# Patient Record
Sex: FEMALE | Race: WHITE | Employment: FULL TIME | URBAN - METROPOLITAN AREA
[De-identification: names, ages, dates, MRNs, and addresses within clinical notes are randomized per-mention and may not be internally consistent; named-entity substitution may affect disease eponyms.]

---

## 2017-04-28 DIAGNOSIS — I61.9 NONTRAUMATIC INTRACEREBRAL HEMORRHAGE OF BASAL GANGLIA (HCC): ICD-10-CM

## 2017-04-28 RX ORDER — HYDROCHLOROTHIAZIDE 25 MG/1
25 TABLET ORAL DAILY
Qty: 14 TAB | Refills: 0 | Status: SHIPPED | OUTPATIENT
Start: 2017-04-28 | End: 2018-01-29 | Stop reason: SDUPTHER

## 2017-05-01 ENCOUNTER — OFFICE VISIT (OUTPATIENT)
Dept: CARDIOLOGY CLINIC | Age: 47
End: 2017-05-01

## 2017-05-01 VITALS
BODY MASS INDEX: 26.92 KG/M2 | DIASTOLIC BLOOD PRESSURE: 72 MMHG | RESPIRATION RATE: 16 BRPM | WEIGHT: 161.6 LBS | SYSTOLIC BLOOD PRESSURE: 124 MMHG | HEART RATE: 74 BPM | HEIGHT: 65 IN | OXYGEN SATURATION: 99 %

## 2017-05-01 DIAGNOSIS — E78.5 DYSLIPIDEMIA: ICD-10-CM

## 2017-05-01 DIAGNOSIS — E10.8 TYPE 1 DIABETES MELLITUS WITH COMPLICATION (HCC): ICD-10-CM

## 2017-05-01 DIAGNOSIS — I25.10 CORONARY ARTERY DISEASE INVOLVING NATIVE CORONARY ARTERY OF NATIVE HEART WITHOUT ANGINA PECTORIS: Primary | ICD-10-CM

## 2017-05-01 RX ORDER — ACETAMINOPHEN, DIPHENHYDRAMINE HCL, PHENYLEPHRINE HCL 325; 25; 5 MG/1; MG/1; MG/1
TABLET ORAL DAILY
COMMUNITY

## 2017-05-01 RX ORDER — INSULIN LISPRO 100 [IU]/ML
INJECTION, SOLUTION INTRAVENOUS; SUBCUTANEOUS
COMMUNITY
End: 2020-02-05 | Stop reason: SDUPTHER

## 2017-05-01 RX ORDER — LISINOPRIL 40 MG/1
40 TABLET ORAL
COMMUNITY
End: 2020-05-15

## 2017-05-01 NOTE — PATIENT INSTRUCTIONS
[facility] Activation    Thank you for requesting access to [facility]. Please follow the instructions below to securely access and download your online medical record. [facility] allows you to send messages to your doctor, view your test results, renew your prescriptions, schedule appointments, and more. How Do I Sign Up? 1. In your internet browser, go to [email or web address]  2. Click on the First Time User? Click Here link in the Sign In box. You will be redirect to the New Member Sign Up page. 3. Enter your [facility] Access Code exactly as it appears below. You will not need to use this code after youve completed the sign-up process. If you do not sign up before the expiration date, you must request a new code. [facility] Access Code: [email or web address]  Expires: 2017  4:17 PM (This is the date your [facility] access code will )    4. Enter the last four digits of your Social Security Number (xxxx) and Date of Birth (mm/dd/yyyy) as indicated and click Submit. You will be taken to the next sign-up page. 5. Create a [facility] ID. This will be your [facility] login ID and cannot be changed, so think of one that is secure and easy to remember. 6. Create a [facility] password. You can change your password at any time. 7. Enter your Password Reset Question and Answer. This can be used at a later time if you forget your password. 8. Enter your e-mail address. You will receive e-mail notification when new information is available in [address]. 9. Click Sign Up. You can now view and download portions of your medical record. 10. Click the Download Summary menu link to download a portable copy of your medical information. Additional Information    If you have questions, please visit the Frequently Asked Questions section of the [facility] website at [email or web address] Remember, [facility] is NOT to be used for urgent needs. For medical emergencies, dial 911.            Learning About Coronary Artery Disease (CAD)  What is coronary artery disease? Coronary artery disease (CAD) occurs when plaque builds up in the arteries that bring oxygen-rich blood to your heart. Plaque is a fatty substance made of cholesterol, calcium, and other substances in the blood. This process is called hardening of the arteries, or atherosclerosis. What happens when you have coronary artery disease? · Plaque may narrow the coronary arteries. Narrowed arteries cause poor blood flow. This can lead to angina symptoms such as chest pain or discomfort. If blood flow is completely blocked, you could have a heart attack. · You can slow CAD and reduce the risk of future problems by making changes in your lifestyle. These include quitting smoking and eating heart-healthy foods. · Treatments for CAD, along with changes in your lifestyle, can help you live a longer and healthier life. How can you prevent coronary artery disease? · Do not smoke. It may be the best thing you can do to prevent heart disease. If you need help quitting, talk to your doctor about stop-smoking programs and medicines. These can increase your chances of quitting for good. · Be active. Get at least 30 minutes of exercise on most days of the week. Walking is a good choice. You also may want to do other activities, such as running, swimming, cycling, or playing tennis or team sports. · Eat heart-healthy foods. Eat more fruits and vegetables and less foods that contain saturated and trans fats. Limit alcohol, sodium, and sweets. · Stay at a healthy weight. Lose weight if you need to. · Manage other health problems such as diabetes, high blood pressure, and high cholesterol. · Manage stress. Stress can hurt your heart. To keep stress low, talk about your problems and feelings. Don't keep your feelings hidden. · If you have talked about it with your doctor, take a low-dose aspirin every day.  Aspirin can help certain people lower their risk of a heart attack or stroke. But taking aspirin isn't right for everyone, because it can cause serious bleeding. Do not start taking daily aspirin unless your doctor knows about it. How is coronary artery disease treated? · Your doctor will suggest that you make lifestyle changes. For example, your doctor may ask you to eat healthy foods, quit smoking, lose extra weight, and be more active. · You will have to take medicines. · Your doctor may suggest a procedure to open narrowed or blocked arteries. This is called angioplasty. Or your doctor may suggest using healthy blood vessels to create detours around narrowed or blocked arteries. This is called bypass surgery. Follow-up care is a key part of your treatment and safety. Be sure to make and go to all appointments, and call your doctor if you are having problems. It's also a good idea to know your test results and keep a list of the medicines you take. Where can you learn more? Go to http://kelly-dionte.info/. Enter [phone] in the search box to learn more about \"Learning About Coronary Artery Disease (CAD). \"  Current as of: January 27, 2016  Content Version: 11.2  © 3336-9048 [employer]. Care instructions adapted under license by [employer] (which disclaims liability or warranty for this information). If you have questions about a medical condition or this instruction, always ask your healthcare professional. [address] any warranty or liability for your use of this information.

## 2017-05-01 NOTE — MR AVS SNAPSHOT
Visit Information Date & Time Provider Department Dept. Phone Encounter #  
 5/1/2017  4:00 PM Aniket Orosco MD [facility] Elissa Smith [phone] [id number] Upcoming Health Maintenance Date Due HEMOGLOBIN A1C Q6M 1972 LIPID PANEL Q1 1972 FOOT EXAM Q1 2/5/1982 MICROALBUMIN Q1 2/5/1982 EYE EXAM RETINAL OR DILATED Q1 2/5/1982 Pneumococcal 19-64 Medium Risk (1 of 1 - PPSV23) 2/5/1991 DTaP/Tdap/Td series (1 - Tdap) 2/5/1993 PAP AKA CERVICAL CYTOLOGY 2/5/1993 INFLUENZA AGE 9 TO ADULT 8/1/2017 Allergies as of 5/1/2017  Review Complete On: 5/1/2017 By: Aniket Orosco MD  
  
 Severity Noted Reaction Type Reactions Erythromycin  08/26/2011    Hives Current Immunizations  Never Reviewed Name Date Influenza Vaccine 9/13/2016 Not reviewed this visit You Were Diagnosed With   
  
 Codes Comments Coronary artery disease involving native coronary artery of native heart without angina pectoris    -  Primary ICD-10-CM: I25.10 ICD-9-CM: 414.01 Vitals BP Pulse Resp Height(growth percentile) Weight(growth percentile) SpO2  
 124/72 (BP 1 Location: Left arm) 74 16 5' 5\" (1.651 m) 161 lb 9.6 oz (73.3 kg) 99% BMI OB Status Smoking Status 26.89 kg/m2 IUD Never Smoker Vitals History BMI and BSA Data Body Mass Index Body Surface Area  
 26.89 kg/m 2 1.83 m 2 Preferred Pharmacy Pharmacy Name Phone [id number] [address] [phone] Your Updated Medication List  
  
   
This list is accurate as of: 5/1/17  4:31 PM.  Always use your most recent med list.  
  
  
  
  
 COREG CR 10 mg CR capsule Generic drug:  carvedilol Take  by mouth daily (with breakfast). CRESTOR 20 mg tablet Generic drug:  rosuvastatin Take 20 mg by mouth daily. HumaLOG 100 unit/mL injection Generic drug:  insulin lispro Via insulin pump  
  
 hydroCHLOROthiazide 25 mg tablet Commonly known as:  HYDRODIURIL Take 1 Tab by mouth daily. Intrauterine Device (IUD) Iud  
by IntraUTERine route. Placed in Feb 2013 LEXAPRO 10 mg tablet Generic drug:  escitalopram oxalate Take 10 mg by mouth daily. lisinopril 40 mg tablet Commonly known as:  Landon Lofty Take 40 mg by mouth daily. spironolactone 25 mg tablet Commonly known as:  ALDACTONE Take 1 Tab by mouth three (3) days a week. VITAMIN B-12 5,000 mcg Subl Generic drug:  cyanocobalamin (vitamin B-12) by SubLINGual route daily. We Performed the Following AMB POC EKG ROUTINE W/ 12 LEADS, INTER & REP [23097 CPT(R)] Introducing [facility] SERVICES! Liana Yusuf introduces [employer] patient portal. Now you can access parts of your medical record, email your doctor's office, and request medication refills online. 1. In your internet browser, go to [email or web address] 2. Click on the First Time User? Click Here link in the Sign In box. You will see the New Member Sign Up page. 3. Enter your [employer] Access Code exactly as it appears below. You will not need to use this code after youve completed the sign-up process. If you do not sign up before the expiration date, you must request a new code. · [employer] Access Code: [email or web address] Expires: 7/30/2017  4:17 PM 
 
4. Enter the last four digits of your Social Security Number (xxxx) and Date of Birth (mm/dd/yyyy) as indicated and click Submit. You will be taken to the next sign-up page. 5. Create a [employer] ID. This will be your [employer] login ID and cannot be changed, so think of one that is secure and easy to remember. 6. Create a [employer] password. You can change your password at any time. 7. Enter your Password Reset Question and Answer. This can be used at a later time if you forget your password. 8. Enter your e-mail address. You will receive e-mail notification when new information is available in [address] 9. Click Sign Up. You can now view and download portions of your medical record. 10. Click the Download Summary menu link to download a portable copy of your medical information. If you have questions, please visit the Frequently Asked Questions section of the [employer] website. Remember, [employer] is NOT to be used for urgent needs. For medical emergencies, dial 911. Now available from your iPhone and Android! Please provide this summary of care documentation to your next provider. Your primary care clinician is listed as [address] [place] [address] Street. If you have any questions after today's visit, please call [phone].

## 2017-05-01 NOTE — PROGRESS NOTES
Subjective:     Problem List  Date Reviewed: 5/1/2017          Codes Class Noted    Nontraumatic acute hemorrhage of basal ganglia ([facility] [address] ICD-10-CM: I61.9  ICD-9-CM: 465  11/7/2016        Stroke (cerebrum) [facility]) ICD-10-CM: I63.9  ICD-9-CM: 434.91  9/23/2016        Coronary artery disease ICD-10-CM: I25.10  ICD-9-CM: 414.00  8/26/2011    Overview Addendum 11/5/2013  4:45 PM by Cary Lopez MD     a. Echo (11/21/6): EF 60%. PASP 20.  b. Exercise Cardiolite (11/17/6): Reversible anteroseptal defect extending to apex. EF 69%. c. Cath (11/28/6): LAD p80, m50; D1 50. OM1 40. RCA p40. EF 65%. No AVG/MR.  d. PCI pLAD (11/29/6): 2.5x13 Cypher. e. Exercise Myoview (10/4/7): Normal perfusion. EF 74%.  f. Exercise Echo (5/11/10): EF 60%. No ischemia. G.  Holter (6/27/13): Unremarkable. H. Exercise Cardiolite (6/27/13): Normal perfusion. EF 74%. Dyslipidemia ICD-10-CM: E78.5  ICD-9-CM: 272.4  8/26/2011    Overview Signed 8/26/2011  4:04 PM by Cary Lopez MD     a. FLP (10/26/6): Tot 168, , HDL 60, LDL 82 (Vytorin 10/80mg qd). b. Ana Spine (11/29/6): Tot 131, TG 73, HDL 44, LDL 72 (Vytorin 10/80mg qd). c. FLP (8/3/7): Tot 163, , HDL 53, LDL 86 (Vytorin 10/80mg qd). Diabetes mellitus type 1 (HCC) ICD-10-CM: E10.9  ICD-9-CM: 250.01  8/26/2011        History of peptic ulcer disease ICD-10-CM: Z87.11  ICD-9-CM: V12.71  8/26/2011              Ms. Matilde Santa is a 41 y.o. woman with the above past medical history, who presents for follow up of her coronary artery disease. She is doing well from a cardiac standpoint. We reviewed her most recent hospitalization when she had a cerebral bleed felt to be due to elevated blood pressure. Her symptoms completely resolved. She denies any chest pain, chest discomfort, shortness of breath, dyspnea on exertion, orthopnea, paroxysmal nocturnal dyspnea, lower extremity swelling, palpitations, syncope or near syncope.           History   Smoking Status  Never Smoker   Smokeless Tobacco    Never Used       Current Outpatient Prescriptions   Medication Sig Dispense Refill    lisinopril (PRINIVIL, ZESTRIL) 40 mg tablet Take 40 mg by mouth daily.  insulin lispro (HUMALOG) 100 unit/mL injection Via insulin pump      cyanocobalamin, vitamin B-12, (VITAMIN B-12) 5,000 mcg subl by SubLINGual route daily.  hydroCHLOROthiazide (HYDRODIURIL) 25 mg tablet Take 1 Tab by mouth daily. 14 Tab 0    spironolactone (ALDACTONE) 25 mg tablet Take 1 Tab by mouth three (3) days a week. 15 Tab 0    escitalopram (LEXAPRO) 10 mg tablet Take 10 mg by mouth daily.  Intrauterine Device, IUD, IUD by IntraUTERine route. Placed in Feb 2013      rosuvastatin (CRESTOR) 20 mg tablet Take 20 mg by mouth daily.  carvedilol (COREG CR) 10 mg CR capsule Take  by mouth daily (with breakfast). Objective:     Visit Vitals    /72 (BP 1 Location: Left arm)    Pulse 74    Resp 16    Ht 5' 5\" (1.651 m)    Wt 161 lb 9.6 oz (73.3 kg)    SpO2 99%    BMI 26.89 kg/m2       HEENT Exam:     Normocephalic, atraumatic. EOMI. Oropharynx negative. Neck supple. No lymphadenopathy. Lung Exam:     The patient is not dyspneic. There is no cough. The lungs are clear to percussion. Breath sounds are heard equally in all lung fields. There are no wheezes, rales, rhonchi, or rubs heard on auscultation. Heart Exam:     The rhythm is regular. The PMI is in the 5th intercostal space of the MCL. Apical impulse is normal. S1 is regular. S2 is physiologic. There is no S3, S4 gallop, murmur, click, or rub. Abdomen Exam:     Bowel sounds are normoactive. Abdomen benign. Extremities Exam:     The extremities are atraumatic appearing. There is no clubbing, cyanosis, edema, ulcers, varicose veins, rash, swelling, erythemia noted in the extremities. The neurovascular status is grossly intact with normal distal sensation and pulses.           Vascular Exam: The radial, brachial, dorsalis pedis, posterior tibial, are equal and strong bilaterally The carotids are equal bilaterally without bruits. EKG: NSR @ 74, no ischy. Assessment/Plan:     Ms. Efe Sarmiento appears stable from a cardiac standpoint. We are going to stay the course with her current medication regimen, and she is going to follow up with me in one year's time. We discussed diet and exercise. Plan:  1. Continue outpatient medication regimen. 2. Follow up with me in one year's time. 3. Diet and exercise. 4. Call my office, call her primary care physician, or return to the hospital should any concerning symptomatology arise. Ms. Efe Sarmiento indicated that she understood this plan and wished to proceed ahead.              Patient Care Team:  Nhan Bajwa MD as PCP - General (Family Practice)  Toro Sharp MD (Nephrology)  Gene Burroughs MD (Obstetrics & Gynecology)  Jordin Hou MD as Physician (Cardiology)  Leif Gaona MD (Neurology)  Eugenie Tran MD (Orthopedic Surgery)

## 2018-01-29 DIAGNOSIS — I61.9 NONTRAUMATIC INTRACEREBRAL HEMORRHAGE OF BASAL GANGLIA (HCC): ICD-10-CM

## 2018-01-29 RX ORDER — HYDROCHLOROTHIAZIDE 25 MG/1
25 TABLET ORAL DAILY
Qty: 14 TAB | Refills: 0 | Status: SHIPPED | OUTPATIENT
Start: 2018-01-29

## 2018-01-29 RX ORDER — HYDROCHLOROTHIAZIDE 25 MG/1
TABLET ORAL
Qty: 90 TAB | Refills: 2 | Status: SHIPPED | OUTPATIENT
Start: 2018-01-29 | End: 2019-12-11 | Stop reason: SDUPTHER

## 2018-01-29 RX ORDER — HYDROCHLOROTHIAZIDE 25 MG/1
25 TABLET ORAL DAILY
Qty: 30 TAB | Refills: 6 | Status: SHIPPED | OUTPATIENT
Start: 2018-01-29 | End: 2018-01-29 | Stop reason: ALTCHOICE

## 2018-01-29 NOTE — TELEPHONE ENCOUNTER
Patient stated that her new pharmacy is [facility]. She only has 1 pill left. Patient would like to  a few samples if possible Thanks!    Fax# [phone]  Phone [phone]  Phone [phone] sk

## 2018-03-08 ENCOUNTER — HOSPITAL ENCOUNTER (OUTPATIENT)
Dept: DIABETES SERVICES | Age: 48
Discharge: HOME | End: 2018-03-08

## 2018-05-31 ENCOUNTER — HOSPITAL ENCOUNTER (OUTPATIENT)
Dept: DIABETES SERVICES | Age: 48
Discharge: HOME | End: 2018-05-31
Attending: INTERNAL MEDICINE

## 2018-06-25 ENCOUNTER — HOSPITAL ENCOUNTER (OUTPATIENT)
Dept: DIABETES SERVICES | Age: 48
Discharge: HOME | End: 2018-06-25
Attending: INTERNAL MEDICINE

## 2018-07-13 DIAGNOSIS — I61.9 NONTRAUMATIC INTRACEREBRAL HEMORRHAGE OF BASAL GANGLIA (HCC): ICD-10-CM

## 2018-07-24 RX ORDER — HYDROCHLOROTHIAZIDE 25 MG/1
TABLET ORAL
Qty: 90 TAB | OUTPATIENT
Start: 2018-07-24

## 2019-01-04 ENCOUNTER — APPOINTMENT (OUTPATIENT)
Dept: ULTRASOUND IMAGING | Age: 49
End: 2019-01-04
Attending: EMERGENCY MEDICINE
Payer: COMMERCIAL

## 2019-01-04 ENCOUNTER — APPOINTMENT (OUTPATIENT)
Dept: GENERAL RADIOLOGY | Age: 49
End: 2019-01-04
Attending: EMERGENCY MEDICINE
Payer: COMMERCIAL

## 2019-01-04 ENCOUNTER — APPOINTMENT (OUTPATIENT)
Dept: CT IMAGING | Age: 49
End: 2019-01-04
Attending: EMERGENCY MEDICINE
Payer: COMMERCIAL

## 2019-01-04 ENCOUNTER — HOSPITAL ENCOUNTER (EMERGENCY)
Age: 49
Discharge: HOME | End: 2019-01-04
Attending: EMERGENCY MEDICINE
Payer: COMMERCIAL

## 2019-01-04 VITALS
BODY MASS INDEX: 24.99 KG/M2 | SYSTOLIC BLOOD PRESSURE: 107 MMHG | DIASTOLIC BLOOD PRESSURE: 61 MMHG | WEIGHT: 150 LBS | HEART RATE: 67 BPM | HEIGHT: 65 IN | TEMPERATURE: 98.2 F | RESPIRATION RATE: 13 BRPM | OXYGEN SATURATION: 99 %

## 2019-01-04 DIAGNOSIS — R10.13 ABDOMINAL PAIN, EPIGASTRIC: Primary | ICD-10-CM

## 2019-01-04 DIAGNOSIS — R11.2 NON-INTRACTABLE VOMITING WITH NAUSEA, UNSPECIFIED VOMITING TYPE: ICD-10-CM

## 2019-01-04 DIAGNOSIS — D72.829 LEUKOCYTOSIS, UNSPECIFIED TYPE: ICD-10-CM

## 2019-01-04 LAB
ALBUMIN SERPL-MCNC: 3.7 G/DL (ref 3.5–5)
ALBUMIN/GLOB SERPL: 1.1 {RATIO} (ref 1.1–2.2)
ALP SERPL-CCNC: 62 U/L (ref 45–117)
ALT SERPL-CCNC: 25 U/L (ref 12–78)
ANION GAP SERPL CALC-SCNC: 6 MMOL/L (ref 5–15)
APPEARANCE UR: CLEAR
AST SERPL-CCNC: 15 U/L (ref 15–37)
BACTERIA URNS QL MICRO: ABNORMAL /HPF
BASOPHILS # BLD: 0.1 K/UL (ref 0–0.1)
BASOPHILS NFR BLD: 0 % (ref 0–1)
BILIRUB SERPL-MCNC: 1.1 MG/DL (ref 0.2–1)
BILIRUB UR QL: NEGATIVE
BUN SERPL-MCNC: 23 MG/DL (ref 6–20)
BUN/CREAT SERPL: 19 (ref 12–20)
CALCIUM SERPL-MCNC: 9.3 MG/DL (ref 8.5–10.1)
CHLORIDE SERPL-SCNC: 104 MMOL/L (ref 97–108)
CK SERPL-CCNC: 72 U/L (ref 26–192)
CO2 SERPL-SCNC: 29 MMOL/L (ref 21–32)
COLOR UR: ABNORMAL
CREAT SERPL-MCNC: 1.23 MG/DL (ref 0.55–1.02)
DIFFERENTIAL METHOD BLD: ABNORMAL
EOSINOPHIL # BLD: 0.4 K/UL (ref 0–0.4)
EOSINOPHIL NFR BLD: 2 % (ref 0–7)
EPITH CASTS URNS QL MICRO: ABNORMAL /LPF
ERYTHROCYTE [DISTWIDTH] IN BLOOD BY AUTOMATED COUNT: 12.1 % (ref 11.5–14.5)
GLOBULIN SER CALC-MCNC: 3.3 G/DL (ref 2–4)
GLUCOSE SERPL-MCNC: 144 MG/DL (ref 65–100)
GLUCOSE UR STRIP.AUTO-MCNC: NEGATIVE MG/DL
HCT VFR BLD AUTO: 37.2 % (ref 35–47)
HGB BLD-MCNC: 12.8 G/DL (ref 11.5–16)
HGB UR QL STRIP: NEGATIVE
HYALINE CASTS URNS QL MICRO: ABNORMAL /LPF (ref 0–5)
IMM GRANULOCYTES # BLD: 0.1 K/UL (ref 0–0.04)
IMM GRANULOCYTES NFR BLD AUTO: 1 % (ref 0–0.5)
KETONES UR QL STRIP.AUTO: NEGATIVE MG/DL
LEUKOCYTE ESTERASE UR QL STRIP.AUTO: NEGATIVE
LYMPHOCYTES # BLD: 1.5 K/UL (ref 0.8–3.5)
LYMPHOCYTES NFR BLD: 9 % (ref 12–49)
MCH RBC QN AUTO: 33 PG (ref 26–34)
MCHC RBC AUTO-ENTMCNC: 34.4 G/DL (ref 30–36.5)
MCV RBC AUTO: 95.9 FL (ref 80–99)
MONOCYTES # BLD: 1.4 K/UL (ref 0–1)
MONOCYTES NFR BLD: 8 % (ref 5–13)
NEUTS SEG # BLD: 13 K/UL (ref 1.8–8)
NEUTS SEG NFR BLD: 80 % (ref 32–75)
NITRITE UR QL STRIP.AUTO: NEGATIVE
NRBC # BLD: 0 K/UL (ref 0–0.01)
NRBC BLD-RTO: 0 PER 100 WBC
PH UR STRIP: 5.5 [PH] (ref 5–8)
PLATELET # BLD AUTO: 279 K/UL (ref 150–400)
PMV BLD AUTO: 9 FL (ref 8.9–12.9)
POTASSIUM SERPL-SCNC: 4.2 MMOL/L (ref 3.5–5.1)
PROT SERPL-MCNC: 7 G/DL (ref 6.4–8.2)
PROT UR STRIP-MCNC: 30 MG/DL
RBC # BLD AUTO: 3.88 M/UL (ref 3.8–5.2)
RBC #/AREA URNS HPF: ABNORMAL /HPF (ref 0–5)
SODIUM SERPL-SCNC: 139 MMOL/L (ref 136–145)
SP GR UR REFRACTOMETRY: 1.02 (ref 1–1.03)
TROPONIN I SERPL-MCNC: <0.05 NG/ML
TROPONIN I SERPL-MCNC: <0.05 NG/ML
UROBILINOGEN UR QL STRIP.AUTO: 0.2 EU/DL (ref 0.2–1)
WBC # BLD AUTO: 16.4 K/UL (ref 3.6–11)
WBC URNS QL MICRO: ABNORMAL /HPF (ref 0–4)

## 2019-01-04 PROCEDURE — 76705 ECHO EXAM OF ABDOMEN: CPT

## 2019-01-04 PROCEDURE — 80053 COMPREHEN METABOLIC PANEL: CPT

## 2019-01-04 PROCEDURE — 93005 ELECTROCARDIOGRAM TRACING: CPT

## 2019-01-04 PROCEDURE — 74011250636 HC RX REV CODE- 250/636: Performed by: PHYSICIAN ASSISTANT

## 2019-01-04 PROCEDURE — 71046 X-RAY EXAM CHEST 2 VIEWS: CPT

## 2019-01-04 PROCEDURE — 74011250637 HC RX REV CODE- 250/637: Performed by: PHYSICIAN ASSISTANT

## 2019-01-04 PROCEDURE — 74177 CT ABD & PELVIS W/CONTRAST: CPT

## 2019-01-04 PROCEDURE — 96374 THER/PROPH/DIAG INJ IV PUSH: CPT

## 2019-01-04 PROCEDURE — 81001 URINALYSIS AUTO W/SCOPE: CPT

## 2019-01-04 PROCEDURE — 74011636320 HC RX REV CODE- 636/320: Performed by: EMERGENCY MEDICINE

## 2019-01-04 PROCEDURE — 74011250636 HC RX REV CODE- 250/636: Performed by: EMERGENCY MEDICINE

## 2019-01-04 PROCEDURE — 82550 ASSAY OF CK (CPK): CPT

## 2019-01-04 PROCEDURE — 85025 COMPLETE CBC W/AUTO DIFF WBC: CPT

## 2019-01-04 PROCEDURE — 36415 COLL VENOUS BLD VENIPUNCTURE: CPT

## 2019-01-04 PROCEDURE — 99285 EMERGENCY DEPT VISIT HI MDM: CPT

## 2019-01-04 PROCEDURE — 84484 ASSAY OF TROPONIN QUANT: CPT

## 2019-01-04 RX ORDER — SODIUM CHLORIDE 9 MG/ML
50 INJECTION, SOLUTION INTRAVENOUS
Status: COMPLETED | OUTPATIENT
Start: 2019-01-04 | End: 2019-01-04

## 2019-01-04 RX ORDER — SODIUM CHLORIDE 0.9 % (FLUSH) 0.9 %
10 SYRINGE (ML) INJECTION
Status: COMPLETED | OUTPATIENT
Start: 2019-01-04 | End: 2019-01-04

## 2019-01-04 RX ORDER — ONDANSETRON 2 MG/ML
4 INJECTION INTRAMUSCULAR; INTRAVENOUS
Status: COMPLETED | OUTPATIENT
Start: 2019-01-04 | End: 2019-01-04

## 2019-01-04 RX ORDER — BUTALBITAL, ACETAMINOPHEN AND CAFFEINE 50; 325; 40 MG/1; MG/1; MG/1
1 TABLET ORAL
Status: COMPLETED | OUTPATIENT
Start: 2019-01-04 | End: 2019-01-04

## 2019-01-04 RX ORDER — SODIUM CHLORIDE 0.9 % (FLUSH) 0.9 %
5-10 SYRINGE (ML) INJECTION AS NEEDED
Status: DISCONTINUED | OUTPATIENT
Start: 2019-01-04 | End: 2019-01-05 | Stop reason: HOSPADM

## 2019-01-04 RX ORDER — ONDANSETRON 4 MG/1
4 TABLET, ORALLY DISINTEGRATING ORAL
Qty: 10 TAB | Refills: 0 | Status: SHIPPED | OUTPATIENT
Start: 2019-01-04

## 2019-01-04 RX ORDER — SODIUM CHLORIDE 0.9 % (FLUSH) 0.9 %
5-10 SYRINGE (ML) INJECTION EVERY 8 HOURS
Status: DISCONTINUED | OUTPATIENT
Start: 2019-01-04 | End: 2019-01-05 | Stop reason: HOSPADM

## 2019-01-04 RX ADMIN — BUTALBITAL, ACETAMINOPHEN AND CAFFEINE 1 TABLET: 50; 325; 40 TABLET ORAL at 16:38

## 2019-01-04 RX ADMIN — Medication 10 ML: at 18:15

## 2019-01-04 RX ADMIN — IOPAMIDOL 100 ML: 755 INJECTION, SOLUTION INTRAVENOUS at 18:15

## 2019-01-04 RX ADMIN — ONDANSETRON 4 MG: 2 INJECTION INTRAMUSCULAR; INTRAVENOUS at 16:38

## 2019-01-04 RX ADMIN — SODIUM CHLORIDE 50 ML/HR: 900 INJECTION, SOLUTION INTRAVENOUS at 18:15

## 2019-01-04 NOTE — LETTER
[address] 
[facility] EMERGENCY DEPT 
[address] [place] [address] [address] [id number] [phone] Work/School Note Date: 1/4/2019 To Whom It May concern: 
 
Clement Cadet was seen and treated today in the emergency room by the following provider(s): 
Attending Provider: Brady Hardy MD. Clement Cadet No work till 1/16/19 Sincerely, Sade Hi MD

## 2019-01-04 NOTE — ED NOTES
PT. Presents to ED today for complaints of nausea/vomiting/diarrhea for the past week. Patient went back to work today and had an episode of nausea/vomiting and chest pain. Pt. Alert and oriented x4. Pt. Placed in position of comfort with call bell in reach.

## 2019-01-04 NOTE — ED PROVIDER NOTES
EMERGENCY DEPARTMENT HISTORY AND PHYSICAL EXAM      Date: 1/4/2019  Patient Name: John Pineda CARE NOTE:  3:54 PM  I have seen and evaluated this patient in the Express Care portion of triage for abdominal pain, nausea/vomiting around the Christmas holiday which returned today. Pt states she was at work when she had abrupt onset abdominal pain, vomiting, and an uncomfortable sensation between her shoulder blades. Pt states she has had a silent MI in the past with stent placement and became concerned at the abrupt nature of her symptoms. The patients care will begin now and orders have been placed. This patient will be seen and provided further care in the Emergency Room. DARIO Robert  History of Presenting Illness     Chief Complaint   Patient presents with    Nausea     The patient presents to the ED with complaints of nausea, dizziness and abdominal pain since 12/25/18    Dizziness    Abdominal Pain       History Provided By: Patient    HPI: Tobias Aguilar, 57 y.o. female with PMHx significant for CAD, DM, CKD and HTN, presents via EMS to the ED with cc of sudden onset, recurrent N/V/D x 1 week with associated lightheadedness, mild epigastric abd pain, a mild-moderate diffuse, pounding HA and tingling between the shoulder blades. Abd pain is worsened with palpation of the area. Pt states her symptoms initially began 12/26 and improved until today. She notes that she was at work today when she experienced a sudden need to vomit and move stool. Her other symptoms manifested briefly afterwards which prompted the trip to the ED for evaluation. Pt specifically denies a fever or chills. There are no other complaints, changes, or physical findings at this time. PCP: Rodrigo Crow MD    No current facility-administered medications on file prior to encounter.       Current Outpatient Medications on File Prior to Encounter   Medication Sig Dispense Refill    hydroCHLOROthiazide (HYDRODIURIL) 25 mg tablet Take 1 Tab by mouth daily. 14 Tab 0    lisinopril (PRINIVIL, ZESTRIL) 40 mg tablet Take 40 mg by mouth daily.  insulin lispro (HUMALOG) 100 unit/mL injection Via insulin pump      cyanocobalamin, vitamin B-12, (VITAMIN B-12) 5,000 mcg subl by SubLINGual route daily.  spironolactone (ALDACTONE) 25 mg tablet Take 1 Tab by mouth three (3) days a week. 15 Tab 0    escitalopram (LEXAPRO) 10 mg tablet Take 10 mg by mouth daily.  Intrauterine Device, IUD, IUD by IntraUTERine route. Placed in Feb 2013      rosuvastatin (CRESTOR) 20 mg tablet Take 20 mg by mouth daily.  carvedilol (COREG CR) 10 mg CR capsule Take  by mouth daily (with breakfast).       hydroCHLOROthiazide (HYDRODIURIL) 25 mg tablet TAKE 1 TABLET DAILY 90 Tab 2       Past History     Past Medical History:  Past Medical History:   Diagnosis Date    CKD (chronic kidney disease) stage 3, GFR 30-59 ml/min ([address]     Coronary artery disease 8/26/2011    with silent MI in 2006 and s/p one stent    Diabetes mellitus type 1 ([address] 8/26/2011    Age 7    Diabetic retinopathy ([address]     Dyslipidemia 8/26/2011    History of peptic ulcer disease 8/26/2011    from plavix    Hypertension        Past Surgical History:  Past Surgical History:   Procedure Laterality Date    CARDIAC SURG PROCEDURE UNLIST  stent placed with heart catherization    HX HEENT      laser surgery for retinopathy on multiple occasions and vitrectomy bilateral       Family History:  Family History   Problem Relation Age of Onset    Heart Disease Father         CABG    Dementia Father     Heart Disease Paternal Grandfather     Diabetes Maternal Grandmother         Type 2    Dementia Mother     Heart Disease Mother     Stroke Maternal Grandfather        Social History:  Social History     Tobacco Use    Smoking status: Never Smoker    Smokeless tobacco: Never Used   Substance Use Topics    Alcohol use: Yes     Comment: occ glass of wine  Drug use: No       Allergies: Allergies   Allergen Reactions    Erythromycin Hives         Review of Systems   Review of Systems   Constitutional: Negative. Negative for chills and fever. HENT: Negative. Negative for congestion and rhinorrhea. Respiratory: Negative. Negative for cough, chest tightness and wheezing. Cardiovascular: Negative. Negative for chest pain and palpitations. Gastrointestinal: Positive for abdominal pain, diarrhea, nausea and vomiting. Negative for constipation. Endocrine: Negative. Genitourinary: Negative. Negative for decreased urine volume, flank pain, hematuria and pelvic pain. Musculoskeletal: Negative. Negative for back pain and neck pain. Skin: Negative. Negative for color change, pallor and rash. Neurological: Positive for light-headedness and headaches. Negative for dizziness, seizures, weakness and numbness. +tingling   Hematological: Negative. Negative for adenopathy. Psychiatric/Behavioral: Negative. All other systems reviewed and are negative. Physical Exam   Physical Exam   Constitutional: She is oriented to person, place, and time. She appears well-developed and well-nourished. No distress. HENT:   Head: Normocephalic and atraumatic. Mouth/Throat: No oropharyngeal exudate. Eyes: Conjunctivae are normal. Pupils are equal, round, and reactive to light. Right eye exhibits no discharge. Left eye exhibits no discharge. No scleral icterus. Neck: Normal range of motion. Neck supple. No JVD present. Cardiovascular: Normal rate, regular rhythm, normal heart sounds and intact distal pulses. Exam reveals no gallop and no friction rub. No murmur heard. Pulmonary/Chest: Effort normal and breath sounds normal. No stridor. No respiratory distress. She has no wheezes. She has no rales. She exhibits no tenderness. Abdominal: Soft. Bowel sounds are normal. She exhibits no distension and no mass.  There is tenderness in the epigastric area. There is no rebound and no guarding. Neurological: She is alert and oriented to person, place, and time. She displays normal reflexes. No cranial nerve deficit. She exhibits normal muscle tone. Coordination normal.   Skin: Skin is warm. No rash noted. She is not diaphoretic. No pallor. Vitals reviewed. Diagnostic Study Results     Labs -     Recent Results (from the past 12 hour(s))   CBC WITH AUTOMATED DIFF    Collection Time: 01/04/19  2:19 PM   Result Value Ref Range    WBC 16.4 (H) 3.6 - 11.0 K/uL    RBC 3.88 3.80 - 5.20 M/uL    HGB 12.8 11.5 - 16.0 g/dL    HCT 37.2 35.0 - 47.0 %    MCV 95.9 80.0 - 99.0 FL    MCH 33.0 26.0 - 34.0 PG    MCHC 34.4 30.0 - 36.5 g/dL    RDW 12.1 11.5 - 14.5 %    PLATELET 784 542 - 213 K/uL    MPV 9.0 8.9 - 12.9 FL    NRBC 0.0 0  WBC    ABSOLUTE NRBC 0.00 0.00 - 0.01 K/uL    NEUTROPHILS 80 (H) 32 - 75 %    LYMPHOCYTES 9 (L) 12 - 49 %    MONOCYTES 8 5 - 13 %    EOSINOPHILS 2 0 - 7 %    BASOPHILS 0 0 - 1 %    IMMATURE GRANULOCYTES 1 (H) 0.0 - 0.5 %    ABS. NEUTROPHILS 13.0 (H) 1.8 - 8.0 K/UL    ABS. LYMPHOCYTES 1.5 0.8 - 3.5 K/UL    ABS. MONOCYTES 1.4 (H) 0.0 - 1.0 K/UL    ABS. EOSINOPHILS 0.4 0.0 - 0.4 K/UL    ABS. BASOPHILS 0.1 0.0 - 0.1 K/UL    ABS. IMM. GRANS. 0.1 (H) 0.00 - 0.04 K/UL    DF AUTOMATED     METABOLIC PANEL, COMPREHENSIVE    Collection Time: 01/04/19  2:19 PM   Result Value Ref Range    Sodium 139 136 - 145 mmol/L    Potassium 4.2 3.5 - 5.1 mmol/L    Chloride 104 97 - 108 mmol/L    CO2 29 21 - 32 mmol/L    Anion gap 6 5 - 15 mmol/L    Glucose 144 (H) 65 - 100 mg/dL    BUN 23 (H) 6 - 20 MG/DL    Creatinine 1.23 (H) 0.55 - 1.02 MG/DL    BUN/Creatinine ratio 19 12 - 20      GFR est AA 57 (L) >60 ml/min/1.73m2    GFR est non-AA 47 (L) >60 ml/min/1.73m2    Calcium 9.3 8.5 - 10.1 MG/DL    Bilirubin, total 1.1 (H) 0.2 - 1.0 MG/DL    ALT (SGPT) 25 12 - 78 U/L    AST (SGOT) 15 15 - 37 U/L    Alk.  phosphatase 62 45 - 117 U/L    Protein, total 7.0 6.4 - 8.2 g/dL    Albumin 3.7 3.5 - 5.0 g/dL    Globulin 3.3 2.0 - 4.0 g/dL    A-G Ratio 1.1 1.1 - 2.2     TROPONIN I    Collection Time: 01/04/19  2:19 PM   Result Value Ref Range    Troponin-I, Qt. <0.05 <0.05 ng/mL   CK W/ REFLX CKMB    Collection Time: 01/04/19  2:19 PM   Result Value Ref Range    CK 72 26 - 192 U/L   EKG, 12 LEAD, INITIAL    Collection Time: 01/04/19  3:48 PM   Result Value Ref Range    Ventricular Rate 81 BPM    Atrial Rate 81 BPM    P-R Interval 140 ms    QRS Duration 84 ms    Q-T Interval 372 ms    QTC Calculation (Bezet) 432 ms    Calculated P Axis 35 degrees    Calculated R Axis 21 degrees    Calculated T Axis 34 degrees    Diagnosis       Normal sinus rhythm  Septal infarct , age undetermined  When compared with ECG of 11-JAN-2014 07:25,  Septal infarct is now present     URINALYSIS W/ RFLX MICROSCOPIC    Collection Time: 01/04/19  4:03 PM   Result Value Ref Range    Color YELLOW/STRAW      Appearance CLEAR CLEAR      Specific gravity 1.017 1.003 - 1.030      pH (UA) 5.5 5.0 - 8.0      Protein 30 (A) NEG mg/dL    Glucose NEGATIVE  NEG mg/dL    Ketone NEGATIVE  NEG mg/dL    Bilirubin NEGATIVE  NEG      Blood NEGATIVE  NEG      Urobilinogen 0.2 0.2 - 1.0 EU/dL    Nitrites NEGATIVE  NEG      Leukocyte Esterase NEGATIVE  NEG      WBC 0-4 0 - 4 /hpf    RBC 0-5 0 - 5 /hpf    Epithelial cells FEW FEW /lpf    Bacteria 1+ (A) NEG /hpf    Hyaline cast 2-5 0 - 5 /lpf   TROPONIN I    Collection Time: 01/04/19  7:09 PM   Result Value Ref Range    Troponin-I, Qt. <0.05 <0.05 ng/mL       Radiologic Studies -   XR CHEST PA LAT   Final Result   Impression:   1. No acute cardiopulmonary disease         US ABD LTD   Final Result   IMPRESSION:   1.  No acute abnormality         CT ABD PELV W CONT   Final Result   IMPRESSION:   No acute abnormality        CT Results  (Last 48 hours)               01/04/19 1815  CT ABD PELV W CONT Final result    Impression:  IMPRESSION:   No acute abnormality       Narrative: EXAM: CT ABD PELV W CONT       INDICATION: Abdominal pain       COMPARISON: Ultrasound same day        CONTRAST: 100 mL of Isovue-370. TECHNIQUE:    Following the uneventful intravenous administration of contrast, thin axial   images were obtained through the abdomen and pelvis. Coronal and sagittal   reconstructions were generated. Oral contrast was not administered. CT dose   reduction was achieved through use of a standardized protocol tailored for this   examination and automatic exposure control for dose modulation. FINDINGS:    LUNG BASES: Clear. INCIDENTALLY IMAGED HEART AND MEDIASTINUM: Unremarkable. LIVER: No mass or biliary dilatation. GALLBLADDER: Unremarkable. SPLEEN: No mass. PANCREAS: No mass or ductal dilatation. ADRENALS: Unremarkable. KIDNEYS: No mass, calculus, or hydronephrosis. STOMACH: Unremarkable. SMALL BOWEL: No dilatation or wall thickening. COLON: No dilatation or wall thickening. APPENDIX: Unremarkable. PERITONEUM: No ascites or pneumoperitoneum. RETROPERITONEUM: No lymphadenopathy or aortic aneurysm. REPRODUCTIVE ORGANS: IUD within the uterus which is not enlarged   URINARY BLADDER: No mass or calculus. BONES: No destructive bone lesion. ADDITIONAL COMMENTS: N/A               CXR Results  (Last 48 hours)               01/04/19 1945  XR CHEST PA LAT Final result    Impression:  Impression:   1. No acute cardiopulmonary disease           Narrative:  INDICATION:  abdominal pain        Exam: Chest 2 views. Comparison: 1/11/2014. Findings: Cardiomediastinal silhouette is normal. Pulmonary vasculature is not   engorged. No focal parenchymal opacities, effusions, or pneumothorax. Bony   thorax is intact. Medical Decision Making   I am the first provider for this patient. I reviewed the vital signs, available nursing notes, past medical history, past surgical history, family history and social history.     Vital Signs-Reviewed the patient's vital signs. Patient Vitals for the past 12 hrs:   Temp Pulse Resp BP SpO2   01/04/19 1800 -- 70 15 98/57 96 %   01/04/19 1751 -- 72 12 111/64 --   01/04/19 1556 -- 79 -- 126/75 --   01/04/19 1401 98.3 °F (36.8 °C) 86 18 118/58 100 %       Pulse Oximetry Analysis - 100% on RA    Cardiac Monitor:   Rate: 86 bpm  Rhythm: Normal Sinus Rhythm     Records Reviewed: Nursing Notes and Old Medical Records    Provider Notes (Medical Decision Making):   DDx: coronary syndrome, hepatitis, pancreatitis, perforated viscus, aortic aneurysm, appendicitis    Impression: Hx of coronary disease to the ER with initial N/V and dizziness followed by upper abd pain. Had similar event shortly after Jose. Has no classic CP Sx in the ER. Will obtain serial enzymes, CT and make final disposition pending those results. ED Course:   Initial assessment performed. The patients presenting problems have been discussed, and they are in agreement with the care plan formulated and outlined with them. I have encouraged them to ask questions as they arise throughout their visit. Critical Care Time:   0    Disposition:  DISCHARGE NOTE:  8:29 PM  The patient is ready for discharge. The patients signs, symptoms, diagnosis, and instructions for discharge have been discussed and the pt has conveyed their understanding. The patient is to follow up as recommended or return to the ER should their symptoms worsen. Plan has been discussed and patient has conveyed their agreement. PLAN:  1. Current Discharge Medication List      START taking these medications    Details   ondansetron (ZOFRAN ODT) 4 mg disintegrating tablet Take 1 Tab by mouth every eight (8) hours as needed for Nausea. Qty: 10 Tab, Refills: 0           2.    Follow-up Information     Follow up With Specialties Details Why Contact Info    Iris Wilson MD [facility] Schedule an appointment as soon as possible for a visit in 2 days Rai [phone]      \[facility] [place] [facility]\"" EMERGENCY DEPT Emergency Medicine  If symptoms worsen [address] [place] [address]  [phone]        Return to ED if worse     Diagnosis     Clinical Impression:   1. Abdominal pain, epigastric    2. Leukocytosis, unspecified type    3. Non-intractable vomiting with nausea, unspecified vomiting type        Attestations: This note is prepared by Juan A Farr, acting as Scribe for Jarad Vaughn MD.    Jarad Vaughn MD: The scribe's documentation has been prepared under my direction and personally reviewed by me in its entirety. I confirm that the note above accurately reflects all work, treatment, procedures, and medical decision making performed by me.

## 2019-01-05 LAB
ATRIAL RATE: 68 BPM
ATRIAL RATE: 81 BPM
CALCULATED P AXIS, ECG09: 21 DEGREES
CALCULATED P AXIS, ECG09: 35 DEGREES
CALCULATED R AXIS, ECG10: 12 DEGREES
CALCULATED R AXIS, ECG10: 21 DEGREES
CALCULATED T AXIS, ECG11: 34 DEGREES
CALCULATED T AXIS, ECG11: 9 DEGREES
DIAGNOSIS, 93000: NORMAL
DIAGNOSIS, 93000: NORMAL
P-R INTERVAL, ECG05: 140 MS
P-R INTERVAL, ECG05: 158 MS
Q-T INTERVAL, ECG07: 372 MS
Q-T INTERVAL, ECG07: 394 MS
QRS DURATION, ECG06: 82 MS
QRS DURATION, ECG06: 84 MS
QTC CALCULATION (BEZET), ECG08: 418 MS
QTC CALCULATION (BEZET), ECG08: 432 MS
VENTRICULAR RATE, ECG03: 68 BPM
VENTRICULAR RATE, ECG03: 81 BPM

## 2019-01-05 NOTE — ED NOTES
Bedside and Verbal shift change report given to Duran Regalado RN and Alejandra Kay RN (oncoming nurse) by Ulysses Laundry, RN (offgoing nurse). Report included the following information SBAR, Kardex, ED Summary, [facility] and Recent Results.

## 2019-01-05 NOTE — DISCHARGE INSTRUCTIONS
Patient Education        Abdominal Pain: Care Instructions  Your Care Instructions    Abdominal pain has many possible causes. Some aren't serious and get better on their own in a few days. Others need more testing and treatment. If your pain continues or gets worse, you need to be rechecked and may need more tests to find out what is wrong. You may need surgery to correct the problem. Don't ignore new symptoms, such as fever, nausea and vomiting, urination problems, pain that gets worse, and dizziness. These may be signs of a more serious problem. Your doctor may have recommended a follow-up visit in the next 8 to 12 hours. If you are not getting better, you may need more tests or treatment. The doctor has checked you carefully, but problems can develop later. If you notice any problems or new symptoms, get medical treatment right away. Follow-up care is a key part of your treatment and safety. Be sure to make and go to all appointments, and call your doctor if you are having problems. It's also a good idea to know your test results and keep a list of the medicines you take. How can you care for yourself at home? · Rest until you feel better. · To prevent dehydration, drink plenty of fluids, enough so that your urine is light yellow or clear like water. Choose water and other caffeine-free clear liquids until you feel better. If you have kidney, heart, or liver disease and have to limit fluids, talk with your doctor before you increase the amount of fluids you drink. · If your stomach is upset, eat mild foods, such as rice, dry toast or crackers, bananas, and applesauce. Try eating several small meals instead of two or three large ones. · Wait until 48 hours after all symptoms have gone away before you have spicy foods, alcohol, and drinks that contain caffeine. · Do not eat foods that are high in fat. · Avoid anti-inflammatory medicines such as aspirin, ibuprofen (Advil, Motrin), and naproxen (Aleve). These can cause stomach upset. Talk to your doctor if you take daily aspirin for another health problem. When should you call for help? Call 911 anytime you think you may need emergency care. For example, call if:    · You passed out (lost consciousness).     · You pass maroon or very bloody stools.     · You vomit blood or what looks like coffee grounds.     · You have new, severe belly pain.    Call your doctor now or seek immediate medical care if:    · Your pain gets worse, especially if it becomes focused in one area of your belly.     · You have a new or higher fever.     · Your stools are black and look like tar, or they have streaks of blood.     · You have unexpected vaginal bleeding.     · You have symptoms of a urinary tract infection. These may include:  ? Pain when you urinate. ? Urinating more often than usual.  ? Blood in your urine.     · You are dizzy or lightheaded, or you feel like you may faint.    Watch closely for changes in your health, and be sure to contact your doctor if:    · You are not getting better after 1 day (24 hours). Where can you learn more? Go to http://kellyCentrobit Agoradionte.info/. Enter M906 in the search box to learn more about \"Abdominal Pain: Care Instructions. \"  Current as of: November 20, 2017  Content Version: 11.8  © 2811-3180 [employer]. Care instructions adapted under license by [employer] (which disclaims liability or warranty for this information). If you have questions about a medical condition or this instruction, always ask your healthcare professional. [place] [address] any warranty or liability for your use of this information. Patient Education        Nausea and Vomiting: Care Instructions  Your Care Instructions    When you are nauseated, you may feel weak and sweaty and notice a lot of saliva in your mouth. Nausea often leads to vomiting.  Most of the time you do not need to worry about nausea and vomiting, but they can be signs of other illnesses. Two common causes of nausea and vomiting are stomach flu and food poisoning. Nausea and vomiting from viral stomach flu will usually start to improve within 24 hours. Nausea and vomiting from food poisoning may last from 12 to 48 hours. The doctor has checked you carefully, but problems can develop later. If you notice any problems or new symptoms, get medical treatment right away. Follow-up care is a key part of your treatment and safety. Be sure to make and go to all appointments, and call your doctor if you are having problems. It's also a good idea to know your test results and keep a list of the medicines you take. How can you care for yourself at home? · To prevent dehydration, drink plenty of fluids, enough so that your urine is light yellow or clear like water. Choose water and other caffeine-free clear liquids until you feel better. If you have kidney, heart, or liver disease and have to limit fluids, talk with your doctor before you increase the amount of fluids you drink. · Rest in bed until you feel better. · When you are able to eat, try clear soups, mild foods, and liquids until all symptoms are gone for 12 to 48 hours. Other good choices include dry toast, crackers, cooked cereal, and gelatin dessert, such as Jell-O. When should you call for help? Call 911 anytime you think you may need emergency care. For example, call if:    · You passed out (lost consciousness).    Call your doctor now or seek immediate medical care if:    · You have symptoms of dehydration, such as:  ? Dry eyes and a dry mouth. ? Passing only a little dark urine. ?  Feeling thirstier than usual.     · You have new or worsening belly pain.     · You have a new or higher fever.     · You vomit blood or what looks like coffee grounds.    Watch closely for changes in your health, and be sure to contact your doctor if:    · You have ongoing nausea and vomiting.     · Your vomiting is getting worse.     · Your vomiting lasts longer than 2 days.     · You are not getting better as expected. Where can you learn more? Go to http://kelly-dionte.info/. Enter 25 [id number] in the search box to learn more about \"Nausea and Vomiting: Care Instructions. \"  Current as of: 2017  Content Version: 11.8  © 7137-6322 [employer]. Care instructions adapted under license by [employer] (which disclaims liability or warranty for this information). If you have questions about a medical condition or this instruction, always ask your healthcare professional. [place] [address] any warranty or liability for your use of this information. [employer] Activation    Thank you for requesting access to [employer]. Please follow the instructions below to securely access and download your online medical record. [employer] allows you to send messages to your doctor, view your test results, renew your prescriptions, schedule appointments, and more. How Do I Sign Up? 1. In your internet browser, go to [email or web address]  2. Click on the First Time User? Click Here link in the Sign In box. You will be redirect to the New Member Sign Up page. 3. Enter your [employer] Access Code exactly as it appears below. You will not need to use this code after youve completed the sign-up process. If you do not sign up before the expiration date, you must request a new code. [employer] Access Code: [email or web address]  Expires: 2019  2:00 PM (This is the date your [employer] access code will )    4. Enter the last four digits of your Social Security Number (xxxx) and Date of Birth (mm/dd/yyyy) as indicated and click Submit. You will be taken to the next sign-up page. 5. Create a [employer] ID. This will be your [employer] login ID and cannot be changed, so think of one that is secure and easy to remember. 6. Create a [employer] password.  You can change your password at any time. 7. Enter your Password Reset Question and Answer. This can be used at a later time if you forget your password. 8. Enter your e-mail address. You will receive e-mail notification when new information is available in [address] 9. Click Sign Up. You can now view and download portions of your medical record. 10. Click the Download Summary menu link to download a portable copy of your medical information. Additional Information    If you have questions, please visit the Frequently Asked Questions section of the [employer] website at [email or web address] Remember, [employer] is NOT to be used for urgent needs. For medical emergencies, dial 911.

## 2019-04-16 ENCOUNTER — OFFICE VISIT (OUTPATIENT)
Dept: NEUROLOGY | Age: 49
End: 2019-04-16

## 2019-04-16 VITALS
BODY MASS INDEX: 26.99 KG/M2 | SYSTOLIC BLOOD PRESSURE: 132 MMHG | TEMPERATURE: 98.8 F | WEIGHT: 162 LBS | RESPIRATION RATE: 14 BRPM | DIASTOLIC BLOOD PRESSURE: 84 MMHG | OXYGEN SATURATION: 99 % | HEART RATE: 98 BPM | HEIGHT: 65 IN

## 2019-04-16 DIAGNOSIS — G44.89 OTHER HEADACHE SYNDROME: Primary | ICD-10-CM

## 2019-04-16 NOTE — PROGRESS NOTES
Visit Vitals  /84 (BP 1 Location: Right arm, BP Patient Position: Sitting)   Pulse 98   Temp 98.8 °F (37.1 °C) (Oral)   Resp 14   Ht 5' 5\" (1.651 m)   Wt 73.5 kg (162 lb)   SpO2 99%   BMI 26.96 kg/m²     Chief Complaint   Patient presents with    Headache     dizziness/ nausea December, january, march- SOB as well    Other     hospital mention vaso vagel      Order placed for Cambia 50 mg powder pack, PO, per Verbal Order from Dr. Jovanni Mendoza on 4/16/2019 due to migraine. Instructed patient that Dr. Jovanni Mendoza does not want her taking the Cambia until we get the results form the CT of the head. If everything is fine with the CT of the head, than she may take the cambia as needed for migraine.  Patient stated understanding

## 2019-04-16 NOTE — LETTER
4/16/19 Patient: Ranjan Pacheco YOB: 1970 Date of Visit: 4/16/2019 Derik Reese MD 
[address] [id number] VIA Facsimile: [phone] Dear Derik Reese MD, Thank you for referring Ms. Dirk Fowler to [facility] for evaluation. My notes for this consultation are attached. If you have questions, please do not hesitate to call me. I look forward to following your patient along with you. Sincerely, Joan Oneil MD

## 2019-04-16 NOTE — PROGRESS NOTES
Neurology Progress Note    Patient ID:  Amanda Flores  [id number]  45 y.o.  1970      Subjective:   History:  Amanda Flores is a female who  has a past medical history of CKD (chronic kidney disease) stage 3, GFR 30-59 ml/min; Coronary artery disease (8/26/2011); Diabetes mellitus type 1 ([facility] [address] (8/26/2011); Diabetic retinopathy ([facility] Dyslipidemia (8/26/2011); History of peptic ulcer disease (8/26/2011); and Hypertension. who last Sept 23, 2016, was doing a phone interview when she noted tingling of the R hand with heaviness of the R arm and R leg associated with nausea consistent with a L internal capsule hemorrhage, probably due to uncontrolled HTN after missing her BP medication that day and stress with the interview.       On Dec 2018, patient will have periods of  Feeling sick described as having lightheadedness, nausea, with upset stomach that lasts for 3 days. (-) vomiting (+) diarrhea (+) chills     (+) headache bifrontal, 6/10, lasting for 3 days, dull aching, (+) photophobia (+) phonophobia (+) blurred vision. Last event was 1st week of March. Patient see Dr [place] cardiologist and work up was said to be okay. ROS:  Per HPI-  Otherwise the remainder of ROS was negative    Social Hx  Social History     Socioeconomic History    Marital status: SINGLE     Spouse name: Not on file    Number of children: Not on file    Years of education: Not on file    Highest education level: Not on file   Tobacco Use    Smoking status: Never Smoker    Smokeless tobacco: Never Used   Substance and Sexual Activity    Alcohol use: Yes     Comment: occ glass of wine    Drug use: No   Social History Narrative    Lives in [facility] alone. No kids. 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 Likes to work out and go to sporting events and plays with animals.          Meds:  Current Outpatient Medications on File Prior to Visit   Medication Sig Dispense Refill    ondansetron [facility] ODT) 4 mg disintegrating tablet Take 1 Tab by mouth every eight (8) hours as needed for Nausea. 10 Tab 0    hydroCHLOROthiazide (HYDRODIURIL) 25 mg tablet TAKE 1 TABLET DAILY 90 Tab 2    lisinopril (PRINIVIL, ZESTRIL) 40 mg tablet Take 40 mg by mouth daily.  insulin lispro (HUMALOG) 100 unit/mL injection Via insulin pump      cyanocobalamin, vitamin B-12, (VITAMIN B-12) 5,000 mcg subl by SubLINGual route daily.  spironolactone (ALDACTONE) 25 mg tablet Take 1 Tab by mouth three (3) days a week. 15 Tab 0    escitalopram (LEXAPRO) 10 mg tablet Take 10 mg by mouth daily.  Intrauterine Device, IUD, IUD by IntraUTERine route. Placed in Feb 2013      rosuvastatin (CRESTOR) 20 mg tablet Take 20 mg by mouth daily.  carvedilol (COREG CR) 10 mg CR capsule Take  by mouth daily (with breakfast).  hydroCHLOROthiazide (HYDRODIURIL) 25 mg tablet Take 1 Tab by mouth daily. 14 Tab 0     No current facility-administered medications on file prior to visit. Imaging:    CT Results (recent):  Results from Hospital Encounter encounter on 01/04/19   CT ABD PELV W CONT    Narrative EXAM: CT ABD PELV W CONT    INDICATION: Abdominal pain    COMPARISON: Ultrasound same day     CONTRAST: 100 mL of Isovue-370. TECHNIQUE:   Following the uneventful intravenous administration of contrast, thin axial  images were obtained through the abdomen and pelvis. Coronal and sagittal  reconstructions were generated. Oral contrast was not administered. CT dose  reduction was achieved through use of a standardized protocol tailored for this  examination and automatic exposure control for dose modulation. FINDINGS:   LUNG BASES: Clear. INCIDENTALLY IMAGED HEART AND MEDIASTINUM: Unremarkable. LIVER: No mass or biliary dilatation. GALLBLADDER: Unremarkable. SPLEEN: No mass. PANCREAS: No mass or ductal dilatation. ADRENALS: Unremarkable. KIDNEYS: No mass, calculus, or hydronephrosis. STOMACH: Unremarkable.   SMALL BOWEL: No dilatation or wall thickening. COLON: No dilatation or wall thickening. APPENDIX: Unremarkable. PERITONEUM: No ascites or pneumoperitoneum. RETROPERITONEUM: No lymphadenopathy or aortic aneurysm. REPRODUCTIVE ORGANS: IUD within the uterus which is not enlarged  URINARY BLADDER: No mass or calculus. BONES: No destructive bone lesion. ADDITIONAL COMMENTS: N/A      Impression IMPRESSION:  No acute abnormality       MRI Results (recent):  No results found for this or any previous visit. IR Results (recent):  No results found for this or any previous visit. VAS/US Results (recent):  No results found for this or any previous visit. Reviewed records in [facility] and [employer] tab today    Lab Review     No visits with results within 3 Month(s) from this visit. Latest known visit with results is:   Admission on 01/04/2019, Discharged on 01/04/2019   Component Date Value Ref Range Status    WBC 01/04/2019 16.4* 3.6 - 11.0 K/uL Final    Comment: Due to mathematical rounding between the [address], and the new [employer] analyzers, the reported automated differential may vary by up to +/- 0.5% per cell line. This finding may produce a result that is 100% +/- 3%, which is clinically insignificant.       RBC 01/04/2019 3.88  3.80 - 5.20 M/uL Final    HGB 01/04/2019 12.8  11.5 - 16.0 g/dL Final    HCT 01/04/2019 37.2  35.0 - 47.0 % Final    MCV 01/04/2019 95.9  80.0 - 99.0 FL Final    MCH 01/04/2019 33.0  26.0 - 34.0 PG Final    MCHC 01/04/2019 34.4  30.0 - 36.5 g/dL Final    RDW 01/04/2019 12.1  11.5 - 14.5 % Final    PLATELET 53/27/8668 707  150 - 400 K/uL Final    MPV 01/04/2019 9.0  8.9 - 12.9 FL Final    NRBC 01/04/2019 0.0  0  WBC Final    ABSOLUTE NRBC 01/04/2019 0.00  0.00 - 0.01 K/uL Final    NEUTROPHILS 01/04/2019 80* 32 - 75 % Final    LYMPHOCYTES 01/04/2019 9* 12 - 49 % Final    MONOCYTES 01/04/2019 8  5 - 13 % Final    EOSINOPHILS 01/04/2019 2  0 - 7 % Final    BASOPHILS 01/04/2019 0  0 - 1 % Final    IMMATURE GRANULOCYTES 01/04/2019 1* 0.0 - 0.5 % Final    ABS. NEUTROPHILS 01/04/2019 13.0* 1.8 - 8.0 K/UL Final    ABS. LYMPHOCYTES 01/04/2019 1.5  0.8 - 3.5 K/UL Final    ABS. MONOCYTES 01/04/2019 1.4* 0.0 - 1.0 K/UL Final    ABS. EOSINOPHILS 01/04/2019 0.4  0.0 - 0.4 K/UL Final    ABS. BASOPHILS 01/04/2019 0.1  0.0 - 0.1 K/UL Final    ABS. IMM. GRANS. 01/04/2019 0.1* 0.00 - 0.04 K/UL Final    DF 01/04/2019 AUTOMATED    Final    Sodium 01/04/2019 139  136 - 145 mmol/L Final    Potassium 01/04/2019 4.2  3.5 - 5.1 mmol/L Final    Chloride 01/04/2019 104  97 - 108 mmol/L Final    CO2 01/04/2019 29  21 - 32 mmol/L Final    Anion gap 01/04/2019 6  5 - 15 mmol/L Final    Glucose 01/04/2019 144* 65 - 100 mg/dL Final    BUN 01/04/2019 23* 6 - 20 MG/DL Final    Creatinine 01/04/2019 1.23* 0.55 - 1.02 MG/DL Final    BUN/Creatinine ratio 01/04/2019 19  12 - 20   Final    GFR est AA 01/04/2019 57* >60 ml/min/1.73m2 Final    GFR est non-AA 01/04/2019 47* >60 ml/min/1.73m2 Final    Comment: Estimated GFR is calculated using the IDMS-traceable Modification of Diet in Renal Disease (MDRD) Study equation, reported for both  Americans (GFRAA) and non- Americans (GFRNA), and normalized to 1.73m2 body surface area. The physician must decide which value applies to the patient. The MDRD study equation should only be used in individuals age 25 or older. It has not been validated for the following: pregnant women, patients with serious comorbid conditions, or on certain medications, or persons with extremes of body size, muscle mass, or nutritional status.  Calcium 01/04/2019 9.3  8.5 - 10.1 MG/DL Final    Bilirubin, total 01/04/2019 1.1* 0.2 - 1.0 MG/DL Final    ALT (SGPT) 01/04/2019 25  12 - 78 U/L Final    AST (SGOT) 01/04/2019 15  15 - 37 U/L Final    Alk.  phosphatase 01/04/2019 62  45 - 117 U/L Final    Protein, total 01/04/2019 7.0  6.4 - 8.2 g/dL Final    Albumin 01/04/2019 3.7  3.5 - 5.0 g/dL Final    Globulin 01/04/2019 3.3  2.0 - 4.0 g/dL Final    A-G Ratio 01/04/2019 1.1  1.1 - 2.2   Final    Color 01/04/2019 YELLOW/STRAW    Final    Color Reference Range: Straw, Yellow or Dark Yellow    Appearance 01/04/2019 CLEAR  CLEAR   Final    Specific gravity 01/04/2019 1.017  1.003 - 1.030   Final    pH (UA) 01/04/2019 5.5  5.0 - 8.0   Final    Protein 01/04/2019 30* NEG mg/dL Final    Glucose 01/04/2019 NEGATIVE   NEG mg/dL Final    Ketone 01/04/2019 NEGATIVE   NEG mg/dL Final    Bilirubin 01/04/2019 NEGATIVE   NEG   Final    Blood 01/04/2019 NEGATIVE   NEG   Final    Urobilinogen 01/04/2019 0.2  0.2 - 1.0 EU/dL Final    Nitrites 01/04/2019 NEGATIVE   NEG   Final    Leukocyte Esterase 01/04/2019 NEGATIVE   NEG   Final    WBC 01/04/2019 0-4  0 - 4 /hpf Final    RBC 01/04/2019 0-5  0 - 5 /hpf Final    Epithelial cells 01/04/2019 FEW  FEW /lpf Final    Epithelial cell category consists of squamous cells and /or transitional urothelial cells. Renal tubular cells, if present, are separately identified as such.  Bacteria 01/04/2019 1+* NEG /hpf Final    Hyaline cast 01/04/2019 2-5  0 - 5 /lpf Final    Troponin-I, Qt. 01/04/2019 <0.05  <0.05 ng/mL Final    Comment: The presence of detectable troponin above the reference range indicates myocardial injury which may be due to ischemia, myocarditis, trauma, etc.  Clinical correlation is necessary to establish the significance of this finding. Sequential testing is recommended to determine if the typical rise and fall of cTnI is demonstrated. Note:  Cardiac troponin I has a relatively long half life and may be present well after the CK MB has returned to baseline. The reference range is based on the 99th percentile of the referent population.       Ventricular Rate 01/04/2019 81  BPM Final    Atrial Rate 01/04/2019 81  BPM Final    P-R Interval 01/04/2019 140  ms Final    QRS Duration 01/04/2019 84  ms Final    Q-T Interval 01/04/2019 372  ms Final    QTC Calculation (Bezet) 01/04/2019 432  ms Final    Calculated P Axis 01/04/2019 35  degrees Final    Calculated R Axis 01/04/2019 21  degrees Final    Calculated T Axis 01/04/2019 34  degrees Final    Diagnosis 01/04/2019    Final                    Value:Normal sinus rhythm  Poor Anterior Forces  When compared with ECG of 11-JAN-2014 07:25,  No significant change was found  Confirmed by Simeon Jurado ([id number]) on 1/5/2019 2:37:30 PM      CK 01/04/2019 72  26 - 192 U/L Final    NOT ELEVATED,CKMB-QUANT NOT PERFORMED    Troponin-I, Qt. 01/04/2019 <0.05  <0.05 ng/mL Final    Ventricular Rate 01/04/2019 68  BPM Final    Atrial Rate 01/04/2019 68  BPM Final    P-R Interval 01/04/2019 158  ms Final    QRS Duration 01/04/2019 82  ms Final    Q-T Interval 01/04/2019 394  ms Final    QTC Calculation (Bezet) 01/04/2019 418  ms Final    Calculated P Axis 01/04/2019 21  degrees Final    Calculated R Axis 01/04/2019 12  degrees Final    Calculated T Axis 01/04/2019 9  degrees Final    Diagnosis 01/04/2019    Final                    Value:Normal sinus rhythm  Poor Anterior Forces  Confirmed by Simeon Wilkins ([id number]) on 1/5/2019 2:37:46 PM           Objective:       Exam:  Visit Vitals  /84 (BP 1 Location: Right arm, BP Patient Position: Sitting)   Pulse 98   Temp 98.8 °F (37.1 °C) (Oral)   Resp 14   Ht 5' 5\" (1.651 m)   Wt 73.5 kg (162 lb)   SpO2 99%   BMI 26.96 kg/m²     Gen: Awake, alert, follows commands  Appropriate appearance, normal speech. Oriented to all spheres. No visual field defect on confrontation exam.  Full eyes movement, with no nystagmus, no diplopia, no ptosis. Normal gag and swallow.   All remaining cranial nerves were normal  Motor function: 5/5 in all extremities  Sensory: intact to LT, PP and JPS  DTRs ++ in all extremities, (-) Babinski  Good FTN and HTS   Gait: Normal    Assessment: ICD-10-CM ICD-9-CM    1. Other headache syndrome G44.89 339.89 CT HEAD WO CONT     Headache with nausea, vomiting, upset stomach, photophobia and phonophobia; consider migraine. Evaluate for intracranial cause of symptoms (history of L basal ganglia bleed) - also slipped on ice and hit head on Jan 2019      Plan:   1. Will do CT head to look for new bleeds to explain symptoms. Patient to call for results  2. Given Cambia to try to abort headache if CT head is negative for bleed  3. Monitor for triggers (I.e. Hormonal changes)  4. Depending on above, will consider trial of migraine preventative (I.e. Topamax)      Follow-up and Dispositions    · Return in about 3 months (around 7/16/2019).                Joan Oneil MD  Diplomate, American Board of Psychiatry and Neurology  Diplomate, Neuromuscular Medicine  Diplomate, American Board of Electrodiagnostic Medicine

## 2019-04-16 NOTE — PATIENT INSTRUCTIONS
[facility]   Statement to Patients  April 1, 2014      In an effort to ensure the large volume of patient prescription refills is processed in the most efficient and expeditious manner, we are asking our patients to assist us by calling your Pharmacy for all prescription refills, this will include also your  Mail Order Pharmacy. The pharmacy will contact our office electronically to continue the refill process. Please do not wait until the last minute to call your pharmacy. We need at least 48 hours (2days) to fill prescriptions. We also encourage you to call your pharmacy before going to  your prescription to make sure it is ready. With regard to controlled substance prescription refill requests (narcotic refills) that need to be picked up at our office, we ask your cooperation by providing us with at least 72 hours (3days) notice that you will need a refill. We will not refill narcotic prescription refill requests after 4:00pm on any weekday, Monday through Thursday, or after 2:00pm on Fridays, or on the weekends. We encourage everyone to explore another way of getting your prescription refill request processed using V[employer], our patient web portal through our electronic medical record system. Vidit is an efficient and effective way to communicate your medication request directly to the office and  downloadable as an maryann on your smart phone . Vidit also features a review functionality that allows you to view your medication list as well as leave messages for your physician. Are you ready to get connected? If so please review the attatched instructions or speak to any of our staff to get you set up right away! Thank you so much for your cooperation. Should you have any questions please contact our Practice Administrator.     The Physicians and Staff,  [facility]   Patient Instruction Plan/ Result Policy    If we have ordered testing for you, know that; \"NO NEWS IS GOOD NEWS! \" It is our policy that we know longer call patients with results, nor do we  give test results over the phone. We schedule follow up appointments so that your results can be discussed in person. This allows you to address any questions you have regarding the results. If something of concern is revealed on your test, we will contact you to discuss the matter and if needed schedule a sooner follow up appointment. Additionally, results may be found by using the My Chart feature and one of our patient service representatives at the  can give you instructions on how to access this feature to utilize our electronic medical record system. Thank you for your understanding. Migraine Headache: Care Instructions  Your Care Instructions  Migraines are painful, throbbing headaches that often start on one side of the head. They may cause nausea and vomiting and make you sensitive to light, sound, or smell. Without treatment, migraines can last from 4 hours to a few days. Medicines can help prevent migraines or stop them after they have started. Your doctor can help you find which ones work best for you. Follow-up care is a key part of your treatment and safety. Be sure to make and go to all appointments, and call your doctor if you are having problems. It's also a good idea to know your test results and keep a list of the medicines you take. How can you care for yourself at home? · Do not drive if you have taken a prescription pain medicine. · Rest in a quiet, dark room until your headache is gone. Close your eyes, and try to relax or go to sleep. Don't watch TV or read. · Put a cold, moist cloth or cold pack on the painful area for 10 to 20 minutes at a time. Put a thin cloth between the cold pack and your skin. · Use a warm, moist towel or a heating pad set on low to relax tight shoulder and neck muscles.   · Have someone gently massage your neck and shoulders. · Take your medicines exactly as prescribed. Call your doctor if you think you are having a problem with your medicine. You will get more details on the specific medicines your doctor prescribes. · Be careful not to take pain medicine more often than the instructions allow. You could get worse or more frequent headaches when the medicine wears off. To prevent migraines  · Keep a headache diary so you can figure out what triggers your headaches. Avoiding triggers may help you prevent headaches. Record when each headache began, how long it lasted, and what the pain was like. (Was it throbbing, aching, stabbing, or dull?) Write down any other symptoms you had with the headache, such as nausea, flashing lights or dark spots, or sensitivity to bright light or loud noise. Note if the headache occurred near your period. List anything that might have triggered the headache. Triggers may include certain foods (chocolate, cheese, wine) or odors, smoke, bright light, stress, or lack of sleep. · If your doctor has prescribed medicine for your migraines, take it as directed. You may have medicine that you take only when you get a migraine and medicine that you take all the time to help prevent migraines. ? If your doctor has prescribed medicine for when you get a headache, take it at the first sign of a migraine, unless your doctor has given you other instructions. ? If your doctor has prescribed medicine to prevent migraines, take it exactly as prescribed. Call your doctor if you think you are having a problem with your medicine. · Find healthy ways to deal with stress. Migraines are most common during or right after stressful times. Take time to relax before and after you do something that has caused a migraine in the past.  · Try to keep your muscles relaxed by keeping good posture. Check your jaw, face, neck, and shoulder muscles for tension. Try to relax them.  When you sit at a desk, change positions often. And make sure to stretch for 30 seconds each hour. · Get plenty of sleep and exercise. · Eat meals on a regular schedule. Avoid foods and drinks that often trigger migraines. These include chocolate, alcohol (especially red wine and port), aspartame, monosodium glutamate (MSG), and some additives found in foods (such as hot dogs, fuentes, cold cuts, aged cheeses, and pickled foods). · Limit caffeine. Don't drink too much coffee, tea, or soda. But don't quit caffeine suddenly. That can also give you migraines. · Do not smoke or allow others to smoke around you. If you need help quitting, talk to your doctor about stop-smoking programs and medicines. These can increase your chances of quitting for good. · If you are taking birth control pills or hormone therapy, talk to your doctor about whether they are triggering your migraines. When should you call for help? Call 911 anytime you think you may need emergency care. For example, call if:    · You have signs of a stroke. These may include:  ? Sudden numbness, paralysis, or weakness in your face, arm, or leg, especially on only one side of your body. ? Sudden vision changes. ? Sudden trouble speaking. ? Sudden confusion or trouble understanding simple statements. ? Sudden problems with walking or balance. ? A sudden, severe headache that is different from past headaches.    Call your doctor now or seek immediate medical care if:    · You have new or worse nausea and vomiting.     · You have a new or higher fever.     · Your headache gets much worse.    Watch closely for changes in your health, and be sure to contact your doctor if:    · You are not getting better after 2 days (48 hours). Where can you learn more? Go to http://kelly-dionte.info/. Enter I367 in the search box to learn more about \"Migraine Headache: Care Instructions. \"  Current as of: Iza 3, 2018  Content Version: 11.9  © 7280-6380 [employer]. Care instructions adapted under license by [employer] (which disclaims liability or warranty for this information). If you have questions about a medical condition or this instruction, always ask your healthcare professional. [address] any warranty or liability for your use of this information.

## 2019-05-03 ENCOUNTER — HOSPITAL ENCOUNTER (OUTPATIENT)
Dept: CT IMAGING | Age: 49
Discharge: HOME | End: 2019-05-03
Attending: PSYCHIATRY & NEUROLOGY
Payer: COMMERCIAL

## 2019-05-03 DIAGNOSIS — G44.89 OTHER HEADACHE SYNDROME: ICD-10-CM

## 2019-05-03 PROCEDURE — 70450 CT HEAD/BRAIN W/O DYE: CPT

## 2019-05-06 ENCOUNTER — TELEPHONE (OUTPATIENT)
Dept: NEUROLOGY | Age: 49
End: 2019-05-06

## 2019-05-06 NOTE — TELEPHONE ENCOUNTER
----- Message from Zoey Hastings sent at 5/6/2019  1:40 PM EDT -----  Regarding: Dr. Hector Walton: [phone]  Pt returning missed call.

## 2019-09-11 ENCOUNTER — OFFICE VISIT (OUTPATIENT)
Dept: ENDOCRINOLOGY | Age: 49
End: 2019-09-11

## 2019-09-11 VITALS
SYSTOLIC BLOOD PRESSURE: 137 MMHG | HEART RATE: 88 BPM | WEIGHT: 158.6 LBS | DIASTOLIC BLOOD PRESSURE: 78 MMHG | BODY MASS INDEX: 26.42 KG/M2 | HEIGHT: 65 IN

## 2019-09-11 DIAGNOSIS — E10.42 TYPE 1 DIABETES MELLITUS WITH DIABETIC POLYNEUROPATHY (HCC): Primary | ICD-10-CM

## 2019-09-11 NOTE — PROGRESS NOTES
Chief Complaint   Patient presents with    Diabetes     pcp and pharmacy verified. Has a mini medtronic pump. Eye exam appt soon   Records reviewed  History of Present Illness: Nevin Maher is a 54 y.o. female who I was asked to see in consult by Dr. Thor Babb, for evaluation of DM. Will request records from Dr. Rohit Fragoso. \"I wanted a fresh prospective\". She was diagnosed with DM at the age of 13. She was previously followed by Octavio Macias, [address] [place] [address] and Ramon Mcgarry. Will request records from Drs. [address] [place] [address] and Ramon Mcgarry. Pt is currently using a Medtronic pump. She has been on an insulin pump since 1998. She notes that she does use a CGM and \"I was happy with it, but since I am wearing a skirt to work and they don't have pockets, I am not able to access my pump. I also started to have issues with the CGM so I stopped using it because it was easier to just check my BGs. \"    She did see the [facility]  98. system \"but my insurance would not cover it because I was with [employer] so I never picked it up\". Pt notes she commutes from [place] to [place] every day and she notes all the travel \"makes me crazy and makes it difficult to control my DM. \"    Her last A1C was in November 2018, \"it was in the 8's. \"    She checks her BGs 4 times per day her FBGs are running in the 100-160's, her pre-lunch BGs run in the 150-200, her pre-dinner BGs run in the 150 and her HS BGs tend to run in the 200-300's. I tend to have a large meal at night and am not bolusing enough. She has been working She has been working with Shaye Kimble and she notes that she has been working with her to overcome \"guilt issues with DM\". Current Pump Settings  Basal  MN-9AM 1.85  9AM-6PM 1.80  6PM-930PM 1.9  930PM-MN 1.85  Carb Ratio  MN-9AM 7.5  9AM-6PM8.0  6PM-MN 7.0  Correction  1: 23    Pt reports that she will use the bolus wizard to correct for hyperglycemia, but then estimate and dose her carbs manually.     She changes her infusion site every 3 days. A typical day is as follows:  Pt wakes at 6AM  - breakfast: She has breakfast at work around 830AM, this AM she had nabs and a diet soda. - She denies snacking during the mid-morning  - lunch: She has lunch around Noon-2PM, this afternoon she had an apple and PB (this is her typical lunch)  - She denies mid-afternoon snacking.  - dinner: She has dinner around 7-9PM, last night she had chicken breast with cheese and ham, rice, broccoli, mushrooms and wine  - She will have 3 mini oreos in the evening, on occasions    Exercise consists of she has been walking at night. \"I had a silent heart attack at the age of 41 (2006) and had a stent placed. She follows with Dr. Shay Sanford of Cardiology. She had a TIA in September 2016. She follows a Neurologist (Dr. Phillip Snyder). Pt has hx of Neuropathy. She has had proteinuria since the age of 18. She is followed by Nephrology. Last eye exam was 2018. Pt has hx of Retinopathy (since [facility]) and cataracts. Will request these records. Past Medical History:   Diagnosis Date    CKD (chronic kidney disease) stage 3, GFR 30-59 ml/min ([facility])     Coronary artery disease 8/26/2011    with silent MI in 2006 and s/p one stent    Diabetes mellitus type 1 ([facility] 8/26/2011    Age 7    Diabetic retinopathy ([facility]     Dyslipidemia 8/26/2011    History of peptic ulcer disease 8/26/2011    from plavix    Hypertension      Past Surgical History:   Procedure Laterality Date    CARDIAC SURG PROCEDURE UNLIST  stent placed with heart catherization    HX HEENT      laser surgery for retinopathy on multiple occasions and vitrectomy bilateral     Current Outpatient Medications   Medication Sig    ondansetron (ZOFRAN ODT) 4 mg disintegrating tablet Take 1 Tab by mouth every eight (8) hours as needed for Nausea.     hydroCHLOROthiazide (HYDRODIURIL) 25 mg tablet TAKE 1 TABLET DAILY    hydroCHLOROthiazide (HYDRODIURIL) 25 mg tablet Take 1 Tab by mouth daily.  lisinopril (PRINIVIL, ZESTRIL) 40 mg tablet Take 40 mg by mouth daily.  insulin lispro (HUMALOG) 100 unit/mL injection Via insulin pump    cyanocobalamin, vitamin B-12, (VITAMIN B-12) 5,000 mcg subl by SubLINGual route daily.  spironolactone (ALDACTONE) 25 mg tablet Take 1 Tab by mouth three (3) days a week.  escitalopram (LEXAPRO) 10 mg tablet Take 10 mg by mouth daily.  Intrauterine Device, IUD, IUD by IntraUTERine route. Placed in Feb 2013    rosuvastatin (CRESTOR) 20 mg tablet Take 20 mg by mouth daily.  carvedilol (COREG CR) 10 mg CR capsule Take  by mouth daily (with breakfast). No current facility-administered medications for this visit.       Allergies   Allergen Reactions    Erythromycin Hives     Family History   Problem Relation Age of Onset    Heart Disease Father         CABG    Diabetes Father         Type II    Heart Disease Paternal Grandfather     Diabetes Maternal Grandmother         Type 2    Heart Disease Mother     Diabetes Mother         Type II    Stroke Mother     Arthritis-osteo Mother     Stroke Maternal Grandfather      Social History     Socioeconomic History    Marital status: SINGLE     Spouse name: Not on file    Number of children: Not on file    Years of education: Not on file    Highest education level: Not on file   Occupational History    Not on file   Social Needs    Financial resource strain: Not on file    Food insecurity:     Worry: Not on file     Inability: Not on file    Transportation needs:     Medical: Not on file     Non-medical: Not on file   Tobacco Use    Smoking status: Never Smoker    Smokeless tobacco: Never Used   Substance and Sexual Activity    Alcohol use: Yes     Comment: occ glass of wine    Drug use: No    Sexual activity: Not on file   Lifestyle    Physical activity:     Days per week: Not on file     Minutes per session: Not on file    Stress: Not on file   Relationships    Social connections:     Talks on phone: Not on file     Gets together: Not on file     Attends [employer] service: Not on file     Active member of club or organization: Not on file     Attends meetings of clubs or organizations: Not on file     Relationship status: Not on file    Intimate partner violence:     Fear of current or ex partner: Not on file     Emotionally abused: Not on file     Physically abused: Not on file     Forced sexual activity: Not on file   Other Topics Concern    Not on file   Social History Narrative    Lives in [place] alone. No kids. 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 Likes to work out and go to sporting events and plays with animals. Review of Systems:  - Constitutional Symptoms: no fevers, chills, weight loss  - Eyes: no blurry vision or double vision  - Cardiovascular: no chest pain or palpitations  - Respiratory: no cough or shortness of breath  - Gastrointestinal: no dysphagia or abdominal pain  - Musculoskeletal: no joint pains or weakness  - Integumentary: no rashes  - Neurological: no numbness, tingling, or headaches  - Psychiatric: no depression or anxiety  - Endocrine: no heat or cold intolerance, no polyuria or polydipsia    Physical Examination:  Blood pressure 137/78, pulse 88, height 5' 5\" (1.651 m), weight 158 lb 9.6 oz (71.9 kg).   - General: pleasant, no distress, good eye contact  - HEENT: no exopthalmos, no periorbital edema, no scleral/conjunctival injection, EOMI, no lid lag or stare  - Neck: supple, no thyromegaly, masses, lymph nodes, or carotid bruits, no supraclavicular or dorsocervical fat pads  - Cardiovascular: regular, normal rate, normal S1 and S2, no murmurs/rubs/gallops, 2+ dorsalis pedis pulses bilaterally  - Respiratory: clear to auscultation bilaterally  - Gastrointestinal: soft, nontender, nondistended, no masses, no hepatosplenomegaly  - Musculoskeletal: no proximal muscle weakness in upper or lower extremities  - Integumentary: no acanthosis nigricans, no rashes, no edema, no foot ulcers  - Neurological: Psychiatric: normal mood and affect    Diabetic foot exam:     Left Foot:   Visual Exam: callous - present   Pulse DP: 2+ (normal)   Filament test: normal sensation    Vibratory sensation: normal      Right Foot:   Visual Exam: callous - present   Pulse DP: 2+ (normal)   Filament test: normal sensation    Vibratory sensation: normal        Data Reviewed:   Component      Latest Ref Rng & Units 1/4/2019           2:19 PM   WBC      3.6 - 11.0 K/uL 16.4 (H)   RBC      3.80 - 5.20 M/uL 3.88   HGB      11.5 - 16.0 g/dL 12.8   HCT      35.0 - 47.0 % 37.2   MCV      80.0 - 99.0 FL 95.9   MCH      26.0 - 34.0 PG 33.0   MCHC      30.0 - 36.5 g/dL 34.4   RDW      11.5 - 14.5 % 12.1   PLATELET      431 - 639 K/uL 279   MPV      8.9 - 12.9 FL 9.0   NRBC      0  WBC 0.0   ABSOLUTE NRBC      0.00 - 0.01 K/uL 0.00   NEUTROPHILS      32 - 75 % 80 (H)   LYMPHOCYTES      12 - 49 % 9 (L)   MONOCYTES      5 - 13 % 8   EOSINOPHILS      0 - 7 % 2   BASOPHILS      0 - 1 % 0   IMMATURE GRANULOCYTES      0.0 - 0.5 % 1 (H)   ABS. NEUTROPHILS      1.8 - 8.0 K/UL 13.0 (H)   ABS. LYMPHOCYTES      0.8 - 3.5 K/UL 1.5   ABS. MONOCYTES      0.0 - 1.0 K/UL 1.4 (H)   ABS. EOSINOPHILS      0.0 - 0.4 K/UL 0.4   ABS. BASOPHILS      0.0 - 0.1 K/UL 0.1   ABS. IMM. GRANS.      0.00 - 0.04 K/UL 0.1 (H)   DF       AUTOMATED     Assessment/Plan:   1. Type 1 diabetes mellitus with diabetic polyneuropathy ([facility]    1) To help with her high BGs after breakfast and HS I will make some changes to her insulin pump settings    Basal  MN-9AM 1.85  9AM-6PM 1.80 > 1.9  6PM-930PM 1.9 > 2.0  930PM-MN 1.85  Carb Ratio  MN-9AM 7.5  9AM-6PM8.0  6PM-MN 7.0  Correction  1: 23    Will give pt a [facility] reader and two sensor kit for her to try. If she does like it we can prescribe the sensors for her to have a CGM.   Will check an A1C, lipid panel, CMP and urine MA today. There are no Patient Instructions on file for this visit.       Copy sent to:  Dr. Hector Pritchard

## 2019-09-11 NOTE — LETTER
9/11/2019 3:38 PM 
 
Patient:  Nimesh Brown YOB: 1970 Date of Visit: 9/11/2019 Dear Nica Ahmadi MD 
[address] [address] VIA Facsimile: [phone] Diego Shanks MD 
[address] [facility] [address] [facility] [address] [address] VIA Facsimile: [phone] Lucien Farr MD 
[address] [address] [facility] [address] VIA Facsimile: [phone] Elsie Oh MD 
[facility] [address] [facility] [facility] [address] [facility] [address] VIA In Basket 
 : Thank you for referring Ms. Cece Morales to me for evaluation/treatment. Below are the relevant portions of my assessment and plan of care. If you have questions, please do not hesitate to call me. I look forward to following Ms. Breanne Olsen along with you. Sincerely, Ramses Junior MD

## 2019-09-11 NOTE — Clinical Note
9/11/19 Patient: Allison Beckett YOB: 1970 Date of Visit: 9/11/2019 Myra Larsen MD 
[address] [place] [address] VIA Facsimile: [phone] Dear Myra Larsen MD, Thank you for referring Ms. Ervin Hobbs to [address] [place] [address] for evaluation. My notes for this consultation are attached. If you have questions, please do not hesitate to call me. I look forward to following your patient along with you. Sincerely, Renita Mukherjee MD

## 2019-09-12 LAB
ALBUMIN SERPL-MCNC: 4.7 G/DL (ref 3.5–5.5)
ALBUMIN/CREAT UR: 1218 MG/G CREAT (ref 0–30)
ALBUMIN/GLOB SERPL: 2 {RATIO} (ref 1.2–2.2)
ALP SERPL-CCNC: 56 IU/L (ref 39–117)
ALT SERPL-CCNC: 29 IU/L (ref 0–32)
AST SERPL-CCNC: 29 IU/L (ref 0–40)
BILIRUB SERPL-MCNC: 1 MG/DL (ref 0–1.2)
BUN SERPL-MCNC: 22 MG/DL (ref 6–24)
BUN/CREAT SERPL: 22 (ref 9–23)
CALCIUM SERPL-MCNC: 10.4 MG/DL (ref 8.7–10.2)
CHLORIDE SERPL-SCNC: 98 MMOL/L (ref 96–106)
CHOLEST SERPL-MCNC: 207 MG/DL (ref 100–199)
CO2 SERPL-SCNC: 24 MMOL/L (ref 20–29)
CREAT SERPL-MCNC: 0.98 MG/DL (ref 0.57–1)
CREAT UR-MCNC: 65.4 MG/DL
EST. AVERAGE GLUCOSE BLD GHB EST-MCNC: 203 MG/DL
GLOBULIN SER CALC-MCNC: 2.3 G/DL (ref 1.5–4.5)
GLUCOSE SERPL-MCNC: 211 MG/DL (ref 65–99)
HBA1C MFR BLD: 8.7 % (ref 4.8–5.6)
HDLC SERPL-MCNC: 56 MG/DL
INTERPRETATION, 910389: NORMAL
LDLC SERPL CALC-MCNC: 78 MG/DL (ref 0–99)
Lab: NORMAL
MICROALBUMIN UR-MCNC: 796.6 UG/ML
POTASSIUM SERPL-SCNC: 4.3 MMOL/L (ref 3.5–5.2)
PROT SERPL-MCNC: 7 G/DL (ref 6–8.5)
SODIUM SERPL-SCNC: 137 MMOL/L (ref 134–144)
TRIGL SERPL-MCNC: 365 MG/DL (ref 0–149)
VLDLC SERPL CALC-MCNC: 73 MG/DL (ref 5–40)

## 2019-09-13 NOTE — PROGRESS NOTES
1) Her A1C was 8.7%, please send us some blood sugar readings in 2-3 weeks, so we can see how the changes we made are working. 2) Her Cholesterol levels look good. 3) Her kidney and liver function tests look good.

## 2019-09-16 NOTE — TELEPHONE ENCOUNTER
----- Message from Veda Montano sent at 9/16/2019 12:17 PM EDT -----  Regarding: Dr. Kamryn Horn Message/Vendor Calls    Caller's first and last name: Chantel MONTGOMERY [facility])      Reason for call: pt doesn't remember getting a prescription for the \"free [place] censors\" so she's requesting a prescription to be sent to [facility] pharmacy that's on file    Callback required yes/no and why: yes      Best contact number(s): [phone]      Details to clarify the request:      Veda Montano

## 2019-11-13 ENCOUNTER — OFFICE VISIT (OUTPATIENT)
Dept: SURGERY | Age: 49
End: 2019-11-13

## 2019-11-13 VITALS
WEIGHT: 166.4 LBS | SYSTOLIC BLOOD PRESSURE: 172 MMHG | OXYGEN SATURATION: 100 % | RESPIRATION RATE: 20 BRPM | HEIGHT: 65 IN | TEMPERATURE: 98.8 F | DIASTOLIC BLOOD PRESSURE: 83 MMHG | HEART RATE: 82 BPM | BODY MASS INDEX: 27.72 KG/M2

## 2019-11-13 DIAGNOSIS — K42.9 UMBILICAL HERNIA WITHOUT OBSTRUCTION AND WITHOUT GANGRENE: Primary | ICD-10-CM

## 2019-11-13 NOTE — PROGRESS NOTES
The patient is a 55-year old woman referred by Dr. Madison Joshua regarding a hernia at the umbilicus. The patient reports that she noticed recently when leaning forward against a counter a sharp pain at the umbilicus. The area remained quite sensitive to touch. She had nausea transiently. The patient was seen by Dr. Sangeeta Hurtado who pushed on the area, reducing the hernia, and the area has remained sensitive but less severely so since then. The patient still notices the discomfort when coughing, laughing or lifting. She does not have any other abdominal pain. She is tolerating po. She has for many years occasionally had nausea and vomiting and this is presently unchanged. The patient has no history of previous abdominal surgery. The patient has a history of type 1 diabetes mellitus. Her blood sugar typically runs 140-150. She has a history of silent MI in 2006 and had coronary angioplasty at that time. She has had no further cardiac symptoms. She has a history of stage 3 chronic kidney disease and is followed by Dr. Darlene Bowie. There is a history of hyperlipidemia, hypertension. She reports she had peptic ulcer disease related to Plavix shortly after the coronary stent. She presently takes one aspirin per day. The patient has never smoked. She does use alcohol. There is a history of nontraumatic hemorrhage at the basal ganglia since stroke. Presently the patient is ambulatory and active and does not have shortness of breath with activity. She has no cardiac symptoms. Physical Examination:  GENERAL: On examination the patient is an alert woman in no acute distress. VITAL SIGNS: /83, P 82, R 20, T 98.8, O2 saturation 100% on room air,  pounds, HT 5'5\". HEAD/NECK: No mass, jaundice or thyromegaly. LUNGS: Clear bilaterally without rales, rhonchi or wheezes. HEART: Regular without murmur, gallop or rub. ABDOMEN: Soft, nontender. There is a small reducible hernia at the umbilicus. No masses or other hernias are noted. NEURO: Patient is alert and oriented. She moves all extremities equally. Facial movement is symmetrical. Speech is normal.    The patient does have a small but symptomatic umbilical hernia and therefore repair would be recommended. I would recommend planning for open repair of umbilical hernia with mesh under general anesthesia as an outpatient. I reviewed with the patient the typical operative and postoperative course for the surgery. Risks vs benefits of surgery were reviewed. Risks would include bleeding, infection, injury to abdominal organs, complications related to mesh, risk of hernia recurrence, risks associated with general anesthesia. The patient understands and wishes to proceed.      Final Diagnosis: Umbilical hernia.    c: Criss Patten MD

## 2019-12-05 ENCOUNTER — TELEPHONE (OUTPATIENT)
Dept: SURGERY | Age: 49
End: 2019-12-05

## 2019-12-05 NOTE — TELEPHONE ENCOUNTER
Spoke with Ms Patience Presume. Need to move surgery date from 12/20/19 to 12/18/19. Pt agreeable with with change. Pre admission testing remains on 12/11/19.

## 2019-12-11 ENCOUNTER — HOSPITAL ENCOUNTER (OUTPATIENT)
Dept: PREADMISSION TESTING | Age: 49
Discharge: HOME | End: 2019-12-11
Attending: SURGERY
Payer: COMMERCIAL

## 2019-12-11 VITALS
HEART RATE: 84 BPM | BODY MASS INDEX: 27.47 KG/M2 | HEIGHT: 65 IN | DIASTOLIC BLOOD PRESSURE: 70 MMHG | RESPIRATION RATE: 16 BRPM | OXYGEN SATURATION: 98 % | SYSTOLIC BLOOD PRESSURE: 148 MMHG | TEMPERATURE: 98.7 F | WEIGHT: 164.9 LBS

## 2019-12-11 LAB
ANION GAP SERPL CALC-SCNC: 8 MMOL/L (ref 5–15)
BUN SERPL-MCNC: 15 MG/DL (ref 6–20)
BUN/CREAT SERPL: 14 (ref 12–20)
CALCIUM SERPL-MCNC: 9.2 MG/DL (ref 8.5–10.1)
CHLORIDE SERPL-SCNC: 104 MMOL/L (ref 97–108)
CO2 SERPL-SCNC: 25 MMOL/L (ref 21–32)
CREAT SERPL-MCNC: 1.11 MG/DL (ref 0.55–1.02)
ERYTHROCYTE [DISTWIDTH] IN BLOOD BY AUTOMATED COUNT: 12.2 % (ref 11.5–14.5)
GLUCOSE SERPL-MCNC: 173 MG/DL (ref 65–100)
HCT VFR BLD AUTO: 35.9 % (ref 35–47)
HGB BLD-MCNC: 12 G/DL (ref 11.5–16)
MCH RBC QN AUTO: 32.4 PG (ref 26–34)
MCHC RBC AUTO-ENTMCNC: 33.4 G/DL (ref 30–36.5)
MCV RBC AUTO: 97 FL (ref 80–99)
NRBC # BLD: 0 K/UL (ref 0–0.01)
NRBC BLD-RTO: 0 PER 100 WBC
PLATELET # BLD AUTO: 280 K/UL (ref 150–400)
PMV BLD AUTO: 9.2 FL (ref 8.9–12.9)
POTASSIUM SERPL-SCNC: 4 MMOL/L (ref 3.5–5.1)
RBC # BLD AUTO: 3.7 M/UL (ref 3.8–5.2)
SODIUM SERPL-SCNC: 137 MMOL/L (ref 136–145)
WBC # BLD AUTO: 7.2 K/UL (ref 3.6–11)

## 2019-12-11 PROCEDURE — 80048 BASIC METABOLIC PNL TOTAL CA: CPT

## 2019-12-11 PROCEDURE — 36415 COLL VENOUS BLD VENIPUNCTURE: CPT

## 2019-12-11 PROCEDURE — 85027 COMPLETE CBC AUTOMATED: CPT

## 2019-12-11 RX ORDER — CARVEDILOL 6.25 MG/1
6.25 TABLET ORAL 2 TIMES DAILY
COMMUNITY
End: 2020-02-05 | Stop reason: SDUPTHER

## 2019-12-11 RX ORDER — SODIUM CHLORIDE, SODIUM LACTATE, POTASSIUM CHLORIDE, CALCIUM CHLORIDE 600; 310; 30; 20 MG/100ML; MG/100ML; MG/100ML; MG/100ML
25 INJECTION, SOLUTION INTRAVENOUS CONTINUOUS
Status: CANCELLED | OUTPATIENT
Start: 2019-12-18

## 2019-12-11 RX ORDER — ESTRADIOL AND NORETHINDRONE ACETATE .5; .1 MG/1; MG/1
1 TABLET ORAL
Status: ON HOLD | COMMUNITY
End: 2020-05-07

## 2019-12-11 RX ORDER — ASPIRIN 81 MG/1
81 TABLET ORAL
COMMUNITY

## 2019-12-11 NOTE — PERIOP NOTES
[facility]  Preoperative Instructions        Surgery Date 12/18/19          Time of Arrival 1030  Contact #:  [phone]    1. On the day of your surgery, please report to the Surgical Services Registration Desk and sign in at your designated time. The Surgery Center is located to the right of the Emergency Room. 2. You must have someone with you to drive you home. You should not drive a car for 24 hours following surgery. Please make arrangements for a friend or family member to stay with you for the first 24 hours after your surgery. 3. Do not have anything to eat or drink (including water, gum, mints, coffee, juice) after midnight 12/17/19 . ? This may not apply to medications prescribed by your physician. ?(Please note below the special instructions with medications to take the morning of your procedure.)    4. We recommend you do not drink any alcoholic beverages for 24 hours before and after your surgery. 5. Contact your surgeons office for instructions on the following medications: non-steroidal anti-inflammatory drugs (i.e. Advil, Aleve), vitamins, and supplements. (Some surgeons will want you to stop these medications prior to surgery and others may allow you to take them)  **If you are currently taking Plavix, Coumadin, Aspirin and/or other blood-thinning agents, contact your surgeon for instructions. ** Your surgeon will partner with the physician prescribing these medications to determine if it is safe to stop or if you need to continue taking. Please do not stop taking these medications without instructions from your surgeon    6. Wear comfortable clothes. Wear glasses instead of contacts. Do not bring any money or jewelry. Please bring picture ID, insurance card, and any prearranged co-payment or hospital payment. Do not wear make-up, particularly mascara the morning of your surgery. Do not wear nail polish, particularly if you are having foot /hand surgery. Wear your hair loose or down, no ponytails, buns, alix pins or clips. All body piercings must be removed. Please shower with antibacterial soap for three consecutive days before and on the morning of surgery, but do not apply any lotions, powders or deodorants after the shower on the day of surgery. Please use a fresh towels after each shower. Please sleep in clean clothes and change bed linens the night before surgery. Please do not shave for 48 hours prior to surgery. Shaving of the face is acceptable. 7. You should understand that if you do not follow these instructions your surgery may be cancelled. If your physical condition changes (I.e. fever, cold or flu) please contact your surgeon as soon as possible. 8. It is important that you be on time. If a situation occurs where you may be late, please call [phone] (OR Holding Area). 9. If you have any questions and or problems, please call [phone] (Pre-admission Testing). 10. Your surgery time may be subject to change. You will receive a phone call the evening prior if your time changes. 11.  If having outpatient surgery, you must have someone to drive you here, stay with you during the duration of your stay, and to drive you home at time of discharge. 12.   In an effort to improve the efficiency, privacy, and safety for all of our Pre-op patients visitors are not allowed in the Holding area. Once you arrive and are registered your family/visitors will be asked to remain in the waiting room. The Pre-op staff will get you from the Surgical Waiting Area and will explain to you and your family/visitors that the Pre-op phase is beginning. The staff will answer any questions and provide instructions for tracking of the patient, by use of the existing tracking number and color-coded status board in the waiting room.   At this time the staff will also ask for your designated spokesperson information in the event that the physician or staff need to provide an update or obtain any pertinent information. The designated spokesperson will be notified if the physician needs to speak to family during the pre-operative phase. If at any time your family/visitors has questions or concerns they may approach the volunteer desk in the waiting area for assistance. Special Instructions: Follow your doctors instructions when to stop Aspirin, Vitamins, Supplements. Please keep your Insulin Pump on and notify your nurse that you have it on day of surgery. MEDICATIONS TO TAKE THE MORNING OF SURGERY WITH A SIP OF WATER:  Lexapro, Carvedilol, Spironolactone if due day of surgery. I understand a pre-operative phone call will be made to verify my surgery time. In the event that I am not available, I give permission for a message to be left on my answering service and/or with another person?   yes         ___________________       __________   12/11/19 @  0564    (Signature of Patient)             (Witness)                (Date and Time)

## 2019-12-12 ENCOUNTER — OFFICE VISIT (OUTPATIENT)
Dept: ENDOCRINOLOGY | Age: 49
End: 2019-12-12

## 2019-12-12 VITALS
HEIGHT: 65 IN | DIASTOLIC BLOOD PRESSURE: 73 MMHG | BODY MASS INDEX: 27.72 KG/M2 | HEART RATE: 85 BPM | SYSTOLIC BLOOD PRESSURE: 116 MMHG | WEIGHT: 166.4 LBS

## 2019-12-12 DIAGNOSIS — E10.42 TYPE 1 DIABETES MELLITUS WITH DIABETIC POLYNEUROPATHY (HCC): Primary | ICD-10-CM

## 2019-12-12 LAB — HBA1C MFR BLD HPLC: 8.7 %

## 2019-12-12 RX ORDER — ESTRADIOL AND NORETHINDRONE ACETATE 1; .5 MG/1; MG/1
TABLET, FILM COATED ORAL
Refills: 11 | COMMUNITY
Start: 2019-11-21 | End: 2022-04-20

## 2019-12-12 NOTE — LETTER
12/12/19 Patient: Bassem José YOB: 1970 Date of Visit: 12/12/2019 Wagner Paniagua MD 
[address] [address] VIA Facsimile: [phone] Dear Wagner Paniagua MD, Thank you for referring Ms. Genet Hammond to [address] [place] [address] for evaluation. My notes for this consultation are attached. If you have questions, please do not hesitate to call me. I look forward to following your patient along with you. Sincerely, Tanisha Rust MD

## 2019-12-12 NOTE — PROGRESS NOTES
Chief Complaint   Patient presents with    Diabetes     pcp and pharmacy verified. Release signed for eye exam     History of Present Illness: Mehdi García is a 54 y.o. female here for follow up of Type 1 diabetes. She was diagnosed with DM at the age of 13. At our initial visit in September 2019 she was using a [employer]tr[employer] pump. She has been on an insulin pump since 1998. Her A1C was 8.7%. To help with high BGs after breakfast and HS I made some adjustments to her pump settings    Basal  MN-9AM 1.85  9AM-6PM 1.80 > 1.9  6PM-930PM 1.9 > 2.0  930PM-MN 1.85  Carb Ratio  MN-9AM 7.5  9AM-6PM8.0  6PM-MN 7.0  Correction  1: 23    Pt saw Dr. Geoff Blackman of General surgery for evaluation of an umbilical hernia. She is scheduled for surgery on 12/18/19. She was diagnosed with being in Menopause in September 2019. She notes that she was having issues of hot flashes. She is now on HRT by her Gyn Dr. Cheo Siddiqi. She feels that with the menopause and hernia she notes her BGs have been higher and she has been having to adjust her basal rates. She notes that she has had some low BGs, typically in the morning or on the way home from work in the evening.  (530-6PM)    She notes that she has had some episodes between changing her infusion sets that she has had some BGs in then 400's. Her A1C today was 8.0%    Current Pump Settings  Basal  MN-9AM 2.10  9AM-6PM 2.0  6PM-930PM 2.1  930PM-MN 2.25  Carb Ratio  MN-9AM 7.5  9AM-6PM8.0  6PM-MN 7.0  Correction  1: 23    Pt reports that she will use the bolus wizard to correct for hyperglycemia, but then estimate and dose her carbs manually. Pt is not using the freestyle farrah, which she feels has been very helpful. She brought her [address] [place] [address] with her. Her BGs from 6A-Noon tend to spike and then between Noon-6PM drop. They are tending to climb overnight, then drop around 3AM    She changes her infusion site every 3 days.     A typical day is as follows:  Pt wakes at 530AM  - breakfast: She has breakfast at work around 830AM, this AM she had an egg and fuentes croissant, potatoes and diet soda. - She denies snacking during the mid-morning  - lunch: She has lunch around 11AM-2PM, yesterday she had pretzels and peanut butter. - She denies mid-afternoon snacking.  - dinner: She has dinner around 7-8PM, last night she had avacado on toast, ham and wine. - She will have 3 mini oreos in the evening, on occasions  -She goes to bed around 9-10PM    Exercise consists of she has been walking at night. \"I had a silent heart attack at the age of 41 (2006) and had a stent placed. She follows with Dr. Souleymane Bell of Cardiology. She had a TIA in September 2016. She follows a Neurologist (Dr. Lynsey Wyatt). Pt has hx of Neuropathy. She has had proteinuria since the age of 18. She is followed by Nephrology. Last eye exam was 2018. Pt has hx of Retinopathy (since college) and cataracts. Will request these records. Current Outpatient Medications   Medication Sig    MIMVEY 1-0.5 mg per tablet TAKE 1 TABLET BY MOUTH EVERY DAY    carvedilol (COREG) 6.25 mg tablet Take 6.25 mg by mouth two (2) times a day.  aspirin delayed-release 81 mg tablet Take 81 mg by mouth nightly.  flash glucose sensor (FREESTYLE RICCI 14 DAY SENSOR) kit Use to check blood sugar    ondansetron (ZOFRAN ODT) 4 mg disintegrating tablet Take 1 Tab by mouth every eight (8) hours as needed for Nausea.  hydroCHLOROthiazide (HYDRODIURIL) 25 mg tablet Take 1 Tab by mouth daily. (Patient taking differently: Take 25 mg by mouth nightly.)    lisinopril (PRINIVIL, ZESTRIL) 40 mg tablet Take 40 mg by mouth nightly.  insulin lispro (HUMALOG) 100 unit/mL injection Via insulin pump    cyanocobalamin, vitamin B-12, (VITAMIN B-12) 5,000 mcg subl by SubLINGual route daily.  spironolactone (ALDACTONE) 25 mg tablet Take 1 Tab by mouth three (3) days a week. (Patient taking differently: Take 25 mg by mouth three (3) days a week. M-W-F)    escitalopram (LEXAPRO) 10 mg tablet Take 10 mg by mouth daily.  Intrauterine Device, IUD, IUD by IntraUTERine route. Placed in Feb 2013    rosuvastatin (CRESTOR) 20 mg tablet Take 20 mg by mouth nightly.  estradiol-norethindrone (ACTIVELLA) 0.5-0.1 mg per tablet Take 1 Tab by mouth nightly. No current facility-administered medications for this visit. Allergies   Allergen Reactions    Erythromycin Hives     Review of Systems:  - Eyes: no blurry vision or double vision  - Cardiovascular: no chest pain  - Respiratory: no shortness of breath  - Musculoskeletal: no myalgias  - Neurological: no numbness/tingling in extremities    Physical Examination:  Blood pressure 116/73, pulse 85, height 5' 5\" (1.651 m), weight 166 lb 6.4 oz (75.5 kg). - General: pleasant, no distress, good eye contact   - Neck: no carotid bruits  - Cardiovascular: regular, normal rate, nl s1 and s2, no m/r/g, 2+ DP pulses   - Respiratory: clear bilaterally  - Integumentary: no edema, no foot ulcers,  - Psychiatric: normal mood and affect    Diabetic foot exam:     Left Foot:   Visual Exam: normal    Pulse DP: 2+ (normal)   Filament test: normal sensation    Vibratory sensation: normal      Right Foot:   Visual Exam: normal    Pulse DP: 2+ (normal)   Filament test: normal sensation    Vibratory sensation: normal        Data Reviewed:   - none new for review    Assessment/Plan:   1) DM > Her A1C today was down to 8.0%, but she has been having high BGs during the day and drops in the afternoon To address her spikes and drops as noted above, I have made some adjustments to her insulin pump settings. Basal  MN-3AM 2.10  3AM-530AM1. 95  530AM-Noon 2.1  Noon--6PM: 1.9  6PM-930PM 2.1  930PM-MN 2.25  Carb Ratio  MN-9AM 7.5  9AM-6PM8.0  6PM-MN 7.0  Correction  1: 23    Pt to continue the FreeStyle Eliseo CGM    Pt to send me her BG readings in 2 weeks.       Copy sent to:  Dr. Josie Lockett

## 2019-12-18 ENCOUNTER — HOSPITAL ENCOUNTER (OUTPATIENT)
Age: 49
Setting detail: OUTPATIENT SURGERY
Discharge: HOME | End: 2019-12-18
Attending: SURGERY | Admitting: SURGERY
Payer: COMMERCIAL

## 2019-12-18 ENCOUNTER — ANESTHESIA EVENT (OUTPATIENT)
Dept: SURGERY | Age: 49
End: 2019-12-18
Payer: COMMERCIAL

## 2019-12-18 ENCOUNTER — ANESTHESIA (OUTPATIENT)
Dept: SURGERY | Age: 49
End: 2019-12-18
Payer: COMMERCIAL

## 2019-12-18 VITALS
HEART RATE: 70 BPM | DIASTOLIC BLOOD PRESSURE: 58 MMHG | BODY MASS INDEX: 26.74 KG/M2 | TEMPERATURE: 98.5 F | SYSTOLIC BLOOD PRESSURE: 120 MMHG | WEIGHT: 160.5 LBS | RESPIRATION RATE: 16 BRPM | OXYGEN SATURATION: 99 % | HEIGHT: 65 IN

## 2019-12-18 DIAGNOSIS — L76.82 PAIN AT SURGICAL INCISION: Primary | ICD-10-CM

## 2019-12-18 LAB
GLUCOSE BLD STRIP.AUTO-MCNC: 169 MG/DL (ref 65–100)
GLUCOSE BLD STRIP.AUTO-MCNC: 206 MG/DL (ref 65–100)
GLUCOSE BLD STRIP.AUTO-MCNC: 224 MG/DL (ref 65–100)
HCG UR QL: NEGATIVE
SERVICE CMNT-IMP: ABNORMAL

## 2019-12-18 PROCEDURE — 77030013079 HC BLNKT BAIR HGGR 3M -A: Performed by: ANESTHESIOLOGY

## 2019-12-18 PROCEDURE — 76210000017 HC OR PH I REC 1.5 TO 2 HR: Performed by: SURGERY

## 2019-12-18 PROCEDURE — 74011250636 HC RX REV CODE- 250/636: Performed by: ANESTHESIOLOGY

## 2019-12-18 PROCEDURE — 74011000250 HC RX REV CODE- 250: Performed by: NURSE ANESTHETIST, CERTIFIED REGISTERED

## 2019-12-18 PROCEDURE — 74011250637 HC RX REV CODE- 250/637: Performed by: SURGERY

## 2019-12-18 PROCEDURE — 82962 GLUCOSE BLOOD TEST: CPT

## 2019-12-18 PROCEDURE — 77030018673: Performed by: SURGERY

## 2019-12-18 PROCEDURE — 77030008771 HC TU NG SALEM SUMP -A: Performed by: ANESTHESIOLOGY

## 2019-12-18 PROCEDURE — 74011250636 HC RX REV CODE- 250/636: Performed by: NURSE ANESTHETIST, CERTIFIED REGISTERED

## 2019-12-18 PROCEDURE — 74011000272 HC RX REV CODE- 272: Performed by: SURGERY

## 2019-12-18 PROCEDURE — 74011250637 HC RX REV CODE- 250/637: Performed by: ANESTHESIOLOGY

## 2019-12-18 PROCEDURE — 76060000033 HC ANESTHESIA 1 TO 1.5 HR: Performed by: SURGERY

## 2019-12-18 PROCEDURE — 74011000250 HC RX REV CODE- 250: Performed by: SURGERY

## 2019-12-18 PROCEDURE — 76010000149 HC OR TIME 1 TO 1.5 HR: Performed by: SURGERY

## 2019-12-18 PROCEDURE — 77030040361 HC SLV COMPR DVT MDII -B: Performed by: SURGERY

## 2019-12-18 PROCEDURE — 77030010507 HC ADH SKN DERMBND J&J -B: Performed by: SURGERY

## 2019-12-18 PROCEDURE — 77030031139 HC SUT VCRL2 J&J -A: Performed by: SURGERY

## 2019-12-18 PROCEDURE — 77030002986 HC SUT PROL J&J -A: Performed by: SURGERY

## 2019-12-18 PROCEDURE — 77030026438 HC STYL ET INTUB CARD -A: Performed by: ANESTHESIOLOGY

## 2019-12-18 PROCEDURE — 77030008684 HC TU ET CUF COVD -B: Performed by: ANESTHESIOLOGY

## 2019-12-18 PROCEDURE — 77030011640 HC PAD GRND REM COVD -A: Performed by: SURGERY

## 2019-12-18 PROCEDURE — 74011250636 HC RX REV CODE- 250/636: Performed by: SURGERY

## 2019-12-18 PROCEDURE — 77030002888 HC SUT CHRMC J&J -A: Performed by: SURGERY

## 2019-12-18 PROCEDURE — 77030018836 HC SOL IRR NACL ICUM -A: Performed by: SURGERY

## 2019-12-18 PROCEDURE — C1781 MESH (IMPLANTABLE): HCPCS | Performed by: SURGERY

## 2019-12-18 PROCEDURE — 76210000021 HC REC RM PH II 0.5 TO 1 HR: Performed by: SURGERY

## 2019-12-18 PROCEDURE — 81025 URINE PREGNANCY TEST: CPT

## 2019-12-18 DEVICE — VENTRALEX ST HERNIA PATCH, 6.4 CM (2.5"), CIRCLE
Type: IMPLANTABLE DEVICE | Site: UMBILICAL | Status: FUNCTIONAL
Brand: VENTRALEX

## 2019-12-18 RX ORDER — SODIUM CHLORIDE, SODIUM LACTATE, POTASSIUM CHLORIDE, CALCIUM CHLORIDE 600; 310; 30; 20 MG/100ML; MG/100ML; MG/100ML; MG/100ML
25 INJECTION, SOLUTION INTRAVENOUS CONTINUOUS
Status: DISCONTINUED | OUTPATIENT
Start: 2019-12-18 | End: 2019-12-18 | Stop reason: HOSPADM

## 2019-12-18 RX ORDER — MIDAZOLAM HYDROCHLORIDE 1 MG/ML
0.5 INJECTION, SOLUTION INTRAMUSCULAR; INTRAVENOUS
Status: DISCONTINUED | OUTPATIENT
Start: 2019-12-18 | End: 2019-12-18 | Stop reason: HOSPADM

## 2019-12-18 RX ORDER — SODIUM CHLORIDE 0.9 % (FLUSH) 0.9 %
5-40 SYRINGE (ML) INJECTION EVERY 8 HOURS
Status: DISCONTINUED | OUTPATIENT
Start: 2019-12-18 | End: 2019-12-18 | Stop reason: HOSPADM

## 2019-12-18 RX ORDER — ACETAMINOPHEN 325 MG/1
650 TABLET ORAL ONCE
Status: DISCONTINUED | OUTPATIENT
Start: 2019-12-18 | End: 2019-12-18 | Stop reason: HOSPADM

## 2019-12-18 RX ORDER — ONDANSETRON 2 MG/ML
INJECTION INTRAMUSCULAR; INTRAVENOUS AS NEEDED
Status: DISCONTINUED | OUTPATIENT
Start: 2019-12-18 | End: 2019-12-18 | Stop reason: HOSPADM

## 2019-12-18 RX ORDER — MIDAZOLAM HYDROCHLORIDE 1 MG/ML
1 INJECTION, SOLUTION INTRAMUSCULAR; INTRAVENOUS AS NEEDED
Status: DISCONTINUED | OUTPATIENT
Start: 2019-12-18 | End: 2019-12-18 | Stop reason: HOSPADM

## 2019-12-18 RX ORDER — HYDROMORPHONE HYDROCHLORIDE 1 MG/ML
0.2 INJECTION, SOLUTION INTRAMUSCULAR; INTRAVENOUS; SUBCUTANEOUS
Status: DISCONTINUED | OUTPATIENT
Start: 2019-12-18 | End: 2019-12-18 | Stop reason: HOSPADM

## 2019-12-18 RX ORDER — FENTANYL CITRATE 50 UG/ML
INJECTION, SOLUTION INTRAMUSCULAR; INTRAVENOUS AS NEEDED
Status: DISCONTINUED | OUTPATIENT
Start: 2019-12-18 | End: 2019-12-18 | Stop reason: HOSPADM

## 2019-12-18 RX ORDER — ACETAMINOPHEN 325 MG/1
650 TABLET ORAL ONCE
Status: COMPLETED | OUTPATIENT
Start: 2019-12-18 | End: 2019-12-18

## 2019-12-18 RX ORDER — SUCCINYLCHOLINE CHLORIDE 20 MG/ML
INJECTION INTRAMUSCULAR; INTRAVENOUS AS NEEDED
Status: DISCONTINUED | OUTPATIENT
Start: 2019-12-18 | End: 2019-12-18 | Stop reason: HOSPADM

## 2019-12-18 RX ORDER — HYDROCODONE BITARTRATE AND ACETAMINOPHEN 5; 325 MG/1; MG/1
1 TABLET ORAL
Qty: 18 TAB | Refills: 0 | Status: SHIPPED | OUTPATIENT
Start: 2019-12-18 | End: 2019-12-21

## 2019-12-18 RX ORDER — EPHEDRINE SULFATE/0.9% NACL/PF 50 MG/5 ML
SYRINGE (ML) INTRAVENOUS AS NEEDED
Status: DISCONTINUED | OUTPATIENT
Start: 2019-12-18 | End: 2019-12-18 | Stop reason: HOSPADM

## 2019-12-18 RX ORDER — FENTANYL CITRATE 50 UG/ML
25 INJECTION, SOLUTION INTRAMUSCULAR; INTRAVENOUS
Status: DISCONTINUED | OUTPATIENT
Start: 2019-12-18 | End: 2019-12-18 | Stop reason: HOSPADM

## 2019-12-18 RX ORDER — PROPOFOL 10 MG/ML
INJECTION, EMULSION INTRAVENOUS AS NEEDED
Status: DISCONTINUED | OUTPATIENT
Start: 2019-12-18 | End: 2019-12-18 | Stop reason: HOSPADM

## 2019-12-18 RX ORDER — BUPIVACAINE HYDROCHLORIDE AND EPINEPHRINE 2.5; 5 MG/ML; UG/ML
INJECTION, SOLUTION EPIDURAL; INFILTRATION; INTRACAUDAL; PERINEURAL AS NEEDED
Status: DISCONTINUED | OUTPATIENT
Start: 2019-12-18 | End: 2019-12-18 | Stop reason: HOSPADM

## 2019-12-18 RX ORDER — ROCURONIUM BROMIDE 10 MG/ML
INJECTION, SOLUTION INTRAVENOUS AS NEEDED
Status: DISCONTINUED | OUTPATIENT
Start: 2019-12-18 | End: 2019-12-18 | Stop reason: HOSPADM

## 2019-12-18 RX ORDER — MIDAZOLAM HYDROCHLORIDE 1 MG/ML
INJECTION, SOLUTION INTRAMUSCULAR; INTRAVENOUS AS NEEDED
Status: DISCONTINUED | OUTPATIENT
Start: 2019-12-18 | End: 2019-12-18 | Stop reason: HOSPADM

## 2019-12-18 RX ORDER — LIDOCAINE HYDROCHLORIDE 10 MG/ML
0.1 INJECTION, SOLUTION EPIDURAL; INFILTRATION; INTRACAUDAL; PERINEURAL AS NEEDED
Status: DISCONTINUED | OUTPATIENT
Start: 2019-12-18 | End: 2019-12-18 | Stop reason: HOSPADM

## 2019-12-18 RX ORDER — DIPHENHYDRAMINE HYDROCHLORIDE 50 MG/ML
12.5 INJECTION, SOLUTION INTRAMUSCULAR; INTRAVENOUS AS NEEDED
Status: DISCONTINUED | OUTPATIENT
Start: 2019-12-18 | End: 2019-12-18 | Stop reason: HOSPADM

## 2019-12-18 RX ORDER — NEOSTIGMINE METHYLSULFATE 1 MG/ML
INJECTION INTRAVENOUS AS NEEDED
Status: DISCONTINUED | OUTPATIENT
Start: 2019-12-18 | End: 2019-12-18 | Stop reason: HOSPADM

## 2019-12-18 RX ORDER — LIDOCAINE HYDROCHLORIDE 20 MG/ML
INJECTION, SOLUTION EPIDURAL; INFILTRATION; INTRACAUDAL; PERINEURAL AS NEEDED
Status: DISCONTINUED | OUTPATIENT
Start: 2019-12-18 | End: 2019-12-18 | Stop reason: HOSPADM

## 2019-12-18 RX ORDER — GLYCOPYRROLATE 0.2 MG/ML
INJECTION INTRAMUSCULAR; INTRAVENOUS AS NEEDED
Status: DISCONTINUED | OUTPATIENT
Start: 2019-12-18 | End: 2019-12-18 | Stop reason: HOSPADM

## 2019-12-18 RX ORDER — SODIUM CHLORIDE 0.9 % (FLUSH) 0.9 %
5-40 SYRINGE (ML) INJECTION AS NEEDED
Status: DISCONTINUED | OUTPATIENT
Start: 2019-12-18 | End: 2019-12-18 | Stop reason: HOSPADM

## 2019-12-18 RX ORDER — SODIUM CHLORIDE 9 MG/ML
25 INJECTION, SOLUTION INTRAVENOUS CONTINUOUS
Status: DISCONTINUED | OUTPATIENT
Start: 2019-12-18 | End: 2019-12-18 | Stop reason: HOSPADM

## 2019-12-18 RX ORDER — ONDANSETRON 2 MG/ML
4 INJECTION INTRAMUSCULAR; INTRAVENOUS AS NEEDED
Status: DISCONTINUED | OUTPATIENT
Start: 2019-12-18 | End: 2019-12-18 | Stop reason: HOSPADM

## 2019-12-18 RX ORDER — FENTANYL CITRATE 50 UG/ML
50 INJECTION, SOLUTION INTRAMUSCULAR; INTRAVENOUS AS NEEDED
Status: DISCONTINUED | OUTPATIENT
Start: 2019-12-18 | End: 2019-12-18 | Stop reason: HOSPADM

## 2019-12-18 RX ORDER — MORPHINE SULFATE 10 MG/ML
2 INJECTION, SOLUTION INTRAMUSCULAR; INTRAVENOUS
Status: DISCONTINUED | OUTPATIENT
Start: 2019-12-18 | End: 2019-12-18 | Stop reason: HOSPADM

## 2019-12-18 RX ORDER — OXYCODONE HYDROCHLORIDE 5 MG/1
5 TABLET ORAL AS NEEDED
Status: DISCONTINUED | OUTPATIENT
Start: 2019-12-18 | End: 2019-12-18 | Stop reason: HOSPADM

## 2019-12-18 RX ORDER — SODIUM CHLORIDE, SODIUM LACTATE, POTASSIUM CHLORIDE, CALCIUM CHLORIDE 600; 310; 30; 20 MG/100ML; MG/100ML; MG/100ML; MG/100ML
125 INJECTION, SOLUTION INTRAVENOUS CONTINUOUS
Status: DISCONTINUED | OUTPATIENT
Start: 2019-12-18 | End: 2019-12-18 | Stop reason: HOSPADM

## 2019-12-18 RX ADMIN — Medication 15 MG: at 12:45

## 2019-12-18 RX ADMIN — SUCCINYLCHOLINE CHLORIDE 120 MG: 20 INJECTION, SOLUTION INTRAMUSCULAR; INTRAVENOUS at 12:30

## 2019-12-18 RX ADMIN — MIDAZOLAM HYDROCHLORIDE 2 MG: 1 INJECTION INTRAMUSCULAR; INTRAVENOUS at 12:25

## 2019-12-18 RX ADMIN — FENTANYL CITRATE 100 MCG: 50 INJECTION, SOLUTION INTRAMUSCULAR; INTRAVENOUS at 12:30

## 2019-12-18 RX ADMIN — ONDANSETRON HYDROCHLORIDE 4 MG: 2 INJECTION, SOLUTION INTRAMUSCULAR; INTRAVENOUS at 13:24

## 2019-12-18 RX ADMIN — ROCURONIUM BROMIDE 25 MG: 10 INJECTION INTRAVENOUS at 12:40

## 2019-12-18 RX ADMIN — SODIUM CHLORIDE, SODIUM LACTATE, POTASSIUM CHLORIDE, AND CALCIUM CHLORIDE 25 ML/HR: 600; 310; 30; 20 INJECTION, SOLUTION INTRAVENOUS at 11:59

## 2019-12-18 RX ADMIN — ACETAMINOPHEN 650 MG: 325 TABLET ORAL at 12:01

## 2019-12-18 RX ADMIN — SODIUM CHLORIDE, SODIUM LACTATE, POTASSIUM CHLORIDE, AND CALCIUM CHLORIDE: 600; 310; 30; 20 INJECTION, SOLUTION INTRAVENOUS at 13:26

## 2019-12-18 RX ADMIN — Medication 3 AMPULE: at 12:00

## 2019-12-18 RX ADMIN — PROPOFOL 200 MG: 10 INJECTION, EMULSION INTRAVENOUS at 12:30

## 2019-12-18 RX ADMIN — ROCURONIUM BROMIDE 10 MG: 10 INJECTION INTRAVENOUS at 12:48

## 2019-12-18 RX ADMIN — GLYCOPYRROLATE 0.6 MG: 0.2 INJECTION, SOLUTION INTRAMUSCULAR; INTRAVENOUS at 13:24

## 2019-12-18 RX ADMIN — ROCURONIUM BROMIDE 5 MG: 10 INJECTION INTRAVENOUS at 12:30

## 2019-12-18 RX ADMIN — WATER 2 G: 1 INJECTION INTRAMUSCULAR; INTRAVENOUS; SUBCUTANEOUS at 12:43

## 2019-12-18 RX ADMIN — LIDOCAINE HYDROCHLORIDE 100 MG: 20 INJECTION, SOLUTION INTRAVENOUS at 12:30

## 2019-12-18 RX ADMIN — NEOSTIGMINE METHYLSULFATE 3.5 MG: 1 INJECTION INTRAVENOUS at 13:24

## 2019-12-18 NOTE — PERIOP NOTES
[facility] Report from Operating Room to PACU    Report received from GETACHEW Chapman RN and Valarie Fenton CRNA regarding Bailey Olmedo. Surgeon(s):  Rach Beasley MD  And Procedure(s) (LRB):  OPEN UMBILICAL HERNIA REPAIR WITH MESH (N/A)  confirmed   with allergies, drains and dressings discussed. Anesthesia type, drugs, patient history, complications, estimated blood loss, vital signs, intake and output, and last pain medication, lines, reversal medications and temperature were reviewed.

## 2019-12-18 NOTE — PERIOP NOTES
perrl - right and left temporal pulse palp.smile and tongue pertrussion symmetrical right and left radial pulse palp. Brisk cap refill . Grasp strong and equal right and left dp and pt pulse palp.    Brisk cap refill

## 2019-12-18 NOTE — ANESTHESIA PREPROCEDURE EVALUATION
Relevant Problems   No relevant active problems       Anesthetic History   No history of anesthetic complications            Review of Systems / Medical History  Patient summary reviewed, nursing notes reviewed and pertinent labs reviewed    Pulmonary  Within defined limits                 Neuro/Psych         TIA     Cardiovascular    Hypertension          CAD and cardiac stents    Exercise tolerance: >4 METS     GI/Hepatic/Renal     GERD           Endo/Other    Diabetes: type 1, using insulin         Other Findings   Comments: Insulin pump           Physical Exam    Airway  Mallampati: II  TM Distance: > 6 cm  Neck ROM: normal range of motion   Mouth opening: Normal     Cardiovascular  Regular rate and rhythm,  S1 and S2 normal,  no murmur, click, rub, or gallop             Dental  No notable dental hx       Pulmonary  Breath sounds clear to auscultation               Abdominal  GI exam deferred       Other Findings            Anesthetic Plan    ASA: 3  Anesthesia type: general          Induction: Intravenous  Anesthetic plan and risks discussed with: Patient

## 2019-12-18 NOTE — H&P
Surgery History and Physical    Subjective:      Bassem José is a 54 y.o. female  referred by Dr. John Pendleton regarding a hernia at the umbilicus. The patient reports that she noticed recently when leaning forward against a counter a sharp pain at the umbilicus. The area remained quite sensitive to touch. She had nausea transiently. The patient was seen by Dr. Maria Luisa Murillo who pushed on the area, reducing the hernia, and the area has remained sensitive but less severely so since then. The patient still notices the discomfort when coughing, laughing or lifting. She does not have any other abdominal pain. She is tolerating po. She has for many years occasionally had nausea and vomiting and this is presently unchanged.     The patient has no history of previous abdominal surgery.     The patient has a history of type 1 diabetes mellitus. Her blood sugar typically runs 140-150. She has a history of silent MI in 2006 and had coronary angioplasty at that time. She has had no further cardiac symptoms. She has a history of stage 3 chronic kidney disease and is followed by Dr. Bernardo Cardozo. There is a history of hyperlipidemia, hypertension. She reports she had peptic ulcer disease related to Plavix shortly after the coronary stent. She presently takes one aspirin per day.      The patient has never smoked. She does use alcohol. There is a history of nontraumatic hemorrhage at the basal ganglia since stroke.      Presently the patient is ambulatory and active and does not have shortness of breath with activity. She has no cardiac symptoms.       Past Medical History:   Diagnosis Date    Adverse effect of anesthesia     difficult to wake    Chronic kidney disease 1989    Dr Bernardo Cardozo, stage 3 Proteinuria    CKD (chronic kidney disease) stage 3, GFR 30-59 ml/min ([facility])     Coronary artery disease 8/26/2011    with silent MI in 2006 and s/p one stent    [facility] 8/26/2011    Age 7, Insulin Pump    [facility]     Dyslipidemia 8/26/2011    GERD (gastroesophageal reflux disease)     History of peptic ulcer disease 8/26/2011    from plavix    Hypertension     denies states takes BP meds to reduce protein in kidneys due to kidney disease    PUD (peptic ulcer disease)     Stroke [facility] 2016    hx L basal ganglia bleed     Past Surgical History:   Procedure Laterality Date    CARDIAC SURG PROCEDURE UNLIST  2007    Dr Alejo Rodney stent with Card Cath    HX HEENT Bilateral 1990-95    laser surgery for retinopathy on multiple occasions and vitrectomy bilateral    HX HEENT Bilateral 2013-14    cataracts Dr Gilbert Chauhan      Family History   Problem Relation Age of Onset    Heart Disease Father         CABG    Diabetes Father         Type II    Hypertension Father     Stroke Father     Heart Disease Paternal Grandfather     Hypertension Paternal Grandfather     Stroke Paternal Grandfather     Diabetes Maternal Grandmother         Type 2    Heart Disease Mother     Diabetes Mother         Type II    Stroke Mother     Arthritis-osteo Mother     Hypertension Mother     Stroke Maternal Grandfather     Heart Disease Paternal Grandmother     Hypertension Paternal Grandmother     Stroke Paternal Grandmother      Social History     Tobacco Use    Smoking status: Never Smoker    Smokeless tobacco: Never Used   Substance Use Topics    Alcohol use: Yes     Alcohol/week: 1.0 standard drinks     Types: 1 Glasses of wine per week      Prior to Admission medications    Medication Sig Start Date End Date Taking? Authorizing Provider   MIMVEY 1-0.5 mg per tablet TAKE 1 TABLET BY MOUTH EVERY DAY 11/21/19  Yes Provider, Historical   carvedilol (COREG) 6.25 mg tablet Take 6.25 mg by mouth two (2) times a day.    Yes Provider, Historical   flash glucose sensor (FREESTYLE RICCI 14 DAY SENSOR) kit Use to check blood sugar 9/17/19  Yes Dary Anglin MD   hydroCHLOROthiazide (HYDRODIURIL) 25 mg tablet Take 1 Tab by mouth daily. Patient taking differently: Take 25 mg by mouth nightly. 1/29/18  Yes Amador Carlos MD   lisinopril (PRINIVIL, ZESTRIL) 40 mg tablet Take 40 mg by mouth nightly. Yes Provider, Historical   insulin lispro (HUMALOG) 100 unit/mL injection Via insulin pump   Yes Provider, Historical   escitalopram (LEXAPRO) 10 mg tablet Take 10 mg by mouth daily. Yes Other, MD Lindy   rosuvastatin (CRESTOR) 20 mg tablet Take 20 mg by mouth nightly. Yes Provider, Historical   estradiol-norethindrone (ACTIVELLA) 0.5-0.1 mg per tablet Take 1 Tab by mouth nightly. Provider, Historical   aspirin delayed-release 81 mg tablet Take 81 mg by mouth nightly. Provider, Historical   ondansetron (ZOFRAN ODT) 4 mg disintegrating tablet Take 1 Tab by mouth every eight (8) hours as needed for Nausea. 1/4/19   Hubert Bear MD   cyanocobalamin, vitamin B-12, (VITAMIN B-12) 5,000 mcg subl by SubLINGual route daily. Provider, Historical   spironolactone (ALDACTONE) 25 mg tablet Take 1 Tab by mouth three (3) days a week. Patient taking differently: Take 25 mg by mouth three (3) days a week. M-W-F 9/26/16   Lizy Gongora MD   Intrauterine Device, IUD, IUD by IntraUTERine route. Placed in Feb 2013    Provider, Historical      Allergies   Allergen Reactions    Erythromycin Hives       Review of Systems:  Pertinent items are noted in the History of Present Illness. Objective:      No data found. No data recorded. Physical Exam:  WD woman,  NAD  HEAD/NECK: No jaundice, mass or thyromegaly. LUNGS: Clear bilaterally without wheeze, rhonchi or rales. [facility] HEART: Regular without murmur, gallop or rub. Abdomen:  Soft, non tender. Small reducible umbilical hernia noted. NEURO: Patient is alert and oriented x 3 and moves all extremities equally. Facial movement is symmetrical. Speech was normal.   EXT: without edema. Assessment:     Umbilical hernia.     Plan:     Open repair of umbilical hernia with mesh.

## 2019-12-18 NOTE — DISCHARGE INSTRUCTIONS
Instructions Following Hernia Surgery        Take pain medications as prescribed when needed or use Tylenol. Do not drive while taking narcotic pain medication. Do not take escitalopram (Lexapro) on the day of surgery. You may resume this medication the day after surgery. Do not restart Aspirin until Friday 12/20/2019. Leave Dermabond (plastic coating) in place until it falls off. This usually occurs in about 2 weeks. It is OK for incision to get wet for bathing tomorrow. No lifting over 15 pounds for 4 weeks. Expect some bruise color and swelling in the region of the surgery. See Dr. Tyree Browne in the office in two weeks - [phone]. For any problem, call Dr. Tyree Browne at [phone] or [phone]. Ai Burris MD      DISCHARGE SUMMARY from Nurse    PATIENT INSTRUCTIONS:    After general anesthesia or intravenous sedation, for 24 hours or while taking prescription Narcotics:  · Limit your activities  · Do not drive and operate hazardous machinery  · Do not make important personal or business decisions  · Do  not drink alcoholic beverages  · If you have not urinated within 8 hours after discharge, please contact your surgeon on call. Report the following to your surgeon:  · Excessive pain, swelling, redness or odor of or around the surgical area  · Temperature over 100.5  · Nausea and vomiting lasting longer than 4 hours or if unable to take medications  · Any signs of decreased circulation or nerve impairment to extremity: change in color, persistent  numbness, tingling, coldness or increase pain  · Any questions    What to do at Home:  A common side effect of anesthesia following surgery is nausea and/or vomiting. In order to decrease symptoms, it is wise to avoid foods that are high in fat, greasy foods, milk products, and spicy foods for the first 24 hours.     Acceptable foods for the first 24 hours following surgery include but are not limited to:     soup   broth    toast  crackers    applesauce    bananas    mashed potatoes,   soft or scrambled eggs   oatmeal    jello    It is important to eat when taking your pain medication. This will help to prevent nausea. If possible, please try to time your meals with your medications. It is very important to stay hydrated following surgery. Sip fluids frequently while awake. Avoid acidic drinks such as citrus juices and soda for 24 hours. Carbonated beverages may cause bloating and gas. Acceptable fluids include:    - water (flavor packets may add variety)  - coffee or tea (in moderation)  - Gatorade  - Rajeev-aid  - apple juice  - cranberry juice    You are encouraged to cough and deep breathe every hour when awake. This will help to prevent respiratory complications following anesthesia. You may want to hug a pillow when coughing and sneezing to add additional support to the surgical area and to decrease discomfort if you had abdominal or chest surgery. If you are discharged home with support stockings, you may remove them after 24 hours. Support stockings are used to help prevent blood clots in the legs following surgery. Please take time to review all of your Home Care Instructions and Medication Information sheets provided in your discharge packet. If you have any questions, please contact your surgeons office. Thank you. TO PREVENT AN INFECTION      1. [address] YOUR HANDS     To prevent infection, good handwashing is the most important thing you or your caregiver can do.  Wash your hands with soap and water or use the hand  we gave you before you touch any wounds. 2. SHOWER     Use the antibacterial soap we gave you when you take a shower.  Shower with this soap until your wounds are healed.  To reach all areas of your body, you may need someone to help you.  Dont forget to clean your belly button with every shower.     3.  USE CLEAN SHEETS     Use freshly cleaned sheets on your bed after surgery.  To keep the surgery site clean, do not allow pets to sleep with you while your wound is still healing. 4. STOP SMOKING     Stop smoking, or at least cut back on smoking     Smoking slows your healing. 5.  CONTROL YOUR BLOOD SUGAR     High blood sugars slow wound healing. If you are diabetic, control your blood sugar levels before and after your surgery. How to Care for Your Wound After Its Treated With  DERMABOND* Topical Skin Adhesive  DERMABOND* Topical Skin Adhesive (2-octyl cyanoacrylate) is a sterile, liquid skin adhesive  that holds wound edges together. The film will usually remain in place for 5 to 10 days, then  naturally fall off your skin. The following will answer some of your questions and provide instructions for proper care for your  wound while it is healing:    CHECK WOUND APPEARANCE   Some swelling, redness, and pain are common with all wounds and normally will go away as the  wound heals. If swelling, redness, or pain increases or if the wound feels warm to the touch,  contact a doctor. Also contact a doctor if the wound edges reopen or separate. REPLACE BANDAGES   If your wound is bandaged, keep the bandage dry.  Replace the dressing daily until the adhesive film has fallen off or if the  bandage should become wet, unless otherwise instructed by your  physician.  When changing the dressing, do not place tape directly over the  DERMABOND adhesive film, because removing the tape later may also  remove the film. AVOID TOPICAL MEDICATIONS   Do not apply liquid or ointment medications or any other product to your wound while the  DERMABOND adhesive film is in place. These may loosen the film before your wound is healed. KEEP WOUND DRY AND PROTECTED   You may occasionally and briefly wet your wound in the shower or bath.  Do not soak or scrub  your wound, do not swim, and avoid periods of heavy perspiration until the DERMABOND  adhesive has naturally fallen off. After showering or bathing, gently blot your wound dry with a  soft towel. If a protective dressing is being used, apply a fresh, dry bandage, being sure to keep  the tape off the DERMABOND adhesive film.  Apply a clean, dry bandage over the wound if necessary to protect it.  Protect your wound from injury until the skin has had sufficient time to heal.   Do not scratch, rub, or pick at the DERMABOND adhesive film. This may loosen the film before  your wound is healed.  Protect the wound from prolonged exposure to sunlight or tanning lamps while the film is in  place. If you have any questions or concerns about this product, please consult your doctor. *Trademark ©[employer] 2002Patient Education      Narcotic-Analgesic/Acetaminophen (Percocet, [address] [facility], [address],3M, Lortab 10/325) - (By mouth)   Why this medicine is used:   Relieves pain. Contact a nurse or doctor right away if you have:  Extreme weakness, shallow breathing, slow heartbeat  Severe confusion, lightheadedness, dizziness, fainting  Yellow skin or eyes, dark urine or pale stools  Severe constipation, severe stomach pain, nausea, vomiting, loss of appetite  Sweating or cold, clammy skin     Common side effects:  Mild constipation, nausea, vomiting  Sleepiness, tiredness  Itching, rash  © 2017 [facility] Information is for End User's use only and may not be sold, redistributed or otherwise used for commercial purposes. These are general instructions for a healthy lifestyle:    No smoking/ No tobacco products/ Avoid exposure to second hand smoke  Surgeon General's Warning:  Quitting smoking now greatly reduces serious risk to your health.     Obesity, smoking, and sedentary lifestyle greatly increases your risk for illness    A healthy diet, regular physical exercise & weight monitoring are important for maintaining a healthy lifestyle    You may be retaining fluid if you have a history of heart failure or if you experience any of the following symptoms:  Weight gain of 3 pounds or more overnight or 5 pounds in a week, increased swelling in our hands or feet or shortness of breath while lying flat in bed. Please call your doctor as soon as you notice any of these symptoms; do not wait until your next office visit. The discharge information has been reviewed with the {PATIENT PARENT GUARDIAN:82227}. The {PATIENT PARENT GUARDIAN:83657} verbalized understanding. Discharge medications reviewed with the {Dishcar meds reviewed [facility]:99180} and appropriate educational materials and side effects teaching were provided.   ___________________________________________________________________________________________________________________________________

## 2019-12-18 NOTE — ANESTHESIA POSTPROCEDURE EVALUATION
Post-Anesthesia Evaluation and Assessment    Patient: Bradly Morales MRN: [id number]  SSN: [id number]    YOB: 1970  Age: 54 y.o. Sex: female       Cardiovascular Function/Vital Signs  Visit Vitals  /54 (BP 1 Location: Right arm, BP Patient Position: At rest)   Pulse 69   Temp 36.8 °C (98.3 °F)   Resp 19   Ht 5' 5\" (1.651 m)   Wt 72.8 kg (160 lb 7.9 oz)   SpO2 98%   BMI 26.71 kg/m²       Patient is status post General anesthesia for Procedure(s):  OPEN UMBILICAL HERNIA REPAIR WITH MESH. Nausea/Vomiting: None    Postoperative hydration reviewed and adequate. Pain:  Pain Scale 1: Numeric (0 - 10) (12/18/19 1500)  Pain Intensity 1: 0 (12/18/19 1500)   Managed    Neurological Status:   Neuro (WDL): Exceptions to WDL (12/18/19 1335)  Neuro  Neurologic State: Drowsy (12/18/19 1335)  Orientation Level: Oriented to person;Oriented to place;Oriented to situation (12/18/19 1335)  Cognition: Follows commands (12/18/19 1335)  Speech: Nods appropriately (12/18/19 1335)  LUE Motor Response: Purposeful (12/18/19 1335)  LLE Motor Response: Purposeful (12/18/19 1335)  RUE Motor Response: Purposeful (12/18/19 1335)  RLE Motor Response: Purposeful (12/18/19 1335)   At baseline    Mental Status and Level of Consciousness: Alert and oriented to person, place, and time    Pulmonary Status:   O2 Device: Room air (12/18/19 1500)   Adequate oxygenation and airway patent    Complications related to anesthesia: None    Post-anesthesia assessment completed. No concerns    Signed By: Deshawn Estes MD     December 18, 2019              Procedure(s):  OPEN UMBILICAL HERNIA REPAIR WITH MESH. general    <BSHSIANPOST>    Vitals Value Taken Time   /70 12/18/2019  3:15 PM   Temp 36.8 °C (98.3 °F) 12/18/2019  1:36 PM   Pulse 67 12/18/2019  3:18 PM   Resp 19 12/18/2019  3:18 PM   SpO2 98 % 12/18/2019  3:18 PM   Vitals shown include unvalidated device data.

## 2019-12-18 NOTE — PERIOP NOTES
TRANSFER - OUT REPORT:    Verbal report given to Rizwana Prasad RN (name) on Sharon Soares  being transferred to Phase II (unit) for routine progression of care       Report consisted of patients Situation, Background, Assessment and   Recommendations(SBAR). Information from the following report(s) SBAR, OR Summary, MAR and Cardiac Rhythm NSR was reviewed with the receiving nurse. Opportunity for questions and clarification was provided.       Patient transported with:   Registered Nurse

## 2019-12-18 NOTE — BRIEF OP NOTE
BRIEF OPERATIVE NOTE    Date of Procedure: 12/18/2019   Preoperative Diagnosis: UMBILICAL HERNIA  Postoperative Diagnosis: UMBILICAL HERNIA    Procedure(s):  OPEN UMBILICAL HERNIA REPAIR WITH MESH  Surgeon(s) and Role:     An Freitas MD - Primary         Surgical Assistant: staff    Surgical Staff:  Circ-1: Adriana Bullard  Scrub RN-1: Elgin Hodgkins, RN; Arash Rodriguez RN  Surg Asst-1: Shaneka Rashid RN  Event Time In Time Out   Incision Start 1246    Incision Close 1330      Anesthesia: General   Estimated Blood Loss: 5 ml  Specimens: * No specimens in log *   Findings: Umbilical hernia with 2 cm fascial defect. Coated mesh placed intraperitoneally. Complications: none  Implants:   Implant Name Type Inv.  Item Serial No.  Lot No. LRB No. Used Action   MESH VENTRALEX ST MED --  - SNA  MESH VENTRALEX ST MED --  NA BARD EVE [id number] N/A 1 Implanted

## 2019-12-19 NOTE — OP NOTES
[address]  OPERATIVE REPORT    Name:  Jaciel Puente  MR#:  [id number]  :  1970  ACCOUNT #:  [de-identified]  DATE OF SERVICE:  2019    PREOPERATIVE DIAGNOSIS:  Umbilical hernia. POSTOPERATIVE DIAGNOSIS:  Umbilical hernia. PROCEDURE PERFORMED:  Open repair of umbilical hernia with mesh. SURGEON:  Katrin Bowie MD    ASSISTANT:  staff    ANESTHESIA:  General.    COMPLICATIONS:  none. SPECIMENS REMOVED:  None. DRAIN:  None. IMPLANTS:  Ventralex ST mesh. ESTIMATED BLOOD LOSS:  5 mL. OPERATIVE SUMMARY:  The patient was taken to the operating room, placed on the operating table in supine position. The patient's abdomen was sterilely prepared and draped. An Ioban drape was utilized. A curved incision was made on the right side of the umbilicus and carried down into the subcutaneous tissue. The underlying sac was identified on its first crease circumferentially down to the fascial level. It was then divided from the floor of the umbilicus. The sac was densely adherent to the underlying fascia and so the two were not . Sac was opened. It was empty. There was a fascial defect about 2 cm in diameter. There were no abdominal adhesions associated with the hernia. Omentum was seen deep to the fascial defect. A medium Ventralex ST patch was utilized and repaired. It was rolled up and passed through the facial defect and then drawn up against the under surface of the abdominal wall with the lifting strap. The fascial defect was then closed transversely utilizing interrupted 2-0 Prolene taking a bite of the lifting strap with each suture. These were initially left long and clamped. After they had all been placed, then the lifting strap was transected even flushed with the fascia. The sutures were then individually tied in such a manner that all the mesh lay deep to the fascia.   A small remnant of the sac was then closed over the sutures with interrupted 3-0 chromic. 3-0 chromic was utilized to approximate the subcutaneous tissues and the deep dermis with interrupted sutures. Skin was closed with a running intracuticular suture of 4-0 Monocryl. Exofin was applied to the skin. The patient tolerated the surgery well and was taken to the recovery room in stable condition.       MD NOAM [id number]  D:  12/18/2019 13:47  T:  12/18/2019 20:30  JOB #:  [id number]

## 2020-01-06 ENCOUNTER — OFFICE VISIT (OUTPATIENT)
Dept: SURGERY | Age: 50
End: 2020-01-06

## 2020-01-06 VITALS
BODY MASS INDEX: 28.09 KG/M2 | WEIGHT: 168.6 LBS | SYSTOLIC BLOOD PRESSURE: 148 MMHG | TEMPERATURE: 98.7 F | RESPIRATION RATE: 18 BRPM | HEIGHT: 65 IN | DIASTOLIC BLOOD PRESSURE: 83 MMHG | HEART RATE: 87 BPM | OXYGEN SATURATION: 98 %

## 2020-01-06 DIAGNOSIS — Z09 POSTOPERATIVE EXAMINATION: Primary | ICD-10-CM

## 2020-01-06 NOTE — PROGRESS NOTES
Pierre Almeida is a 54 y.o. female  Chief Complaint   Patient presents with    Surgical Follow-up     post op hernia repair 12/18/19     Visit Vitals  /83 (BP 1 Location: Left arm, BP Patient Position: Sitting)   Pulse 87   Temp 98.7 °F (37.1 °C) (Oral)   Resp 18   Ht 5' 5\" (1.651 m)   Wt 76.5 kg (168 lb 9.6 oz)   SpO2 98%   BMI 28.06 kg/m²     1. Have you been to the ER, urgent care clinic since your last visit? Hospitalized since your last visit? NO    2. Have you seen or consulted any other health care providers outside of the [address] [place] [address] since your last visit? Include any pap smears or colon screening.  NO

## 2020-01-06 NOTE — PROGRESS NOTES
The patient is status post open repair of umbilical hernia with mesh. She reports eating well and getting about well.       On examination there is mild edema at the operative site. The incision line looks good.   There is no evidence of infection.     The patient is doing well and can follow up prn.      He was reminded to lift no more than 10 pounds for 4 weeks following surgery.       Final Diagnosis:  Status post open repair of umbilical hernia with mesh, post-operative visit.     CC:  Dr Erna Ariza

## 2020-01-29 LAB — CREATININE, EXTERNAL: 1.1

## 2020-02-05 DIAGNOSIS — I25.10 CORONARY ARTERY DISEASE INVOLVING NATIVE CORONARY ARTERY OF NATIVE HEART WITHOUT ANGINA PECTORIS: ICD-10-CM

## 2020-02-05 RX ORDER — CARVEDILOL 6.25 MG/1
6.25 TABLET ORAL 2 TIMES DAILY
Qty: 90 TAB | Refills: 3 | Status: SHIPPED | OUTPATIENT
Start: 2020-02-05 | End: 2020-10-17

## 2020-02-05 RX ORDER — ROSUVASTATIN CALCIUM 20 MG/1
20 TABLET, COATED ORAL
Qty: 90 TAB | Refills: 3 | Status: SHIPPED | OUTPATIENT
Start: 2020-02-05 | End: 2021-02-08

## 2020-02-05 RX ORDER — INSULIN LISPRO 100 [IU]/ML
INJECTION, SOLUTION INTRAVENOUS; SUBCUTANEOUS
Qty: 100 ML | Refills: 3
Start: 2020-02-05 | End: 2020-08-26 | Stop reason: SDUPTHER

## 2020-03-18 ENCOUNTER — OFFICE VISIT (OUTPATIENT)
Dept: ENDOCRINOLOGY | Age: 50
End: 2020-03-18

## 2020-03-18 VITALS
SYSTOLIC BLOOD PRESSURE: 117 MMHG | HEART RATE: 95 BPM | BODY MASS INDEX: 27.49 KG/M2 | DIASTOLIC BLOOD PRESSURE: 70 MMHG | WEIGHT: 165 LBS | HEIGHT: 65 IN

## 2020-03-18 DIAGNOSIS — E10.42 TYPE 1 DIABETES MELLITUS WITH DIABETIC POLYNEUROPATHY (HCC): Primary | ICD-10-CM

## 2020-03-18 LAB — HBA1C MFR BLD HPLC: 8.5 %

## 2020-03-18 NOTE — PROGRESS NOTES
Chief Complaint   Patient presents with    Diabetes     pcp and pharmacy verified     History of Present Illness: Gail Orr is a 52 y.o. female here for follow up of Type 1 diabetes. She was diagnosed with DM at the age of 13. At our initial visit in September 2019 she was using a Medtronic pump. She has been on an insulin pump since 1998. Her A1C was 8.7%. To help with high BGs after breakfast and HS I made some adjustments to her pump settings    At our last visit in December 2019 her A1C was down to 8.0% but she had been having high BGs during the day and drops in the afternoon. To address her spikes and drops I made some more changes to her pump settings. Basal  MN-3AM 2.10  3AM-530AM1. 95  530AM-Noon 2.1  Noon--6PM: 1.9  6PM-930PM 2.1  930PM-MN 2.25  Carb Ratio  MN-9AM 7.5  9AM-6PM8.0  6PM-MN 7.0  Correction  1: 23    Pt has her Umbilical Hernia repair on 12/18/19. \"That went really well and my blood sugars were doing well. Then in early February she was feeling very well and her BGs were doing well, but she had been under a lot of stress at work and with the stress of the COVID-19, her BGs have been higher. She notes that her BGs have been improving over the last couple of weeks. She is not having the 300's she had been experiencing in February. She brought her CoinPassine readings today and her BGs are typically running high after breakfsa and after dinner. She was diagnosed with being in Menopause in September 2019. She notes that she was having issues of hot flashes. She is now on HRT by her Gyn Dr. Etelvina Rosas. She feels that with the menopause and hernia she notes her BGs have been higher and she has been having to adjust her basal rates. Her A1C today was 8.5%.     Current Pump Settings  Basal  MN-3AM 2.10  3AM-530AM 1.95  530AM-Noon 2.1  Noon--6PM: 1.9  6PM-930PM 2.1  930PM-MN 2.25  Carb Ratio  MN-9AM 7.5  9AM-6PM8.0  6PM-MN 7.0  Correction  1: 23    Pt reports that she will use the bolus james to correct for hyperglycemia, but then estimate and dose her carbs manually. Pt is not using the freestyle farrah, which she feels has been very helpful. She brought her [address] [facility] with her. Her BGs from 6A-Noon tend to spike and then between Noon-6PM drop. They are tending to climb overnight, then drop around 3AM    She changes her infusion site every 3 days. A typical day is as follows:  Pt wakes at 530AM  - breakfast: She has breakfast at work around 830AM, this AM she had PB and an oatmeal bar. - She denies snacking during the mid-morning  - lunch: She has lunch around 11AM-2PM, yesterday she did not have lunch. - She denies mid-afternoon snacking.  - dinner: She has dinner around 7-8PM, last night she had keilbasa, broccoli, peppers, onions and a glass of red wine. - She gave up the 3 mini oreos in the evening.  -She goes to bed around 9-10PM    Exercise consists of she has been walking at night. \"I had a silent heart attack at the age of 41 (2006) and had a stent placed. She follows with Dr. Sonya Gonzalez of Cardiology. She had a TIA in September 2016. She follows a Neurologist (Dr. Tamy Storey). Pt has hx of Neuropathy. She has had proteinuria since the age of 18. She is followed by Nephrology. Last eye exam was Fall 2019. Pt has hx of Retinopathy (since college) and cataracts. Will request these records. Current Outpatient Medications   Medication Sig    carvediloL (COREG) 6.25 mg tablet Take 1 Tab by mouth two (2) times a day.  flash glucose sensor (FREESTYLE FARRAH 14 DAY SENSOR) kit Use to check blood sugar    insulin lispro (HUMALOG U-100 INSULIN) 100 unit/mL injection Via insulin pump 125 units per day max    rosuvastatin (CRESTOR) 20 mg tablet Take 1 Tab by mouth nightly.  MIMVEY 1-0.5 mg per tablet TAKE 1 TABLET BY MOUTH EVERY DAY    aspirin delayed-release 81 mg tablet Take 81 mg by mouth nightly.     ondansetron (ZOFRAN ODT) 4 mg disintegrating tablet Take 1 Tab by mouth every eight (8) hours as needed for Nausea.  hydroCHLOROthiazide (HYDRODIURIL) 25 mg tablet Take 1 Tab by mouth daily. (Patient taking differently: Take 25 mg by mouth nightly.)    lisinopril (PRINIVIL, ZESTRIL) 40 mg tablet Take 40 mg by mouth nightly.  cyanocobalamin, vitamin B-12, (VITAMIN B-12) 5,000 mcg subl by SubLINGual route daily.  spironolactone (ALDACTONE) 25 mg tablet Take 1 Tab by mouth three (3) days a week. (Patient taking differently: Take 25 mg by mouth daily.)    escitalopram (LEXAPRO) 10 mg tablet Take 10 mg by mouth daily.  Intrauterine Device, IUD, IUD by IntraUTERine route. Placed in Feb 2013    estradiol-norethindrone (ACTIVELLA) 0.5-0.1 mg per tablet Take 1 Tab by mouth nightly. No current facility-administered medications for this visit. Allergies   Allergen Reactions    Erythromycin Hives     Review of Systems:  - Eyes: no blurry vision or double vision  - Cardiovascular: no chest pain  - Respiratory: no shortness of breath  - Musculoskeletal: no myalgias  - Neurological: no numbness/tingling in extremities    Physical Examination:  Blood pressure 117/70, pulse 95, height 5' 5\" (1.651 m), weight 165 lb (74.8 kg). - General: pleasant, no distress, good eye contact   - Neck: no carotid bruits  - Cardiovascular: regular, normal rate, nl s1 and s2, no m/r/g, 2+ DP pulses   - Respiratory: clear bilaterally  - Integumentary: no edema, no foot ulcers,  - Psychiatric: normal mood and affect    Diabetic foot exam:     Left Foot:   Visual Exam: normal    Pulse DP: 2+ (normal)   Filament test: normal sensation    Vibratory sensation: normal      Right Foot:   Visual Exam: normal    Pulse DP: 2+ (normal)   Filament test: normal sensation    Vibratory sensation: normal        Data Reviewed:   Her A1C today was 8.5%. Assessment/Plan:   1) DM > Her A1C today was 8.5%.  She has been having high BGs after breakfast and after dinner, will adjust her basal rate and her carb ratio some. Basal  MN-3AM 2.10  3AM-530AM 1.95  530AM-Noon 2.1 > 2.25  Noon--6PM: 1.9  6PM-930PM 2.1 > 2.25  930PM-MN 2.25  Carb Ratio  MN-9AM 7.5 > 7.0  9AM-6PM8.0 > 7.0  6PM-MN 7.0  Correction  1: 23 > 1:20    Pt to continue the FreeStyle Eliseo CGM    Pt to send me her BG readings in 4 weeks. Follow-up and Dispositions    · Return in about 4 months (around 7/18/2020).          Copy sent to:  Dr. Leigha Trujillo

## 2020-05-07 ENCOUNTER — HOSPITAL ENCOUNTER (INPATIENT)
Age: 50
LOS: 1 days | Discharge: ANOTHER HOSPITAL | DRG: 287 | End: 2020-05-08
Attending: SPECIALIST | Admitting: SPECIALIST
Payer: COMMERCIAL

## 2020-05-07 DIAGNOSIS — I25.119 ATHEROSCLEROSIS OF NATIVE CORONARY ARTERY WITH ANGINA PECTORIS, UNSPECIFIED WHETHER NATIVE OR TRANSPLANTED HEART (HCC): ICD-10-CM

## 2020-05-07 LAB
ANION GAP SERPL CALC-SCNC: 8 MMOL/L (ref 5–15)
BUN SERPL-MCNC: 23 MG/DL (ref 6–20)
BUN/CREAT SERPL: 17 (ref 12–20)
CALCIUM SERPL-MCNC: 9.1 MG/DL (ref 8.5–10.1)
CHLORIDE SERPL-SCNC: 103 MMOL/L (ref 97–108)
CO2 SERPL-SCNC: 23 MMOL/L (ref 21–32)
CREAT SERPL-MCNC: 1.35 MG/DL (ref 0.55–1.02)
ERYTHROCYTE [DISTWIDTH] IN BLOOD BY AUTOMATED COUNT: 12.4 % (ref 11.5–14.5)
GLUCOSE SERPL-MCNC: 260 MG/DL (ref 65–100)
HCT VFR BLD AUTO: 34.7 % (ref 35–47)
HGB BLD-MCNC: 12.3 G/DL (ref 11.5–16)
MAGNESIUM SERPL-MCNC: 2 MG/DL (ref 1.6–2.4)
MCH RBC QN AUTO: 33.9 PG (ref 26–34)
MCHC RBC AUTO-ENTMCNC: 35.4 G/DL (ref 30–36.5)
MCV RBC AUTO: 95.6 FL (ref 80–99)
NRBC # BLD: 0 K/UL (ref 0–0.01)
NRBC BLD-RTO: 0 PER 100 WBC
PLATELET # BLD AUTO: 329 K/UL (ref 150–400)
PMV BLD AUTO: 8.9 FL (ref 8.9–12.9)
POTASSIUM SERPL-SCNC: 4.1 MMOL/L (ref 3.5–5.1)
RBC # BLD AUTO: 3.63 M/UL (ref 3.8–5.2)
SODIUM SERPL-SCNC: 134 MMOL/L (ref 136–145)
WBC # BLD AUTO: 10.4 K/UL (ref 3.6–11)

## 2020-05-07 PROCEDURE — 74011250637 HC RX REV CODE- 250/637: Performed by: SPECIALIST

## 2020-05-07 PROCEDURE — 74011636637 HC RX REV CODE- 636/637: Performed by: SPECIALIST

## 2020-05-07 PROCEDURE — 99152 MOD SED SAME PHYS/QHP 5/>YRS: CPT | Performed by: SPECIALIST

## 2020-05-07 PROCEDURE — 93458 L HRT ARTERY/VENTRICLE ANGIO: CPT | Performed by: SPECIALIST

## 2020-05-07 PROCEDURE — 36415 COLL VENOUS BLD VENIPUNCTURE: CPT

## 2020-05-07 PROCEDURE — 74011636320 HC RX REV CODE- 636/320: Performed by: SPECIALIST

## 2020-05-07 PROCEDURE — 65660000000 HC RM CCU STEPDOWN

## 2020-05-07 PROCEDURE — 74011250637 HC RX REV CODE- 250/637: Performed by: INTERNAL MEDICINE

## 2020-05-07 PROCEDURE — 99153 MOD SED SAME PHYS/QHP EA: CPT | Performed by: SPECIALIST

## 2020-05-07 PROCEDURE — 74011250636 HC RX REV CODE- 250/636: Performed by: SPECIALIST

## 2020-05-07 PROCEDURE — 77030003390 HC NDL ANGI MRTM -A: Performed by: SPECIALIST

## 2020-05-07 PROCEDURE — 83735 ASSAY OF MAGNESIUM: CPT

## 2020-05-07 PROCEDURE — 85027 COMPLETE CBC AUTOMATED: CPT

## 2020-05-07 PROCEDURE — 77030004532 HC CATH ANGI DX IMP BSC -A: Performed by: SPECIALIST

## 2020-05-07 PROCEDURE — 80048 BASIC METABOLIC PNL TOTAL CA: CPT

## 2020-05-07 PROCEDURE — 74011000250 HC RX REV CODE- 250: Performed by: SPECIALIST

## 2020-05-07 PROCEDURE — 77030029065 HC DRSG HEMO QCLOT ZMED -B

## 2020-05-07 PROCEDURE — 4A023N7 MEASUREMENT OF CARDIAC SAMPLING AND PRESSURE, LEFT HEART, PERCUTANEOUS APPROACH: ICD-10-PCS | Performed by: SPECIALIST

## 2020-05-07 PROCEDURE — C1894 INTRO/SHEATH, NON-LASER: HCPCS | Performed by: SPECIALIST

## 2020-05-07 PROCEDURE — B2111ZZ FLUOROSCOPY OF MULTIPLE CORONARY ARTERIES USING LOW OSMOLAR CONTRAST: ICD-10-PCS | Performed by: SPECIALIST

## 2020-05-07 PROCEDURE — B2151ZZ FLUOROSCOPY OF LEFT HEART USING LOW OSMOLAR CONTRAST: ICD-10-PCS | Performed by: SPECIALIST

## 2020-05-07 RX ORDER — CLONIDINE HYDROCHLORIDE 0.1 MG/1
0.1 TABLET ORAL
Status: DISCONTINUED | OUTPATIENT
Start: 2020-05-07 | End: 2020-05-08 | Stop reason: HOSPADM

## 2020-05-07 RX ORDER — ONDANSETRON 2 MG/ML
4 INJECTION INTRAMUSCULAR; INTRAVENOUS
Status: DISCONTINUED | OUTPATIENT
Start: 2020-05-07 | End: 2020-05-08 | Stop reason: HOSPADM

## 2020-05-07 RX ORDER — HYDROCHLOROTHIAZIDE 25 MG/1
25 TABLET ORAL DAILY
Status: DISCONTINUED | OUTPATIENT
Start: 2020-05-07 | End: 2020-05-08 | Stop reason: HOSPADM

## 2020-05-07 RX ORDER — ESCITALOPRAM OXALATE 10 MG/1
10 TABLET ORAL DAILY
Status: DISCONTINUED | OUTPATIENT
Start: 2020-05-08 | End: 2020-05-08 | Stop reason: HOSPADM

## 2020-05-07 RX ORDER — FENTANYL CITRATE 50 UG/ML
INJECTION, SOLUTION INTRAMUSCULAR; INTRAVENOUS AS NEEDED
Status: DISCONTINUED | OUTPATIENT
Start: 2020-05-07 | End: 2020-05-07 | Stop reason: HOSPADM

## 2020-05-07 RX ORDER — HEPARIN SODIUM 200 [USP'U]/100ML
INJECTION, SOLUTION INTRAVENOUS
Status: COMPLETED | OUTPATIENT
Start: 2020-05-07 | End: 2020-05-07

## 2020-05-07 RX ORDER — ESTRADIOL AND NORETHINDRONE ACETATE 1; .5 MG/1; MG/1
1 TABLET ORAL DAILY
Status: DISCONTINUED | OUTPATIENT
Start: 2020-05-07 | End: 2020-05-08 | Stop reason: HOSPADM

## 2020-05-07 RX ORDER — INSULIN LISPRO 100 [IU]/ML
INJECTION, SOLUTION INTRAVENOUS; SUBCUTANEOUS EVERY 6 HOURS
Status: DISCONTINUED | OUTPATIENT
Start: 2020-05-07 | End: 2020-05-08 | Stop reason: HOSPADM

## 2020-05-07 RX ORDER — SODIUM CHLORIDE 0.9 % (FLUSH) 0.9 %
5-40 SYRINGE (ML) INJECTION EVERY 8 HOURS
Status: DISCONTINUED | OUTPATIENT
Start: 2020-05-07 | End: 2020-05-08 | Stop reason: HOSPADM

## 2020-05-07 RX ORDER — SODIUM CHLORIDE 0.9 % (FLUSH) 0.9 %
5-40 SYRINGE (ML) INJECTION AS NEEDED
Status: DISCONTINUED | OUTPATIENT
Start: 2020-05-07 | End: 2020-05-08 | Stop reason: HOSPADM

## 2020-05-07 RX ORDER — ACETAMINOPHEN 325 MG/1
650 TABLET ORAL
Status: DISCONTINUED | OUTPATIENT
Start: 2020-05-07 | End: 2020-05-08 | Stop reason: HOSPADM

## 2020-05-07 RX ORDER — LIDOCAINE HYDROCHLORIDE 10 MG/ML
INJECTION INFILTRATION; PERINEURAL AS NEEDED
Status: DISCONTINUED | OUTPATIENT
Start: 2020-05-07 | End: 2020-05-07 | Stop reason: HOSPADM

## 2020-05-07 RX ORDER — SODIUM CHLORIDE 9 MG/ML
75 INJECTION, SOLUTION INTRAVENOUS CONTINUOUS
Status: DISPENSED | OUTPATIENT
Start: 2020-05-07 | End: 2020-05-07

## 2020-05-07 RX ORDER — ROSUVASTATIN CALCIUM 10 MG/1
20 TABLET, COATED ORAL
Status: DISCONTINUED | OUTPATIENT
Start: 2020-05-07 | End: 2020-05-08 | Stop reason: HOSPADM

## 2020-05-07 RX ORDER — ONDANSETRON 4 MG/1
4 TABLET, ORALLY DISINTEGRATING ORAL
Status: DISCONTINUED | OUTPATIENT
Start: 2020-05-07 | End: 2020-05-08 | Stop reason: HOSPADM

## 2020-05-07 RX ORDER — CARVEDILOL 6.25 MG/1
6.25 TABLET ORAL 2 TIMES DAILY WITH MEALS
Status: DISCONTINUED | OUTPATIENT
Start: 2020-05-07 | End: 2020-05-08 | Stop reason: HOSPADM

## 2020-05-07 RX ORDER — HYDROCORTISONE SODIUM SUCCINATE 100 MG/2ML
100 INJECTION, POWDER, FOR SOLUTION INTRAMUSCULAR; INTRAVENOUS
Status: DISCONTINUED | OUTPATIENT
Start: 2020-05-07 | End: 2020-05-08 | Stop reason: HOSPADM

## 2020-05-07 RX ORDER — MIDAZOLAM HYDROCHLORIDE 1 MG/ML
INJECTION, SOLUTION INTRAMUSCULAR; INTRAVENOUS AS NEEDED
Status: DISCONTINUED | OUTPATIENT
Start: 2020-05-07 | End: 2020-05-07 | Stop reason: HOSPADM

## 2020-05-07 RX ORDER — LISINOPRIL 20 MG/1
40 TABLET ORAL
Status: DISCONTINUED | OUTPATIENT
Start: 2020-05-07 | End: 2020-05-08 | Stop reason: HOSPADM

## 2020-05-07 RX ORDER — ALPRAZOLAM 0.5 MG/1
0.5 TABLET ORAL ONCE
Status: COMPLETED | OUTPATIENT
Start: 2020-05-07 | End: 2020-05-07

## 2020-05-07 RX ORDER — ASPIRIN 81 MG/1
81 TABLET ORAL
Status: DISCONTINUED | OUTPATIENT
Start: 2020-05-07 | End: 2020-05-08 | Stop reason: HOSPADM

## 2020-05-07 RX ORDER — DIPHENHYDRAMINE HYDROCHLORIDE 50 MG/ML
25 INJECTION, SOLUTION INTRAMUSCULAR; INTRAVENOUS
Status: DISCONTINUED | OUTPATIENT
Start: 2020-05-07 | End: 2020-05-08 | Stop reason: HOSPADM

## 2020-05-07 RX ADMIN — HYDROCHLOROTHIAZIDE 25 MG: 25 TABLET ORAL at 17:57

## 2020-05-07 RX ADMIN — SODIUM CHLORIDE 75 ML/HR: 900 INJECTION, SOLUTION INTRAVENOUS at 13:03

## 2020-05-07 RX ADMIN — ASPIRIN 81 MG: 81 TABLET, COATED ORAL at 21:43

## 2020-05-07 RX ADMIN — ROSUVASTATIN CALCIUM 20 MG: 10 TABLET, COATED ORAL at 21:43

## 2020-05-07 RX ADMIN — ALPRAZOLAM 0.5 MG: 0.5 TABLET ORAL at 23:03

## 2020-05-07 RX ADMIN — CARVEDILOL 6.25 MG: 6.25 TABLET, FILM COATED ORAL at 21:43

## 2020-05-07 RX ADMIN — Medication 10 ML: at 21:46

## 2020-05-07 RX ADMIN — INSULIN LISPRO 12 UNITS: 100 INJECTION, SOLUTION INTRAVENOUS; SUBCUTANEOUS at 18:00

## 2020-05-07 NOTE — PROCEDURES
Cath:  Obstructive 3VD     LAD patent prox stent, m95; D1 90 (small)     LCx m80, d80; OM1 80 (small), OM2 90, OM3 80 (small); OM4 80. RCA d90  Normal LVF (EF 70%). No AVG/MR  Patent L SC.  RFA manual    CTSx consult for CABG.

## 2020-05-07 NOTE — Clinical Note
TRANSFER - OUT REPORT:     Verbal report given to: Gaurav Nina. Report consisted of patient's Situation, Background, Assessment and   Recommendations(SBAR). Opportunity for questions and clarification was provided. Patient transported with a Registered Nurse and [address] [place] [address] / [facility]. Patient transported to: [facility].

## 2020-05-07 NOTE — PROGRESS NOTES
[address] [facility] IN REPORT:    Verbal report received from Andrews(name) on Kasi Montalvo  being received from cath lab(unit) for routine progression of care      Report consisted of patients Situation, Background, Assessment and   Recommendations(SBAR). Information from the following report(s) Procedure Summary was reviewed with the receiving nurse. Opportunity for questions and clarification was provided. Assessment completed upon patients arrival to unit and care assumed. Verbal report given to Andrews(name) on Kasi Montalvo  being transferred to cath lab(unit) for routine progression of care       Report consisted of patients Situation, Background, Assessment and   Recommendations(SBAR). Information from the following report(s) Procedure Summary was reviewed with the receiving nurse. Lines:   Peripheral IV 05/07/20 Right Antecubital (Active)   Site Assessment Clean, dry, & intact 5/7/2020 12:53 PM   Phlebitis Assessment 0 5/7/2020 12:53 PM   Infiltration Assessment 0 5/7/2020 12:53 PM   Dressing Status Clean, dry, & intact 5/7/2020 12:53 PM   Dressing Type Transparent 5/7/2020 12:53 PM        Opportunity for questions and clarification was provided.       Patient transported with:   Registered Nurse   [phone] right groin sheath out, hand holding pressure on right groin, b/p trending down 162/67, pt tearful  1645 called re port to [facility] on pcc questions answered, preparing to transfer to room 326 with monitor   1700 family came let  Them visit for a short while before going up to room [phone] transferred to room 326 placed on telemetry, assessed right groin with oncoming nurse, pt has her phone and her  family members took the rest of her clothes

## 2020-05-07 NOTE — H&P
Date of Surgery Update:  Arjun Malone was seen and examined. History and physical has been reviewed. The patient has been examined. There have been no significant clinical changes since the completion of the originally dated History and Physical.    Signed By: Saul Vee MD     May 7, 2020 2:07 PM         [place] 1970   Office/Outpatient Visit  Visit Date: Edith Blakely, May 6, 2020 3:15 pm  Provider: Muriel Christianson MD (Assistant: Dheeraj Temple LPN )  Location: [facility])[address] [place]. [address] [phone]    Electronically signed by Antonio Trammell MD on  05/06/2020 03:20:14 PM                           Subjective:    CC: Ms. Olivier Gold is a 52year old White female. Her primary care physician is Corinne Moan, MD.  Medication bottles reviewed. The primary reason for today's visit is evaluation of the following: unspecified hyperlipidemia. HPI:           Hypercholesterolemia: Current treatment includes Crestor. Regarding chest pain: The pain initially began 3 weeks ago. Typically, individual episodes of chest pain last less than five minutes. She characterizes the pain as squeezing. It seems to be worse with exertion. Pain improves with rest.  She denies associated nausea, dyspnea and diaphoresis. Coronary Artery Disease: Currently, her treatment regimen consists of daily 81 mg aspirin, an ACE/ARB ( lisinopril ),  Coreg, and Crestor. Current symptoms include angina and chest pain. She denies lower extremity edema or shortness of breath. Prior procedures include PTCA with stent placement was done 2006 (LAD).     Past Medical History / Family History / Social History:     Last Reviewed on 5/06/2020 03:00 PM by Dheeraj Temple  Past Medical History:     Coronary artery disease  Dyslipidemia   Peptic Ulcer Disease   Type 1 Diabetes   Cerebrovascular Accident: Hemorrhagic; basal ganglia;   INFLUENZA VACCINE: was last done 2019 PNEUMOCOCCAL VACCINE: was last done 2016 about     Past Cardiac Procedures:  TESTING(Dr Osuna)     1. CORONARY ARTERY DISEASE  a. Echo (11/21/6): EF 60%. PASP 20.  b. Exercise Cardiolite (11/17/6): Reversible anteroseptal defect extending to apex. EF 69%. c. Cath (11/28/6): LAD p80, m50; D1 50. OM1 40. RCA p40. EF 65%. No AVG/MR.  d. PCI pLAD (11/29/6): 2.5x13 Cypher. e. Exercise Myoview (10/4/7): Normal perfusion. EF 74%.  f. Exercise Echo (5/11/10): EF 60%. No ischemia. G.  Holter (6/27/13): Unremarkable. H. Exercise Cardiolite (6/27/13): Normal perfusion. EF 74%     2. DYSLIPIDEMIA  a. FLP (10/26/6): Tot 168, , HDL 60, LDL 82 (Vytorin 10/80mg qd). b. Rona Burger (11/29/6): Tot 131, TG 73, HDL 44, LDL 72 (Vytorin 10/80mg qd). c. FLP (8/3/7): Tot 163, , HDL 53, LDL 86 (Vytorin 10/80mg qd). d.  FLP (7/30/18): Tot 174, , HDL 45, LDL 89 (Crestor 20). e.  FLP (9/11/19): Tot 207, , HDL 56, LDL 78 (Crestor 20). 3. H/O TIA    4. DIABETES MELLITUS TYPE 1       ADVANCED DIRECTIVES: None     Surgical History:   Surgical/Procedural History:   laser surgery for retinopathy on multiple occasions and vitrectomy bilateral     Family History:   Father:  Positive for Coronary Artery Disease;   ; Positive for Dementia; Mother:  Positive for Coronary Artery Disease;   ; Positive for Dementia; Maternal Grandmother:  Positive for Type 2 Diabetes; Social History:   Social History:   Occupation: Human Resources   Marital Status: Single   Children: None     Tobacco/Alcohol/Supplements:   Last Reviewed on 5/06/2020 03:00 PM by Editha Peabody  TOBACCO/ALCOHOL/SUPPLEMENTS   Tobacco: She has never smoked. Alcohol: Non-drinker   Caffeine:  She admits to consuming caffeine via tea and soda.       Substance Abuse History:   Last Reviewed on 5/06/2020 03:00 PM by Editha Peabody  Substance Use/Abuse: Unremarkable     Mental Health History:   Last Reviewed on 5/06/2020 03:00 PM by Aliza Flores, [address] (eg STDs): Last Reviewed on 5/06/2020 03:00 PM by Dutch Recinos    Current Problems:   Last Reviewed on 5/06/2020 03:00 PM by Dutch Recinos  Coronary artery disease, of native coronary artery  Atherosclerotic heart disease of native coronary artery without angina pectoris  Dyslipidemia  Other hyperlipidemia  Chest pain  Precordial pain    Allergies:   Last Reviewed on 5/06/2020 03:00 PM by Dutch Recinos  Erythromycin Base:      Current Medications:   Last Reviewed on 5/06/2020 03:00 PM by Dutch Recinos  hydroCHLOROthiazide 25 mg oral tablet [1 po qd]  Lisinopril 40 mg oral tablet [ 1 po qd]  HumaLOG U-100 Insulin 100 unit/mL subcutaneous Solution [via insulin pump]  Vitamin B-12 1,000 mcg oral tablet [5000 mcg daily]  spironolactone 25 mg oral tablet [1 po qd]  Lexapro 10 mg oral tablet [1 po qd]  Crestor 20 mg oral tablet [1 po qd]  aspirin 81 mg oral tablet, delayed release (enteric coated) [1  po qd]  biotin 10,000 mcg oral Tablet,disintegrating [1 po qd]  aspirin 81 mg oral tablet, delayed release (enteric coated) [1 po qd]  carvediloL 6.25 mg oral tablet [1 po bid]  escitalopram oxalate 10 mg oral tablet [1 po qd]    Objective:    Vitals:     Historical:   3/25/2020  BP:   154/87 mm Hg ( (left arm, , sitting, );) 3/25/2020  Wt:   163lbs  Current: 5/6/2020 2:59:11 PM  Ht:  5 ft, 5 in; Wt: 162 lbs;  BMI: 27. 0BP: 172/93 mm Hg (left arm, sitting);  P: 103 bpm (left arm (BP Cuff), sitting);  sCr: 1.08 mg/dL;  GFR: 65.86    Repeat:   2:59:25 PM  BP:   180/102mm Hg (left arm, standing)   Exams:   GENERAL:  Alert, oriented to person, place and time. HEENT:  Pinkish palpebral  conjunctivae. Anicteric sclerae. NECK:  No jugular vein engorgement. No bruit. CHEST: Equal expansion. Clear breath sounds. No rales, no wheezing. HEART:  Regular rate and rhythm. Normal S1 and S2. No S3 or S4. No murmur. ABDOMEN:  Soft. Normal active bowel sounds. No tenderness. EXTREMITIES:  No pitting pedal edema. Equal pulses bilaterally. NEURO:  Grossly intact. Assessment:     E78.4   Other hyperlipidemia     R07.2   Precordial pain     I25.119   Atherosclerotic heart disease of native coronary artery with unspecified angina pectoris       ORDERS:     Procedures Ordered:     63113  Education and train for pt self-mgmt by qualified, nonphysician, dagoberto 30 minutes; individual pt  (Send-Out)          XCATH  Cardiac Cath  (In-House)          Other Orders:     [id number]  Queried Patient for Tobacco Use  (Send-Out)            LDL-C < 100 mg/dL  (Send-Out)          4086F  Aspirin or clopidogrel prescribed (CAD)  (Send-Out)              Plan:     Atherosclerotic heart disease of native coronary artery with unspecified angina pectoris  CAD # 197: CAD Lipid Control LDL < 100 mg/dL # 6: CAD w/ Antiplatelet Therapy Aspirin or Clopidogrel Prescribed 1. Medication list has been reviewed. Start the following medication(s):  Nitroglycerin. Smoking Status:  Nonsmoker   2. Advised the patient regarding diet, exercise, and lifestyle modification. Go to ER if symptoms recur. 3.  The patient to call the office if there is any change in her cardiac symptoms. 4.  Explained to the patient the importance of controlling her cardiac risk factors. Testing/Procedures: Cardiac Catheterization  Explained to the patient the indication, procedure, risks, and benefits of cardiac catheterization. The patient understands  and wishes to proceed with the cath to be performed as an outpatient tomorrow at [facility] by Dr. Georgi Thomas. Schedule a follow up appointment in 2 weeks. Dr. Otto Gutierrez.

## 2020-05-07 NOTE — PROGRESS NOTES
Patient arrived. ID and allergies verified verbally with patient. Pt voices understanding of procedure to be performed. Consent obtained. Pt prepped for procedure. 2:45 PM  TRANSFER - OUT REPORT:    Verbal report given to LINDA DUNCAN(name) on Sury Martin  being transferred to CATH LAB (unit) for routine post - op       Report consisted of patients Situation, Background, Assessment and   Recommendations(SBAR). Information from the following report(s) Procedure Summary was reviewed with the receiving nurse. Lines:   Peripheral IV 05/07/20 Right Antecubital (Active)   Site Assessment Clean, dry, & intact 5/7/2020 12:53 PM   Phlebitis Assessment 0 5/7/2020 12:53 PM   Infiltration Assessment 0 5/7/2020 12:53 PM   Dressing Status Clean, dry, & intact 5/7/2020 12:53 PM   Dressing Type Transparent 5/7/2020 12:53 PM        Opportunity for questions and clarification was provided.       Patient transported with:   Registered Nurse

## 2020-05-07 NOTE — Clinical Note
TRANSFER - IN REPORT:     Verbal report received from: Miki. Report consisted of patient's Situation, Background, Assessment and   Recommendations(SBAR). Opportunity for questions and clarification was provided. Assessment completed upon patient's arrival to unit and care assumed. Patient transported with a Registered Nurse and [address] [place] [address] / [facility] [employer].

## 2020-05-07 NOTE — PROGRESS NOTES
Bedside shift change report given to DAKOTA Castillo (oncoming nurse) by Berenice Grayson RN (offgoing nurse). Report included the following information SBAR, Kardex and MAR.

## 2020-05-08 ENCOUNTER — APPOINTMENT (OUTPATIENT)
Dept: VASCULAR SURGERY | Age: 50
DRG: 236 | End: 2020-05-08
Attending: NURSE PRACTITIONER
Payer: COMMERCIAL

## 2020-05-08 ENCOUNTER — APPOINTMENT (OUTPATIENT)
Dept: GENERAL RADIOLOGY | Age: 50
DRG: 236 | End: 2020-05-08
Attending: NURSE PRACTITIONER
Payer: COMMERCIAL

## 2020-05-08 ENCOUNTER — HOSPITAL ENCOUNTER (INPATIENT)
Age: 50
LOS: 7 days | Discharge: HOME HEALTH | DRG: 236 | End: 2020-05-15
Attending: THORACIC SURGERY (CARDIOTHORACIC VASCULAR SURGERY) | Admitting: THORACIC SURGERY (CARDIOTHORACIC VASCULAR SURGERY)
Payer: COMMERCIAL

## 2020-05-08 ENCOUNTER — ANESTHESIA EVENT (OUTPATIENT)
Dept: CARDIOTHORACIC SURGERY | Age: 50
DRG: 236 | End: 2020-05-08
Payer: COMMERCIAL

## 2020-05-08 VITALS
BODY MASS INDEX: 26.56 KG/M2 | WEIGHT: 159.39 LBS | HEART RATE: 79 BPM | RESPIRATION RATE: 16 BRPM | DIASTOLIC BLOOD PRESSURE: 64 MMHG | HEIGHT: 65 IN | TEMPERATURE: 98.3 F | SYSTOLIC BLOOD PRESSURE: 100 MMHG | OXYGEN SATURATION: 95 %

## 2020-05-08 DIAGNOSIS — Z95.1 S/P CABG X 4: Primary | ICD-10-CM

## 2020-05-08 LAB
ALBUMIN SERPL-MCNC: 3.3 G/DL (ref 3.5–5)
ALBUMIN/GLOB SERPL: 1.1 {RATIO} (ref 1.1–2.2)
ALP SERPL-CCNC: 48 U/L (ref 45–117)
ALT SERPL-CCNC: 22 U/L (ref 12–78)
ANION GAP SERPL CALC-SCNC: 6 MMOL/L (ref 5–15)
APPEARANCE UR: CLEAR
ARTERIAL PATENCY WRIST A: YES
AST SERPL-CCNC: 18 U/L (ref 15–37)
BACTERIA URNS QL MICRO: ABNORMAL /HPF
BASE DEFICIT BLD-SCNC: 1 MMOL/L
BDY SITE: NORMAL
BILIRUB SERPL-MCNC: 0.8 MG/DL (ref 0.2–1)
BILIRUB UR QL: NEGATIVE
BUN SERPL-MCNC: 23 MG/DL (ref 6–20)
BUN/CREAT SERPL: 16 (ref 12–20)
CA-I BLD-SCNC: 1.28 MMOL/L (ref 1.12–1.32)
CALCIUM SERPL-MCNC: 8.7 MG/DL (ref 8.5–10.1)
CHLORIDE SERPL-SCNC: 105 MMOL/L (ref 97–108)
CHOLEST SERPL-MCNC: 146 MG/DL
CO2 SERPL-SCNC: 25 MMOL/L (ref 21–32)
COLOR UR: ABNORMAL
CREAT SERPL-MCNC: 1.42 MG/DL (ref 0.55–1.02)
EPITH CASTS URNS QL MICRO: ABNORMAL /LPF
EST. AVERAGE GLUCOSE BLD GHB EST-MCNC: 177 MG/DL
GAS FLOW.O2 O2 DELIVERY SYS: NORMAL L/MIN
GLOBULIN SER CALC-MCNC: 2.9 G/DL (ref 2–4)
GLUCOSE BLD STRIP.AUTO-MCNC: 100 MG/DL (ref 65–100)
GLUCOSE BLD STRIP.AUTO-MCNC: 131 MG/DL (ref 65–100)
GLUCOSE BLD STRIP.AUTO-MCNC: 152 MG/DL (ref 65–100)
GLUCOSE BLD STRIP.AUTO-MCNC: 221 MG/DL (ref 65–100)
GLUCOSE BLD STRIP.AUTO-MCNC: 238 MG/DL (ref 65–100)
GLUCOSE SERPL-MCNC: 158 MG/DL (ref 65–100)
GLUCOSE UR STRIP.AUTO-MCNC: NEGATIVE MG/DL
HBA1C MFR BLD: 7.8 % (ref 4–5.6)
HCO3 BLD-SCNC: 23.2 MMOL/L (ref 22–26)
HDLC SERPL-MCNC: 38 MG/DL
HDLC SERPL: 3.8 {RATIO} (ref 0–5)
HGB UR QL STRIP: NEGATIVE
HYALINE CASTS URNS QL MICRO: ABNORMAL /LPF (ref 0–5)
KETONES UR QL STRIP.AUTO: NEGATIVE MG/DL
LDLC SERPL CALC-MCNC: 33.8 MG/DL (ref 0–100)
LEUKOCYTE ESTERASE UR QL STRIP.AUTO: NEGATIVE
LIPID PROFILE,FLP: ABNORMAL
NITRITE UR QL STRIP.AUTO: NEGATIVE
O2/TOTAL GAS SETTING VFR VENT: 0.21 %
PCO2 BLD: 36.8 MMHG (ref 35–45)
PH BLD: 7.41 [PH] (ref 7.35–7.45)
PH UR STRIP: 5.5 [PH] (ref 5–8)
PO2 BLD: 86 MMHG (ref 80–100)
POTASSIUM SERPL-SCNC: 3.9 MMOL/L (ref 3.5–5.1)
PROT SERPL-MCNC: 6.2 G/DL (ref 6.4–8.2)
PROT UR STRIP-MCNC: 100 MG/DL
RBC #/AREA URNS HPF: ABNORMAL /HPF (ref 0–5)
SAO2 % BLD: 97 % (ref 92–97)
SERVICE CMNT-IMP: ABNORMAL
SERVICE CMNT-IMP: NORMAL
SODIUM SERPL-SCNC: 136 MMOL/L (ref 136–145)
SP GR UR REFRACTOMETRY: 1.01 (ref 1–1.03)
SPECIMEN TYPE: NORMAL
TOTAL RESP. RATE, ITRR: 20
TRIGL SERPL-MCNC: 371 MG/DL (ref ?–150)
UA: UC IF INDICATED,UAUC: ABNORMAL
UROBILINOGEN UR QL STRIP.AUTO: 0.2 EU/DL (ref 0.2–1)
VLDLC SERPL CALC-MCNC: 74.2 MG/DL
WBC URNS QL MICRO: ABNORMAL /HPF (ref 0–4)

## 2020-05-08 PROCEDURE — 81001 URINALYSIS AUTO W/SCOPE: CPT

## 2020-05-08 PROCEDURE — 82962 GLUCOSE BLOOD TEST: CPT

## 2020-05-08 PROCEDURE — 93880 EXTRACRANIAL BILAT STUDY: CPT

## 2020-05-08 PROCEDURE — 93005 ELECTROCARDIOGRAM TRACING: CPT

## 2020-05-08 PROCEDURE — 74011000250 HC RX REV CODE- 250: Performed by: NURSE PRACTITIONER

## 2020-05-08 PROCEDURE — 36600 WITHDRAWAL OF ARTERIAL BLOOD: CPT

## 2020-05-08 PROCEDURE — 77030027138 HC INCENT SPIROMETER -A

## 2020-05-08 PROCEDURE — 80061 LIPID PANEL: CPT

## 2020-05-08 PROCEDURE — 36415 COLL VENOUS BLD VENIPUNCTURE: CPT

## 2020-05-08 PROCEDURE — 80053 COMPREHEN METABOLIC PANEL: CPT

## 2020-05-08 PROCEDURE — 94010 BREATHING CAPACITY TEST: CPT

## 2020-05-08 PROCEDURE — 65660000000 HC RM CCU STEPDOWN

## 2020-05-08 PROCEDURE — 93922 UPR/L XTREMITY ART 2 LEVELS: CPT

## 2020-05-08 PROCEDURE — 71046 X-RAY EXAM CHEST 2 VIEWS: CPT

## 2020-05-08 PROCEDURE — 83036 HEMOGLOBIN GLYCOSYLATED A1C: CPT

## 2020-05-08 PROCEDURE — 82803 BLOOD GASES ANY COMBINATION: CPT

## 2020-05-08 PROCEDURE — 74011250637 HC RX REV CODE- 250/637: Performed by: NURSE PRACTITIONER

## 2020-05-08 RX ORDER — ROSUVASTATIN CALCIUM 10 MG/1
20 TABLET, COATED ORAL
Status: DISCONTINUED | OUTPATIENT
Start: 2020-05-08 | End: 2020-05-11

## 2020-05-08 RX ORDER — CARVEDILOL 6.25 MG/1
6.25 TABLET ORAL 2 TIMES DAILY
Status: CANCELLED | OUTPATIENT
Start: 2020-05-08

## 2020-05-08 RX ORDER — ROSUVASTATIN CALCIUM 10 MG/1
20 TABLET, COATED ORAL
Status: CANCELLED | OUTPATIENT
Start: 2020-05-08

## 2020-05-08 RX ORDER — ACETAMINOPHEN 325 MG/1
650 TABLET ORAL
Status: DISCONTINUED | OUTPATIENT
Start: 2020-05-08 | End: 2020-05-11

## 2020-05-08 RX ORDER — GUAIFENESIN 100 MG/5ML
81 LIQUID (ML) ORAL DAILY
Status: DISCONTINUED | OUTPATIENT
Start: 2020-05-08 | End: 2020-05-08

## 2020-05-08 RX ORDER — SODIUM CHLORIDE 0.9 % (FLUSH) 0.9 %
5-40 SYRINGE (ML) INJECTION EVERY 8 HOURS
Status: CANCELLED | OUTPATIENT
Start: 2020-05-08

## 2020-05-08 RX ORDER — SPIRONOLACTONE 25 MG/1
25 TABLET ORAL DAILY
Status: CANCELLED | OUTPATIENT
Start: 2020-05-08

## 2020-05-08 RX ORDER — NITROGLYCERIN 0.4 MG/1
0.4 TABLET SUBLINGUAL AS NEEDED
Status: DISCONTINUED | OUTPATIENT
Start: 2020-05-08 | End: 2020-05-11

## 2020-05-08 RX ORDER — MUPIROCIN 20 MG/G
OINTMENT TOPICAL 2 TIMES DAILY
Status: DISCONTINUED | OUTPATIENT
Start: 2020-05-08 | End: 2020-05-11

## 2020-05-08 RX ORDER — AMIODARONE HYDROCHLORIDE 200 MG/1
200 TABLET ORAL EVERY 12 HOURS
Status: DISCONTINUED | OUTPATIENT
Start: 2020-05-08 | End: 2020-05-11 | Stop reason: HOSPADM

## 2020-05-08 RX ORDER — DEXTROSE MONOHYDRATE 100 MG/ML
0-250 INJECTION, SOLUTION INTRAVENOUS AS NEEDED
Status: DISCONTINUED | OUTPATIENT
Start: 2020-05-08 | End: 2020-05-11

## 2020-05-08 RX ORDER — ESCITALOPRAM OXALATE 10 MG/1
10 TABLET ORAL DAILY
Status: CANCELLED | OUTPATIENT
Start: 2020-05-08

## 2020-05-08 RX ORDER — SODIUM CHLORIDE 0.9 % (FLUSH) 0.9 %
5-40 SYRINGE (ML) INJECTION EVERY 8 HOURS
Status: DISCONTINUED | OUTPATIENT
Start: 2020-05-08 | End: 2020-05-11 | Stop reason: HOSPADM

## 2020-05-08 RX ORDER — ESCITALOPRAM OXALATE 10 MG/1
10 TABLET ORAL DAILY
Status: DISCONTINUED | OUTPATIENT
Start: 2020-05-08 | End: 2020-05-15 | Stop reason: HOSPADM

## 2020-05-08 RX ORDER — SODIUM CHLORIDE 0.9 % (FLUSH) 0.9 %
5-40 SYRINGE (ML) INJECTION AS NEEDED
Status: DISCONTINUED | OUTPATIENT
Start: 2020-05-08 | End: 2020-05-11 | Stop reason: HOSPADM

## 2020-05-08 RX ORDER — ASPIRIN 81 MG/1
81 TABLET ORAL
Status: CANCELLED | OUTPATIENT
Start: 2020-05-08

## 2020-05-08 RX ORDER — CARVEDILOL 6.25 MG/1
6.25 TABLET ORAL 2 TIMES DAILY WITH MEALS
Status: DISCONTINUED | OUTPATIENT
Start: 2020-05-08 | End: 2020-05-11

## 2020-05-08 RX ORDER — CHLORHEXIDINE GLUCONATE 1.2 MG/ML
15 RINSE ORAL EVERY 12 HOURS
Status: CANCELLED | OUTPATIENT
Start: 2020-05-08

## 2020-05-08 RX ORDER — AMIODARONE HYDROCHLORIDE 200 MG/1
400 TABLET ORAL EVERY 12 HOURS
Status: DISCONTINUED | OUTPATIENT
Start: 2020-05-08 | End: 2020-05-08

## 2020-05-08 RX ORDER — GUAIFENESIN 100 MG/5ML
81 LIQUID (ML) ORAL DAILY
Status: CANCELLED | OUTPATIENT
Start: 2020-05-08

## 2020-05-08 RX ORDER — CHLORHEXIDINE GLUCONATE 1.2 MG/ML
15 RINSE ORAL EVERY 12 HOURS
Status: DISCONTINUED | OUTPATIENT
Start: 2020-05-08 | End: 2020-05-11

## 2020-05-08 RX ORDER — ONDANSETRON 2 MG/ML
4 INJECTION INTRAMUSCULAR; INTRAVENOUS
Status: DISCONTINUED | OUTPATIENT
Start: 2020-05-08 | End: 2020-05-11

## 2020-05-08 RX ORDER — ASPIRIN 81 MG/1
81 TABLET ORAL
Status: DISCONTINUED | OUTPATIENT
Start: 2020-05-08 | End: 2020-05-11

## 2020-05-08 RX ORDER — SODIUM CHLORIDE 0.9 % (FLUSH) 0.9 %
5-40 SYRINGE (ML) INJECTION AS NEEDED
Status: CANCELLED | OUTPATIENT
Start: 2020-05-08

## 2020-05-08 RX ORDER — AMIODARONE HYDROCHLORIDE 200 MG/1
400 TABLET ORAL EVERY 12 HOURS
Status: CANCELLED | OUTPATIENT
Start: 2020-05-08

## 2020-05-08 RX ORDER — CEFAZOLIN SODIUM/WATER 2 G/20 ML
2 SYRINGE (ML) INTRAVENOUS
Status: DISCONTINUED | OUTPATIENT
Start: 2020-05-11 | End: 2020-05-11 | Stop reason: HOSPADM

## 2020-05-08 RX ORDER — SPIRONOLACTONE 25 MG/1
25 TABLET ORAL DAILY
Status: DISCONTINUED | OUTPATIENT
Start: 2020-05-08 | End: 2020-05-11

## 2020-05-08 RX ORDER — MAGNESIUM SULFATE 100 %
4 CRYSTALS MISCELLANEOUS AS NEEDED
Status: DISCONTINUED | OUTPATIENT
Start: 2020-05-08 | End: 2020-05-10

## 2020-05-08 RX ADMIN — CHLORHEXIDINE GLUCONATE 15 ML: 1.2 RINSE ORAL at 21:26

## 2020-05-08 RX ADMIN — CARVEDILOL 6.25 MG: 6.25 TABLET, FILM COATED ORAL at 17:02

## 2020-05-08 RX ADMIN — ESCITALOPRAM OXALATE 10 MG: 10 TABLET ORAL at 10:43

## 2020-05-08 RX ADMIN — MUPIROCIN: 20 OINTMENT TOPICAL at 10:42

## 2020-05-08 RX ADMIN — CARVEDILOL 6.25 MG: 6.25 TABLET, FILM COATED ORAL at 10:42

## 2020-05-08 RX ADMIN — Medication 10 ML: at 10:43

## 2020-05-08 RX ADMIN — MUPIROCIN: 20 OINTMENT TOPICAL at 17:02

## 2020-05-08 RX ADMIN — ROSUVASTATIN 20 MG: 10 TABLET, FILM COATED ORAL at 21:26

## 2020-05-08 RX ADMIN — AMIODARONE HYDROCHLORIDE 200 MG: 200 TABLET ORAL at 21:26

## 2020-05-08 RX ADMIN — ASPIRIN 81 MG: 81 TABLET, COATED ORAL at 21:26

## 2020-05-08 RX ADMIN — Medication 10 ML: at 21:26

## 2020-05-08 RX ADMIN — CHLORHEXIDINE GLUCONATE 15 ML: 1.2 RINSE ORAL at 10:42

## 2020-05-08 RX ADMIN — SPIRONOLACTONE 25 MG: 25 TABLET ORAL at 10:42

## 2020-05-08 RX ADMIN — Medication 10 ML: at 06:00

## 2020-05-08 RX ADMIN — Medication 10 ML: at 17:02

## 2020-05-08 NOTE — PROGRESS NOTES
Cardiac Surgery Care Coordinator-  Met with Yeny Beard, Introduced role of the Cardiac Surgery Care Coordinator. Reviewed plan of care and began pre-op education. Discussed day of surgery expectations for the pt and family. At this time there are no visitors allowed in the hospital.  Informed Ms Silvino Domingo that I will be calling her family with updates on Monday. Instructed pt on the proper use of the incentive spirometer, She is able to pull 2500cc with good effort. Reviewed material in the CABG educational folder including Cardiac Surgery Pathway. Reinforced sternal precautions and keeping your move in the tube. Provided a tour of the CVICU and introduced her to some bedside nurses. Encouraged Yeny Beard  to verbalize and offered emotional support.  Broderick William RN

## 2020-05-08 NOTE — PROGRESS NOTES
Problem: Falls - Risk of  Goal: *Absence of Falls  Description: Document Quanah Flow Fall Risk and appropriate interventions in the flowsheet.   Outcome: Progressing Towards Goal  Note: Fall Risk Interventions:            Medication Interventions: Patient to call before getting OOB, Teach patient to arise slowly                   Problem: Patient Education: Go to Patient Education Activity  Goal: Patient/Family Education  Outcome: Progressing Towards Goal

## 2020-05-08 NOTE — H&P
CSS   History and Physical    Subjective:      Cesar Edwards is a 52 y.o. female who was referred for cardiac evaluation of coronary artery disease, referred by Dr. Jose Flood. Pt's PMH includes CAD/Hx of MI/Stent(2006), DM1 w/ diabetic retinopathy, CKD3, Dyslipidemia, GERD/PUD, CVA w/ subsequent L basal ganglia bleed(2016), HTN, anxiety/depression. Pt had scheduled outpatient cardiac catheterization on 5/7/20 after chief complaint of chest pain that started about 3 weeks ago. Episodes of pain last <5 minutes, described as squeezing, worse with exertion, improves with rest.  Denies associated SOB, N/V, syncope, dizziness, or edema. Denies tobacco or drug use. Drinks 1 glass of wine/week. Single, has significant other Deyanira [place]. Lives independently w/ partner. No children. Works in [employer]. Significant positive family history for heart disease in mother and father. Cardiac Testing    Cardiac catheterization 5/7/2020: Obstructive 3VD     LAD patent prox stent, m95; D1 90 (small)     LCx m80, d80; OM1 80 (small), OM2 90, OM3 80 (small); OM4 80. RCA d90  Normal LVF (EF 70%).   No AVG/MR  Patent L SC.  RFA manual    ECHO: None    Past Medical History:   Diagnosis Date    Adverse effect of anesthesia     difficult to wake    Chronic kidney disease 1989    Dr Maggie Miramontes, stage 3 Proteinuria    CKD (chronic kidney disease) stage 3, GFR 30-59 ml/min ([facility])     Coronary artery disease 8/26/2011    with silent MI in 2006 and s/p one stent    Diabetes mellitus type 1 ([address] 8/26/2011    Age 7, Insulin Pump    Diabetic retinopathy ([address]     Dyslipidemia 8/26/2011    GERD (gastroesophageal reflux disease)     History of peptic ulcer disease 8/26/2011    from plavix    Hypertension     denies states takes BP meds to reduce protein in kidneys due to kidney disease    PUD (peptic ulcer disease)     Stroke ([address] 2016    hx L basal ganglia bleed     Past Surgical History:   Procedure Laterality Date    CARDIAC SURG PROCEDURE UNLIST  2007    Dr Alyssia Hernández stent with Card Cath    HX HEENT Bilateral 1990-95    laser surgery for retinopathy on multiple occasions and vitrectomy bilateral    HX HEENT Bilateral 2013-14    cataracts Dr Chelsea Hodgson      Social History     Tobacco Use    Smoking status: Never Smoker    Smokeless tobacco: Never Used   Substance Use Topics    Alcohol use: Yes     Alcohol/week: 1.0 standard drinks     Types: 1 Glasses of wine per week      Family History   Problem Relation Age of Onset    Heart Disease Father         CABG    Diabetes Father         Type II    Hypertension Father     Stroke Father     Heart Disease Paternal Grandfather     Hypertension Paternal Grandfather     Stroke Paternal Grandfather     Diabetes Maternal Grandmother         Type 2    Heart Disease Mother     Diabetes Mother         Type II    Stroke Mother     Arthritis-osteo Mother     Hypertension Mother     Stroke Maternal Grandfather     Heart Disease Paternal Grandmother     Hypertension Paternal Grandmother     Stroke Paternal Grandmother      Prior to Admission medications    Medication Sig Start Date End Date Taking? Authorizing Provider   carvediloL (COREG) 6.25 mg tablet Take 1 Tab by mouth two (2) times a day. 2/5/20   Sandra Malloy MD   flash glucose sensor (FREESTYLE RICCI 14 DAY SENSOR) kit Use to check blood sugar 2/5/20   Sandra Malloy MD   insulin lispro (HUMALOG U-100 INSULIN) 100 unit/mL injection Via insulin pump 125 units per day max 2/5/20   Sandra Malloy MD   rosuvastatin (CRESTOR) 20 mg tablet Take 1 Tab by mouth nightly. 2/5/20   Sandra Malloy MD   MIMVEY 1-0.5 mg per tablet TAKE 1 TABLET BY MOUTH EVERY DAY 11/21/19   Provider, Historical   aspirin delayed-release 81 mg tablet Take 81 mg by mouth nightly. Provider, Historical   ondansetron (ZOFRAN ODT) 4 mg disintegrating tablet Take 1 Tab by mouth every eight (8) hours as needed for Nausea.  1/4/19   Danielle Wilde, Joshua FREDERICK MD   hydroCHLOROthiazide (HYDRODIURIL) 25 mg tablet Take 1 Tab by mouth daily. Patient taking differently: Take 25 mg by mouth nightly. 1/29/18   Vipin Paris MD   lisinopril (PRINIVIL, ZESTRIL) 40 mg tablet Take 40 mg by mouth nightly. Provider, Historical   cyanocobalamin, vitamin B-12, (VITAMIN B-12) 5,000 mcg subl by SubLINGual route daily. Provider, Historical   spironolactone (ALDACTONE) 25 mg tablet Take 1 Tab by mouth three (3) days a week. Patient taking differently: Take 25 mg by mouth daily. 9/26/16   Estrellita Hanson MD   escitalopram (LEXAPRO) 10 mg tablet Take 10 mg by mouth daily. Other, MD Lindy   Intrauterine Device, IUD, IUD by IntraUTERine route. Placed in Feb 2013    Provider, Historical       Allergies   Allergen Reactions    Erythromycin Hives     Review of Systems:  Pertinent positives in HPI   Consititutional: Denies fever or chills. Eyes:  Denies use of glasses or vision problems(cataracts). ENT:  Denies hearing or swallowing difficulty. CV: Denies CP, claudication, HTN. Resp: Denies dyspnea, productive cough. : Denies dialysis or kidney problems. GI: Denies ulcers, esophageal strictures, liver problems. M/S: Denies joint or bone problems, or implanted artificial hardware. Skin: Denies varicose veins, edema. Neuro: Denies strokes, or TIAs. Psych: Denies anxiety or depression. Endocrine: Denies thyroid problems or diabetes. Heme/Lymphatic: Denies easy bruising or lymphedema. Objective:     VS:   Visit Vitals  /76 (BP 1 Location: Right arm, BP Patient Position: Sitting)   Pulse 91   Temp 99.3 °F (37.4 °C)   Resp 18   Wt 72.4 kg (159 lb 9.8 oz)   SpO2 97%   BMI 26.56 kg/m²         Physical Exam:    General appearance: alert, cooperative, no distress  Head: normocephalic, without obvious abnormality; atraumatic  Eyes: conjunctivae/corneas clear; EOM's intact. Nose: nares normal; no drainage.   Neck: no carotid bruit and no JVD  Lungs: clear to auscultation bilaterally  Heart: regular rate and rhythm; no murmur  Abdomen: soft, non-tender; bowel sounds normal  Extremities: moves all extremities; no weakness. Skin: Skin color normal; No varicose veins or edema. Neurologic: Grossly normal      Labs:   Recent Labs     05/08/20  1119  05/08/20  0052 05/07/20  1243   WBC  --   --   --  10.4   HGB  --   --   --  12.3   HCT  --   --   --  34.7*   PLT  --   --   --  329   NA  --   --  136 134*   K  --   --  3.9 4.1   BUN  --   --  23* 23*   CREA  --   --  1.42* 1.35*   GLU  --   --  158* 260*   GLUCPOC 221*   < >  --   --     < > = values in this interval not displayed. Diagnostics:   PA and lateral:   CXR Results  (Last 48 hours)               05/08/20 1008  XR CHEST PA LAT Final result    Impression:  IMPRESSION:   No acute process. Narrative:  INDICATION: pre-op heart       COMPARISON: 1/4/19       FINDINGS:       Frontal and lateral views of the chest demonstrate a normal cardiomediastinal   silhouette. The lungs are adequately expanded. There is no edema, effusion,   consolidation, or pneumothorax. The osseous structures are unremarkable. Carotid doppler: Consistent with no stenosis of the right internal carotid and minimal stenosis of the left internal carotid. Mild proximal left carotid bulb plaque observed. Vertebrals are patent with antegrade flow. ABIs: There is no evidence of hemodynamically significant arterial obstruction in either lower extremity. Mildly low right great toe pressures with normal PVR waveforms. PFTS-FEV1: 2.19-65% predicted     EKG:  NSR  Assessment:     Active Problems:    CAD (coronary artery disease) (5/8/2020)        Plan:   The risk and benefit of surgery were reviewed with patient and family and all questions answered and the patient wishes to proceed. Risk include infection, bleeding, stroke, heart attack, irregular heart rhythm, kidney failure and death.      STS Risk Calculator V2.81 - Discussed by surgeon with patient. Risk of Mortality:1.025%  Renal Failure:1.502%  Permanent Stroke:1.960%  Prolonged Ventilation:4.494%  DSW Infection:0.173%  Reoperation:1.575%  Morbidity or Mortality:8.106%  Short Length of Stay:47.437%  Long Length of Stay:2.779%      Treatment Plan:    1. CAD/Hx of MI/Stent x 1 2006:  Planned CABG For 5/11 w/ DR. Treviño. On asa, statin, BB, preop amio. Preop workup and education started. 2. DM1 w/ diabetic retinopathy: on SQ insulin pump. Hgba1c 7.8. Will transition to insulin gtt on surgery day. 3. CVA w/ subsequent L basal ganglia bleed 2006: on asa/statin, strict BP control. 4. GERD/PUD: More historically when she was on plavix. pepcid while inpt. 5. Dyslipidemia: on statin  6. CKD3: Followed by Dr. Francisco Mason. Monitor labs. 7. HTN:  On coreg, aldactone, hold ACE for upcoming surgery. 8. Anxiety/depression: ON lexapro. 9. Recent open umbilical hernia repair w/ mesh: in 1/2020. Resolved. Signed By: Vicky West NP     May 8, 2020      Saw patient, agree with above  Risk of morbidity and mortality - high  Medical decision making - high complexity    1. CAD/Hx of MI/Stent x 1 2006:  Planned CABG For 5/11 w/ DR. Treviño. On asa, statin, BB, preop amio. Preop workup and education started. 2. DM1 w/ diabetic retinopathy: on SQ insulin pump. Hgba1c 7.8. Will transition to insulin gtt on surgery day. 3. CVA w/ subsequent L basal ganglia bleed 2006: on asa/statin, strict BP control. 4. GERD/PUD: More historically when she was on plavix. pepcid while inpt. 5. Dyslipidemia: on statin  6. CKD3: Followed by Dr. Francisco Mason. Monitor labs. 7. HTN:  On coreg, aldactone, hold ACE for upcoming surgery. 8. Anxiety/depression: ON lexapro. 9. Recent open umbilical hernia repair w/ mesh: in 1/2020. Resolved.

## 2020-05-08 NOTE — DIABETES MGMT
[facility]  CLINICAL NURSE SPECIALIST CONSULT  PROGRAM FOR DIABETES HEALTH    INITIAL NOTE    Presentation   José Luis Montenegro is a 52 y.o. female admitted with chest pain and work up. She will be going for cardiac surgery on Monday. PMH: Type 1 DM/ CAD/dyslipidemia/PUD/ CVA. Current clinical course has been uncomplicated. Diabetes: Patient has had  Type1 diabetes since she was 7yrs old. Ttreated with insulin pump. Family history positive for Type 2 diabetes. Consulted by Provider for advanced diabetes nursing assessment and care, specifically related to   [x] Transitioning off Vignesh Duffy   [x] Inpatient management strategy  [] Home management assessment  [] Survival skill education    Diabetes-related medical history  Acute complications  NONe  Neurological complications  NONE  Microvascular disease  Retinopathy/ nephropathy  Macrovascular disease  CAD and Cerebral vascular accident  Other associated conditions     Dyslipidemia/    Diabetes medication history  Drug class Currently in use Discontinued Never used   Biguanide      DDP-4 inhibitor       Sulfonylurea      Thiazolidinedione      GLP-1 RA      SGLT-2 inhibitors      Basal insulin Via Medtronic pump - recieves 50units daily from pump     Bolus insulin        Subjective   I had the pleasure of speaking with Ms. Preston Howard re: her diabetes history and current management of her diabetes. She sees Dr. Fabio Maldonado (endocrinologist) for management of her diabetes and last saw him in March 2020. She has a very sound knowledge of her disease and manages it well. She is very proud of this fact. She's been on an insulin pump since 1998 and has a CGM (Enma Abreu). Her BG targets are 120-140mg/dl. Her motto is 'she eats to live' NOT' live to eat'. She does work full time as an  and states that she internalizes her stress which can then increase her BG. She changed her pump out and filled her reservoir on 5/7/20.   She would be due to refill her pump and change sites on Sunday, but she will be transitioning to the insulin drip for cardiac surgery. We discussed post surgery her plan and decided she will NOT be on her insulin pump post op. Patient reports the following home diabetes self-care practices:  Eating pattern-has a very strict diet and only 'treats herself' with french fries. Physical activity pattern-does not have time;    Monitoring pattern-per CGM; Taking medications pattern  [x] Consistent administration  [x] Affordable    Social determinants of health impacting diabetes self-management practices   Concerned that you need to know more about how to stay healthy with diabetes    Objective   Physical exam  General Alert, oriented and in no acute distress. Conversant and cooperative. Vital Signs   Visit Vitals  /76 (BP 1 Location: Right arm, BP Patient Position: Sitting)   Pulse 91   Temp 99.3 °F (37.4 °C)   Resp 18   Wt 72.4 kg (159 lb 9.8 oz)   SpO2 97%   BMI 26.56 kg/m²     Skin  Warm and dry. Heart   Regular rate and rhythm. No murmurs, rubs or gallops  Lungs  Clear to auscultation without rales or rhonchi  Extremities No foot wounds    Diabetic foot exam: patient wanted to eat lunch -deferred.         Laboratory  Lab Results   Component Value Date/Time    Hemoglobin A1c 7.8 (H) 05/08/2020 12:52 AM    Hemoglobin A1c (POC) 8.5 03/18/2020 01:45 PM     Lab Results   Component Value Date/Time    LDL, calculated 33.8 05/08/2020 12:52 AM     Lab Results   Component Value Date/Time    Creatinine (POC) 1.2 06/07/2013 12:15 AM    Creatinine 1.42 (H) 05/08/2020 12:52 AM     Lab Results   Component Value Date/Time    Sodium 136 05/08/2020 12:52 AM    Potassium 3.9 05/08/2020 12:52 AM    Chloride 105 05/08/2020 12:52 AM    CO2 25 05/08/2020 12:52 AM    Anion gap 6 05/08/2020 12:52 AM    Glucose 158 (H) 05/08/2020 12:52 AM    BUN 23 (H) 05/08/2020 12:52 AM    Creatinine 1.42 (H) 05/08/2020 12:52 AM    BUN/Creatinine ratio 16 05/08/2020 12:52 AM    GFR est AA 48 (L) 05/08/2020 12:52 AM    GFR est non-AA 39 (L) 05/08/2020 12:52 AM    Calcium 8.7 05/08/2020 12:52 AM    Bilirubin, total 0.8 05/08/2020 12:52 AM    AST (SGOT) 18 05/08/2020 12:52 AM    Alk. phosphatase 48 05/08/2020 12:52 AM    Protein, total 6.2 (L) 05/08/2020 12:52 AM    Albumin 3.3 (L) 05/08/2020 12:52 AM    Globulin 2.9 05/08/2020 12:52 AM    A-G Ratio 1.1 05/08/2020 12:52 AM    ALT (SGPT) 22 05/08/2020 12:52 AM     Lab Results   Component Value Date/Time    ALT (SGPT) 22 05/08/2020 12:52 AM       Factors affecting BG pattern  Factor Dose Comments   Nutrition:  Carb-controlled meals     60 grams/meal        Blood glucose pattern      Assessment and Plan   Nursing Diagnosis Risk for unstable blood glucose pattern   Nursing Intervention Domain 5250 Decision-making Support   Nursing Interventions Examined current inpatient diabetes control   Explored factors facilitating and impeding inpatient management  Identified self-management practices impeding diabetes control  Explored corrective strategies with patient and responsible inpatient provider   Informed patient of rational for insulin strategy while hospitalized     Evaluation     with controlled Type1 diabetes, hasn't  achieved inpatient blood glucose target of 100-180mg/dl. BG trends 100-221mg/dl today. She is  managing her BG via insulin pump and making changes in her pump based off of our POC BG. She is aware that she will need to keep up with her bolus amounts for the nursing staff. Inpatient blood glucose management has been impacted by  [x] Kidney dysfunction  [x] Erratic meal consumption  [] Glucocorticoid use  [x]  Upcoming cardiac surgery (stress)    Recommendations   1. Patient will continue to manage her insulin pump until Sunday when she transitions to the insulin drip per cardiac surgery protocol.       2. On Sunday when the insulin drip is initiated; patient will discontinue her insulin pump at that same time.       Billing Code(s)     [x] [id number] IP subsequent hospital care - 45 minutes      Yuridia Paulino, [facility]  Program for Diabetes Health  Access via SHERICE [address] [phone]

## 2020-05-08 NOTE — PROGRESS NOTES
0730: Bedside and Verbal shift change report given to Calvin [facility] (oncoming nurse) by Tr Ulrich RN (offgoing nurse). Report included the following information SBAR, Kardex, Intake/Output, MAR, Accordion, Recent Results and Cardiac Rhythm NSR.     0913: patient off unit to vascular lab and x-ray    1930: Bedside and Verbal shift change report given to Tr Ulrich RN (oncoming nurse) by DAKOTA Simpson (offgoing nurse). Report included the following information SBAR, Kardex, Intake/Output, MAR, Accordion, Recent Results and Cardiac Rhythm NSR. Problem: Diabetes Self-Management  Goal: *Disease process and treatment process  Description: Define diabetes and identify own type of diabetes; list 3 options for treating diabetes. Outcome: Progressing Towards Goal  Goal: *Incorporating nutritional management into lifestyle  Description: Describe effect of type, amount and timing of food on blood glucose; list 3 methods for planning meals. Outcome: Progressing Towards Goal  Goal: *Incorporating physical activity into lifestyle  Description: State effect of exercise on blood glucose levels. Outcome: Progressing Towards Goal  Goal: *Developing strategies to promote health/change behavior  Description: Define the ABC's of diabetes; identify appropriate screenings, schedule and personal plan for screenings. Outcome: Progressing Towards Goal  Goal: *Using medications safely  Description: State effect of diabetes medications on diabetes; name diabetes medication taking, action and side effects. Outcome: Progressing Towards Goal  Goal: *Monitoring blood glucose, interpreting and using results  Description: Identify recommended blood glucose targets  and personal targets. Outcome: Progressing Towards Goal  Goal: *Prevention, detection, treatment of acute complications  Description: List symptoms of hyper- and hypoglycemia; describe how to treat low blood sugar and actions for lowering  high blood glucose level.   Outcome: Progressing Towards Goal  Goal: *Prevention, detection and treatment of chronic complications  Description: Define the natural course of diabetes and describe the relationship of blood glucose levels to long term complications of diabetes. Outcome: Progressing Towards Goal  Goal: *Developing strategies to address psychosocial issues  Description: Describe feelings about living with diabetes; identify support needed and support network  Outcome: Progressing Towards Goal  Goal: *Insulin pump training  Outcome: Progressing Towards Goal     Problem: Falls - Risk of  Goal: *Absence of Falls  Description: Document Jaciel Bedolla Fall Risk and appropriate interventions in the flowsheet. Outcome: Progressing Towards Goal  Note: Fall Risk Interventions:  Mobility Interventions: Communicate number of staff needed for ambulation/transfer, OT consult for ADLs, Patient to call before getting OOB, PT Consult for mobility concerns, PT Consult for assist device competence         Medication Interventions: Evaluate medications/consider consulting pharmacy, Patient to call before getting OOB                   Problem: Patient Education: Go to Patient Education Activity  Goal: Patient/Family Education  Outcome: Progressing Towards Goal     Problem: Pressure Injury - Risk of  Goal: *Prevention of pressure injury  Description: Document Keny Scale and appropriate interventions in the flowsheet.   Outcome: Progressing Towards Goal  Note: Pressure Injury Interventions:                                            Problem: Patient Education: Go to Patient Education Activity  Goal: Patient/Family Education  Outcome: Progressing Towards Goal     Problem: Patient Education: Go to Patient Education Activity  Goal: Patient/Family Education  Outcome: Progressing Towards Goal

## 2020-05-08 NOTE — PROGRESS NOTES
1915 Bedside and Verbal shift change report given to Anna RN (oncoming nurse) by Adolfo Miller RN (offgoing nurse). Report included the following information SBAR, Kardex, Intake/Output, MAR, Recent Results and Cardiac Rhythm NSR. Introduced self as primary RN. Initial assessment completed. VSS. Pt denies additional complaints at this time. Plan for the evening discussed with pt and she verbalized understanding. Bed locked and in low position with call bell within reach. Instructed pt to press call villagran when needed. White board updated with this RN's name. 2149 Pt refused lisinopril. States she takes lisinopril in the am and has already taken today. 2230 Patient complained of feeling anxious about the procedure coming up. Spoke to on call cardiologist Dr. Jesse Garcia, new order given for xanax 0.5mg po One time. 2300 Pt reports Xanax is effective. 0630 Pt transferred.

## 2020-05-08 NOTE — PROGRESS NOTES
Reason for Admission: Patient transferred from [facility]- to undergo CABG 5/11                     RUR Score: 9% risk of re-admission                     Plan for utilizing home health:  Anticipate home health needs s/p CABG, patient was not receiving any home health services prior to admission         PCP: First and Last name:  Dr. Vianey Rao   Name of Practice: [facility]    Are you a current patient: Yes/No: Yes   Approximate date of last visit: N/A                    Current Advanced Directive/Advance Care Plan: Full Code, not on file                          Transition of Care Plan: Home with home health     The CM met with the the patient at bedside in order to introduce the role of CM and assess for patient needs. The patient lives in [place] with her boyfriend/significant other Delicia Byrd- the patient endorses Delicia Byrd is her primary emergency contact. The patient verified demographics and insurance information. The patient is independent with ADLs and mobility, denied use of DME in the home. The patient utilizes mail order through [facility], utilizes [facility] for short-term prescriptions, she denied difficulty accessing medications. The patient is to undergo CABG on Monday, CM will follow for transitions of care needs. Anticipate discharge home with home health pending progress, PT/OT evaluations s/p CABG. The patient was working full-time as a  for a [employer] prior to hospitalization. JENNIE Ramirez     Care Management Interventions  PCP Verified by CM: Yes(Patient verified PCP as Dr. Albertina Lassiter)  Mode of Transport at Discharge:  Other (see comment)(Family will transport home upon discharge)  Transition of Care Consult (CM Consult): Discharge Planning  MyChart Signup: Yes  Physical Therapy Consult: No  Occupational Therapy Consult: No  Current Support Network: Own Home, Other(Patient lives in home with her significant other/boyfriend )  Confirm Follow Up Transport: Family  Discharge Location  Discharge Placement: Home with home health

## 2020-05-09 ENCOUNTER — APPOINTMENT (OUTPATIENT)
Dept: NON INVASIVE DIAGNOSTICS | Age: 50
DRG: 236 | End: 2020-05-09
Attending: NURSE PRACTITIONER
Payer: COMMERCIAL

## 2020-05-09 LAB
ALBUMIN SERPL-MCNC: 3.5 G/DL (ref 3.5–5)
ALBUMIN/GLOB SERPL: 1 {RATIO} (ref 1.1–2.2)
ALP SERPL-CCNC: 56 U/L (ref 45–117)
ALT SERPL-CCNC: 25 U/L (ref 12–78)
ANION GAP SERPL CALC-SCNC: 6 MMOL/L (ref 5–15)
APTT PPP: 21.5 SEC (ref 22.1–32)
AST SERPL-CCNC: 23 U/L (ref 15–37)
ATRIAL RATE: 75 BPM
AV VELOCITY RATIO: 0.82
BASOPHILS # BLD: 0.1 K/UL (ref 0–0.1)
BASOPHILS NFR BLD: 1 % (ref 0–1)
BILIRUB SERPL-MCNC: 0.8 MG/DL (ref 0.2–1)
BNP SERPL-MCNC: 13 PG/ML
BUN SERPL-MCNC: 22 MG/DL (ref 6–20)
BUN/CREAT SERPL: 19 (ref 12–20)
CALCIUM SERPL-MCNC: 9.4 MG/DL (ref 8.5–10.1)
CALCULATED P AXIS, ECG09: 18 DEGREES
CALCULATED R AXIS, ECG10: 7 DEGREES
CALCULATED T AXIS, ECG11: 14 DEGREES
CHLORIDE SERPL-SCNC: 105 MMOL/L (ref 97–108)
CO2 SERPL-SCNC: 24 MMOL/L (ref 21–32)
CREAT SERPL-MCNC: 1.18 MG/DL (ref 0.55–1.02)
DIAGNOSIS, 93000: NORMAL
DIFFERENTIAL METHOD BLD: ABNORMAL
ECHO AO ROOT DIAM: 2.86 CM
ECHO AV AREA PEAK VELOCITY: 2.3 CM2
ECHO AV PEAK GRADIENT: 6.3 MMHG
ECHO AV PEAK VELOCITY: 125.86 CM/S
ECHO LA AREA 4C: 13.6 CM2
ECHO LA MAJOR AXIS: 3.05 CM
ECHO LA TO AORTIC ROOT RATIO: 1.07
ECHO LA VOL 2C: 30.3 ML (ref 22–52)
ECHO LA VOL 4C: 31.34 ML (ref 22–52)
ECHO LA VOL BP: 31.7 ML (ref 22–52)
ECHO LA VOL/BSA BIPLANE: 17.67 ML/M2 (ref 16–28)
ECHO LA VOLUME INDEX A2C: 16.89 ML/M2 (ref 16–28)
ECHO LA VOLUME INDEX A4C: 17.47 ML/M2 (ref 16–28)
ECHO LV E' LATERAL VELOCITY: 8.19 CM/S
ECHO LV E' SEPTAL VELOCITY: 6 CM/S
ECHO LV INTERNAL DIMENSION DIASTOLIC: 4.64 CM (ref 3.9–5.3)
ECHO LV INTERNAL DIMENSION SYSTOLIC: 2.59 CM
ECHO LV IVSD: 0.62 CM (ref 0.6–0.9)
ECHO LV MASS 2D: 117.7 G (ref 67–162)
ECHO LV MASS INDEX 2D: 65.6 G/M2 (ref 43–95)
ECHO LV POSTERIOR WALL DIASTOLIC: 0.84 CM (ref 0.6–0.9)
ECHO LVOT DIAM: 1.87 CM
ECHO LVOT PEAK GRADIENT: 4.3 MMHG
ECHO LVOT PEAK VELOCITY: 103.3 CM/S
ECHO MV A VELOCITY: 56.33 CM/S
ECHO MV AREA PHT: 3.2 CM2
ECHO MV E DECELERATION TIME (DT): 236.5 MS
ECHO MV E VELOCITY: 50.51 CM/S
ECHO MV E/A RATIO: 0.9
ECHO MV E/E' LATERAL: 6.17
ECHO MV E/E' RATIO (AVERAGED): 7.29
ECHO MV E/E' SEPTAL: 8.42
ECHO MV PRESSURE HALF TIME (PHT): 68.6 MS
ECHO PV MAX VELOCITY: 117.15 CM/S
ECHO PV PEAK GRADIENT: 5.5 MMHG
ECHO RV INTERNAL DIMENSION: 2.74 CM
ECHO RV TAPSE: 1.38 CM (ref 1.5–2)
EOSINOPHIL # BLD: 0.3 K/UL (ref 0–0.4)
EOSINOPHIL NFR BLD: 3 % (ref 0–7)
ERYTHROCYTE [DISTWIDTH] IN BLOOD BY AUTOMATED COUNT: 12.3 % (ref 11.5–14.5)
EST. AVERAGE GLUCOSE BLD GHB EST-MCNC: 163 MG/DL
GLOBULIN SER CALC-MCNC: 3.4 G/DL (ref 2–4)
GLUCOSE BLD STRIP.AUTO-MCNC: 103 MG/DL (ref 65–100)
GLUCOSE BLD STRIP.AUTO-MCNC: 204 MG/DL (ref 65–100)
GLUCOSE BLD STRIP.AUTO-MCNC: 269 MG/DL (ref 65–100)
GLUCOSE BLD STRIP.AUTO-MCNC: 319 MG/DL (ref 65–100)
GLUCOSE BLD STRIP.AUTO-MCNC: 328 MG/DL (ref 65–100)
GLUCOSE BLD STRIP.AUTO-MCNC: 64 MG/DL (ref 65–100)
GLUCOSE BLD STRIP.AUTO-MCNC: 65 MG/DL (ref 65–100)
GLUCOSE SERPL-MCNC: 56 MG/DL (ref 65–100)
HBA1C MFR BLD: 7.3 % (ref 4–5.6)
HCT VFR BLD AUTO: 35 % (ref 35–47)
HGB BLD-MCNC: 12 G/DL (ref 11.5–16)
IMM GRANULOCYTES # BLD AUTO: 0.1 K/UL (ref 0–0.04)
IMM GRANULOCYTES NFR BLD AUTO: 1 % (ref 0–0.5)
INR PPP: 1 (ref 0.9–1.1)
LVFS 2D: 44.2 %
LYMPHOCYTES # BLD: 3.2 K/UL (ref 0.8–3.5)
LYMPHOCYTES NFR BLD: 29 % (ref 12–49)
MAGNESIUM SERPL-MCNC: 2 MG/DL (ref 1.6–2.4)
MCH RBC QN AUTO: 32.6 PG (ref 26–34)
MCHC RBC AUTO-ENTMCNC: 34.3 G/DL (ref 30–36.5)
MCV RBC AUTO: 95.1 FL (ref 80–99)
MONOCYTES # BLD: 1 K/UL (ref 0–1)
MONOCYTES NFR BLD: 9 % (ref 5–13)
MV DEC SLOPE: 2.14
NEUTS SEG # BLD: 6.2 K/UL (ref 1.8–8)
NEUTS SEG NFR BLD: 57 % (ref 32–75)
NRBC # BLD: 0 K/UL (ref 0–0.01)
NRBC BLD-RTO: 0 PER 100 WBC
P-R INTERVAL, ECG05: 126 MS
PLATELET # BLD AUTO: 367 K/UL (ref 150–400)
PMV BLD AUTO: 8.9 FL (ref 8.9–12.9)
POTASSIUM SERPL-SCNC: 3.4 MMOL/L (ref 3.5–5.1)
PROT SERPL-MCNC: 6.9 G/DL (ref 6.4–8.2)
PROTHROMBIN TIME: 9.9 SEC (ref 9–11.1)
Q-T INTERVAL, ECG07: 388 MS
QRS DURATION, ECG06: 76 MS
QTC CALCULATION (BEZET), ECG08: 433 MS
RBC # BLD AUTO: 3.68 M/UL (ref 3.8–5.2)
SERVICE CMNT-IMP: ABNORMAL
SERVICE CMNT-IMP: NORMAL
SODIUM SERPL-SCNC: 135 MMOL/L (ref 136–145)
THERAPEUTIC RANGE,PTTT: ABNORMAL SECS (ref 58–77)
TSH SERPL DL<=0.05 MIU/L-ACNC: 1.54 UIU/ML (ref 0.36–3.74)
VENTRICULAR RATE, ECG03: 75 BPM
WBC # BLD AUTO: 10.8 K/UL (ref 3.6–11)

## 2020-05-09 PROCEDURE — 86900 BLOOD TYPING SEROLOGIC ABO: CPT

## 2020-05-09 PROCEDURE — 83036 HEMOGLOBIN GLYCOSYLATED A1C: CPT

## 2020-05-09 PROCEDURE — 85610 PROTHROMBIN TIME: CPT

## 2020-05-09 PROCEDURE — 93306 TTE W/DOPPLER COMPLETE: CPT

## 2020-05-09 PROCEDURE — 85730 THROMBOPLASTIN TIME PARTIAL: CPT

## 2020-05-09 PROCEDURE — 83735 ASSAY OF MAGNESIUM: CPT

## 2020-05-09 PROCEDURE — 36415 COLL VENOUS BLD VENIPUNCTURE: CPT

## 2020-05-09 PROCEDURE — 83880 ASSAY OF NATRIURETIC PEPTIDE: CPT

## 2020-05-09 PROCEDURE — 82962 GLUCOSE BLOOD TEST: CPT

## 2020-05-09 PROCEDURE — 80053 COMPREHEN METABOLIC PANEL: CPT

## 2020-05-09 PROCEDURE — 65660000000 HC RM CCU STEPDOWN

## 2020-05-09 PROCEDURE — 74011250637 HC RX REV CODE- 250/637: Performed by: NURSE PRACTITIONER

## 2020-05-09 PROCEDURE — 74011250637 HC RX REV CODE- 250/637: Performed by: PHYSICIAN ASSISTANT

## 2020-05-09 PROCEDURE — 85025 COMPLETE CBC W/AUTO DIFF WBC: CPT

## 2020-05-09 PROCEDURE — 84443 ASSAY THYROID STIM HORMONE: CPT

## 2020-05-09 PROCEDURE — 86923 COMPATIBILITY TEST ELECTRIC: CPT

## 2020-05-09 RX ORDER — POLYETHYLENE GLYCOL 3350 17 G/17G
17 POWDER, FOR SOLUTION ORAL
Status: DISCONTINUED | OUTPATIENT
Start: 2020-05-09 | End: 2020-05-11

## 2020-05-09 RX ORDER — DOCUSATE SODIUM 100 MG/1
100 CAPSULE, LIQUID FILLED ORAL 2 TIMES DAILY
Status: DISCONTINUED | OUTPATIENT
Start: 2020-05-09 | End: 2020-05-11

## 2020-05-09 RX ADMIN — AMIODARONE HYDROCHLORIDE 200 MG: 200 TABLET ORAL at 21:11

## 2020-05-09 RX ADMIN — ESCITALOPRAM OXALATE 10 MG: 10 TABLET ORAL at 09:43

## 2020-05-09 RX ADMIN — Medication 10 ML: at 21:11

## 2020-05-09 RX ADMIN — AMIODARONE HYDROCHLORIDE 200 MG: 200 TABLET ORAL at 09:43

## 2020-05-09 RX ADMIN — MUPIROCIN: 20 OINTMENT TOPICAL at 09:43

## 2020-05-09 RX ADMIN — MUPIROCIN: 20 OINTMENT TOPICAL at 18:16

## 2020-05-09 RX ADMIN — DOCUSATE SODIUM 100 MG: 100 CAPSULE, LIQUID FILLED ORAL at 11:38

## 2020-05-09 RX ADMIN — Medication 10 ML: at 06:59

## 2020-05-09 RX ADMIN — ASPIRIN 81 MG: 81 TABLET, COATED ORAL at 21:11

## 2020-05-09 RX ADMIN — ROSUVASTATIN 20 MG: 10 TABLET, FILM COATED ORAL at 21:11

## 2020-05-09 RX ADMIN — CARVEDILOL 6.25 MG: 6.25 TABLET, FILM COATED ORAL at 18:16

## 2020-05-09 RX ADMIN — DOCUSATE SODIUM 100 MG: 100 CAPSULE, LIQUID FILLED ORAL at 18:16

## 2020-05-09 RX ADMIN — SPIRONOLACTONE 25 MG: 25 TABLET ORAL at 09:43

## 2020-05-09 RX ADMIN — Medication 10 ML: at 15:13

## 2020-05-09 RX ADMIN — CHLORHEXIDINE GLUCONATE 15 ML: 1.2 RINSE ORAL at 21:12

## 2020-05-09 RX ADMIN — CARVEDILOL 6.25 MG: 6.25 TABLET, FILM COATED ORAL at 09:43

## 2020-05-09 RX ADMIN — CHLORHEXIDINE GLUCONATE 15 ML: 1.2 RINSE ORAL at 09:43

## 2020-05-09 NOTE — PROGRESS NOTES
1930   Bedside shift change report given to [facility], RN (oncoming nurse) by DAKOTA Simpson (offgoing nurse). Report included the following information SBAR, Kardex, Intake/Output, MAR and Recent Results. 2045  BP taken on both arms   Left: 116/74  Right: 121/59    0327  Pt felt \"low\"; diaphoretic when woken. BS 64. Replenished. 0345  BS 65, replenished, will recheck in 15.     0405  Pt blood sugar 103    0800  Bedside shift change report given to RN (oncoming nurse) by [facility], RN (offgoing nurse). Report included the following information SBAR, Kardex, Intake/Output, MAR and Recent Results. HYPOGLYCEMIC EPISODE DOCUMENTATION    Patient with hypoglycemic episode(s) at 0330(time) on 5/92020(date). BG value(s) pre-treatment 64-->65  Was patient symptomatic?  [x] yes, [] no  Patient was treated with the following rescue medications/treatments: [] D50                [] Glucose tablets                [] Glucagon                [x] 4oz juice                [] 6oz reg soda                [] 8oz low fat milk  BG value post-treatment: 103  Once BG treated and value greater than 80mg/dl, pt was provided with the following:  [x] snack  [] meal

## 2020-05-09 NOTE — PROGRESS NOTES
0801: off floor to echo lab.  6094: pt returned to floor tele placed and confirmed with MT.  1043: Off tele for shower. 1120: Leads placed post shower, confirmed with MT. Pt up ad senia, encouraged to walk around the unit. 1930:Bedside and Verbal shift change report given to james anne rn (oncoming nurse) by melina burroughs rn (offgoing nurse). Report included the following information SBAR, Kardex, ED Summary, Procedure Summary, MAR and Recent Results.

## 2020-05-09 NOTE — PROGRESS NOTES
[facility]  Progress Note    Admit Date: 2020    Preop CABG      Subjective:   Pt seen with Dr. Dionna Hoyt. No new issues. Awaiting CABG. Objective:     Visit Vitals  /47 (BP 1 Location: Left arm, BP Patient Position: At rest)   Pulse 73   Temp 98.6 °F (37 °C)   Resp 16   Wt 159 lb 13.3 oz (72.5 kg)   SpO2 99%   BMI 26.60 kg/m²     Temp (24hrs), Av.4 °F (36.9 °C), Min:97.5 °F (36.4 °C), Max:99.3 °F (37.4 °C)      Last 24hr Input/Output:    Intake/Output Summary (Last 24 hours) at 2020 0753  Last data filed at 2020 0317  Gross per 24 hour   Intake 1440 ml   Output 1750 ml   Net -310 ml        EKG/Rhythm: SR      Oxygen: RA    CXR:  CXR Results  (Last 48 hours)               20 1008  XR CHEST PA LAT Final result    Impression:  IMPRESSION:   No acute process. Narrative:  INDICATION: pre-op heart       COMPARISON: 19       FINDINGS:       Frontal and lateral views of the chest demonstrate a normal cardiomediastinal   silhouette. The lungs are adequately expanded. There is no edema, effusion,   consolidation, or pneumothorax. The osseous structures are unremarkable. Admission Weight: Last Weight   Weight: 159 lb 9.8 oz (72.4 kg) Weight: 159 lb 13.3 oz (72.5 kg)       EXAM:  General: NAD   Lungs:   Clear to auscultation bilaterally. Heart:  Regular rate and rhythm, S1, S2 normal, no murmur, click, rub or gallop. Abdomen:   Soft, non-tender. Bowel sounds normal. No masses,  No organomegaly. Extremities:  No edema. PPP   Neurologic:  Gross motor and sensory apparatus intact.      Activity: ad senia    Diet: diabetic    Lab Data Reviewed:   Recent Labs     20  0650  20  0322   WBC  --   --  10.8   HGB  --   --  12.0   HCT  --   --  35.0   PLT  --   --  367   NA  --   --  135*   K  --   --  3.4*   BUN  --   --  22*   CREA  --   --  1.18*   GLU  --   --  56*   GLUCPOC 328*   < >  --    INR  --   --  1.0    < > = values in this interval not displayed. Assessment:     Active Problems:    CAD (coronary artery disease) (2020)             Plan/Recommendations/Medical Decision Makin. CAD/Hx of MI/Stent x 1 :  Planned CABG For  w/ DR. Treviño. On asa, statin, BB, preop amio. Preop workup and education started. Consents signed. 2. DM1 w/ diabetic retinopathy: on SQ insulin pump. Hgba1c 7.8. Will transition to insulin gtt on surgery day. 3. CVA w/ subsequent L basal ganglia bleed 2006: on asa/statin, strict BP control. 4. GERD/PUD: More historically when she was on plavix. pepcid while inpt. 5. Dyslipidemia: on statin    6. CKD3: Followed by Dr. Ezequiel Lopez. Monitor labs. Cr stable. 7. HTN:  On coreg, aldactone, hold ACE for upcoming surgery. 8. Anxiety/depression: On lexapro. 9. Recent open umbilical hernia repair w/ mesh: in 2020. Resolved. Signed By: IRVING Berger    Saw patient, agree with above  Risk of morbidity and mortality - high  Medical decision making - high complexity  \  1. CAD/Hx of MI/Stent x 1 2006:  Planned CABG For   2. DM1 w/ diabetic retinopathy: on SQ insulin pump. Hgba1c 7.8. Will transition to insulin gtt on surgery day. 3. CVA w/ subsequent L basal ganglia bleed 2006: on asa/statin, strict BP control. 4. GERD/PUD: More historically when she was on plavix. pepcid while inpt. 5. Dyslipidemia: on statin  6. CKD3: Followed by Dr. Ezequiel Lopez. Monitor labs. 7. HTN:  On coreg, aldactone, hold ACE for upcoming surgery. 8. Anxiety/depression: ON lexapro. 9. Recent open umbilical hernia repair w/ mesh: in 2020. Resolved.

## 2020-05-09 NOTE — PROGRESS NOTES
Problem: Falls - Risk of  Goal: *Absence of Falls  Description: Document Leafy Sims Fall Risk and appropriate interventions in the flowsheet. Outcome: Progressing Towards Goal  Note: Fall Risk Interventions:  Mobility Interventions: Assess mobility with egress test         Medication Interventions: Evaluate medications/consider consulting pharmacy                   Problem: Patient Education: Go to Patient Education Activity  Goal: Patient/Family Education  Outcome: Progressing Towards Goal     Problem: Pressure Injury - Risk of  Goal: *Prevention of pressure injury  Description: Document Keny Scale and appropriate interventions in the flowsheet.   Outcome: Progressing Towards Goal  Note: Pressure Injury Interventions:                                            Problem: Patient Education: Go to Patient Education Activity  Goal: Patient/Family Education  Outcome: Progressing Towards Goal

## 2020-05-09 NOTE — PROGRESS NOTES
CARE PLAN  Problem: Diabetes Self-Management  Goal: *Disease process and treatment process  Description: Define diabetes and identify own type of diabetes; list 3 options for treating diabetes. Outcome: Progressing Towards Goal  Goal: *Incorporating nutritional management into lifestyle  Description: Describe effect of type, amount and timing of food on blood glucose; list 3 methods for planning meals. Outcome: Progressing Towards Goal  Goal: *Incorporating physical activity into lifestyle  Description: State effect of exercise on blood glucose levels. Outcome: Progressing Towards Goal  Goal: *Developing strategies to promote health/change behavior  Description: Define the ABC's of diabetes; identify appropriate screenings, schedule and personal plan for screenings. Outcome: Progressing Towards Goal  Goal: *Using medications safely  Description: State effect of diabetes medications on diabetes; name diabetes medication taking, action and side effects. Outcome: Progressing Towards Goal  Goal: *Monitoring blood glucose, interpreting and using results  Description: Identify recommended blood glucose targets  and personal targets. Outcome: Progressing Towards Goal  Goal: *Prevention, detection, treatment of acute complications  Description: List symptoms of hyper- and hypoglycemia; describe how to treat low blood sugar and actions for lowering  high blood glucose level. Outcome: Progressing Towards Goal  Goal: *Prevention, detection and treatment of chronic complications  Description: Define the natural course of diabetes and describe the relationship of blood glucose levels to long term complications of diabetes. Outcome: Progressing Towards Goal     Problem: Pressure Injury - Risk of  Goal: *Prevention of pressure injury  Description: Document Keny Scale and appropriate interventions in the flowsheet.   Outcome: Progressing Towards Goal  Note: Pressure Injury Interventions: Problem: CABG: Pre-Op Day  Goal: Diagnostic Test/Procedures  Outcome: Progressing Towards Goal  Goal: Nutrition/Diet  Outcome: Progressing Towards Goal  Goal: *Hemodynamically stable  Outcome: Progressing Towards Goal     Problem: Falls - Risk of  Goal: *Absence of Falls  Description: Document Handy Fall Risk and appropriate interventions in the flowsheet.   Outcome: Progressing Towards Goal  Note: Fall Risk Interventions:  Mobility Interventions: OT consult for ADLs, Patient to call before getting OOB, PT Consult for mobility concerns         Medication Interventions: Patient to call before getting OOB, Teach patient to arise slowly

## 2020-05-10 LAB
ADMINISTERED INITIALS, ADMINIT: NORMAL
ANION GAP SERPL CALC-SCNC: 5 MMOL/L (ref 5–15)
BUN SERPL-MCNC: 23 MG/DL (ref 6–20)
BUN/CREAT SERPL: 20 (ref 12–20)
CALCIUM SERPL-MCNC: 9.4 MG/DL (ref 8.5–10.1)
CHLORIDE SERPL-SCNC: 106 MMOL/L (ref 97–108)
CO2 SERPL-SCNC: 23 MMOL/L (ref 21–32)
COMMENT, HOLDF: NORMAL
CREAT SERPL-MCNC: 1.17 MG/DL (ref 0.55–1.02)
D50 ADMINISTERED, D50ADM: 0 ML
D50 ADMINISTERED, D50ADM: NORMAL ML
D50 ORDER, D50ORD: 0 ML
D50 ORDER, D50ORD: NORMAL ML
ERYTHROCYTE [DISTWIDTH] IN BLOOD BY AUTOMATED COUNT: 12.2 % (ref 11.5–14.5)
GLSCOM COMMENTS: NORMAL
GLUCOSE BLD STRIP.AUTO-MCNC: 198 MG/DL (ref 65–100)
GLUCOSE BLD STRIP.AUTO-MCNC: 207 MG/DL (ref 65–100)
GLUCOSE BLD STRIP.AUTO-MCNC: 236 MG/DL (ref 65–100)
GLUCOSE BLD STRIP.AUTO-MCNC: 239 MG/DL (ref 65–100)
GLUCOSE BLD STRIP.AUTO-MCNC: 257 MG/DL (ref 65–100)
GLUCOSE BLD STRIP.AUTO-MCNC: 385 MG/DL (ref 65–100)
GLUCOSE BLD STRIP.AUTO-MCNC: 395 MG/DL (ref 65–100)
GLUCOSE BLD STRIP.AUTO-MCNC: 402 MG/DL (ref 65–100)
GLUCOSE BLD STRIP.AUTO-MCNC: 473 MG/DL (ref 65–100)
GLUCOSE BLD STRIP.AUTO-MCNC: 91 MG/DL (ref 65–100)
GLUCOSE SERPL-MCNC: 57 MG/DL (ref 65–100)
GLUCOSE, GLC: 198 MG/DL
GLUCOSE, GLC: 239 MG/DL
GLUCOSE, GLC: 257 MG/DL
GLUCOSE, GLC: 385 MG/DL
GLUCOSE, GLC: 395 MG/DL
GLUCOSE, GLC: NORMAL MG/DL
HCT VFR BLD AUTO: 34.6 % (ref 35–47)
HGB BLD-MCNC: 11.8 G/DL (ref 11.5–16)
HIGH TARGET, HITG: 130 MG/DL
HIGH TARGET, HITG: NORMAL MG/DL
INSULIN ADMINSTERED, INSADM: 13 UNITS/HOUR
INSULIN ADMINSTERED, INSADM: 16.8 UNITS/HOUR
INSULIN ADMINSTERED, INSADM: 5.9 UNITS/HOUR
INSULIN ADMINSTERED, INSADM: 8.3 UNITS/HOUR
INSULIN ADMINSTERED, INSADM: 9 UNITS/HOUR
INSULIN ADMINSTERED, INSADM: NORMAL UNITS/HOUR
INSULIN ORDER, INSORD: 13 UNITS/HOUR
INSULIN ORDER, INSORD: 16.8 UNITS/HOUR
INSULIN ORDER, INSORD: 5.9 UNITS/HOUR
INSULIN ORDER, INSORD: 8.3 UNITS/HOUR
INSULIN ORDER, INSORD: 9 UNITS/HOUR
INSULIN ORDER, INSORD: NORMAL UNITS/HOUR
LOW TARGET, LOT: 95 MG/DL
LOW TARGET, LOT: NORMAL MG/DL
MAGNESIUM SERPL-MCNC: 2 MG/DL (ref 1.6–2.4)
MCH RBC QN AUTO: 33.6 PG (ref 26–34)
MCHC RBC AUTO-ENTMCNC: 34.1 G/DL (ref 30–36.5)
MCV RBC AUTO: 98.6 FL (ref 80–99)
MINUTES UNTIL NEXT BG, NBG: 60 MIN
MINUTES UNTIL NEXT BG, NBG: NORMAL MIN
MULTIPLIER, MUL: 0.03
MULTIPLIER, MUL: 0.04
MULTIPLIER, MUL: 0.05
MULTIPLIER, MUL: 0.05
MULTIPLIER, MUL: 0.06
MULTIPLIER, MUL: NORMAL
NRBC # BLD: 0 K/UL (ref 0–0.01)
NRBC BLD-RTO: 0 PER 100 WBC
ORDER INITIALS, ORDINIT: NORMAL
PLATELET # BLD AUTO: 301 K/UL (ref 150–400)
PMV BLD AUTO: 9.1 FL (ref 8.9–12.9)
POTASSIUM SERPL-SCNC: 3.9 MMOL/L (ref 3.5–5.1)
RBC # BLD AUTO: 3.51 M/UL (ref 3.8–5.2)
SAMPLES BEING HELD,HOLD: NORMAL
SERVICE CMNT-IMP: ABNORMAL
SERVICE CMNT-IMP: NORMAL
SODIUM SERPL-SCNC: 134 MMOL/L (ref 136–145)
WBC # BLD AUTO: 8.2 K/UL (ref 3.6–11)

## 2020-05-10 PROCEDURE — 85027 COMPLETE CBC AUTOMATED: CPT

## 2020-05-10 PROCEDURE — 83735 ASSAY OF MAGNESIUM: CPT

## 2020-05-10 PROCEDURE — 80048 BASIC METABOLIC PNL TOTAL CA: CPT

## 2020-05-10 PROCEDURE — 74011000258 HC RX REV CODE- 258: Performed by: PHYSICIAN ASSISTANT

## 2020-05-10 PROCEDURE — 74011250637 HC RX REV CODE- 250/637: Performed by: PHYSICIAN ASSISTANT

## 2020-05-10 PROCEDURE — 65660000000 HC RM CCU STEPDOWN

## 2020-05-10 PROCEDURE — 82962 GLUCOSE BLOOD TEST: CPT

## 2020-05-10 PROCEDURE — 74011250637 HC RX REV CODE- 250/637: Performed by: NURSE PRACTITIONER

## 2020-05-10 PROCEDURE — 74011636637 HC RX REV CODE- 636/637: Performed by: PHYSICIAN ASSISTANT

## 2020-05-10 RX ORDER — POTASSIUM CHLORIDE 29.8 MG/ML
20 INJECTION INTRAVENOUS ONCE
Status: DISCONTINUED | OUTPATIENT
Start: 2020-05-11 | End: 2020-05-11 | Stop reason: HOSPADM

## 2020-05-10 RX ORDER — MAGNESIUM SULFATE HEPTAHYDRATE 40 MG/ML
2 INJECTION, SOLUTION INTRAVENOUS ONCE
Status: DISCONTINUED | OUTPATIENT
Start: 2020-05-11 | End: 2020-05-11 | Stop reason: HOSPADM

## 2020-05-10 RX ORDER — DOBUTAMINE HYDROCHLORIDE 200 MG/100ML
0-10 INJECTION INTRAVENOUS
Status: DISCONTINUED | OUTPATIENT
Start: 2020-05-11 | End: 2020-05-13

## 2020-05-10 RX ORDER — ALBUMIN HUMAN 50 G/1000ML
25 SOLUTION INTRAVENOUS ONCE
Status: DISCONTINUED | OUTPATIENT
Start: 2020-05-11 | End: 2020-05-11 | Stop reason: HOSPADM

## 2020-05-10 RX ORDER — PHENYLEPHRINE 10 MG/250 ML(40 MCG/ML)IN 0.9 % SOD.CHLORIDE INTRAVENOUS
10-100
Status: DISCONTINUED | OUTPATIENT
Start: 2020-05-11 | End: 2020-05-11 | Stop reason: HOSPADM

## 2020-05-10 RX ORDER — HEPARIN SOD,PORCINE/0.9 % NACL 30K/1000ML
50-1000 INTRAVENOUS SOLUTION INTRAVENOUS AS NEEDED
Status: DISCONTINUED | OUTPATIENT
Start: 2020-05-11 | End: 2020-05-11 | Stop reason: HOSPADM

## 2020-05-10 RX ORDER — INSULIN LISPRO 100 [IU]/ML
INJECTION, SOLUTION INTRAVENOUS; SUBCUTANEOUS
Status: DISCONTINUED | OUTPATIENT
Start: 2020-05-10 | End: 2020-05-11

## 2020-05-10 RX ORDER — NITROGLYCERIN 20 MG/100ML
16.5 INJECTION INTRAVENOUS CONTINUOUS
Status: DISCONTINUED | OUTPATIENT
Start: 2020-05-11 | End: 2020-05-11

## 2020-05-10 RX ORDER — PROTAMINE SULFATE 10 MG/ML
500 INJECTION, SOLUTION INTRAVENOUS ONCE
Status: DISCONTINUED | OUTPATIENT
Start: 2020-05-11 | End: 2020-05-11 | Stop reason: HOSPADM

## 2020-05-10 RX ORDER — MAGNESIUM SULFATE 100 %
4 CRYSTALS MISCELLANEOUS AS NEEDED
Status: DISCONTINUED | OUTPATIENT
Start: 2020-05-10 | End: 2020-05-11

## 2020-05-10 RX ORDER — DEXTROSE MONOHYDRATE 100 MG/ML
0-250 INJECTION, SOLUTION INTRAVENOUS AS NEEDED
Status: DISCONTINUED | OUTPATIENT
Start: 2020-05-10 | End: 2020-05-11

## 2020-05-10 RX ADMIN — Medication 10 ML: at 21:58

## 2020-05-10 RX ADMIN — AMIODARONE HYDROCHLORIDE 200 MG: 200 TABLET ORAL at 08:05

## 2020-05-10 RX ADMIN — CHLORHEXIDINE GLUCONATE 15 ML: 1.2 RINSE ORAL at 08:05

## 2020-05-10 RX ADMIN — CHLORHEXIDINE GLUCONATE 15 ML: 1.2 RINSE ORAL at 21:59

## 2020-05-10 RX ADMIN — MUPIROCIN: 20 OINTMENT TOPICAL at 18:18

## 2020-05-10 RX ADMIN — SPIRONOLACTONE 25 MG: 25 TABLET ORAL at 08:05

## 2020-05-10 RX ADMIN — MUPIROCIN: 20 OINTMENT TOPICAL at 08:05

## 2020-05-10 RX ADMIN — AMIODARONE HYDROCHLORIDE 200 MG: 200 TABLET ORAL at 21:58

## 2020-05-10 RX ADMIN — DOCUSATE SODIUM 100 MG: 100 CAPSULE, LIQUID FILLED ORAL at 18:17

## 2020-05-10 RX ADMIN — ESCITALOPRAM OXALATE 10 MG: 10 TABLET ORAL at 08:05

## 2020-05-10 RX ADMIN — CARVEDILOL 6.25 MG: 6.25 TABLET, FILM COATED ORAL at 08:05

## 2020-05-10 RX ADMIN — POLYETHYLENE GLYCOL 3350 17 G: 17 POWDER, FOR SOLUTION ORAL at 11:30

## 2020-05-10 RX ADMIN — ROSUVASTATIN 20 MG: 10 TABLET, FILM COATED ORAL at 21:55

## 2020-05-10 RX ADMIN — ASPIRIN 81 MG: 81 TABLET, COATED ORAL at 21:55

## 2020-05-10 RX ADMIN — CARVEDILOL 6.25 MG: 6.25 TABLET, FILM COATED ORAL at 18:17

## 2020-05-10 RX ADMIN — Medication 10 ML: at 22:30

## 2020-05-10 RX ADMIN — DOCUSATE SODIUM 100 MG: 100 CAPSULE, LIQUID FILLED ORAL at 08:05

## 2020-05-10 RX ADMIN — SODIUM CHLORIDE 5.9 UNITS/HR: 900 INJECTION, SOLUTION INTRAVENOUS at 19:40

## 2020-05-10 NOTE — PROGRESS NOTES
7892: per ONIEL VILLATORO, start insulin gtt when pt insulin pump is completed. Pt reports it should be empty by later today. 1930:Bedside and Verbal shift change report given to james anne rn (oncoming nurse) by melina burroughs rn (offgoing nurse). Report included the following information SBAR, Kardex, ED Summary, Procedure Summary, Intake/Output, MAR and Recent Results. Problem: Pressure Injury - Risk of  Goal: *Prevention of pressure injury  Description: Document Keny Scale and appropriate interventions in the flowsheet.   Outcome: Progressing Towards Goal  Note: Pressure Injury Interventions:                                            Problem: Patient Education: Go to Patient Education Activity  Goal: Patient/Family Education  Outcome: Progressing Towards Goal     Problem: CABG: Pre-Op Day  Goal: Activity/Safety  Outcome: Progressing Towards Goal  Goal: Diagnostic Test/Procedures  Outcome: Progressing Towards Goal  Goal: Nutrition/Diet  Outcome: Progressing Towards Goal  Goal: Medications  Outcome: Progressing Towards Goal  Goal: Treatments/Interventions/Procedures  Outcome: Progressing Towards Goal  Goal: Discharge Planning  Outcome: Progressing Towards Goal  Goal: Psychosocial  Outcome: Progressing Towards Goal  Goal: *Hemodynamically stable  Outcome: Progressing Towards Goal  Goal: *Consent obtained, permits and diagnostics complete  Outcome: Progressing Towards Goal

## 2020-05-10 NOTE — PROGRESS NOTES
1930  Bedside shift change report given to Cara Landry RN (oncoming nurse) by Giselle Saldana RN (offgoing nurse). Report included the following information SBAR, Kardex, Intake/Output, MAR and Recent Results     0800  Bedside shift change report given to Giselle Saldana RN (oncoming nurse) by Cara Landry RN (offgoing nurse). Report included the following information SBAR, Kardex, Intake/Output, MAR and Recent Results. CARE PLAN  Problem: Diabetes Self-Management  Goal: *Disease process and treatment process  Description: Define diabetes and identify own type of diabetes; list 3 options for treating diabetes. Outcome: Progressing Towards Goal  Goal: *Incorporating nutritional management into lifestyle  Description: Describe effect of type, amount and timing of food on blood glucose; list 3 methods for planning meals. Outcome: Progressing Towards Goal  Goal: *Using medications safely  Description: State effect of diabetes medications on diabetes; name diabetes medication taking, action and side effects. Outcome: Progressing Towards Goal  Goal: *Monitoring blood glucose, interpreting and using results  Description: Identify recommended blood glucose targets  and personal targets. Outcome: Progressing Towards Goal  Goal: *Developing strategies to address psychosocial issues  Description: Describe feelings about living with diabetes; identify support needed and support network  Outcome: Progressing Towards Goal  Goal: *Insulin pump training  Outcome: Progressing Towards Goal     Problem: Falls - Risk of  Goal: *Absence of Falls  Description: Document Mary Jensen Fall Risk and appropriate interventions in the flowsheet.   Outcome: Progressing Towards Goal  Note: Fall Risk Interventions:  Mobility Interventions: Assess mobility with egress test         Medication Interventions: Teach patient to arise slowly                   Problem: Pressure Injury - Risk of  Goal: *Prevention of pressure injury  Description: Document Keny Scale and appropriate interventions in the flowsheet.   Outcome: Progressing Towards Goal  Note: Pressure Injury Interventions:                                            Problem: CABG: Pre-Op Day  Goal: Activity/Safety  Outcome: Progressing Towards Goal  Goal: Consults, if ordered  Outcome: Progressing Towards Goal  Goal: Medications  Outcome: Progressing Towards Goal  Goal: Treatments/Interventions/Procedures  Outcome: Progressing Towards Goal  Goal: Psychosocial  Outcome: Progressing Towards Goal  Goal: *Hemodynamically stable  Outcome: Progressing Towards Goal  Goal: *Consent obtained, permits and diagnostics complete  Outcome: Progressing Towards Goal

## 2020-05-11 ENCOUNTER — HOSPITAL ENCOUNTER (OUTPATIENT)
Dept: NON INVASIVE DIAGNOSTICS | Age: 50
Discharge: HOME | End: 2020-05-11
Attending: THORACIC SURGERY (CARDIOTHORACIC VASCULAR SURGERY)

## 2020-05-11 ENCOUNTER — APPOINTMENT (OUTPATIENT)
Dept: GENERAL RADIOLOGY | Age: 50
DRG: 236 | End: 2020-05-11
Attending: NURSE PRACTITIONER
Payer: COMMERCIAL

## 2020-05-11 ENCOUNTER — ANESTHESIA (OUTPATIENT)
Dept: CARDIOTHORACIC SURGERY | Age: 50
DRG: 236 | End: 2020-05-11
Payer: COMMERCIAL

## 2020-05-11 LAB
ADMINISTERED INITIALS, ADMINIT: NORMAL
ALBUMIN SERPL-MCNC: 3.1 G/DL (ref 3.5–5)
ALBUMIN SERPL-MCNC: 3.8 G/DL (ref 3.5–5)
ALBUMIN/GLOB SERPL: 1.3 {RATIO} (ref 1.1–2.2)
ALBUMIN/GLOB SERPL: 2 {RATIO} (ref 1.1–2.2)
ALP SERPL-CCNC: 31 U/L (ref 45–117)
ALP SERPL-CCNC: 40 U/L (ref 45–117)
ALT SERPL-CCNC: 20 U/L (ref 12–78)
ALT SERPL-CCNC: 21 U/L (ref 12–78)
ANION GAP SERPL CALC-SCNC: 3 MMOL/L (ref 5–15)
ANION GAP SERPL CALC-SCNC: 6 MMOL/L (ref 5–15)
APTT PPP: 22.1 SEC (ref 22.1–32)
ARTERIAL PATENCY WRIST A: YES
AST SERPL-CCNC: 31 U/L (ref 15–37)
AST SERPL-CCNC: 52 U/L (ref 15–37)
BASE DEFICIT BLD-SCNC: 3 MMOL/L
BASE DEFICIT BLD-SCNC: 4 MMOL/L
BASE DEFICIT BLD-SCNC: 4 MMOL/L
BASE DEFICIT BLD-SCNC: 5 MMOL/L
BASOPHILS # BLD: 0.1 K/UL (ref 0–0.1)
BASOPHILS NFR BLD: 0 % (ref 0–1)
BDY SITE: ABNORMAL
BILIRUB SERPL-MCNC: 0.7 MG/DL (ref 0.2–1)
BILIRUB SERPL-MCNC: 0.8 MG/DL (ref 0.2–1)
BUN SERPL-MCNC: 19 MG/DL (ref 6–20)
BUN SERPL-MCNC: 19 MG/DL (ref 6–20)
BUN/CREAT SERPL: 15 (ref 12–20)
BUN/CREAT SERPL: 16 (ref 12–20)
CA-I BLD-SCNC: 1.18 MMOL/L (ref 1.12–1.32)
CA-I BLD-SCNC: 1.19 MMOL/L (ref 1.12–1.32)
CA-I BLD-SCNC: 1.2 MMOL/L (ref 1.12–1.32)
CA-I BLD-SCNC: 1.23 MMOL/L (ref 1.12–1.32)
CALCIUM SERPL-MCNC: 7.8 MG/DL (ref 8.5–10.1)
CALCIUM SERPL-MCNC: 8.1 MG/DL (ref 8.5–10.1)
CHLORIDE SERPL-SCNC: 111 MMOL/L (ref 97–108)
CHLORIDE SERPL-SCNC: 112 MMOL/L (ref 97–108)
CO2 SERPL-SCNC: 24 MMOL/L (ref 21–32)
CO2 SERPL-SCNC: 26 MMOL/L (ref 21–32)
CREAT SERPL-MCNC: 1.19 MG/DL (ref 0.55–1.02)
CREAT SERPL-MCNC: 1.25 MG/DL (ref 0.55–1.02)
D50 ADMINISTERED, D50ADM: 0 ML
D50 ORDER, D50ORD: 0 ML
DIFFERENTIAL METHOD BLD: ABNORMAL
EOSINOPHIL # BLD: 0.2 K/UL (ref 0–0.4)
EOSINOPHIL NFR BLD: 1 % (ref 0–7)
ERYTHROCYTE [DISTWIDTH] IN BLOOD BY AUTOMATED COUNT: 12.9 % (ref 11.5–14.5)
GAS FLOW.O2 O2 DELIVERY SYS: ABNORMAL L/MIN
GAS FLOW.O2 SETTING OXYMISER: 18 BPM
GLOBULIN SER CALC-MCNC: 1.9 G/DL (ref 2–4)
GLOBULIN SER CALC-MCNC: 2.3 G/DL (ref 2–4)
GLSCOM COMMENTS: NORMAL
GLUCOSE BLD STRIP.AUTO-MCNC: 102 MG/DL (ref 65–100)
GLUCOSE BLD STRIP.AUTO-MCNC: 103 MG/DL (ref 65–100)
GLUCOSE BLD STRIP.AUTO-MCNC: 104 MG/DL (ref 65–100)
GLUCOSE BLD STRIP.AUTO-MCNC: 113 MG/DL (ref 65–100)
GLUCOSE BLD STRIP.AUTO-MCNC: 113 MG/DL (ref 65–100)
GLUCOSE BLD STRIP.AUTO-MCNC: 115 MG/DL (ref 65–100)
GLUCOSE BLD STRIP.AUTO-MCNC: 119 MG/DL (ref 65–100)
GLUCOSE BLD STRIP.AUTO-MCNC: 119 MG/DL (ref 65–100)
GLUCOSE BLD STRIP.AUTO-MCNC: 122 MG/DL (ref 65–100)
GLUCOSE BLD STRIP.AUTO-MCNC: 127 MG/DL (ref 65–100)
GLUCOSE BLD STRIP.AUTO-MCNC: 140 MG/DL (ref 65–100)
GLUCOSE BLD STRIP.AUTO-MCNC: 151 MG/DL (ref 65–100)
GLUCOSE BLD STRIP.AUTO-MCNC: 155 MG/DL (ref 65–100)
GLUCOSE BLD STRIP.AUTO-MCNC: 82 MG/DL (ref 65–100)
GLUCOSE BLD STRIP.AUTO-MCNC: 85 MG/DL (ref 65–100)
GLUCOSE BLD STRIP.AUTO-MCNC: 91 MG/DL (ref 65–100)
GLUCOSE BLD STRIP.AUTO-MCNC: 97 MG/DL (ref 65–100)
GLUCOSE SERPL-MCNC: 109 MG/DL (ref 65–100)
GLUCOSE SERPL-MCNC: 116 MG/DL (ref 65–100)
GLUCOSE, GLC: 102 MG/DL
GLUCOSE, GLC: 103 MG/DL
GLUCOSE, GLC: 104 MG/DL
GLUCOSE, GLC: 113 MG/DL
GLUCOSE, GLC: 113 MG/DL
GLUCOSE, GLC: 115 MG/DL
GLUCOSE, GLC: 119 MG/DL
GLUCOSE, GLC: 119 MG/DL
GLUCOSE, GLC: 120 MG/DL
GLUCOSE, GLC: 122 MG/DL
GLUCOSE, GLC: 127 MG/DL
GLUCOSE, GLC: 130 MG/DL
GLUCOSE, GLC: 140 MG/DL
GLUCOSE, GLC: 141 MG/DL
GLUCOSE, GLC: 143 MG/DL
GLUCOSE, GLC: 151 MG/DL
GLUCOSE, GLC: 152 MG/DL
GLUCOSE, GLC: 155 MG/DL
GLUCOSE, GLC: 161 MG/DL
GLUCOSE, GLC: 82 MG/DL
GLUCOSE, GLC: 85 MG/DL
GLUCOSE, GLC: 91 MG/DL
GLUCOSE, GLC: 97 MG/DL
HCG UR QL: NEGATIVE
HCO3 BLD-SCNC: 20.9 MMOL/L (ref 22–26)
HCO3 BLD-SCNC: 20.9 MMOL/L (ref 22–26)
HCO3 BLD-SCNC: 22.1 MMOL/L (ref 22–26)
HCO3 BLD-SCNC: 23.2 MMOL/L (ref 22–26)
HCT VFR BLD AUTO: 30.1 % (ref 35–47)
HCT VFR BLD AUTO: 36.1 % (ref 35–47)
HGB BLD-MCNC: 10.1 G/DL (ref 11.5–16)
HGB BLD-MCNC: 12.4 G/DL (ref 11.5–16)
HIGH TARGET, HITG: 130 MG/DL
HIGH TARGET, HITG: 140 MG/DL
IMM GRANULOCYTES # BLD AUTO: 0.1 K/UL (ref 0–0.04)
IMM GRANULOCYTES NFR BLD AUTO: 1 % (ref 0–0.5)
INR PPP: 1.2 (ref 0.9–1.1)
INSULIN ADMINSTERED, INSADM: 1.1 UNITS/HOUR
INSULIN ADMINSTERED, INSADM: 1.2 UNITS/HOUR
INSULIN ADMINSTERED, INSADM: 1.2 UNITS/HOUR
INSULIN ADMINSTERED, INSADM: 2.2 UNITS/HOUR
INSULIN ADMINSTERED, INSADM: 2.4 UNITS/HOUR
INSULIN ADMINSTERED, INSADM: 2.5 UNITS/HOUR
INSULIN ADMINSTERED, INSADM: 2.6 UNITS/HOUR
INSULIN ADMINSTERED, INSADM: 2.6 UNITS/HOUR
INSULIN ADMINSTERED, INSADM: 2.9 UNITS/HOUR
INSULIN ADMINSTERED, INSADM: 3 UNITS/HOUR
INSULIN ADMINSTERED, INSADM: 3.2 UNITS/HOUR
INSULIN ADMINSTERED, INSADM: 3.2 UNITS/HOUR
INSULIN ADMINSTERED, INSADM: 3.6 UNITS/HOUR
INSULIN ADMINSTERED, INSADM: 3.6 UNITS/HOUR
INSULIN ADMINSTERED, INSADM: 3.7 UNITS/HOUR
INSULIN ADMINSTERED, INSADM: 4 UNITS/HOUR
INSULIN ADMINSTERED, INSADM: 4 UNITS/HOUR
INSULIN ADMINSTERED, INSADM: 4.2 UNITS/HOUR
INSULIN ADMINSTERED, INSADM: 4.2 UNITS/HOUR
INSULIN ADMINSTERED, INSADM: 4.4 UNITS/HOUR
INSULIN ADMINSTERED, INSADM: 5.4 UNITS/HOUR
INSULIN ADMINSTERED, INSADM: 5.5 UNITS/HOUR
INSULIN ADMINSTERED, INSADM: 5.5 UNITS/HOUR
INSULIN ORDER, INSORD: 1.1 UNITS/HOUR
INSULIN ORDER, INSORD: 1.2 UNITS/HOUR
INSULIN ORDER, INSORD: 1.2 UNITS/HOUR
INSULIN ORDER, INSORD: 2.2 UNITS/HOUR
INSULIN ORDER, INSORD: 2.4 UNITS/HOUR
INSULIN ORDER, INSORD: 2.5 UNITS/HOUR
INSULIN ORDER, INSORD: 2.6 UNITS/HOUR
INSULIN ORDER, INSORD: 2.6 UNITS/HOUR
INSULIN ORDER, INSORD: 2.9 UNITS/HOUR
INSULIN ORDER, INSORD: 3 UNITS/HOUR
INSULIN ORDER, INSORD: 3.2 UNITS/HOUR
INSULIN ORDER, INSORD: 3.2 UNITS/HOUR
INSULIN ORDER, INSORD: 3.6 UNITS/HOUR
INSULIN ORDER, INSORD: 3.6 UNITS/HOUR
INSULIN ORDER, INSORD: 3.7 UNITS/HOUR
INSULIN ORDER, INSORD: 4 UNITS/HOUR
INSULIN ORDER, INSORD: 4 UNITS/HOUR
INSULIN ORDER, INSORD: 4.2 UNITS/HOUR
INSULIN ORDER, INSORD: 4.2 UNITS/HOUR
INSULIN ORDER, INSORD: 4.4 UNITS/HOUR
INSULIN ORDER, INSORD: 5.4 UNITS/HOUR
INSULIN ORDER, INSORD: 5.5 UNITS/HOUR
INSULIN ORDER, INSORD: 5.5 UNITS/HOUR
LEFT ABI: 1.07
LEFT ANTERIOR TIBIAL: 117 MMHG
LEFT ARM BP: 120 MMHG
LEFT CCA DIST DIAS: 15 CM/S
LEFT CCA DIST SYS: 87.7 CM/S
LEFT CCA PROX DIAS: 17.6 CM/S
LEFT CCA PROX SYS: 122.1 CM/S
LEFT ECA DIAS: 8.33 CM/S
LEFT ECA SYS: 120.8 CM/S
LEFT ICA DIST DIAS: 25.5 CM/S
LEFT ICA DIST SYS: 91.7 CM/S
LEFT ICA MID DIAS: 22.9 CM/S
LEFT ICA MID SYS: 73.2 CM/S
LEFT ICA PROX DIAS: 16.3 CM/S
LEFT ICA PROX SYS: 90.4 CM/S
LEFT ICA/CCA SYS: 1.05
LEFT POSTERIOR TIBIAL: 136 MMHG
LEFT TBI: 0.63
LEFT TOE PRESSURE: 80 MMHG
LEFT VERTEBRAL DIAS: 16.81 CM/S
LEFT VERTEBRAL SYS: 57.5 CM/S
LOW TARGET, LOT: 100 MG/DL
LOW TARGET, LOT: 95 MG/DL
LYMPHOCYTES # BLD: 1.5 K/UL (ref 0.8–3.5)
LYMPHOCYTES NFR BLD: 10 % (ref 12–49)
MAGNESIUM SERPL-MCNC: 1.9 MG/DL (ref 1.6–2.4)
MAGNESIUM SERPL-MCNC: 2.2 MG/DL (ref 1.6–2.4)
MCH RBC QN AUTO: 32.5 PG (ref 26–34)
MCHC RBC AUTO-ENTMCNC: 34.3 G/DL (ref 30–36.5)
MCV RBC AUTO: 94.8 FL (ref 80–99)
MINUTES UNTIL NEXT BG, NBG: 60 MIN
MONOCYTES # BLD: 0.9 K/UL (ref 0–1)
MONOCYTES NFR BLD: 6 % (ref 5–13)
MULTIPLIER, MUL: 0.03
MULTIPLIER, MUL: 0.04
MULTIPLIER, MUL: 0.05
MULTIPLIER, MUL: 0.06
MULTIPLIER, MUL: 0.07
NEUTS SEG # BLD: 12.1 K/UL (ref 1.8–8)
NEUTS SEG NFR BLD: 82 % (ref 32–75)
NRBC # BLD: 0 K/UL (ref 0–0.01)
NRBC BLD-RTO: 0 PER 100 WBC
ORDER INITIALS, ORDINIT: NORMAL
PCO2 BLD: 35.1 MMHG (ref 35–45)
PCO2 BLD: 38.8 MMHG (ref 35–45)
PCO2 BLD: 43.1 MMHG (ref 35–45)
PCO2 BLD: 43.2 MMHG (ref 35–45)
PEEP RESPIRATORY: 5 CMH2O
PH BLD: 7.32 [PH] (ref 7.35–7.45)
PH BLD: 7.34 [PH] (ref 7.35–7.45)
PH BLD: 7.34 [PH] (ref 7.35–7.45)
PH BLD: 7.38 [PH] (ref 7.35–7.45)
PLATELET # BLD AUTO: 217 K/UL (ref 150–400)
PMV BLD AUTO: 8.9 FL (ref 8.9–12.9)
PO2 BLD: 110 MMHG (ref 80–100)
PO2 BLD: 120 MMHG (ref 80–100)
PO2 BLD: 123 MMHG (ref 80–100)
PO2 BLD: 34 MMHG (ref 80–100)
POTASSIUM SERPL-SCNC: 3.6 MMOL/L (ref 3.5–5.1)
POTASSIUM SERPL-SCNC: 4 MMOL/L (ref 3.5–5.1)
PRESSURE SUPPORT SETTING VENT: 5 CMH2O
PROT SERPL-MCNC: 5.4 G/DL (ref 6.4–8.2)
PROT SERPL-MCNC: 5.7 G/DL (ref 6.4–8.2)
PROTHROMBIN TIME: 12.1 SEC (ref 9–11.1)
RBC # BLD AUTO: 3.81 M/UL (ref 3.8–5.2)
RIGHT ABI: 1.02
RIGHT ARM BP: 127 MMHG
RIGHT CCA DIST DIAS: 20.8 CM/S
RIGHT CCA DIST SYS: 83.4 CM/S
RIGHT CCA PROX DIAS: 17.9 CM/S
RIGHT CCA PROX SYS: 92.1 CM/S
RIGHT ECA DIAS: 10.62 CM/S
RIGHT ECA SYS: 125.6 CM/S
RIGHT ICA DIST DIAS: 31.1 CM/S
RIGHT ICA DIST SYS: 78.4 CM/S
RIGHT ICA MID DIAS: 28.9 CM/S
RIGHT ICA MID SYS: 97 CM/S
RIGHT ICA PROX DIAS: 23.4 CM/S
RIGHT ICA PROX SYS: 82.8 CM/S
RIGHT ICA/CCA SYS: 1.2
RIGHT POSTERIOR TIBIAL: 130 MMHG
RIGHT TBI: 0.39
RIGHT TOE PRESSURE: 50 MMHG
RIGHT VERTEBRAL DIAS: 17.71 CM/S
RIGHT VERTEBRAL SYS: 57.8 CM/S
SAO2 % BLD: 61 % (ref 92–97)
SAO2 % BLD: 98 % (ref 92–97)
SAO2 % BLD: 98 % (ref 92–97)
SAO2 % BLD: 99 % (ref 92–97)
SERVICE CMNT-IMP: ABNORMAL
SERVICE CMNT-IMP: NORMAL
SODIUM SERPL-SCNC: 140 MMOL/L (ref 136–145)
SODIUM SERPL-SCNC: 142 MMOL/L (ref 136–145)
SPECIMEN TYPE: ABNORMAL
THERAPEUTIC RANGE,PTTT: NORMAL SECS (ref 58–77)
VENTILATION MODE VENT: ABNORMAL
VT SETTING VENT: 450 ML
WBC # BLD AUTO: 14.7 K/UL (ref 3.6–11)

## 2020-05-11 PROCEDURE — 77030002986 HC SUT PROL J&J -A: Performed by: THORACIC SURGERY (CARDIOTHORACIC VASCULAR SURGERY)

## 2020-05-11 PROCEDURE — 77030013861 HC PNCH AORT CLNCUT QUES -B: Performed by: THORACIC SURGERY (CARDIOTHORACIC VASCULAR SURGERY)

## 2020-05-11 PROCEDURE — 74011636637 HC RX REV CODE- 636/637: Performed by: NURSE ANESTHETIST, CERTIFIED REGISTERED

## 2020-05-11 PROCEDURE — 93355 ECHO TRANSESOPHAGEAL (TEE): CPT | Performed by: THORACIC SURGERY (CARDIOTHORACIC VASCULAR SURGERY)

## 2020-05-11 PROCEDURE — 74011250636 HC RX REV CODE- 250/636: Performed by: ANESTHESIOLOGY

## 2020-05-11 PROCEDURE — 74011000250 HC RX REV CODE- 250: Performed by: NURSE ANESTHETIST, CERTIFIED REGISTERED

## 2020-05-11 PROCEDURE — 77030014650 HC SEAL MTRX FLOSEL BAXT -C: Performed by: THORACIC SURGERY (CARDIOTHORACIC VASCULAR SURGERY)

## 2020-05-11 PROCEDURE — 77030026233 HC STBL CARD VAC SYS GTNG -G1: Performed by: THORACIC SURGERY (CARDIOTHORACIC VASCULAR SURGERY)

## 2020-05-11 PROCEDURE — 77030006247 HC LD PCMKR MYOCRD BPLR TEMP MEDT -B: Performed by: THORACIC SURGERY (CARDIOTHORACIC VASCULAR SURGERY)

## 2020-05-11 PROCEDURE — 74011000258 HC RX REV CODE- 258: Performed by: NURSE ANESTHETIST, CERTIFIED REGISTERED

## 2020-05-11 PROCEDURE — 77030018729 HC ELECTRD DEFIB PAD CARD -B: Performed by: THORACIC SURGERY (CARDIOTHORACIC VASCULAR SURGERY)

## 2020-05-11 PROCEDURE — 74011250636 HC RX REV CODE- 250/636: Performed by: NURSE PRACTITIONER

## 2020-05-11 PROCEDURE — 77030020061 HC IV BLD WRMR ADMIN SET 3M -B: Performed by: ANESTHESIOLOGY

## 2020-05-11 PROCEDURE — 77030014656 HC SHNT INTLUMN BAXT -B: Performed by: THORACIC SURGERY (CARDIOTHORACIC VASCULAR SURGERY)

## 2020-05-11 PROCEDURE — 77030005513 HC CATH URETH FOL11 MDII -B: Performed by: THORACIC SURGERY (CARDIOTHORACIC VASCULAR SURGERY)

## 2020-05-11 PROCEDURE — 77030019702 HC WRP THER MENM -C: Performed by: THORACIC SURGERY (CARDIOTHORACIC VASCULAR SURGERY)

## 2020-05-11 PROCEDURE — 06BP4ZZ EXCISION OF RIGHT SAPHENOUS VEIN, PERCUTANEOUS ENDOSCOPIC APPROACH: ICD-10-PCS | Performed by: THORACIC SURGERY (CARDIOTHORACIC VASCULAR SURGERY)

## 2020-05-11 PROCEDURE — 36415 COLL VENOUS BLD VENIPUNCTURE: CPT

## 2020-05-11 PROCEDURE — 94002 VENT MGMT INPAT INIT DAY: CPT

## 2020-05-11 PROCEDURE — 77030010813: Performed by: THORACIC SURGERY (CARDIOTHORACIC VASCULAR SURGERY)

## 2020-05-11 PROCEDURE — 74011000250 HC RX REV CODE- 250: Performed by: NURSE PRACTITIONER

## 2020-05-11 PROCEDURE — 77030018836 HC SOL IRR NACL ICUM -A: Performed by: THORACIC SURGERY (CARDIOTHORACIC VASCULAR SURGERY)

## 2020-05-11 PROCEDURE — P9016 RBC LEUKOCYTES REDUCED: HCPCS

## 2020-05-11 PROCEDURE — 81025 URINE PREGNANCY TEST: CPT

## 2020-05-11 PROCEDURE — C1713 ANCHOR/SCREW BN/BN,TIS/BN: HCPCS | Performed by: THORACIC SURGERY (CARDIOTHORACIC VASCULAR SURGERY)

## 2020-05-11 PROCEDURE — 74011250636 HC RX REV CODE- 250/636: Performed by: THORACIC SURGERY (CARDIOTHORACIC VASCULAR SURGERY)

## 2020-05-11 PROCEDURE — 77030020256 HC SOL INJ NACL 0.9%  500ML: Performed by: THORACIC SURGERY (CARDIOTHORACIC VASCULAR SURGERY)

## 2020-05-11 PROCEDURE — 77030025869: Performed by: THORACIC SURGERY (CARDIOTHORACIC VASCULAR SURGERY)

## 2020-05-11 PROCEDURE — 77030006994: Performed by: THORACIC SURGERY (CARDIOTHORACIC VASCULAR SURGERY)

## 2020-05-11 PROCEDURE — 82803 BLOOD GASES ANY COMBINATION: CPT

## 2020-05-11 PROCEDURE — 76060000040 HC ANESTHESIA 4.5 TO 5 HR: Performed by: THORACIC SURGERY (CARDIOTHORACIC VASCULAR SURGERY)

## 2020-05-11 PROCEDURE — 85730 THROMBOPLASTIN TIME PARTIAL: CPT

## 2020-05-11 PROCEDURE — 77010033678 HC OXYGEN DAILY

## 2020-05-11 PROCEDURE — 74011250636 HC RX REV CODE- 250/636

## 2020-05-11 PROCEDURE — 74011250637 HC RX REV CODE- 250/637: Performed by: NURSE PRACTITIONER

## 2020-05-11 PROCEDURE — 77030041238 HC TBNG INSUF MDII -A: Performed by: THORACIC SURGERY (CARDIOTHORACIC VASCULAR SURGERY)

## 2020-05-11 PROCEDURE — 77030005402 HC CATH RAD ART LN KT TELE -B

## 2020-05-11 PROCEDURE — 77030037878 HC DRSG MEPILEX >48IN BORD MOLN -B: Performed by: THORACIC SURGERY (CARDIOTHORACIC VASCULAR SURGERY)

## 2020-05-11 PROCEDURE — 02100Z9 BYPASS CORONARY ARTERY, ONE ARTERY FROM LEFT INTERNAL MAMMARY, OPEN APPROACH: ICD-10-PCS | Performed by: THORACIC SURGERY (CARDIOTHORACIC VASCULAR SURGERY)

## 2020-05-11 PROCEDURE — C1751 CATH, INF, PER/CENT/MIDLINE: HCPCS | Performed by: ANESTHESIOLOGY

## 2020-05-11 PROCEDURE — 77030041244 HC CBL PACE EXT TEMP REMG -B: Performed by: THORACIC SURGERY (CARDIOTHORACIC VASCULAR SURGERY)

## 2020-05-11 PROCEDURE — 77030014491 HC PLEDG PTFE BARD -A: Performed by: THORACIC SURGERY (CARDIOTHORACIC VASCULAR SURGERY)

## 2020-05-11 PROCEDURE — 77030005401 HC CATH RAD ARRO -A: Performed by: ANESTHESIOLOGY

## 2020-05-11 PROCEDURE — 77030040922 HC BLNKT HYPOTHRM STRY -A

## 2020-05-11 PROCEDURE — 80053 COMPREHEN METABOLIC PANEL: CPT

## 2020-05-11 PROCEDURE — 77030013505 HC DEV ANAST GTNG -E: Performed by: THORACIC SURGERY (CARDIOTHORACIC VASCULAR SURGERY)

## 2020-05-11 PROCEDURE — 77030010507 HC ADH SKN DERMBND J&J -B: Performed by: THORACIC SURGERY (CARDIOTHORACIC VASCULAR SURGERY)

## 2020-05-11 PROCEDURE — 77030018846 HC SOL IRR STRL H20 ICUM -A: Performed by: THORACIC SURGERY (CARDIOTHORACIC VASCULAR SURGERY)

## 2020-05-11 PROCEDURE — 77030011640 HC PAD GRND REM COVD -A: Performed by: THORACIC SURGERY (CARDIOTHORACIC VASCULAR SURGERY)

## 2020-05-11 PROCEDURE — 74011250636 HC RX REV CODE- 250/636: Performed by: NURSE ANESTHETIST, CERTIFIED REGISTERED

## 2020-05-11 PROCEDURE — 77030010818: Performed by: THORACIC SURGERY (CARDIOTHORACIC VASCULAR SURGERY)

## 2020-05-11 PROCEDURE — 77030018835 HC SOL IRR LR ICUM -A: Performed by: THORACIC SURGERY (CARDIOTHORACIC VASCULAR SURGERY)

## 2020-05-11 PROCEDURE — 71045 X-RAY EXAM CHEST 1 VIEW: CPT

## 2020-05-11 PROCEDURE — 77030010819: Performed by: THORACIC SURGERY (CARDIOTHORACIC VASCULAR SURGERY)

## 2020-05-11 PROCEDURE — 77030022199 HC SYS HARV VESL GTNG -G1: Performed by: THORACIC SURGERY (CARDIOTHORACIC VASCULAR SURGERY)

## 2020-05-11 PROCEDURE — 77030002933 HC SUT MCRYL J&J -A: Performed by: THORACIC SURGERY (CARDIOTHORACIC VASCULAR SURGERY)

## 2020-05-11 PROCEDURE — 74011000272 HC RX REV CODE- 272: Performed by: THORACIC SURGERY (CARDIOTHORACIC VASCULAR SURGERY)

## 2020-05-11 PROCEDURE — 77030003020 HC SUT TICRN COVD -A: Performed by: THORACIC SURGERY (CARDIOTHORACIC VASCULAR SURGERY)

## 2020-05-11 PROCEDURE — 77030041076 HC DRSG AG OPTICELL MDII -A: Performed by: THORACIC SURGERY (CARDIOTHORACIC VASCULAR SURGERY)

## 2020-05-11 PROCEDURE — 74011000250 HC RX REV CODE- 250

## 2020-05-11 PROCEDURE — 77030010938 HC CLP LIG TELE -A: Performed by: THORACIC SURGERY (CARDIOTHORACIC VASCULAR SURGERY)

## 2020-05-11 PROCEDURE — 77030013798 HC KT TRNSDUC PRSSR EDWD -B: Performed by: THORACIC SURGERY (CARDIOTHORACIC VASCULAR SURGERY)

## 2020-05-11 PROCEDURE — 82962 GLUCOSE BLOOD TEST: CPT

## 2020-05-11 PROCEDURE — 77030013798 HC KT TRNSDUC PRSSR EDWD -B: Performed by: ANESTHESIOLOGY

## 2020-05-11 PROCEDURE — 77030040392 HC DRSG OPTIFOAM MDII -A: Performed by: THORACIC SURGERY (CARDIOTHORACIC VASCULAR SURGERY)

## 2020-05-11 PROCEDURE — 77030006920 HC BLD STRNL SAW STRY -B: Performed by: THORACIC SURGERY (CARDIOTHORACIC VASCULAR SURGERY)

## 2020-05-11 PROCEDURE — 77030031403 HC IMPL MRKR GRFT CT SCAN -B: Performed by: THORACIC SURGERY (CARDIOTHORACIC VASCULAR SURGERY)

## 2020-05-11 PROCEDURE — 77030020263 HC SOL INJ SOD CL0.9% LFCR 1000ML: Performed by: THORACIC SURGERY (CARDIOTHORACIC VASCULAR SURGERY)

## 2020-05-11 PROCEDURE — 77030018729 HC ELECTRD DEFIB PAD CARD -B

## 2020-05-11 PROCEDURE — 77030008477 HC STYL SATN SLP COVD -A: Performed by: ANESTHESIOLOGY

## 2020-05-11 PROCEDURE — 77030002987 HC SUT PROL J&J -B: Performed by: THORACIC SURGERY (CARDIOTHORACIC VASCULAR SURGERY)

## 2020-05-11 PROCEDURE — 77030010605 HC BLWR MR MAL MEDT -B: Performed by: THORACIC SURGERY (CARDIOTHORACIC VASCULAR SURGERY)

## 2020-05-11 PROCEDURE — 74011636637 HC RX REV CODE- 636/637: Performed by: PHYSICIAN ASSISTANT

## 2020-05-11 PROCEDURE — 77030033138 HC SUT PGA STRATFX J&J -B: Performed by: THORACIC SURGERY (CARDIOTHORACIC VASCULAR SURGERY)

## 2020-05-11 PROCEDURE — 30233N1 TRANSFUSION OF NONAUTOLOGOUS RED BLOOD CELLS INTO PERIPHERAL VEIN, PERCUTANEOUS APPROACH: ICD-10-PCS | Performed by: THORACIC SURGERY (CARDIOTHORACIC VASCULAR SURGERY)

## 2020-05-11 PROCEDURE — P9045 ALBUMIN (HUMAN), 5%, 250 ML: HCPCS | Performed by: NURSE PRACTITIONER

## 2020-05-11 PROCEDURE — 77030012390 HC DRN CHST BTL GTNG -B: Performed by: THORACIC SURGERY (CARDIOTHORACIC VASCULAR SURGERY)

## 2020-05-11 PROCEDURE — 76010000115 HC CV SURG 4.5 TO 5 HR: Performed by: THORACIC SURGERY (CARDIOTHORACIC VASCULAR SURGERY)

## 2020-05-11 PROCEDURE — 77030008684 HC TU ET CUF COVD -B: Performed by: ANESTHESIOLOGY

## 2020-05-11 PROCEDURE — C1751 CATH, INF, PER/CENT/MIDLINE: HCPCS

## 2020-05-11 PROCEDURE — 65610000003 HC RM ICU SURGICAL

## 2020-05-11 PROCEDURE — P9047 ALBUMIN (HUMAN), 25%, 50ML: HCPCS

## 2020-05-11 PROCEDURE — 77030014008 HC SPNG HEMSTAT J&J -C: Performed by: THORACIC SURGERY (CARDIOTHORACIC VASCULAR SURGERY)

## 2020-05-11 PROCEDURE — 77030002973 HC SUT PLEDG CV SFT OVL TELE -B: Performed by: THORACIC SURGERY (CARDIOTHORACIC VASCULAR SURGERY)

## 2020-05-11 PROCEDURE — 77030007667 HC INSRT SUT HLD MEDT -B: Performed by: THORACIC SURGERY (CARDIOTHORACIC VASCULAR SURGERY)

## 2020-05-11 PROCEDURE — 5A1223Z PERFORMANCE OF CARDIAC PACING, CONTINUOUS: ICD-10-PCS | Performed by: THORACIC SURGERY (CARDIOTHORACIC VASCULAR SURGERY)

## 2020-05-11 PROCEDURE — 83735 ASSAY OF MAGNESIUM: CPT

## 2020-05-11 PROCEDURE — 77030040504 HC DRN WND MDII -B: Performed by: THORACIC SURGERY (CARDIOTHORACIC VASCULAR SURGERY)

## 2020-05-11 PROCEDURE — 85610 PROTHROMBIN TIME: CPT

## 2020-05-11 PROCEDURE — 77030003010 HC SUT SURG STL J&J -B: Performed by: THORACIC SURGERY (CARDIOTHORACIC VASCULAR SURGERY)

## 2020-05-11 PROCEDURE — 77030006689 HC BLD OPHTH BVR BD -A: Performed by: THORACIC SURGERY (CARDIOTHORACIC VASCULAR SURGERY)

## 2020-05-11 PROCEDURE — 74011000258 HC RX REV CODE- 258: Performed by: PHYSICIAN ASSISTANT

## 2020-05-11 PROCEDURE — 77030031139 HC SUT VCRL2 J&J -A: Performed by: THORACIC SURGERY (CARDIOTHORACIC VASCULAR SURGERY)

## 2020-05-11 PROCEDURE — 74011000250 HC RX REV CODE- 250: Performed by: THORACIC SURGERY (CARDIOTHORACIC VASCULAR SURGERY)

## 2020-05-11 PROCEDURE — 77030008771 HC TU NG SALEM SUMP -A: Performed by: ANESTHESIOLOGY

## 2020-05-11 PROCEDURE — 77030019580 HC CBL PACE MEDT -B: Performed by: THORACIC SURGERY (CARDIOTHORACIC VASCULAR SURGERY)

## 2020-05-11 PROCEDURE — B24BZZ4 ULTRASONOGRAPHY OF HEART WITH AORTA, TRANSESOPHAGEAL: ICD-10-PCS | Performed by: ANESTHESIOLOGY

## 2020-05-11 PROCEDURE — 021209W BYPASS CORONARY ARTERY, THREE ARTERIES FROM AORTA WITH AUTOLOGOUS VENOUS TISSUE, OPEN APPROACH: ICD-10-PCS | Performed by: THORACIC SURGERY (CARDIOTHORACIC VASCULAR SURGERY)

## 2020-05-11 PROCEDURE — 77030002978 HC SUT POLY TELE -A: Performed by: THORACIC SURGERY (CARDIOTHORACIC VASCULAR SURGERY)

## 2020-05-11 PROCEDURE — 77030010389 HC WRE ATR PACE AEMC -B: Performed by: THORACIC SURGERY (CARDIOTHORACIC VASCULAR SURGERY)

## 2020-05-11 PROCEDURE — 74011250636 HC RX REV CODE- 250/636: Performed by: PHYSICIAN ASSISTANT

## 2020-05-11 PROCEDURE — 5A1221Z PERFORMANCE OF CARDIAC OUTPUT, CONTINUOUS: ICD-10-PCS | Performed by: THORACIC SURGERY (CARDIOTHORACIC VASCULAR SURGERY)

## 2020-05-11 PROCEDURE — 77030019579 HC CBL PACE DISP REMG -B: Performed by: THORACIC SURGERY (CARDIOTHORACIC VASCULAR SURGERY)

## 2020-05-11 PROCEDURE — 77030013079 HC BLNKT BAIR HGGR 3M -A: Performed by: ANESTHESIOLOGY

## 2020-05-11 PROCEDURE — 85018 HEMOGLOBIN: CPT

## 2020-05-11 PROCEDURE — 85025 COMPLETE CBC W/AUTO DIFF WBC: CPT

## 2020-05-11 PROCEDURE — 74011000272 HC RX REV CODE- 272

## 2020-05-11 PROCEDURE — 74011000258 HC RX REV CODE- 258: Performed by: NURSE PRACTITIONER

## 2020-05-11 PROCEDURE — 94762 N-INVAS EAR/PLS OXIMTRY CONT: CPT

## 2020-05-11 RX ORDER — LIDOCAINE HYDROCHLORIDE 20 MG/ML
INJECTION, SOLUTION EPIDURAL; INFILTRATION; INTRACAUDAL; PERINEURAL AS NEEDED
Status: DISCONTINUED | OUTPATIENT
Start: 2020-05-11 | End: 2020-05-11 | Stop reason: HOSPADM

## 2020-05-11 RX ORDER — ACETAMINOPHEN 325 MG/1
650 TABLET ORAL ONCE
Status: DISCONTINUED | OUTPATIENT
Start: 2020-05-11 | End: 2020-05-11 | Stop reason: HOSPADM

## 2020-05-11 RX ORDER — BACITRACIN 500 UNIT/G
1 PACKET (EA) TOPICAL AS NEEDED
Status: DISCONTINUED | OUTPATIENT
Start: 2020-05-11 | End: 2020-05-13

## 2020-05-11 RX ORDER — NALOXONE HYDROCHLORIDE 0.4 MG/ML
0.4 INJECTION, SOLUTION INTRAMUSCULAR; INTRAVENOUS; SUBCUTANEOUS AS NEEDED
Status: DISCONTINUED | OUTPATIENT
Start: 2020-05-11 | End: 2020-05-15 | Stop reason: HOSPADM

## 2020-05-11 RX ORDER — DIPHENHYDRAMINE HYDROCHLORIDE 50 MG/ML
25 INJECTION, SOLUTION INTRAMUSCULAR; INTRAVENOUS
Status: ACTIVE | OUTPATIENT
Start: 2020-05-11 | End: 2020-05-13

## 2020-05-11 RX ORDER — MORPHINE SULFATE 4 MG/ML
4 INJECTION, SOLUTION INTRAMUSCULAR; INTRAVENOUS
Status: DISCONTINUED | OUTPATIENT
Start: 2020-05-11 | End: 2020-05-13

## 2020-05-11 RX ORDER — MIDAZOLAM HYDROCHLORIDE 1 MG/ML
1 INJECTION, SOLUTION INTRAMUSCULAR; INTRAVENOUS AS NEEDED
Status: DISCONTINUED | OUTPATIENT
Start: 2020-05-11 | End: 2020-05-11 | Stop reason: HOSPADM

## 2020-05-11 RX ORDER — SODIUM CHLORIDE 9 MG/ML
250 INJECTION, SOLUTION INTRAVENOUS AS NEEDED
Status: DISCONTINUED | OUTPATIENT
Start: 2020-05-11 | End: 2020-05-13

## 2020-05-11 RX ORDER — AMOXICILLIN 250 MG
1 CAPSULE ORAL 2 TIMES DAILY
Status: DISCONTINUED | OUTPATIENT
Start: 2020-05-12 | End: 2020-05-15 | Stop reason: HOSPADM

## 2020-05-11 RX ORDER — PROPOFOL 10 MG/ML
INJECTION, EMULSION INTRAVENOUS AS NEEDED
Status: DISCONTINUED | OUTPATIENT
Start: 2020-05-11 | End: 2020-05-11 | Stop reason: HOSPADM

## 2020-05-11 RX ORDER — SODIUM CHLORIDE 0.9 % (FLUSH) 0.9 %
5-40 SYRINGE (ML) INJECTION EVERY 8 HOURS
Status: DISCONTINUED | OUTPATIENT
Start: 2020-05-11 | End: 2020-05-13

## 2020-05-11 RX ORDER — HEPARIN SODIUM 1000 [USP'U]/ML
INJECTION, SOLUTION INTRAVENOUS; SUBCUTANEOUS AS NEEDED
Status: DISCONTINUED | OUTPATIENT
Start: 2020-05-11 | End: 2020-05-11 | Stop reason: HOSPADM

## 2020-05-11 RX ORDER — AMIODARONE HYDROCHLORIDE 200 MG/1
400 TABLET ORAL EVERY 12 HOURS
Status: DISCONTINUED | OUTPATIENT
Start: 2020-05-12 | End: 2020-05-13

## 2020-05-11 RX ORDER — ONDANSETRON 2 MG/ML
4 INJECTION INTRAMUSCULAR; INTRAVENOUS
Status: DISCONTINUED | OUTPATIENT
Start: 2020-05-11 | End: 2020-05-13

## 2020-05-11 RX ORDER — PAPAVERINE HYDROCHLORIDE 30 MG/ML
INJECTION INTRAMUSCULAR; INTRAVENOUS AS NEEDED
Status: DISCONTINUED | OUTPATIENT
Start: 2020-05-11 | End: 2020-05-11 | Stop reason: HOSPADM

## 2020-05-11 RX ORDER — SODIUM BICARBONATE 1 MEQ/ML
SYRINGE (ML) INTRAVENOUS AS NEEDED
Status: DISCONTINUED | OUTPATIENT
Start: 2020-05-11 | End: 2020-05-11 | Stop reason: HOSPADM

## 2020-05-11 RX ORDER — GUAIFENESIN 100 MG/5ML
81 LIQUID (ML) ORAL DAILY
Status: DISCONTINUED | OUTPATIENT
Start: 2020-05-12 | End: 2020-05-15 | Stop reason: HOSPADM

## 2020-05-11 RX ORDER — SODIUM CHLORIDE 0.9 % (FLUSH) 0.9 %
5-40 SYRINGE (ML) INJECTION EVERY 8 HOURS
Status: DISCONTINUED | OUTPATIENT
Start: 2020-05-11 | End: 2020-05-11 | Stop reason: HOSPADM

## 2020-05-11 RX ORDER — MIDAZOLAM HYDROCHLORIDE 1 MG/ML
1 INJECTION, SOLUTION INTRAMUSCULAR; INTRAVENOUS
Status: DISCONTINUED | OUTPATIENT
Start: 2020-05-11 | End: 2020-05-13

## 2020-05-11 RX ORDER — CEFAZOLIN SODIUM 1 G/3ML
INJECTION, POWDER, FOR SOLUTION INTRAMUSCULAR; INTRAVENOUS AS NEEDED
Status: DISCONTINUED | OUTPATIENT
Start: 2020-05-11 | End: 2020-05-11 | Stop reason: HOSPADM

## 2020-05-11 RX ORDER — DESMOPRESSIN ACETATE 4 UG/ML
INJECTION, SOLUTION INTRAVENOUS; SUBCUTANEOUS AS NEEDED
Status: DISCONTINUED | OUTPATIENT
Start: 2020-05-11 | End: 2020-05-11 | Stop reason: HOSPADM

## 2020-05-11 RX ORDER — POTASSIUM CHLORIDE 29.8 MG/ML
20 INJECTION INTRAVENOUS
Status: ACTIVE | OUTPATIENT
Start: 2020-05-11 | End: 2020-05-12

## 2020-05-11 RX ORDER — SUFENTANIL CITRATE 50 UG/ML
INJECTION EPIDURAL; INTRAVENOUS AS NEEDED
Status: DISCONTINUED | OUTPATIENT
Start: 2020-05-11 | End: 2020-05-11 | Stop reason: HOSPADM

## 2020-05-11 RX ORDER — LANOLIN ALCOHOL/MO/W.PET/CERES
3 CREAM (GRAM) TOPICAL
Status: DISCONTINUED | OUTPATIENT
Start: 2020-05-11 | End: 2020-05-15 | Stop reason: HOSPADM

## 2020-05-11 RX ORDER — SODIUM BICARBONATE 84 MG/ML
50 INJECTION, SOLUTION INTRAVENOUS
Status: COMPLETED | OUTPATIENT
Start: 2020-05-11 | End: 2020-05-11

## 2020-05-11 RX ORDER — POLYETHYLENE GLYCOL 3350 17 G/17G
17 POWDER, FOR SOLUTION ORAL DAILY
Status: DISCONTINUED | OUTPATIENT
Start: 2020-05-12 | End: 2020-05-15 | Stop reason: HOSPADM

## 2020-05-11 RX ORDER — LIDOCAINE HYDROCHLORIDE 10 MG/ML
0.1 INJECTION, SOLUTION EPIDURAL; INFILTRATION; INTRACAUDAL; PERINEURAL AS NEEDED
Status: DISCONTINUED | OUTPATIENT
Start: 2020-05-11 | End: 2020-05-11 | Stop reason: HOSPADM

## 2020-05-11 RX ORDER — SUCCINYLCHOLINE CHLORIDE 20 MG/ML
INJECTION INTRAMUSCULAR; INTRAVENOUS AS NEEDED
Status: DISCONTINUED | OUTPATIENT
Start: 2020-05-11 | End: 2020-05-11 | Stop reason: HOSPADM

## 2020-05-11 RX ORDER — SODIUM CHLORIDE, SODIUM LACTATE, POTASSIUM CHLORIDE, CALCIUM CHLORIDE 600; 310; 30; 20 MG/100ML; MG/100ML; MG/100ML; MG/100ML
INJECTION, SOLUTION INTRAVENOUS
Status: DISCONTINUED | OUTPATIENT
Start: 2020-05-11 | End: 2020-05-11 | Stop reason: HOSPADM

## 2020-05-11 RX ORDER — SODIUM CHLORIDE 0.9 % (FLUSH) 0.9 %
5-40 SYRINGE (ML) INJECTION AS NEEDED
Status: DISCONTINUED | OUTPATIENT
Start: 2020-05-11 | End: 2020-05-11 | Stop reason: HOSPADM

## 2020-05-11 RX ORDER — MUPIROCIN 20 MG/G
OINTMENT TOPICAL 2 TIMES DAILY
Status: DISCONTINUED | OUTPATIENT
Start: 2020-05-11 | End: 2020-05-15 | Stop reason: HOSPADM

## 2020-05-11 RX ORDER — SODIUM CHLORIDE 0.9 % (FLUSH) 0.9 %
5-40 SYRINGE (ML) INJECTION AS NEEDED
Status: DISCONTINUED | OUTPATIENT
Start: 2020-05-11 | End: 2020-05-13

## 2020-05-11 RX ORDER — ACETAMINOPHEN 325 MG/1
650 TABLET ORAL EVERY 4 HOURS
Status: DISPENSED | OUTPATIENT
Start: 2020-05-11 | End: 2020-05-13

## 2020-05-11 RX ORDER — DIPHENHYDRAMINE HCL 25 MG
25 CAPSULE ORAL
Status: DISCONTINUED | OUTPATIENT
Start: 2020-05-11 | End: 2020-05-15 | Stop reason: HOSPADM

## 2020-05-11 RX ORDER — MORPHINE SULFATE 2 MG/ML
INJECTION, SOLUTION INTRAMUSCULAR; INTRAVENOUS AS NEEDED
Status: DISCONTINUED | OUTPATIENT
Start: 2020-05-11 | End: 2020-05-11 | Stop reason: HOSPADM

## 2020-05-11 RX ORDER — SUFENTANIL CITRATE 50 UG/ML
INJECTION EPIDURAL; INTRAVENOUS
Status: DISCONTINUED | OUTPATIENT
Start: 2020-05-11 | End: 2020-05-11 | Stop reason: HOSPADM

## 2020-05-11 RX ORDER — HYDROMORPHONE HYDROCHLORIDE 1 MG/ML
0.5 INJECTION, SOLUTION INTRAMUSCULAR; INTRAVENOUS; SUBCUTANEOUS ONCE
Status: COMPLETED | OUTPATIENT
Start: 2020-05-11 | End: 2020-05-11

## 2020-05-11 RX ORDER — FACIAL-BODY WIPES
10 EACH TOPICAL DAILY PRN
Status: DISCONTINUED | OUTPATIENT
Start: 2020-05-11 | End: 2020-05-15 | Stop reason: HOSPADM

## 2020-05-11 RX ORDER — CEFAZOLIN SODIUM/WATER 2 G/20 ML
2 SYRINGE (ML) INTRAVENOUS EVERY 6 HOURS
Status: COMPLETED | OUTPATIENT
Start: 2020-05-11 | End: 2020-05-13

## 2020-05-11 RX ORDER — SODIUM CHLORIDE 450 MG/100ML
10 INJECTION, SOLUTION INTRAVENOUS CONTINUOUS
Status: DISCONTINUED | OUTPATIENT
Start: 2020-05-11 | End: 2020-05-13

## 2020-05-11 RX ORDER — MAGNESIUM SULFATE 1 G/100ML
1 INJECTION INTRAVENOUS AS NEEDED
Status: DISCONTINUED | OUTPATIENT
Start: 2020-05-11 | End: 2020-05-13

## 2020-05-11 RX ORDER — MAGNESIUM SULFATE 100 %
4 CRYSTALS MISCELLANEOUS AS NEEDED
Status: DISCONTINUED | OUTPATIENT
Start: 2020-05-11 | End: 2020-05-15 | Stop reason: HOSPADM

## 2020-05-11 RX ORDER — INSULIN GLARGINE 100 [IU]/ML
1-50 INJECTION, SOLUTION SUBCUTANEOUS
Status: ACTIVE | OUTPATIENT
Start: 2020-05-11 | End: 2020-05-12

## 2020-05-11 RX ORDER — OXYCODONE HYDROCHLORIDE 5 MG/1
5 TABLET ORAL
Status: DISCONTINUED | OUTPATIENT
Start: 2020-05-11 | End: 2020-05-15 | Stop reason: HOSPADM

## 2020-05-11 RX ORDER — POTASSIUM CHLORIDE 29.8 MG/ML
20 INJECTION INTRAVENOUS
Status: DISPENSED | OUTPATIENT
Start: 2020-05-11 | End: 2020-05-12

## 2020-05-11 RX ORDER — INSULIN LISPRO 100 [IU]/ML
INJECTION, SOLUTION INTRAVENOUS; SUBCUTANEOUS
Status: DISCONTINUED | OUTPATIENT
Start: 2020-05-11 | End: 2020-05-14 | Stop reason: ALTCHOICE

## 2020-05-11 RX ORDER — MORPHINE SULFATE 2 MG/ML
2 INJECTION, SOLUTION INTRAMUSCULAR; INTRAVENOUS
Status: DISCONTINUED | OUTPATIENT
Start: 2020-05-11 | End: 2020-05-13

## 2020-05-11 RX ORDER — ROCURONIUM BROMIDE 10 MG/ML
INJECTION, SOLUTION INTRAVENOUS AS NEEDED
Status: DISCONTINUED | OUTPATIENT
Start: 2020-05-11 | End: 2020-05-11 | Stop reason: HOSPADM

## 2020-05-11 RX ORDER — CHLORHEXIDINE GLUCONATE 1.2 MG/ML
10 RINSE ORAL EVERY 12 HOURS
Status: DISCONTINUED | OUTPATIENT
Start: 2020-05-11 | End: 2020-05-15 | Stop reason: HOSPADM

## 2020-05-11 RX ORDER — DEXTROSE MONOHYDRATE 100 MG/ML
0-250 INJECTION, SOLUTION INTRAVENOUS AS NEEDED
Status: DISCONTINUED | OUTPATIENT
Start: 2020-05-11 | End: 2020-05-15 | Stop reason: HOSPADM

## 2020-05-11 RX ORDER — FENTANYL CITRATE 50 UG/ML
50 INJECTION, SOLUTION INTRAMUSCULAR; INTRAVENOUS AS NEEDED
Status: DISCONTINUED | OUTPATIENT
Start: 2020-05-11 | End: 2020-05-11 | Stop reason: HOSPADM

## 2020-05-11 RX ORDER — FAMOTIDINE 20 MG/1
20 TABLET, FILM COATED ORAL EVERY 12 HOURS
Status: DISCONTINUED | OUTPATIENT
Start: 2020-05-12 | End: 2020-05-15 | Stop reason: HOSPADM

## 2020-05-11 RX ORDER — SODIUM CHLORIDE 9 MG/ML
INJECTION, SOLUTION INTRAVENOUS
Status: DISCONTINUED | OUTPATIENT
Start: 2020-05-11 | End: 2020-05-11 | Stop reason: HOSPADM

## 2020-05-11 RX ORDER — SODIUM CHLORIDE, SODIUM LACTATE, POTASSIUM CHLORIDE, CALCIUM CHLORIDE 600; 310; 30; 20 MG/100ML; MG/100ML; MG/100ML; MG/100ML
50 INJECTION, SOLUTION INTRAVENOUS CONTINUOUS
Status: DISCONTINUED | OUTPATIENT
Start: 2020-05-11 | End: 2020-05-11 | Stop reason: HOSPADM

## 2020-05-11 RX ORDER — PROTAMINE SULFATE 10 MG/ML
INJECTION, SOLUTION INTRAVENOUS AS NEEDED
Status: DISCONTINUED | OUTPATIENT
Start: 2020-05-11 | End: 2020-05-11 | Stop reason: HOSPADM

## 2020-05-11 RX ORDER — ALBUTEROL SULFATE 0.83 MG/ML
2.5 SOLUTION RESPIRATORY (INHALATION)
Status: DISCONTINUED | OUTPATIENT
Start: 2020-05-11 | End: 2020-05-15 | Stop reason: HOSPADM

## 2020-05-11 RX ORDER — OXYCODONE HYDROCHLORIDE 5 MG/1
10 TABLET ORAL
Status: DISCONTINUED | OUTPATIENT
Start: 2020-05-11 | End: 2020-05-15 | Stop reason: HOSPADM

## 2020-05-11 RX ORDER — SODIUM CHLORIDE 9 MG/ML
9 INJECTION, SOLUTION INTRAVENOUS CONTINUOUS
Status: DISCONTINUED | OUTPATIENT
Start: 2020-05-11 | End: 2020-05-13

## 2020-05-11 RX ORDER — ALBUMIN HUMAN 50 G/1000ML
12.5 SOLUTION INTRAVENOUS
Status: COMPLETED | OUTPATIENT
Start: 2020-05-11 | End: 2020-05-11

## 2020-05-11 RX ORDER — LANOLIN ALCOHOL/MO/W.PET/CERES
400 CREAM (GRAM) TOPICAL 2 TIMES DAILY
Status: DISCONTINUED | OUTPATIENT
Start: 2020-05-12 | End: 2020-05-15 | Stop reason: HOSPADM

## 2020-05-11 RX ADMIN — SODIUM CHLORIDE: 900 INJECTION, SOLUTION INTRAVENOUS at 09:23

## 2020-05-11 RX ADMIN — ACETAMINOPHEN 650 MG: 325 TABLET ORAL at 21:22

## 2020-05-11 RX ADMIN — MORPHINE SULFATE 4 MG: 4 INJECTION, SOLUTION INTRAMUSCULAR; INTRAVENOUS at 20:04

## 2020-05-11 RX ADMIN — SODIUM CHLORIDE 2.6 UNITS/HR: 900 INJECTION, SOLUTION INTRAVENOUS at 08:34

## 2020-05-11 RX ADMIN — SUCCINYLCHOLINE CHLORIDE 120 MG: 20 INJECTION, SOLUTION INTRAMUSCULAR; INTRAVENOUS at 09:23

## 2020-05-11 RX ADMIN — PROPOFOL 100 MG: 10 INJECTION, EMULSION INTRAVENOUS at 09:23

## 2020-05-11 RX ADMIN — DEXMEDETOMIDINE HYDROCHLORIDE 0.3 MCG/KG/HR: 100 INJECTION, SOLUTION, CONCENTRATE INTRAVENOUS at 13:13

## 2020-05-11 RX ADMIN — Medication 2 MG: at 13:33

## 2020-05-11 RX ADMIN — CEFAZOLIN SODIUM 2 G: 300 INJECTION, POWDER, LYOPHILIZED, FOR SOLUTION INTRAVENOUS at 18:39

## 2020-05-11 RX ADMIN — Medication 10 ML: at 22:09

## 2020-05-11 RX ADMIN — MIDAZOLAM 5 MG: 1 INJECTION INTRAMUSCULAR; INTRAVENOUS at 08:36

## 2020-05-11 RX ADMIN — MUPIROCIN: 20 OINTMENT TOPICAL at 18:37

## 2020-05-11 RX ADMIN — FAMOTIDINE 20 MG: 10 INJECTION, SOLUTION INTRAVENOUS at 15:00

## 2020-05-11 RX ADMIN — Medication 10 ML: at 16:24

## 2020-05-11 RX ADMIN — ALBUMIN (HUMAN) 12.5 G: 12.5 INJECTION, SOLUTION INTRAVENOUS at 14:05

## 2020-05-11 RX ADMIN — SODIUM CHLORIDE 2.6 UNITS/HR: 9 INJECTION, SOLUTION INTRAVENOUS at 09:23

## 2020-05-11 RX ADMIN — HEPARIN SODIUM 32000 UNITS: 1000 INJECTION, SOLUTION INTRAVENOUS; SUBCUTANEOUS at 10:29

## 2020-05-11 RX ADMIN — SODIUM BICARBONATE 20 MEQ: 84 INJECTION, SOLUTION INTRAVENOUS at 13:45

## 2020-05-11 RX ADMIN — CEFAZOLIN 2 G: 330 INJECTION, POWDER, FOR SOLUTION INTRAMUSCULAR; INTRAVENOUS at 12:56

## 2020-05-11 RX ADMIN — LIDOCAINE HYDROCHLORIDE 100 MG: 20 INJECTION, SOLUTION EPIDURAL; INFILTRATION; INTRACAUDAL; PERINEURAL at 09:23

## 2020-05-11 RX ADMIN — SODIUM BICARBONATE 50 MEQ: 84 INJECTION, SOLUTION INTRAVENOUS at 18:01

## 2020-05-11 RX ADMIN — SUFENTANIL CITRATE 40 MCG: 50 INJECTION EPIDURAL; INTRAVENOUS at 09:23

## 2020-05-11 RX ADMIN — CHLORHEXIDINE GLUCONATE 10 ML: 1.2 RINSE ORAL at 21:22

## 2020-05-11 RX ADMIN — ROCURONIUM BROMIDE 50 MG: 10 SOLUTION INTRAVENOUS at 09:23

## 2020-05-11 RX ADMIN — HYDROMORPHONE HYDROCHLORIDE 0.5 MG: 1 INJECTION, SOLUTION INTRAMUSCULAR; INTRAVENOUS; SUBCUTANEOUS at 15:20

## 2020-05-11 RX ADMIN — SUFENTANIL CITRATE 0.3 MCG/KG/HR: 50 INJECTION EPIDURAL; INTRAVENOUS at 09:33

## 2020-05-11 RX ADMIN — PHENYLEPHRINE HYDROCHLORIDE 40 MCG/MIN: 10 INJECTION INTRAVENOUS at 12:30

## 2020-05-11 RX ADMIN — SODIUM CHLORIDE 10 G/HR: 900 INJECTION, SOLUTION INTRAVENOUS at 09:33

## 2020-05-11 RX ADMIN — SODIUM CHLORIDE 10 ML/HR: 450 INJECTION, SOLUTION INTRAVENOUS at 14:00

## 2020-05-11 RX ADMIN — AMIODARONE HYDROCHLORIDE 1 MG/MIN: 50 INJECTION, SOLUTION INTRAVENOUS at 20:34

## 2020-05-11 RX ADMIN — PROTAMINE SULFATE 25 MG: 10 INJECTION, SOLUTION INTRAVENOUS at 13:01

## 2020-05-11 RX ADMIN — CEFAZOLIN 2 G: 330 INJECTION, POWDER, FOR SOLUTION INTRAMUSCULAR; INTRAVENOUS at 09:53

## 2020-05-11 RX ADMIN — ALBUMIN (HUMAN) 12.5 G: 12.5 INJECTION, SOLUTION INTRAVENOUS at 18:33

## 2020-05-11 RX ADMIN — MORPHINE SULFATE 4 MG: 4 INJECTION, SOLUTION INTRAMUSCULAR; INTRAVENOUS at 14:24

## 2020-05-11 RX ADMIN — SODIUM CHLORIDE, POTASSIUM CHLORIDE, SODIUM LACTATE AND CALCIUM CHLORIDE: 600; 310; 30; 20 INJECTION, SOLUTION INTRAVENOUS at 09:23

## 2020-05-11 RX ADMIN — FENTANYL CITRATE 100 MCG: 50 INJECTION INTRAMUSCULAR; INTRAVENOUS at 08:36

## 2020-05-11 RX ADMIN — DESMOPRESSIN ACETATE 21.9 MCG: 4 SOLUTION INTRAVENOUS at 12:47

## 2020-05-11 RX ADMIN — Medication 10 ML: at 06:42

## 2020-05-11 RX ADMIN — PROTAMINE SULFATE 325 MG: 10 INJECTION, SOLUTION INTRAVENOUS at 12:22

## 2020-05-11 RX ADMIN — SUFENTANIL CITRATE 10 MCG: 50 INJECTION EPIDURAL; INTRAVENOUS at 09:57

## 2020-05-11 RX ADMIN — ALBUMIN (HUMAN) 12.5 G: 12.5 INJECTION, SOLUTION INTRAVENOUS at 14:37

## 2020-05-11 RX ADMIN — CHLORHEXIDINE GLUCONATE 10 ML: 1.2 RINSE ORAL at 15:30

## 2020-05-11 RX ADMIN — MAGNESIUM SULFATE HEPTAHYDRATE 1 G: 1 INJECTION, SOLUTION INTRAVENOUS at 22:24

## 2020-05-11 RX ADMIN — MORPHINE SULFATE 4 MG: 4 INJECTION, SOLUTION INTRAMUSCULAR; INTRAVENOUS at 17:58

## 2020-05-11 RX ADMIN — HEPARIN SODIUM 1000 UNITS: 1000 INJECTION, SOLUTION INTRAVENOUS; SUBCUTANEOUS at 09:59

## 2020-05-11 RX ADMIN — MORPHINE SULFATE 4 MG: 4 INJECTION, SOLUTION INTRAMUSCULAR; INTRAVENOUS at 22:08

## 2020-05-11 NOTE — PROGRESS NOTES
Patient will undergo CABG today with Cardiac Surgery team. The CM will follow for transitions of care, anticipate discharge home with home health services when medically stable.  JENNIE Fine

## 2020-05-11 NOTE — PROGRESS NOTES
Physical Therapy  5/11/2020    Orders received, chart review completed. Note patient POD #0 from CORONARY ARTERY BYPASS GRAFTING X 4 WITH LIMA. PT will follow up tomorrow for evaluation    Thank you.     Maite Estrada, PT, DPT

## 2020-05-11 NOTE — ROUTINE PROCESS
Occupational Therapy   Orders received, chart review completed. Note patient POD #0 from CORONARY ARTERY BYPASS GRAFTING X 4 WITH LIMA. OT will follow up tomorrow for evaluation. Recommend OOB to chair three times a day for meals and self-completion of ADLs as able and medically stable. Thank you.

## 2020-05-11 NOTE — PROGRESS NOTES
Spiritual Care Assessment/Progress Note  [facility]      NAME: Torri Wheeler      MRN: [id number]  AGE: 52 y.o.  SEX: female  Anabaptism Affiliation: Zhanna Rothman   Language: English     5/11/2020     Total Time (in minutes): 25     Spiritual Assessment begun in [facility] 17 through conversation with:         [x]Patient        [] Family    [] Friend(s)        Reason for Consult: Request by patient     Spiritual beliefs: (Please include comment if needed)     [x] Identifies with a kelsey tradition:         [x] Supported by a kelsey community:            [] Claims no spiritual orientation:           [] Seeking spiritual identity:                [] Adheres to an individual form of spirituality:           [] Not able to assess:                           Identified resources for coping:      [x] Prayer                               [] Music                  [] Guided Imagery     [x] Family/friends                 [] Pet visits     [] Devotional reading                         [] Unknown     [] Other:                                              Interventions offered during this visit: (See comments for more details)    Patient Interventions: Affirmation of emotions/emotional suffering, Affirmation of kelsey, Coping skills reviewed/reinforced, Iconic (affirming the presence of God/Higher Power), Normalization of emotional/spiritual concerns, Prayer (actual), Prayer (assurance of)           Plan of Care:     [x] Support spiritual and/or cultural needs    [] Support AMD and/or advance care planning process      [] Support grieving process   [] Coordinate Rites and/or Rituals    [] Coordination with community clergy   [] No spiritual needs identified at this time   [] Detailed Plan of Care below (See Comments)  [] Make referral to Music Therapy  [] Make referral to Pet Therapy     [] Make referral to Addiction services  [] Make referral to [facility]  [] Make referral to Spiritual Care Partner  [] No future visits requested        [x] Follow up visits as needed     Comments:  responded to pt request for  support and prayer before her procedure.  when to pt room and pt had already gone down to pre-op.  met with pt in pre-op. Pt, Carlota Dosrey was grateful for visit. She shared as grateful she is that the hospital is taking precautions its hard not having family and her support present with her. We talked about how challenging its been but prayers for good outcomes.  provided pastoral listening, support and prayer. Let her know of  support and availability.  follow up as needed.      [address] [place] Shay[place], MACE   [phone])

## 2020-05-11 NOTE — PROGRESS NOTES
1405: Patient arrived in CVICU from [address] [place] [address]. Received report from [place] and [facility]. Hemodynamic goals given, proceed to wean and extubate as tolerated. 1425: Xenia waveforms dampened, PA noticed shallow placement in CXR. Dr Chris Chauhan at bedside [facility], better numbers. 1440: Xenia waveform dampened again, Difficult to pull back on PA port. PA aware. 1441: ABG obtained, pO2 120 on 80 FiO2, decreased to 60% FiO2 O2 remains at 100%. 1548: Pt. Following commands, overbreathing vent, placed on 50% FiO2, oxygen saturation remains at 100%. 1557: Dr. Ofelia Bains on phone, updated on patient status. No new orders at this time. 1645: Rechecked ABG, WDL.     1650: Pt. Following commands, overbreathing vent. Placed on half rate. 1705: Patient following commands, placed on Cpap.     1740: ABG obtained, bicarb 20, notified Dr. Ofelia Bains and received orders to extubate and administer 1 amp. Bi carb. 1747: Pt. Extubated to 4L NC, able to state name and birthday. 1852: Updated pt's boyfriend Alfie Sheets and pt's father. They were updated on patient status and time was offered for questions and concerns. 2000: Bedside and Verbal shift change report given to DAKOTA Wagner    (oncoming nurse) by Colby Delcid RN (offgoing nurse). Report included the following information SBAR, Kardex, OR Summary, Procedure Summary, Intake/Output, MAR, Cardiac Rhythm NSR and Alarm Parameters .

## 2020-05-11 NOTE — BRIEF OP NOTE
BRIEF OP NOTE  Pre-Op Diagnosis: CAD    Post-Op Diagnosis: CAD      Procedure: CORONARY ARTERY BYPASS GRAFTING X 4 (LIMA-LAD, seq SVG-OM-OM, SVG-PDA) [facility]  Placement of Prevena dressing (Lot #: [id number], Exp: 8/31/2022)    Surgeon: Chani Morelos    Assistant(s): IRVING Shine    Anesthesia: General     Infusions: Precedex, insulin, zoey    Estimated Blood Loss: 400 mL    Cell Saver: 524 mL    Specimens: * No specimens in log *    Drains and pacing wires: 2 atrial wires, 1 bipolar ventricular wire, 4 boris drains    Complications: None    Findings: CAD    Implants: * No implants in log *    IRVING Shine

## 2020-05-11 NOTE — ANESTHESIA PROCEDURE NOTES
Arterial Line Placement    Start time: 5/11/2020 8:00 AM  End time: 5/11/2020 8:15 AM  Performed by: Betzaida Paulino MD  Authorized by: Betzaida Paulino MD     Pre-Procedure  Indications:  Arterial pressure monitoring and blood sampling  Preanesthetic Checklist: patient identified, risks and benefits discussed, anesthesia consent, site marked, patient being monitored, timeout performed and patient being monitored    Timeout Time: 08:00        Procedure:   Prep:  ChloraPrep  Seldinger Technique?: Yes    Orientation:  Right  Location:  Radial artery  Catheter size:  20 G  Number of attempts:  1    Assessment:   Post-procedure:  Line secured and sterile dressing applied  Patient Tolerance:  Patient tolerated the procedure well with no immediate complications

## 2020-05-11 NOTE — DIABETES MGMT
JACE VALLADARES  CLINICAL NURSE SPECIALIST CONSULT  [facility]    Follow up  NOTE    Presentation   Arvind Little is a 52 y.o. female admitted with chest pain and work up. She will be going for cardiac surgery on Monday. PMH: Type 1 DM/ CAD/dyslipidemia/PUD/ CVA. Current clinical course has been uncomplicated. Recent Event: CABG x4 today. On insulin gtt. Intubated and on ventilator. Remains sedated post surgery-      Diabetes: Patient has had  Type1 diabetes since she was 7yrs old. Ttreated with insulin pump. Family history positive for Type 2 diabetes. Consulted by Provider for advanced diabetes nursing assessment and care, specifically related to   [x] Transitioning off Sandra Silvius   [x] Inpatient management strategy  [] Home management assessment  [] Survival skill education    Diabetes-related medical history  Acute complications  NONe  Neurological complications  NONE  Microvascular disease  Retinopathy/ nephropathy  Macrovascular disease  CAD and Cerebral vascular accident  Other associated conditions     Dyslipidemia/    Diabetes medication history  Drug class Currently in use Discontinued Never used   Biguanide      DDP-4 inhibitor       Sulfonylurea      Thiazolidinedione      GLP-1 RA      SGLT-2 inhibitors      Basal insulin Via Medtronic pump - recieves 50units daily from pump     Bolus insulin        Subjective   Just rolled into the CVICU from cardiac surgery. Intubated on ventilator. We discussed post surgery her plan and decided she will NOT be on her insulin pump post op. Patient reports the following home diabetes self-care practices:  Eating pattern-has a very strict diet and only 'treats herself' with french fries. Physical activity pattern-does not have time;    Monitoring pattern-per CGM;      Taking medications pattern  [x] Consistent administration  [x] Affordable    Social determinants of health impacting diabetes self-management practices   Concerned that you need to know more about how to stay healthy with diabetes    Objective   Physical exam  General Sedated post op; intubated and on ventilator. Vital Signs   Visit Vitals  /64   Pulse 73   Temp 98.3 °F (36.8 °C)   Resp 13   Ht 5' 5\" (1.651 m)   Wt 73.3 kg (161 lb 9.6 oz)   SpO2 100%   BMI 26.89 kg/m²     Skin  Warm and dry. Heart   Regular rate and rhythm. No murmurs, rubs or gallops  Lungs  Clear to auscultation without rales or rhonchi  Extremities No foot wounds    Diabetic foot exam: patient wanted to eat lunch -deferred. Laboratory  Lab Results   Component Value Date/Time    Hemoglobin A1c 7.3 (H) 05/09/2020 03:22 AM    Hemoglobin A1c (POC) 8.5 03/18/2020 01:45 PM     Lab Results   Component Value Date/Time    LDL, calculated 33.8 05/08/2020 12:52 AM     Lab Results   Component Value Date/Time    Creatinine (POC) 1.2 06/07/2013 12:15 AM    Creatinine 1.17 (H) 05/10/2020 03:32 AM     Lab Results   Component Value Date/Time    Sodium 134 (L) 05/10/2020 03:32 AM    Potassium 3.9 05/10/2020 03:32 AM    Chloride 106 05/10/2020 03:32 AM    CO2 23 05/10/2020 03:32 AM    Anion gap 5 05/10/2020 03:32 AM    Glucose 57 (L) 05/10/2020 03:32 AM    BUN 23 (H) 05/10/2020 03:32 AM    Creatinine 1.17 (H) 05/10/2020 03:32 AM    BUN/Creatinine ratio 20 05/10/2020 03:32 AM    GFR est AA 60 (L) 05/10/2020 03:32 AM    GFR est non-AA 49 (L) 05/10/2020 03:32 AM    Calcium 9.4 05/10/2020 03:32 AM    Bilirubin, total 0.8 05/09/2020 03:22 AM    AST (SGOT) 23 05/09/2020 03:22 AM    Alk.  phosphatase 56 05/09/2020 03:22 AM    Protein, total 6.9 05/09/2020 03:22 AM    Albumin 3.5 05/09/2020 03:22 AM    Globulin 3.4 05/09/2020 03:22 AM    A-G Ratio 1.0 (L) 05/09/2020 03:22 AM    ALT (SGPT) 25 05/09/2020 03:22 AM     Lab Results   Component Value Date/Time    ALT (SGPT) 25 05/09/2020 03:22 AM       Factors affecting BG pattern  Factor Dose Comments   Nutrition:  Carb-controlled meals     60 grams/meal        Blood glucose pattern      Assessment and Plan   Nursing Diagnosis Risk for unstable blood glucose pattern   Nursing Intervention Domain 0355 Decision-making Support   Nursing Interventions Examined current inpatient diabetes control   Explored factors facilitating and impeding inpatient management  Identified self-management practices impeding diabetes control  Explored corrective strategies with patient and responsible inpatient provider   Informed patient of rational for insulin strategy while hospitalized     Evaluation     with controlled Type1 diabetes, has  achieved inpatient blood glucose target of 100-180mg/dl via insulin drip. Per our conversation pre-op she stated that she would rather not be on her insulin pump post operatively as she feels it may be too much for her to worry about while recovering from heart surgery. After 48hours or when insulin drip discontinued,  It will be necessary that once BG trends >200mg/dl we initiate the insulin subcutaenous order set in order to maintain her BG <200mg/dl. Inpatient blood glucose management has been impacted by  [x] Kidney dysfunction  [x] Erratic meal consumption  [] Glucocorticoid use  [x]  Upcoming cardiac surgery (stress)    Recommendations   1. Insulin infusion per post-operative period    2. Lantus 2 hrs prior to d/c infusion when transitioning off    3. Blood glucose monitoring TIDAC once off insulin infusion.  When BG trends >200mg/dl, INITIATE insulin subcutaneous order set with following recommendations:     -Basal TDD (when on insulin pump= 12.4units) recommend:   Basal insulin =12units daily           -Mealtime bolus:  When taking PO meals AND consuming >50% carbohydrates with her meals: 4 to 7 units Humalog TID         -Correctional insulin-insulin sensitive      Billing Code(s)     [x] [id number] IP subsequent hospital care - 30 minutes      AJAY Oneill  [facility]  Access via SHERICE [place] [phone]

## 2020-05-11 NOTE — ANESTHESIA PROCEDURE NOTES
Central Line Placement    Start time: 5/11/2020 8:00 AM  End time: 5/11/2020 8:15 AM  Performed by: Orlando Kilpatrick MD  Authorized by: Orlando Kilpatrick MD     Indications: vascular access, central pressure monitoring and need for vasopressors  Preanesthetic Checklist: patient identified, risks and benefits discussed, anesthesia consent, site marked, patient being monitored and timeout performed    Timeout Time: 08:00       Pre-procedure: All elements of maximal sterile barrier technique followed?  Yes    2% Chlorhexidine for cutaneous antisepsis, Hand hygiene performed prior to catheter insertion and Ultrasound guidance    Sterile Ultrasound Technique followed?: Yes            Procedure:   Prep:  Chlorhexidine  Location: internal jugular  Orientation:  Right  Patient position:  Trendelenburg  Catheter type:  Double lumen  Catheter size:  9 Fr  Catheter length:  12 cm  Number of attempts:  1  Successful placement: Yes      Assessment:   Post-procedure:  Catheter secured and sterile dressing applied  Assessment:  Blood return through all ports, free fluid flow and guidewire removal verified  Insertion:  Uncomplicated  Patient tolerance:  Patient tolerated the procedure well with no immediate complications  8 Fr CCO PAC

## 2020-05-11 NOTE — PROGRESS NOTES
Visited Ms Juan Sierra in [facility], who had just arrived to unit from [address]. Patient was intubated and no family was present. Nurse provided update regarding patient status and POC. Left note at bedside assuring patient and family of ongoing  availability for support. : Rev. Cassy Bales.  Margo James; [facility], to contact [phone] call: [phone]

## 2020-05-11 NOTE — PERIOP NOTES
TRANSFER - IN REPORT:    Verbal report received from Nathaly Goins RN(name) on Riki Ac  being received from [facility](unit) for ordered procedure      Report consisted of patients Situation, Background, Assessment and   Recommendations(SBAR). Information from the following report(s) SBAR, Procedure Summary, Intake/Output and MAR was reviewed with the receiving nurse. Opportunity for questions and clarification was provided. Assessment completed upon patients arrival to unit and care assumed.

## 2020-05-11 NOTE — PROGRESS NOTES
1930   Bedside shift change report given to Cara Landry RN (oncoming nurse) by Rush White RN (offgoing nurse). Report included the following information SBAR, Kardex, Intake/Output, MAR and Recent Results. 1940   Insulin gtt started per order. 2200  CHG bath 1 completed, linen changed    0000  Pt NPO    0630   2nd CHG bath completed    0715   Report called to Opal Camara RN from [facility]. SBAR used. Patient going to PreOp for CABG @ 0800.     0740   TRANSFER - OUT REPORT:    Verbal report given to Carlie(name) on [facility]  being transferred to PreOP(unit) for ordered procedure       Report consisted of patients Situation, Background, Assessment and   Recommendations(SBAR). Information from the following report(s) SBAR, Kardex, Intake/Output, MAR and Recent Results was reviewed with the receiving nurse.     Lines:   Peripheral IV 05/07/20 Right Antecubital (Active)   Phlebitis Assessment 0 5/10/2020  7:39 PM   Infiltration Assessment 0 5/10/2020  7:39 PM   Dressing Status Clean, dry, & intact 5/10/2020  7:39 PM   Dressing Type Tape;Transparent 5/10/2020  7:39 PM   Hub Color/Line Status Pink;Capped 5/10/2020  7:39 PM   Action Taken Open ports on tubing capped 5/10/2020  7:39 PM   Alcohol Cap Used Yes 5/10/2020  7:39 PM       Peripheral IV 05/10/20 Left Forearm (Active)   Phlebitis Assessment 0 5/10/2020  7:39 PM   Infiltration Assessment 0 5/10/2020  7:39 PM   Dressing Status Clean, dry, & intact 5/10/2020  7:39 PM   Dressing Type Transparent;Tape 5/10/2020  7:39 PM   Hub Color/Line Status Pink;Flushed;Capped 5/10/2020  7:39 PM   Action Taken Open ports on tubing capped 5/10/2020  7:39 PM   Alcohol Cap Used Yes 5/10/2020  7:39 PM       Subcutaneous Insulin Infusion Device 05/07/20 (Active)   Date of Last Site Change 05/07/20 5/9/2020  7:55 PM   Date of Last Set Change 05/07/20 5/9/2020  7:55 PM   Dressing Status Clean, dry, & intact 5/9/2020  7:55 PM   Dressing Type Other (comments) 5/9/2020  7:55 PM   Pump continued on admission? Yes 5/9/2020  7:55 PM   Patient able to care for pump? Yes 5/9/2020  7:55 PM   Qualified caregiver available? No 5/9/2020  7:55 PM   Extra supplies available? Yes 5/9/2020  7:55 PM   Was pump agreement signed? Yes 5/9/2020  7:55 PM   Patient reported basal rate  1.9 5/10/2020  3:26 AM   BOLUS (in Units) given via pump 3 5/10/2020 11:20 AM        Opportunity for questions and clarification was provided. Patient transported with:   Monitor  Registered Nurse                          CARE PLAN  Problem: Falls - Risk of  Goal: *Absence of Falls  Description: Document Laretta Elicia Fall Risk and appropriate interventions in the flowsheet. Outcome: Progressing Towards Goal  Note: Fall Risk Interventions:  Mobility Interventions: Assess mobility with egress test         Medication Interventions: Patient to call before getting OOB, Teach patient to arise slowly                   Problem: Pressure Injury - Risk of  Goal: *Prevention of pressure injury  Description: Document Keny Scale and appropriate interventions in the flowsheet.   Outcome: Progressing Towards Goal  Note: Pressure Injury Interventions:                                            Problem: CABG: Pre-Op Day  Goal: Activity/Safety  Outcome: Progressing Towards Goal  Goal: Consults, if ordered  Outcome: Progressing Towards Goal  Goal: Diagnostic Test/Procedures  Outcome: Progressing Towards Goal  Goal: Nutrition/Diet  Outcome: Progressing Towards Goal  Goal: Medications  Outcome: Progressing Towards Goal  Goal: Treatments/Interventions/Procedures  Outcome: Progressing Towards Goal  Goal: Psychosocial  Outcome: Progressing Towards Goal  Goal: *Hemodynamically stable  Outcome: Progressing Towards Goal

## 2020-05-11 NOTE — PERIOP NOTES
0518 timeout with Dr. Sandra Kendall for a central line and arterial line placement. Consent signed. Patient placed on monitor. Vitals stable, will continue to monitor.

## 2020-05-11 NOTE — PROGRESS NOTES
Cardiac Surgery Care Coordinator- Met with Mona Garcia in pre-op holding. Reviewed role of the Cardiac Surgery Co- Nurse. Reviewed plan of care and discussed day of surgery expectations. Reviewed sternal precautions, pain scale and the importance of using the incentive spirometer after surgery. Encouraged Mona Garcia to verbalize and emotional support given. Will continue to follow for educational and emotional needs. Will update family via phone. 1000- Placed call to Gary Bryant, Ms Ling Medina boyfriend, provided update and encouraged him to verbalize. Will continue to follow. 1145- Placed follow up call to Gary Bryant, provided update from the [place]- Placed update call to Gary Bryant, reviewed surgical results and encouraged him to verbalize. He stated he is with her father and will be staying with him today. Exchanged contact information and provided info for bedside nurse.  Nataliia Etienne RN

## 2020-05-11 NOTE — ANESTHESIA POSTPROCEDURE EVALUATION
Post-Anesthesia Evaluation and Assessment    Patient: Cesar Edwards MRN: [id number]  SSN: [id number]    YOB: 1970  Age: 52 y.o. Sex: female      I have evaluated the patient and they are stable in the ICU. Cardiovascular Function/Vital Signs  Visit Vitals  /70   Pulse 73   Temp 36.2 °C (97.1 °F)   Resp 18   Ht 5' 5\" (1.651 m)   Wt 73.3 kg (161 lb 9.6 oz)   SpO2 100%   BMI 26.89 kg/m²       Patient is status post General anesthesia for Procedure(s):  CORONARY ARTERY BYPASS GRAFTING X 4 WITH LIMA, RESVGH, ECC, WEI AND EPIAORTIC U/S BY DR Carole Hilton. Nausea/Vomiting: None    Postoperative hydration reviewed and adequate. Pain:  Pain Scale 1: Numeric (0 - 10) (05/11/20 0813)  Pain Intensity 1: 0 (05/11/20 0813)   Managed    Neurological Status:       Intubated and sedated     Pulmonary Status:   O2 Device: Other (comment) (05/11/20 1408)   Intubated with adequate ventilation and oxygenation     Complications related to anesthesia: None    Post-anesthesia assessment completed.  No concerns    Signed By: Chandni Marcos MD     May 11, 2020

## 2020-05-11 NOTE — ANESTHESIA PREPROCEDURE EVALUATION
Anesthetic History   No history of anesthetic complications            Review of Systems / Medical History  Patient summary reviewed, nursing notes reviewed and pertinent labs reviewed    Pulmonary  Within defined limits                 Neuro/Psych         TIA     Cardiovascular    Hypertension          CAD and cardiac stents    Exercise tolerance: >4 METS  Comments: Normal cavity size, wall thickness, systolic function (ejection fraction normal) and diastolic function. Estimated left ventricular ejection fraction is 55 - 60%. No regional wall motion abnormality noted. Normal left ventricular strain.    GI/Hepatic/Renal     GERD           Endo/Other    Diabetes: type 1, using insulin         Other Findings   Comments: Insulin pump           Physical Exam    Airway  Mallampati: II  TM Distance: 4 - 6 cm  Neck ROM: normal range of motion   Mouth opening: Normal     Cardiovascular  Regular rate and rhythm,  S1 and S2 normal,  no murmur, click, rub, or gallop             Dental  No notable dental hx       Pulmonary  Breath sounds clear to auscultation               Abdominal  GI exam deferred       Other Findings            Anesthetic Plan    ASA: 3  Anesthesia type: general          Induction: Intravenous  Anesthetic plan and risks discussed with: Patient

## 2020-05-11 NOTE — ANESTHESIA PROCEDURE NOTES
WEI  Date/Time: 5/11/2020 12:52 PM      Procedure Details: probe placement, image aquisition & interpretation    Risks and benefits discussed with the patient and plans are to proceed    Procedure Note    Performed by: Cinthya Reese MD  Authorized by: Cinthya Reese MD       Indications: assessment of ascending aorta and assessment of surgical repair  Modalities: 2D, CF, CWD, PWD  Probe Type: biplane, multiplane and epiaortic  Insertion: atraumatic  Patient Status: intubated and sedated    Echocardiographic and Doppler Measurements   Aorta  Size  Diam(cm)  Dissection PlaqueThick(mm)  Plaque Mobile    Ascending normal  No  No    Arch normal  No  No    Descending normal  No  No          Valves  Annulus  Stenosis  Area/Grad  Regurg  Leaflet   Morph  Leaflet   Motion    Aortic normal none  0 normal normal    Mitral normal none  0 normal normal    Tricuspid normal none  0 normal normal          Atria  Size  SEC (smoke)  Thrombus  Tumor  Device    Rt Atrium normal No No  No    Lt Atrium normal No No  No     Interatrial Septum Morphology: normal    Interventricular Septum Morphology: normal    Ventricle  Cavity Size  Cavity Dimension Hypertrophy  Thrombus  Gloal FXN  EF    RV normal  No no normal     LV normal   No normal >55%       Regional Function  (1 = normal, 2 = mildly hypokinetic, 3 = severely hypokinetic, 4 = akinetic, 5 = dyskinetic) LAV - [place]   ME LAV = 0  ME LAV = 90  ME LAV = 130   Basal Sept:1 Basal Ant:1 Basal Post:1   Mid Sept:1 Mid Ant:1 Mid Post:1   Apical Sept:1 Apical Ant:1 Basal Ant Sept:1   Basal Lat:1 Basal Inf:1 Mid Ant Sept:1   Mid Lat:1 Mid Inf:1    Apical Lat:1 Apical Inf:1      Diastolic function: Normal  Pericardium: normal    Post Intervention Follow-up Study  Ventricular Global Function: unchanged  Ventricular Regional Function: unchanged     Valve  Function  Regurgitation  Area    Aortic no change      Mitral no change      Tricuspid  2+     Prosthetic Complications: None  Comments: Pre exam- Normal LVEF with no WMA. Normal RV function. Normal aortic valve, normal mitral valve, normal tricuspid valve, pulmonary valve with mild TX. ALFREDO no thrombus, normal atrium. Normal aorta. No diastolic dysfunction. Post exam- Normal LVEF with no WMA, normal RV function. Mild TR now present. Rest of exam unchanged.

## 2020-05-12 ENCOUNTER — APPOINTMENT (OUTPATIENT)
Dept: GENERAL RADIOLOGY | Age: 50
DRG: 236 | End: 2020-05-12
Attending: NURSE PRACTITIONER
Payer: COMMERCIAL

## 2020-05-12 LAB
ABO + RH BLD: NORMAL
ADMINISTERED INITIALS, ADMINIT: NORMAL
ALBUMIN SERPL-MCNC: 3.6 G/DL (ref 3.5–5)
ALBUMIN/GLOB SERPL: 1.8 {RATIO} (ref 1.1–2.2)
ALP SERPL-CCNC: 29 U/L (ref 45–117)
ALT SERPL-CCNC: 23 U/L (ref 12–78)
ANION GAP SERPL CALC-SCNC: 9 MMOL/L (ref 5–15)
ARTERIAL PATENCY WRIST A: ABNORMAL
AST SERPL-CCNC: 92 U/L (ref 15–37)
ATRIAL RATE: 79 BPM
BASE DEFICIT BLD-SCNC: 4 MMOL/L
BDY SITE: ABNORMAL
BILIRUB SERPL-MCNC: 1 MG/DL (ref 0.2–1)
BLD PROD TYP BPU: NORMAL
BLOOD GROUP ANTIBODIES SERPL: NORMAL
BPU ID: NORMAL
BUN SERPL-MCNC: 18 MG/DL (ref 6–20)
BUN/CREAT SERPL: 15 (ref 12–20)
CA-I BLD-SCNC: 1.2 MMOL/L (ref 1.12–1.32)
CALCIUM SERPL-MCNC: 8 MG/DL (ref 8.5–10.1)
CALCULATED P AXIS, ECG09: 34 DEGREES
CALCULATED R AXIS, ECG10: 9 DEGREES
CALCULATED T AXIS, ECG11: -5 DEGREES
CHLORIDE SERPL-SCNC: 109 MMOL/L (ref 97–108)
CO2 SERPL-SCNC: 22 MMOL/L (ref 21–32)
CREAT SERPL-MCNC: 1.24 MG/DL (ref 0.55–1.02)
CROSSMATCH RESULT,%XM: NORMAL
D50 ADMINISTERED, D50ADM: 0 ML
D50 ORDER, D50ORD: 0 ML
DIAGNOSIS, 93000: NORMAL
ERYTHROCYTE [DISTWIDTH] IN BLOOD BY AUTOMATED COUNT: 13.4 % (ref 11.5–14.5)
GAS FLOW.O2 O2 DELIVERY SYS: ABNORMAL L/MIN
GAS FLOW.O2 SETTING OXYMISER: 3 L/M
GLOBULIN SER CALC-MCNC: 2 G/DL (ref 2–4)
GLSCOM COMMENTS: NORMAL
GLUCOSE BLD STRIP.AUTO-MCNC: 104 MG/DL (ref 65–100)
GLUCOSE BLD STRIP.AUTO-MCNC: 106 MG/DL (ref 65–100)
GLUCOSE BLD STRIP.AUTO-MCNC: 111 MG/DL (ref 65–100)
GLUCOSE BLD STRIP.AUTO-MCNC: 113 MG/DL (ref 65–100)
GLUCOSE BLD STRIP.AUTO-MCNC: 114 MG/DL (ref 65–100)
GLUCOSE BLD STRIP.AUTO-MCNC: 119 MG/DL (ref 65–100)
GLUCOSE BLD STRIP.AUTO-MCNC: 120 MG/DL (ref 65–100)
GLUCOSE BLD STRIP.AUTO-MCNC: 122 MG/DL (ref 65–100)
GLUCOSE BLD STRIP.AUTO-MCNC: 124 MG/DL (ref 65–100)
GLUCOSE BLD STRIP.AUTO-MCNC: 142 MG/DL (ref 65–100)
GLUCOSE BLD STRIP.AUTO-MCNC: 142 MG/DL (ref 65–100)
GLUCOSE BLD STRIP.AUTO-MCNC: 144 MG/DL (ref 65–100)
GLUCOSE BLD STRIP.AUTO-MCNC: 160 MG/DL (ref 65–100)
GLUCOSE BLD STRIP.AUTO-MCNC: 176 MG/DL (ref 65–100)
GLUCOSE BLD STRIP.AUTO-MCNC: 91 MG/DL (ref 65–100)
GLUCOSE BLD STRIP.AUTO-MCNC: 94 MG/DL (ref 65–100)
GLUCOSE BLD STRIP.AUTO-MCNC: 94 MG/DL (ref 65–100)
GLUCOSE BLD STRIP.AUTO-MCNC: 97 MG/DL (ref 65–100)
GLUCOSE BLD STRIP.AUTO-MCNC: 99 MG/DL (ref 65–100)
GLUCOSE BLD STRIP.AUTO-MCNC: 99 MG/DL (ref 65–100)
GLUCOSE SERPL-MCNC: 119 MG/DL (ref 65–100)
GLUCOSE, GLC: 104 MG/DL
GLUCOSE, GLC: 106 MG/DL
GLUCOSE, GLC: 111 MG/DL
GLUCOSE, GLC: 113 MG/DL
GLUCOSE, GLC: 114 MG/DL
GLUCOSE, GLC: 119 MG/DL
GLUCOSE, GLC: 120 MG/DL
GLUCOSE, GLC: 122 MG/DL
GLUCOSE, GLC: 124 MG/DL
GLUCOSE, GLC: 142 MG/DL
GLUCOSE, GLC: 142 MG/DL
GLUCOSE, GLC: 144 MG/DL
GLUCOSE, GLC: 160 MG/DL
GLUCOSE, GLC: 176 MG/DL
GLUCOSE, GLC: 91 MG/DL
GLUCOSE, GLC: 94 MG/DL
GLUCOSE, GLC: 94 MG/DL
GLUCOSE, GLC: 97 MG/DL
GLUCOSE, GLC: 99 MG/DL
GLUCOSE, GLC: 99 MG/DL
HCO3 BLD-SCNC: 21.4 MMOL/L (ref 22–26)
HCT VFR BLD AUTO: 27.6 % (ref 35–47)
HGB BLD-MCNC: 9.5 G/DL (ref 11.5–16)
HIGH TARGET, HITG: 130 MG/DL
INSULIN ADMINSTERED, INSADM: 1.7 UNITS/HOUR
INSULIN ADMINSTERED, INSADM: 2 UNITS/HOUR
INSULIN ADMINSTERED, INSADM: 2.4 UNITS/HOUR
INSULIN ADMINSTERED, INSADM: 2.6 UNITS/HOUR
INSULIN ADMINSTERED, INSADM: 2.8 UNITS/HOUR
INSULIN ADMINSTERED, INSADM: 2.8 UNITS/HOUR
INSULIN ADMINSTERED, INSADM: 2.9 UNITS/HOUR
INSULIN ADMINSTERED, INSADM: 3.1 UNITS/HOUR
INSULIN ADMINSTERED, INSADM: 3.2 UNITS/HOUR
INSULIN ADMINSTERED, INSADM: 3.3 UNITS/HOUR
INSULIN ADMINSTERED, INSADM: 3.5 UNITS/HOUR
INSULIN ADMINSTERED, INSADM: 5.5 UNITS/HOUR
INSULIN ADMINSTERED, INSADM: 6.1 UNITS/HOUR
INSULIN ADMINSTERED, INSADM: 6.3 UNITS/HOUR
INSULIN ADMINSTERED, INSADM: 6.4 UNITS/HOUR
INSULIN ADMINSTERED, INSADM: 6.9 UNITS/HOUR
INSULIN ADMINSTERED, INSADM: 7.9 UNITS/HOUR
INSULIN ORDER, INSORD: 1.7 UNITS/HOUR
INSULIN ORDER, INSORD: 2 UNITS/HOUR
INSULIN ORDER, INSORD: 2.4 UNITS/HOUR
INSULIN ORDER, INSORD: 2.6 UNITS/HOUR
INSULIN ORDER, INSORD: 2.8 UNITS/HOUR
INSULIN ORDER, INSORD: 2.8 UNITS/HOUR
INSULIN ORDER, INSORD: 2.9 UNITS/HOUR
INSULIN ORDER, INSORD: 3.1 UNITS/HOUR
INSULIN ORDER, INSORD: 3.2 UNITS/HOUR
INSULIN ORDER, INSORD: 3.3 UNITS/HOUR
INSULIN ORDER, INSORD: 3.5 UNITS/HOUR
INSULIN ORDER, INSORD: 5.5 UNITS/HOUR
INSULIN ORDER, INSORD: 6.1 UNITS/HOUR
INSULIN ORDER, INSORD: 6.3 UNITS/HOUR
INSULIN ORDER, INSORD: 6.4 UNITS/HOUR
INSULIN ORDER, INSORD: 6.9 UNITS/HOUR
INSULIN ORDER, INSORD: 7.9 UNITS/HOUR
LOW TARGET, LOT: 95 MG/DL
MAGNESIUM SERPL-MCNC: 2.4 MG/DL (ref 1.6–2.4)
MCH RBC QN AUTO: 33.1 PG (ref 26–34)
MCHC RBC AUTO-ENTMCNC: 34.4 G/DL (ref 30–36.5)
MCV RBC AUTO: 96.2 FL (ref 80–99)
MINUTES UNTIL NEXT BG, NBG: 120 MIN
MINUTES UNTIL NEXT BG, NBG: 60 MIN
MULTIPLIER, MUL: 0.05
MULTIPLIER, MUL: 0.06
MULTIPLIER, MUL: 0.06
MULTIPLIER, MUL: 0.07
MULTIPLIER, MUL: 0.08
MULTIPLIER, MUL: 0.09
MULTIPLIER, MUL: 0.09
NRBC # BLD: 0 K/UL (ref 0–0.01)
NRBC BLD-RTO: 0 PER 100 WBC
ORDER INITIALS, ORDINIT: NORMAL
P-R INTERVAL, ECG05: 122 MS
PCO2 BLD: 39.3 MMHG (ref 35–45)
PH BLD: 7.34 [PH] (ref 7.35–7.45)
PLATELET # BLD AUTO: 143 K/UL (ref 150–400)
PMV BLD AUTO: 9.4 FL (ref 8.9–12.9)
PO2 BLD: 34 MMHG (ref 80–100)
POTASSIUM SERPL-SCNC: 4.1 MMOL/L (ref 3.5–5.1)
PROT SERPL-MCNC: 5.6 G/DL (ref 6.4–8.2)
Q-T INTERVAL, ECG07: 398 MS
QRS DURATION, ECG06: 74 MS
QTC CALCULATION (BEZET), ECG08: 456 MS
RBC # BLD AUTO: 2.87 M/UL (ref 3.8–5.2)
SAO2 % BLD: 61 % (ref 92–97)
SERVICE CMNT-IMP: ABNORMAL
SERVICE CMNT-IMP: NORMAL
SODIUM SERPL-SCNC: 140 MMOL/L (ref 136–145)
SPECIMEN EXP DATE BLD: NORMAL
SPECIMEN TYPE: ABNORMAL
STATUS OF UNIT,%ST: NORMAL
UNIT DIVISION, %UDIV: 0
VENTRICULAR RATE, ECG03: 79 BPM
WBC # BLD AUTO: 9.8 K/UL (ref 3.6–11)

## 2020-05-12 PROCEDURE — 82962 GLUCOSE BLOOD TEST: CPT

## 2020-05-12 PROCEDURE — 77010033678 HC OXYGEN DAILY

## 2020-05-12 PROCEDURE — 80053 COMPREHEN METABOLIC PANEL: CPT

## 2020-05-12 PROCEDURE — 74011000250 HC RX REV CODE- 250: Performed by: NURSE PRACTITIONER

## 2020-05-12 PROCEDURE — 97165 OT EVAL LOW COMPLEX 30 MIN: CPT

## 2020-05-12 PROCEDURE — 94762 N-INVAS EAR/PLS OXIMTRY CONT: CPT

## 2020-05-12 PROCEDURE — 93005 ELECTROCARDIOGRAM TRACING: CPT

## 2020-05-12 PROCEDURE — 74011250637 HC RX REV CODE- 250/637: Performed by: NURSE PRACTITIONER

## 2020-05-12 PROCEDURE — 83735 ASSAY OF MAGNESIUM: CPT

## 2020-05-12 PROCEDURE — 71045 X-RAY EXAM CHEST 1 VIEW: CPT

## 2020-05-12 PROCEDURE — 97161 PT EVAL LOW COMPLEX 20 MIN: CPT

## 2020-05-12 PROCEDURE — 85027 COMPLETE CBC AUTOMATED: CPT

## 2020-05-12 PROCEDURE — 97535 SELF CARE MNGMENT TRAINING: CPT

## 2020-05-12 PROCEDURE — 82803 BLOOD GASES ANY COMBINATION: CPT

## 2020-05-12 PROCEDURE — 65610000003 HC RM ICU SURGICAL

## 2020-05-12 PROCEDURE — 74011250636 HC RX REV CODE- 250/636: Performed by: NURSE PRACTITIONER

## 2020-05-12 PROCEDURE — 36415 COLL VENOUS BLD VENIPUNCTURE: CPT

## 2020-05-12 PROCEDURE — P9045 ALBUMIN (HUMAN), 5%, 250 ML: HCPCS | Performed by: INTERNAL MEDICINE

## 2020-05-12 PROCEDURE — 74011000258 HC RX REV CODE- 258: Performed by: NURSE PRACTITIONER

## 2020-05-12 PROCEDURE — 74011636637 HC RX REV CODE- 636/637: Performed by: NURSE PRACTITIONER

## 2020-05-12 PROCEDURE — 74011250636 HC RX REV CODE- 250/636: Performed by: INTERNAL MEDICINE

## 2020-05-12 RX ORDER — ALBUMIN HUMAN 50 G/1000ML
12.5 SOLUTION INTRAVENOUS ONCE
Status: COMPLETED | OUTPATIENT
Start: 2020-05-12 | End: 2020-05-12

## 2020-05-12 RX ADMIN — OXYCODONE 5 MG: 5 TABLET ORAL at 21:31

## 2020-05-12 RX ADMIN — AMIODARONE HYDROCHLORIDE 1 MG/MIN: 50 INJECTION, SOLUTION INTRAVENOUS at 09:06

## 2020-05-12 RX ADMIN — MUPIROCIN: 20 OINTMENT TOPICAL at 17:09

## 2020-05-12 RX ADMIN — Medication 10 ML: at 21:31

## 2020-05-12 RX ADMIN — POTASSIUM CHLORIDE 20 MEQ: 400 INJECTION, SOLUTION INTRAVENOUS at 00:02

## 2020-05-12 RX ADMIN — MAGNESIUM OXIDE TAB 400 MG (241.3 MG ELEMENTAL MG) 400 MG: 400 (241.3 MG) TAB at 17:09

## 2020-05-12 RX ADMIN — MORPHINE SULFATE 4 MG: 4 INJECTION, SOLUTION INTRAMUSCULAR; INTRAVENOUS at 00:02

## 2020-05-12 RX ADMIN — SENNOSIDES AND DOCUSATE SODIUM 1 TABLET: 8.6; 5 TABLET ORAL at 08:43

## 2020-05-12 RX ADMIN — ACETAMINOPHEN 650 MG: 325 TABLET ORAL at 10:22

## 2020-05-12 RX ADMIN — BACITRACIN 1 PACKET: 500 OINTMENT TOPICAL at 17:11

## 2020-05-12 RX ADMIN — SODIUM CHLORIDE 9 ML/HR: 900 INJECTION, SOLUTION INTRAVENOUS at 06:36

## 2020-05-12 RX ADMIN — FAMOTIDINE 20 MG: 10 INJECTION, SOLUTION INTRAVENOUS at 02:16

## 2020-05-12 RX ADMIN — AMIODARONE HYDROCHLORIDE 0.5 MG/MIN: 50 INJECTION, SOLUTION INTRAVENOUS at 16:16

## 2020-05-12 RX ADMIN — CHLORHEXIDINE GLUCONATE 10 ML: 1.2 RINSE ORAL at 21:32

## 2020-05-12 RX ADMIN — MUPIROCIN: 20 OINTMENT TOPICAL at 08:45

## 2020-05-12 RX ADMIN — ONDANSETRON 4 MG: 2 INJECTION INTRAMUSCULAR; INTRAVENOUS at 00:22

## 2020-05-12 RX ADMIN — POTASSIUM CHLORIDE 20 MEQ: 400 INJECTION, SOLUTION INTRAVENOUS at 01:08

## 2020-05-12 RX ADMIN — FAMOTIDINE 20 MG: 20 TABLET ORAL at 17:09

## 2020-05-12 RX ADMIN — CEFAZOLIN SODIUM 2 G: 300 INJECTION, POWDER, LYOPHILIZED, FOR SOLUTION INTRAVENOUS at 13:01

## 2020-05-12 RX ADMIN — AMIODARONE HYDROCHLORIDE 400 MG: 200 TABLET ORAL at 17:09

## 2020-05-12 RX ADMIN — ONDANSETRON 4 MG: 2 INJECTION INTRAMUSCULAR; INTRAVENOUS at 17:11

## 2020-05-12 RX ADMIN — OXYCODONE 10 MG: 5 TABLET ORAL at 10:30

## 2020-05-12 RX ADMIN — SENNOSIDES AND DOCUSATE SODIUM 1 TABLET: 8.6; 5 TABLET ORAL at 17:09

## 2020-05-12 RX ADMIN — Medication 10 ML: at 13:02

## 2020-05-12 RX ADMIN — ESCITALOPRAM OXALATE 10 MG: 10 TABLET ORAL at 08:43

## 2020-05-12 RX ADMIN — OXYCODONE 10 MG: 5 TABLET ORAL at 06:33

## 2020-05-12 RX ADMIN — CHLORHEXIDINE GLUCONATE 10 ML: 1.2 RINSE ORAL at 08:45

## 2020-05-12 RX ADMIN — CEFAZOLIN SODIUM 2 G: 300 INJECTION, POWDER, LYOPHILIZED, FOR SOLUTION INTRAVENOUS at 06:33

## 2020-05-12 RX ADMIN — ACETAMINOPHEN 650 MG: 325 TABLET ORAL at 17:09

## 2020-05-12 RX ADMIN — CEFAZOLIN SODIUM 2 G: 300 INJECTION, POWDER, LYOPHILIZED, FOR SOLUTION INTRAVENOUS at 18:38

## 2020-05-12 RX ADMIN — DOBUTAMINE IN DEXTROSE 2 MCG/KG/MIN: 200 INJECTION, SOLUTION INTRAVENOUS at 02:54

## 2020-05-12 RX ADMIN — CEFAZOLIN SODIUM 2 G: 300 INJECTION, POWDER, LYOPHILIZED, FOR SOLUTION INTRAVENOUS at 00:55

## 2020-05-12 RX ADMIN — AMIODARONE HYDROCHLORIDE 1 MG/MIN: 50 INJECTION, SOLUTION INTRAVENOUS at 03:18

## 2020-05-12 RX ADMIN — ALBUMIN (HUMAN) 12.5 G: 12.5 INJECTION, SOLUTION INTRAVENOUS at 00:55

## 2020-05-12 RX ADMIN — POLYETHYLENE GLYCOL 3350 17 G: 17 POWDER, FOR SOLUTION ORAL at 08:43

## 2020-05-12 RX ADMIN — ACETAMINOPHEN 650 MG: 325 TABLET ORAL at 02:16

## 2020-05-12 RX ADMIN — ONDANSETRON 4 MG: 2 INJECTION INTRAMUSCULAR; INTRAVENOUS at 08:43

## 2020-05-12 RX ADMIN — ACETAMINOPHEN 650 MG: 325 TABLET ORAL at 21:31

## 2020-05-12 RX ADMIN — ACETAMINOPHEN 650 MG: 325 TABLET ORAL at 06:33

## 2020-05-12 RX ADMIN — SODIUM CHLORIDE 2.4 UNITS/HR: 9 INJECTION, SOLUTION INTRAVENOUS at 06:32

## 2020-05-12 RX ADMIN — MORPHINE SULFATE 2 MG: 2 INJECTION, SOLUTION INTRAMUSCULAR; INTRAVENOUS at 02:16

## 2020-05-12 RX ADMIN — ASPIRIN 81 MG 81 MG: 81 TABLET ORAL at 08:43

## 2020-05-12 RX ADMIN — Medication 10 ML: at 06:44

## 2020-05-12 RX ADMIN — OXYCODONE 10 MG: 5 TABLET ORAL at 17:09

## 2020-05-12 RX ADMIN — MORPHINE SULFATE 2 MG: 2 INJECTION, SOLUTION INTRAMUSCULAR; INTRAVENOUS at 04:41

## 2020-05-12 RX ADMIN — DEXMEDETOMIDINE HYDROCHLORIDE 0.3 MCG/KG/HR: 100 INJECTION, SOLUTION, CONCENTRATE INTRAVENOUS at 04:15

## 2020-05-12 RX ADMIN — OXYCODONE 5 MG: 5 TABLET ORAL at 23:12

## 2020-05-12 RX ADMIN — ONDANSETRON 4 MG: 2 INJECTION INTRAMUSCULAR; INTRAVENOUS at 13:06

## 2020-05-12 NOTE — PROGRESS NOTES
Problem: Diabetes Self-Management  Goal: *Disease process and treatment process  Description: Define diabetes and identify own type of diabetes; list 3 options for treating diabetes. Outcome: Progressing Towards Goal  Goal: *Incorporating nutritional management into lifestyle  Description: Describe effect of type, amount and timing of food on blood glucose; list 3 methods for planning meals. Outcome: Progressing Towards Goal  Goal: *Incorporating physical activity into lifestyle  Description: State effect of exercise on blood glucose levels. Outcome: Progressing Towards Goal  Goal: *Developing strategies to promote health/change behavior  Description: Define the ABC's of diabetes; identify appropriate screenings, schedule and personal plan for screenings. Outcome: Progressing Towards Goal  Goal: *Using medications safely  Description: State effect of diabetes medications on diabetes; name diabetes medication taking, action and side effects. Outcome: Progressing Towards Goal  Goal: *Monitoring blood glucose, interpreting and using results  Description: Identify recommended blood glucose targets  and personal targets. Outcome: Progressing Towards Goal  Goal: *Prevention, detection, treatment of acute complications  Description: List symptoms of hyper- and hypoglycemia; describe how to treat low blood sugar and actions for lowering  high blood glucose level. Outcome: Progressing Towards Goal  Goal: *Prevention, detection and treatment of chronic complications  Description: Define the natural course of diabetes and describe the relationship of blood glucose levels to long term complications of diabetes.   Outcome: Progressing Towards Goal  Goal: *Developing strategies to address psychosocial issues  Description: Describe feelings about living with diabetes; identify support needed and support network  Outcome: Progressing Towards Goal  Goal: *Insulin pump training  Outcome: Progressing Towards Goal     Problem: Patient Education: Go to Patient Education Activity  Goal: Patient/Family Education  Outcome: Progressing Towards Goal     Problem: Falls - Risk of  Goal: *Absence of Falls  Description: Document Clydene Bone Fall Risk and appropriate interventions in the flowsheet. Outcome: Progressing Towards Goal  Note: Fall Risk Interventions:  Mobility Interventions: Communicate number of staff needed for ambulation/transfer, Patient to call before getting OOB    Mentation Interventions: Adequate sleep, hydration, pain control, Evaluate medications/consider consulting pharmacy, Update white board    Medication Interventions: Evaluate medications/consider consulting pharmacy    Elimination Interventions: Toileting schedule/hourly rounds    History of Falls Interventions: Evaluate medications/consider consulting pharmacy         Problem: Patient Education: Go to Patient Education Activity  Goal: Patient/Family Education  Outcome: Progressing Towards Goal     Problem: Pressure Injury - Risk of  Goal: *Prevention of pressure injury  Description: Document Keny Scale and appropriate interventions in the flowsheet. Outcome: Progressing Towards Goal  Note: Pressure Injury Interventions:  Sensory Interventions: Assess changes in LOC, Check visual cues for pain, Keep linens dry and wrinkle-free, Minimize linen layers, Monitor skin under medical devices, Turn and reposition approx. every two hours (pillows and wedges if needed)    Moisture Interventions: Absorbent underpads, Internal/External urinary devices, Minimize layers    Activity Interventions: Assess need for specialty bed    Mobility Interventions: Assess need for specialty bed, HOB 30 degrees or less, Turn and reposition approx.  every two hours(pillow and wedges)    Nutrition Interventions: Document food/fluid/supplement intake    Friction and Shear Interventions: Apply protective barrier, creams and emollients, HOB 30 degrees or less, Minimize layers                Problem: Patient Education: Go to Patient Education Activity  Goal: Patient/Family Education  Outcome: Progressing Towards Goal     Problem: Patient Education: Go to Patient Education Activity  Goal: Patient/Family Education  Outcome: Progressing Towards Goal     Problem: CABG: Pre-Op Day  Goal: Off Pathway (Use only if patient is Off Pathway)  Outcome: Resolved/Met  Goal: Activity/Safety  Outcome: Resolved/Met  Goal: Consults, if ordered  Outcome: Resolved/Met  Goal: Diagnostic Test/Procedures  Outcome: Resolved/Met  Goal: Nutrition/Diet  Outcome: Resolved/Met  Goal: Medications  Outcome: Resolved/Met  Goal: Treatments/Interventions/Procedures  Outcome: Resolved/Met  Goal: Discharge Planning  Outcome: Resolved/Met  Goal: Psychosocial  Outcome: Resolved/Met  Goal: *Hemodynamically stable  Outcome: Resolved/Met  Goal: *Consent obtained, permits and diagnostics complete  Outcome: Resolved/Met     Problem: CABG: Day of Surgery (Initiate SCIP measures for post-op care)  Goal: Off Pathway (Use only if patient is Off Pathway)  Outcome: Resolved/Met  Goal: Activity/Safety  Outcome: Resolved/Met  Goal: Consults, if ordered  Outcome: Resolved/Met  Goal: Diagnostic Test/Procedures  Outcome: Resolved/Met  Goal: Nutrition/Diet  Outcome: Resolved/Met  Goal: Medications  Outcome: Resolved/Met  Goal: Respiratory  Outcome: Resolved/Met  Goal: Treatments/Interventions/Procedures  Outcome: Resolved/Met  Goal: Psychosocial  Outcome: Resolved/Met  Goal: *Hemodynamically stable (eg: Cardiac output/index; pulmonary arterial pressures; mixed venous oxygen saturation within set parameters)  Outcome: Resolved/Met  Goal: *Lungs clear or at baseline  Outcome: Resolved/Met  Goal: *Stable cardiac rhythm  Outcome: Resolved/Met  Goal: *Afebrile  Outcome: Resolved/Met  Goal: *Follows simple commands post anesthesia  Outcome: Resolved/Met  Goal: *Orients easily following extubation  Outcome: Resolved/Met  Goal: *Optimal pain control at patient's stated goal  Outcome: Resolved/Met

## 2020-05-12 NOTE — DIABETES MGMT
JACE VALLADARES  CLINICAL NURSE SPECIALIST CONSULT  [facility] DI[facility]ES [facility]    Follow up  NOTE    Presentation   Yocasta Mayen is a 52 y.o. female admitted with chest pain and work up. She will be going for cardiac surgery on Monday. PMH: Type 1 DM/ CAD/dyslipidemia/PUD/ CVA. Current clinical course has been uncomplicated. Recent Event: POD 1 CABG x4. Diabetes: Patient has had  Type1 diabetes since she was 7yrs old. Ttreated with insulin pump. Family history positive for Type 2 diabetes. Consulted by Provider for advanced diabetes nursing assessment and care, specifically related to   [x] Transitioning off Maralyn [employer]   [x] Inpatient management strategy  [] Home management assessment  [] Survival skill education    Diabetes-related medical history  Acute complications  NONe  Neurological complications  NONE  Microvascular disease  Retinopathy/ nephropathy  Macrovascular disease  CAD and Cerebral vascular accident  Other associated conditions     Dyslipidemia/    Diabetes medication history  Drug class Currently in use Discontinued Never used   Biguanide      DDP-4 inhibitor       Sulfonylurea      Thiazolidinedione      GLP-1 RA      SGLT-2 inhibitors      Basal insulin Via Medtronic pump - recieves 50units daily from pump     Bolus insulin        Subjective   Extubated, on NC 02, sitting up in chair. We discussed post surgery her plan and decided she will NOT be on her insulin pump post op. Patient reports the following home diabetes self-care practices:  Eating pattern-has a very strict diet and only 'treats herself' with french fries. Physical activity pattern-does not have time;    Monitoring pattern-per CGM;      Taking medications pattern  [x] Consistent administration  [x] Affordable    Social determinants of health impacting diabetes self-management practices   Concerned that you need to know more about how to stay healthy with diabetes    Objective   Physical exam  General Alert/ oriented   Vital Signs   Visit Vitals  /61 (BP 1 Location: Left arm)   Pulse 66   Temp 98.7 °F (37.1 °C)   Resp 19   Ht 5' 5\" (1.651 m)   Wt 75.5 kg (166 lb 8 oz)   SpO2 98%   BMI 27.71 kg/m²     Skin  Warm and dry. Heart   Regular rate and rhythm. No murmurs, rubs or gallops  Lungs  Clear to auscultation without rales or rhonchi  Extremities No foot wounds    Diabetic foot exam: patient wanted to eat lunch -deferred. Laboratory  Lab Results   Component Value Date/Time    Hemoglobin A1c 7.3 (H) 05/09/2020 03:22 AM    Hemoglobin A1c (POC) 8.5 03/18/2020 01:45 PM     Lab Results   Component Value Date/Time    LDL, calculated 33.8 05/08/2020 12:52 AM     Lab Results   Component Value Date/Time    Creatinine (POC) 1.2 06/07/2013 12:15 AM    Creatinine 1.24 (H) 05/12/2020 04:26 AM     Lab Results   Component Value Date/Time    Sodium 140 05/12/2020 04:26 AM    Potassium 4.1 05/12/2020 04:26 AM    Chloride 109 (H) 05/12/2020 04:26 AM    CO2 22 05/12/2020 04:26 AM    Anion gap 9 05/12/2020 04:26 AM    Glucose 119 (H) 05/12/2020 04:26 AM    BUN 18 05/12/2020 04:26 AM    Creatinine 1.24 (H) 05/12/2020 04:26 AM    BUN/Creatinine ratio 15 05/12/2020 04:26 AM    GFR est AA 56 (L) 05/12/2020 04:26 AM    GFR est non-AA 46 (L) 05/12/2020 04:26 AM    Calcium 8.0 (L) 05/12/2020 04:26 AM    Bilirubin, total 1.0 05/12/2020 04:26 AM    AST (SGOT) 92 (H) 05/12/2020 04:26 AM    Alk.  phosphatase 29 (L) 05/12/2020 04:26 AM    Protein, total 5.6 (L) 05/12/2020 04:26 AM    Albumin 3.6 05/12/2020 04:26 AM    Globulin 2.0 05/12/2020 04:26 AM    A-G Ratio 1.8 05/12/2020 04:26 AM    ALT (SGPT) 23 05/12/2020 04:26 AM     Lab Results   Component Value Date/Time    ALT (SGPT) 23 05/12/2020 04:26 AM       Factors affecting BG pattern  Factor Dose Comments   Nutrition:  Carb-controlled meals     60 grams/meal        Blood glucose pattern      Assessment and Plan   Nursing Diagnosis Risk for unstable blood glucose pattern   Nursing Intervention Domain 9867 Decision-making Support   Nursing Interventions Examined current inpatient diabetes control   Explored factors facilitating and impeding inpatient management  Identified self-management practices impeding diabetes control  Explored corrective strategies with patient and responsible inpatient provider   Informed patient of rational for insulin strategy while hospitalized     Evaluation     with controlled Type1 diabetes, has  achieved inpatient blood glucose target of 100-180mg/dl via insulin drip. Per our conversation pre-op she stated that she would rather not be on her insulin pump post operatively as she feels it may be too much for her to worry about while recovering from heart surgery. After 48hours or when insulin drip discontinued,  It will be necessary that once BG trends >200mg/dl we initiate the insulin subcutaenous order set in order to maintain her BG <200mg/dl. Inpatient blood glucose management has been impacted by  [x] Kidney dysfunction  [x] Erratic meal consumption  [] Glucocorticoid use  [x]  Upcoming cardiac surgery (stress)    Recommendations   1. Insulin infusion per post-operative period    2. Lantus 2 hrs prior to d/c infusion when transitioning off    3. Blood glucose monitoring TIDAC once off insulin infusion.  When BG trends >200mg/dl, INITIATE insulin subcutaneous order set with following recommendations:     -Basal TDD (when on insulin pump= 12.4units) recommend:   Basal insulin =12units daily           -Mealtime bolus:  When taking PO meals AND consuming >50% carbohydrates with her meals: 4 to 7 units Humalog TID         -Correctional insulin-insulin sensitive      Billing Code(s)     [x] 67433 IP subsequent hospital care - 15 minutes      AJAY Herman  [facility] Diabetes [facility]  Access via SHERICE Grissom [phone]

## 2020-05-12 NOTE — PROGRESS NOTES
Transitions of Care: 22% risk of re-admission      -Patient is post-op day 1 s/p CABG   -PT/OT evaluation s/p CABG   -Anticipate discharge home with home health pending progress    The patient is post-op day 1 s/p CABG, patient will have PT/OT evaluations to assist in determining needs upon discharge. The patient is independent at baseline, lives with her significant other. CM will follow for transitions of care, anticipate discharge home with home health services. JENNIE Snyder    15:40 p.m.- CM attempted to meet with patient at bedside for home health choice, patient is sleeping soundly at this time- will not awake patient. CM will follow-up tomorrow for home health choice.

## 2020-05-12 NOTE — CARDIO/PULMONARY
Cardiac Rehab: CABG education folder at bedside. Met with Jin Pruitt to begin cardiac surgery post discharge instructions and to discuss participation in the Cardiac Rehab Program.     Educated using teach back method. Reviewed the use of bear for sternal support, daily weight and temperature monitoring,  Pain management and use of incentive spirometer. Jin Pruitt was able to demonstrate proper use of incentive spirometer, achieving 500 ml. .     Discussed Cardiac Rehab Program format, benefits, and encouraged participation. The contact information for the CWP at [facility] is on her AVS. And a referral was placed. General questions answered. Jin Pruitt verbalized understanding.        Will continue to follow for educational needs and enrollment in the Cardiac Rehab Program. Pete Meza RN

## 2020-05-12 NOTE — PROGRESS NOTES
Problem: Mobility Impaired (Adult and Pediatric)  Goal: *Acute Goals and Plan of Care (Insert Text)  Description: FUNCTIONAL STATUS PRIOR TO ADMISSION: Patient was independent and active without use of DME. Commutes to work 1.5 hours, no history of falls. Wears insulin pump at baseline for Type I DM. HOME SUPPORT PRIOR TO ADMISSION: The patient lived with boyfriend but did not require assist.    Physical Therapy Goals  Initiated 5/12/2020  1. Patient will move from supine to sit and sit to supine , scoot up and down and roll side to side in bed with modified independence within 5 days. 2.  Patient will perform sit to/from stand with modified independence within 5 days. 3.  Patient will ambulate 150 feet with least restrictive assistive device and modified independence within 5 days. 4.  Patient will ascend/descend 4 stairs with bilateral handrail(s) with modified independence within 5 days. 5.  Patient will perform cardiac exercises per protocol with modified independence within 5 days. 6.  Patient will verbally recall and functionally demonstrate mindful-based movements (\"move in the tube\") principles without cues within 5 days. Outcome: Progressing Towards Goal   PHYSICAL THERAPY EVALUATION  Patient: Yeny Beard (51 y.o. female)  Date: 5/12/2020  Primary Diagnosis: CAD (coronary artery disease) [I25.10]  Procedure(s) (LRB):  CORONARY ARTERY BYPASS GRAFTING X 4 WITH LIMA, RESVGH, ECC, WEI AND EPIAORTIC U/S BY DR Loreto Leonardo (N/A) 1 Day Post-Op   Precautions:   Fall, Sternal      ASSESSMENT  Based on the objective data described below, the patient presents with impaired balance, generalized weakness, pain s/p sternotomy and chest tubes, decreased endurance, and decreased activity tolerance (sats ~90% with activity on 2L/min) following admission for CABG x4 POD 1. Patient received in recliner chair and able to transfer back to bed with CGA-min Ax2.  Mobility beyond transfer limited by lines in CVICU setting. Drop in SvO2 from 36% resting to 25% with activity and patient visibly SOB. Recovered with rest in supine and pursed lip breathing. Somewhat nauseas throughout and RN aware. Anticipate good progress with acute PT and d/c home with HHPT. Patient is verbalizing and is demonstrating understanding of mindful-based movements (\"move in the tube\") principles of keeping UEs proximal to ribcage to prevent lateral pull on the sternum during load-bearing activities with visual, verbal, and manual cues required for compliance. Current Level of Function Impacting Discharge (mobility/balance): CGA-miN Ax2    Functional Outcome Measure: The patient scored 9/28 on the Tinetti outcome measure which is indicative of high fall risk. Other factors to consider for discharge: new sternotomy, independent at baseline     Patient will benefit from skilled therapy intervention to address the above noted impairments. PLAN :  Recommendations and Planned Interventions: bed mobility training, transfer training, gait training, therapeutic exercises, neuromuscular re-education, patient and family training/education, and therapeutic activities      Frequency/Duration: Patient will be followed by physical therapy:  daily to address goals. Recommendation for discharge: (in order for the patient to meet his/her long term goals)  Physical therapy at least 2 days/week in the home     This discharge recommendation:  Has been made in collaboration with the attending provider and/or case management    IF patient discharges home will need the following DME: none         SUBJECTIVE:   Patient stated I'm nauseous.     OBJECTIVE DATA SUMMARY:   Patient mobilized on continuous portable monitor/telemetry.   HISTORY:    Past Medical History:   Diagnosis Date    Adverse effect of anesthesia     difficult to wake    Chronic kidney disease 1989    Dr Sandy Cardoso, stage 3 Proteinuria    CKD (chronic kidney disease) stage 3, GFR 30-59 ml/min ([facility])     Coronary artery disease 8/26/2011    with silent MI in 2006 and s/p one stent    Diabetes mellitus type 1 ([facility] [address] 8/26/2011    Age 7, Insulin Pump    Diabetic retinopathy ([facility] [address]     Dyslipidemia 8/26/2011    GERD (gastroesophageal reflux disease)     History of peptic ulcer disease 8/26/2011    from plavix    Hypertension     denies states takes BP meds to reduce protein in kidneys due to kidney disease    PUD (peptic ulcer disease)     Stroke ([facility] [address] 2016    hx L basal ganglia bleed     Past Surgical History:   Procedure Laterality Date    CARDIAC SURG PROCEDURE UNLIST  2007    Dr Rosita Gunn stent with Card Cath    HX HEENT Bilateral 1990-95    laser surgery for retinopathy on multiple occasions and vitrectomy bilateral    HX HEENT Bilateral 2013-14    cataracts Dr Krystina Crockett 47  01/2020    Status post open repair of umbilical hernia with mesh 1/2020       Personal factors and/or comorbidities impacting plan of care: PMH    Home Situation  Home Environment: Private residence  # Steps to Enter: 4  Rails to Enter: Yes  Hand Rails : Bilateral  One/Two Story Residence: One story  Living Alone: No  Support Systems: Spouse/Significant Other/Partner(boyfriend\)  Patient Expects to be Discharged to[de-identified] Private residence  Current DME Used/Available at Home: None  Tub or Shower Type: Shower(bamboo seat)    EXAMINATION/PRESENTATION/DECISION MAKING:     Critical Behavior:  Neurologic State: Alert  Orientation Level: Oriented X4  Cognition: Appropriate for age attention/concentration  Safety/Judgement: Decreased insight into deficits  Hearing:   Auditory  Auditory Impairment: None  Range Of Motion:  AROM: Generally decreased, functional  Strength:    Strength: Generally decreased, functional  Tone & Sensation:   Tone: Normal  Sensation: Intact  Coordination:  Coordination: Generally decreased, functional  Vision:   Acuity: Within Defined Limits  Functional Mobility:  Bed Mobility:  Sit to Supine: Minimum assistance;Assist x2  Transfers:  Sit to Stand: Contact guard assistance  Stand to Sit: Contact guard assistance  Stand Pivot Transfers: Contact guard assistance     Balance:   Sitting: Intact  Standing: Impaired; Without support  Standing - Static: Good  Standing - Dynamic : Fair        Cardiac diagnosis intervention:  Patient instructed and educated on mindful movement principles based on Move in The Tube concept to include maintaining bilateral elbows close to rib cage when performing any load-bearing activity such as getting in/out of bed, pushing up from a chair, opening a door, or lifting a box. Patient was given a handout with diagrams of each correct/incorrect method of performing each of the above tasks. Functional Measure:  Tinetti test:    Sitting Balance: 1  Arises: 2  Attempts to Rise: 2  Immediate Standing Balance: 0  Standing Balance: 1  Nudged: 1  Eyes Closed: 0  Turn 360 Degrees - Continuous/Discontinuous: 0  Turn 360 Degrees - Steady/Unsteady: 1  Sitting Down: 1  Balance Score: 9 Balance total score  Indication of Gait: 0  R Step Length/Height: 0  L Step Length/Height: 0  R Foot Clearance: 0  L Foot Clearance: 0  Step Symmetry: 0  Step Continuity: 0  Path: 0  Trunk: 0  Walking Time: 0  Gait Score: 0 Gait total score  Total Score: 9/28 Overall total score         Tinetti Tool Score Risk of Falls  <19 = High Fall Risk  19-24 = Moderate Fall Risk  25-28 = Low Fall Risk  Tinetti ME. Performance-Oriented Assessment of Mobility Problems in Elderly Patients. Mendez 66; [id number].  (Scoring Description: PT Bulletin Feb. 10, 1993)    Older adults: Maday Salazar et al, 2009; n = 1000 [facility] elderly evaluated with ABC, ABHIJIT, ADL, and IADL)  · Mean ABHIIJT score for males aged 69-68 years = 26.21(3.40)  · Mean ABHIJIT score for females age 69-68 years = 25.16(4.30)  · Mean ABHIJIT score for males over 80 years = 23.29(6.02)  · Mean ABHIJIT score for females over 80 years = 17.20(8.32)            Physical Therapy Evaluation Charge Determination   History Examination Presentation Decision-Making   HIGH Complexity :3+ comorbidities / personal factors will impact the outcome/ POC  HIGH Complexity : 4+ Standardized tests and measures addressing body structure, function, activity limitation and / or participation in recreation  LOW Complexity : Stable, uncomplicated  Other outcome measures Tinetti 9/28  MEDIUM      Based on the above components, the patient evaluation is determined to be of the following complexity level: LOW     Pain Rating:  Pain in upper back and shoulders    Activity Tolerance:   Fair and observed SOB with activity  Please refer to the flowsheet for vital signs taken during this treatment. After treatment patient left in no apparent distress:   Supine in bed, Heels elevated for pressure relief, Call bell within reach, and Side rails x 3    COMMUNICATION/EDUCATION:   The patients plan of care was discussed with: Occupational therapist and Registered nurse. Fall prevention education was provided and the patient/caregiver indicated understanding., Patient/family have participated as able in goal setting and plan of care. , and Patient/family agree to work toward stated goals and plan of care.     Thank you for this referral.  Justin Siddiqi, PT, DPT   Time Calculation: 11 mins

## 2020-05-12 NOTE — PROGRESS NOTES
Cardiac Surgery Care Coordinator-  Met briefly with Kasi Montalvo. Reviewed plan of care and discussed goals for the day. Kasi Montalvo has a good understanding of her plan for the day. Reinforced sternal precautions and encouraged continued use of the incentive spirometer. Will continue to follow for educational and emotional needs. 1130- Placed call to Sandra Rubio, Ms Haney's boyfriend. Reviewed plan of care and offered emotional support. He stated he will call her later today.  Kenisha Rust RN

## 2020-05-12 NOTE — PROGRESS NOTES
[facility] ICU Progress Note    Admit Date: 2020  POD:  1 Day Post-Op    Procedure:  Procedure(s):  CORONARY ARTERY BYPASS GRAFTING X 4 WITH LIMA, RESVGH, ECC, WEI AND EPIAORTIC U/S BY DR Kameron Dang        Subjective:   Pt seen with Dr. Kyler Abad. On amio 1, dobut 1, zoey 10, insulin. On 4L NC. Objective:   Vitals:  Blood pressure 109/68, pulse 74, temperature 99.3 °F (37.4 °C), resp. rate 13, height 5' 5\" (1.651 m), weight 73.3 kg (161 lb 9.6 oz), SpO2 95 %. Temp (24hrs), Av.8 °F (37.1 °C), Min:97.1 °F (36.2 °C), Max:99.9 °F (37.7 °C)    Hemodynamics:   CO: CO (l/min): 5.5 l/min   CI: CI (l/min/m2): 3 l/min/m2   CVP: CVP (mmHg): 8 mmHg (20 0700)   SVR: SVR (dyne*sec)/cm5: 1061 (dyne*sec)/cm5 (20 9033)   PAP Systolic: PAP Systolic: 28 (96/35/93 8542)   PAP Diastolic: PAP Diastolic: 9 (10/15/65 7234)   PVR:     SV02: SVO2 (%): 60 % (20 0700)   SCV02:      EKG/Rhythm:  SR    Extubation Date / Time: 20 at 1747    CT Output: 650 ml     Ventilator:  Ventilator Volumes  Vt Set (ml): 450 ml (20 1353)  Vt Exhaled (Machine Breath) (ml): 496 ml (20 1353)  Vt Spont (ml): 467 ml (20 1353)  Ve Observed (l/min): 8.7 l/min (20 1353)    Oxygen Therapy:  Oxygen Therapy  O2 Sat (%): 95 % (20 0700)  Pulse via Oximetry: 74 beats per minute (20 0700)  O2 Device: Nasal cannula (20 0500)  O2 Flow Rate (L/min): 4 l/min (20 0500)  FIO2 (%): 60 % (20 1443)    CXR:   CXR Results  (Last 48 hours)               20 0439  XR CHEST PORT Final result    Impression:  IMPRESSION:       Stable mild left basilar opacity likely representing atelectasis. Narrative:  EXAM:  XR CHEST PORT       INDICATION: Heart surgery. Coronary artery disease. COMPARISON: 2020 at 1430 hours       TECHNIQUE: Portable AP semiupright chest view at 0347 hours       FINDINGS: The endotracheal tube and enteric tube have been removed.  The right IJ   pulmonary artery catheter, mediastinal drains, and bilateral chest tubes are   stable. Sternal wires are present. Cardiac monitoring wires overlie the thorax. The cardiomediastinal contours are stable. The pulmonary vasculature is within   normal limits. There is stable mild left basilar opacity. The right lung and pleural spaces are   clear. There is no pneumothorax. The bones and upper abdomen are stable. 05/11/20 1435  XR CHEST PORT Final result    Impression:  IMPRESSION:   1. Evidence of an interval thoracotomy/CABG   2. Interval placement of an endotracheal tube, nasogastric tube, [place]-Neftali   catheter and bilateral chest tubes as described above. Narrative:  Portable chest single view dated 5/11/2020       Comparison chest dated 5/8/2020       History is postop heart       A single portable AP supine view of the chest was obtained. There is evidence of   an interval thoracotomy/CABG. An endotracheal tube is noted with its tip   situated at the level of the anterior aspect of the aortic arch. The marie is   not well visualized. A nasogastric tube is been introduced with its tip situated   over the body of the stomach. A [place]-Neftali catheter has been placed such that its   tip situated over the main pulmonary artery. No pneumothorax is detected. Bilateral chest tubes are seen. Admission Weight: Last Weight   Weight: 72.4 kg (159 lb 9.8 oz) Weight: 73.3 kg (161 lb 9.6 oz)     Intake / Output / Drain:  Current Shift: No intake/output data recorded. Last 24 hrs.:     Intake/Output Summary (Last 24 hours) at 5/12/2020 0747  Last data filed at 5/12/2020 0700  Gross per 24 hour   Intake 3652. 95 ml   Output 2765 ml   Net 887.95 ml       EXAM:  General:  No obvious distress      Lungs:   Clear to auscultation upper, diminished in bases. Incision:  Prevena drs intact   Heart:  Regular rate and rhythm, S1, S2 normal, no murmur, click, rub or gallop. Abdomen:   Soft, non-tender.  Bowel sounds normal. No masses,  No organomegaly. Extremities:  No edema. PPP. Neurologic:  Gross motor and sensory apparatus intact. Labs:   Recent Labs     20  0735  20  0426  20  1409   WBC  --   --  9.8  --  14.7*   HGB  --   --  9.5*   < > 12.4   HCT  --   --  27.6*   < > 36.1   PLT  --   --  143*  --  217   NA  --   --  140   < > 142   K  --   --  4.1   < > 4.0   BUN  --   --  18   < > 19   CREA  --   --  1.24*   < > 1.19*   GLU  --   --  119*   < > 109*   GLUCPOC 122*   < >  --    < >  --    INR  --   --   --   --  1.2*    < > = values in this interval not displayed. Assessment:     Active Problems:    CAD (coronary artery disease) (2020)      S/P CABG x 4 (2020)         Plan/Recommendations/Medical Decision Makin. CAD/Hx of MI/Stent x 1 , s/p CABG: on asa, resume statin. No BB until appropriate.      2. DM1 w/ diabetic retinopathy: wears SQ insulin pump.  Hgba1c 7.8.   Cont insulin gtt per protocol. Will transition to SQ pump around 48 hrs. Diabetes health consulted.       3. CVA w/ subsequent L basal ganglia bleed : on asa/statin, strict BP control.      4. GERD/PUD: More historically when she was on plavix.  pepcid while inpt.      5. Dyslipidemia: resume statin     6. CKD3:  Followed by Dr. Lindell Snellen.  Monitor labs. Cr stable.     7. HTN:  Resume home meds as BP allows. (PTA: coreg, aldactone, lisinopril.)     8. Anxiety/depression: resume lexapro.      9. Recent open umbilical hernia repair w/ mesh: in 2020.  Resolved. 10. Elevated tbili/LFTs: Monitor    11. Atelectasis: Wean oxygen for 02 sat > 92%. IS and activity as tolerated. Dispo: PT/OT. Eval for transfer later today.       Signed By: Jordana Parra NP

## 2020-05-12 NOTE — PROGRESS NOTES
Problem: Self Care Deficits Care Plan (Adult)  Goal: *Acute Goals and Plan of Care (Insert Text)  Description: FUNCTIONAL STATUS PRIOR TO ADMISSION: Patient was independent and active without use of DME. Patient reports she commutes an hour and a half to work daily for [employer], however patient has not been to work since March and she reports she has only been walking 30 feet a couple time a day in her home. HOME SUPPORT: The patient lived with boyfriend but did not require assist. Patient also reports she has a glover retriever. Occupational Therapy Goals  Initiated 5/12/2020  1. Patient will perform ADLs standing 5 mins without fatigue or LOB with supervision/set-up within 5 day(s). 2.  Patient will perform lower body ADLs with supervision/set-up within 5 day(s). 3.  Patient will perform gathering ADL items high and low 2/2 with supervision/set-up within 5 day(s). 4.  Patient will perform toilet transfers with supervision/set-up within 5 day(s). 5.  Patient will perform all aspects of toileting with supervision/set-up within 5 day(s). 6.  Patient will participate in cardiac/sternal upper extremity therapeutic exercise/activities to increase independence with ADLs with supervision/set-up for 5 minutes within 5 day(s). Outcome: Progressing Towards Goal   OCCUPATIONAL THERAPY EVALUATION  Patient: Lyle Campbell (89 y.o. female)  Date: 5/12/2020  Primary Diagnosis: CAD (coronary artery disease) [I25.10]  Procedure(s) (LRB):  CORONARY ARTERY BYPASS GRAFTING X 4 WITH LIMA, RESVGH, ECC, WEI AND EPIAORTIC U/S BY DR Mitchell Del Rosario (N/A) 1 Day Post-Op   Precautions: Fall, Sternal (\"move in the tube\")    ASSESSMENT  Based on the objective data described below, the patient presents with generalized weakness, decreased endurance, decreased activity tolerance, and hypotension (MAPs 48-70) today with patient diaphoretic with functional mobility and nauseous.  Patient educated on \"move in the tube\" concepts and on bimanual overhead reaching. Patient also educated on BUE cardiac exercises and performed 5/6 seated in chair today. Patient able to tailor sit to avoid deep bending for LB ADLs and required MIN A for sit <> stand transfer from chair. Patient's biggest limitation at this time is hemodynamic stability. Anticipate patient can discharge home with her boyfriend and HHOT vs OP cardiac rehab. Patient is verbalizing and is demonstrating understanding of mindful-based movements (\"move in the tube\") principles of keeping UEs proximal to ribcage to prevent lateral pull on the sternum during load-bearing activities with verbal cues required for compliance. Current Level of Function Impacting Discharge (ADLs/self-care): MIN A LB ADLs    Functional Outcome Measure: The patient scored 40/100 on the Barthel Index outcome measure which is indicative of 60% ADL impairment. Other factors to consider for discharge: lives with boyfriend     Patient will benefit from skilled therapy intervention to address the above noted impairments. PLAN :  Recommendations and Planned Interventions: self care training, functional mobility training, therapeutic exercise, balance training, therapeutic activities, endurance activities, patient education, home safety training and family training/education    Frequency/Duration: Patient will be followed by occupational therapy 5 times a week to address goals. Recommendation for discharge: (in order for the patient to meet his/her long term goals)  Occupational therapy at least 2 days/week in the home vs OP cardiac rehab     This discharge recommendation:  Has been made in collaboration with the attending provider and/or case management    IF patient discharges home will need the following DME: TBD       SUBJECTIVE:   Patient stated \"I feel nauseous.     OBJECTIVE DATA SUMMARY:   HISTORY:   Past Medical History:   Diagnosis Date    Adverse effect of anesthesia     difficult to wake  Chronic kidney disease 1989    Dr Duran Guard, stage 3 Proteinuria    CKD (chronic kidney disease) stage 3, GFR 30-59 ml/min ([facility])     Coronary artery disease 8/26/2011    with silent MI in 2006 and s/p one stent    Diabetes mellitus type 1 ([facility] 8/26/2011    Age 7, Insulin Pump    Diabetic retinopathy ([facility]     Dyslipidemia 8/26/2011    GERD (gastroesophageal reflux disease)     History of peptic ulcer disease 8/26/2011    from plavix    Hypertension     denies states takes BP meds to reduce protein in kidneys due to kidney disease    PUD (peptic ulcer disease)     Stroke ([facility] 2016    hx L basal ganglia bleed     Past Surgical History:   Procedure Laterality Date    CARDIAC SURG PROCEDURE UNLIST  2007    Dr Josué Moore stent with Card Cath    HX HEENT Bilateral 1990-95    laser surgery for retinopathy on multiple occasions and vitrectomy bilateral    HX HEENT Bilateral 2013-14    cataracts Dr Garry Ryder  01/2020    Status post open repair of umbilical hernia with mesh 1/2020       Expanded or extensive additional review of patient history:     Home Situation  Home Environment: Private residence  # Steps to Enter: 4  Rails to Enter: Yes  Hand Rails : Bilateral  One/Two Story Residence: One story  Living Alone: No  Support Systems: Spouse/Significant Other/Partner(boyfriend\)  Patient Expects to be Discharged to[de-identified] Private residence  Current DME Used/Available at Home: None  Tub or Shower Type: Shower(bamboo seat)    EXAMINATION OF PERFORMANCE DEFICITS:  Cognitive/Behavioral Status:  Neurologic State: Alert  Orientation Level: Oriented X4  Cognition: Appropriate for age attention/concentration  Perception: Appears intact  Perseveration: No perseveration noted  Safety/Judgement: Decreased insight into deficits    Skin: exposed areas grossly intact; chest tube; swain; temporary pacer; Prevena wound vac    Edema: BLE    Hearing:   Auditory  Auditory Impairment: None    Vision/Perceptual:                           Acuity: Within Defined Limits         Range of Motion:  BUE  AROM: Generally decreased, functional                         Strength:  BUE  Strength: Generally decreased, functional                Coordination:  Coordination: Generally decreased, functional  Fine Motor Skills-Upper: Left Intact; Right Intact    Gross Motor Skills-Upper: Left Intact; Right Intact    Tone & Sensation:  BUE  Tone: Normal  Sensation: Intact                      Balance:  Sitting: Intact; With support  Standing: Impaired; Without support  Standing - Static: Fair  Standing - Dynamic : Fair    Functional Mobility and Transfers for ADLs:    Transfers:  Sit to Stand: Minimum assistance  Stand to Sit: Contact guard assistance    ADL Assessment:  Feeding: Setup;Supervision(infer 2* lines and leads)    Oral Facial Hygiene/Grooming: Setup;Supervision(infer 2* lines and leads seated in chair)    Bathing: Minimum assistance(infer 2* balance and hypotension)    Upper Body Dressing: Setup;Supervision(infer 2* BUE ROM)    Lower Body Dressing: Minimum assistance(infer 2* functional reach/ability to tailor sit/balance)    Toileting: Minimum assistance(infer A for clothing management)                ADL Intervention and task modifications:                           Lower Body Dressing Assistance  Socks: Contact guard assistance(simulated)         Cognitive Retraining  Safety/Judgement: Decreased insight into deficits    Patient instructed and educated on mindful movement principles based on Move in The Tube concept to include maintaining bilateral elbows close to rib cage when performing any load-bearing activity such as getting in/out of bed, pushing up from a chair, opening a door, or lifting a box. Patient was given a handout with diagrams of each correct/incorrect method of performing each of the above tasks.    Patient instructed on the ability to utilize upper extremities outside the tube when doing any non-load bearing activity such as washing hair/body, brushing teeth, retrieving clothing items, or scratching your back. Patient encouraged to also perform upper extremity exercises \"outside of the tube\" to prevent scar tissue formation around sternal incision site. Patient instructed no asymmetrical reaching over head to ensure B UEs when shoulders >90* i.e. reaching in cabinets and dressing. Instruction on upper body dressing techniques of over head, then arms through to decrease pain and unilateral shoulder flexion >90*. Instruction on the benefits of utilizing B UEs during functional tasks i.e. opening the fridge, stepping into the tub. May have to adjust home setup to increase ease with items closer to waist height to prevent deep bending and the automatic  of asymmetrical UE WB/pushing for stabilization during bending. Benefit to don clothing tailor sitting and don all clothing while sitting prior to standing. Patient demonstrated lower body dressing seated with Contact guard assistance. Instruction and indicated understanding on the benefits of loose clothing throughout to accommodate for post surgical swelling, decreased ROM and increased pain. Instruction and indicated understanding the technique of pull over shirt versus front open clothing. Increase activity tolerance for home, work, and sexual intercourse by pacing self with increasing the arm exercises, sitting duration, frequency OOB, walking, standing, and ADLs. Instructed and indicated understanding of s/s of too much activity, how to respond to s/s safely. Therapeutic Exercises:   Patient instructed on the benefits and demonstrated 5/6 cardiac exercises while seated with Stand-by assistance. Instructed and indicated understanding on how to progress reps, sets against gravity, pacing through progressive muscle strengthening standing based on surgeon clearance for more weight in prep for basic and instrumental ADLs. Instruction on the use of household items in place of weights as needed. CARDIAC   EXERCISE    Sets    Reps    Active  Active Assist    Passive  Self ROM    Comments    Shoulder flexion  1  5   [x]                            []                             []                             []                                Shoulder abduction  1  5  [x]                             []                             []                             []                                Scapular elevation  1  5  [x]                             []                              []                             []                                Scapular retraction  1  5  [x]                             []                             []                             []                                Trunk rotation  1  5  [x]                             []                             []                             []                                Trunk sidebending             Functional Measure:  Barthel Index:    Bathin  Bladder: 0  Bowels: 10  Groomin  Dressin  Feedin  Mobility: 0  Stairs: 0  Toilet Use: 5  Transfer (Bed to Chair and Back): 10  Total: 40/100        The Barthel ADL Index: Guidelines  1. The index should be used as a record of what a patient does, not as a record of what a patient could do. 2. The main aim is to establish degree of independence from any help, physical or verbal, however minor and for whatever reason. 3. The need for supervision renders the patient not independent. 4. A patient's performance should be established using the best available evidence. Asking the patient, friends/relatives and nurses are the usual sources, but direct observation and common sense are also important. However direct testing is not needed. 5. Usually the patient's performance over the preceding 24-48 hours is important, but occasionally longer periods will be relevant.   6. Middle categories imply that the patient supplies over 50 per cent of the effort. 7. Use of aids to be independent is allowed. Mauricio Nina., Barthel, D.W. (2445). Functional evaluation: the Barthel Index. [address] (14)2. SANJUANA Cabrera, Francisco J Ascencio., Marialuisa Morgan., [place], [facility] (1999). Measuring the change indisability after inpatient rehabilitation; comparison of the responsiveness of the Barthel Index and Functional Yates Measure. Journal of Neurology, Neurosurgery, and Psychiatry, 66(4), 979-219. KRISTINE Ortiz, CRIS Maradiaga, & Bonnie Mackay M.A. (2004.) Assessment of post-stroke quality of life in cost-effectiveness studies: The usefulness of the Barthel Index and the EuroQoL-5D. Quality of Life Research, 15, 615-70       Occupational Therapy Evaluation Charge Determination   History Examination Decision-Making   LOW Complexity : Brief history review  MEDIUM Complexity : 3-5 performance deficits relating to physical, cognitive , or psychosocial skils that result in activity limitations and / or participation restrictions MEDIUM Complexity : Patient may present with comorbidities that affect occupational performnce. Miniml to moderate modification of tasks or assistance (eg, physical or verbal ) with assesment(s) is necessary to enable patient to complete evaluation       Based on the above components, the patient evaluation is determined to be of the following complexity level: LOW   Activity Tolerance:   Fair and requires rest breaks  Please refer to the flowsheet for vital signs taken during this treatment. After treatment patient left in no apparent distress:    Sitting in chair and Call bell within reach    COMMUNICATION/EDUCATION:   The patients plan of care was discussed with: Physical therapist and Registered nurse. Home safety education was provided and the patient/caregiver indicated understanding., Patient/family have participated as able in goal setting and plan of care.  and Patient/family agree to work toward stated goals and plan of care. This patients plan of care is appropriate for delegation to [facility].     Thank you for this referral.  Clay Smith  Time Calculation: 23 mins

## 2020-05-12 NOTE — PROGRESS NOTES
2000: Bedside shift change report given to Kristy FREDERICK RN (oncoming nurse) by Kimberly Sadler RN and Rosa Nunez RN (offgoing nurse). Report included the following information SBAR, OR Summary, Intake/Output, MAR, Recent Results, Cardiac Rhythm NSR and Alarm Parameters . 2038: pts , Jose Camargo and David Deshpande, called unit. Updated on pt status and plan of care by RN. Opportunity for questions provided and questions answered    0000: with midnight assessment, small air leak noted with CT    0015: when repositioning pt, pts MAP dropped to 43, Bryan gtt titrated up to 60mcg/min briefly to assist BP. pts HR decreased to 62 while MAP 40-50s, pt c/o nausea. Nausea subsided when MAP returned >55. Dr. Tera Tripathi with critical care called to notify of decreased BP, order for albumin x1 obtained    0235: with any position change, pts MAP 50s requiring brief increased dose of Bryan. CI now 1.8. pacemaker placed on AAI 86/10    0255: CI 1.9, SVR 1497. Dobutamine started at 2mcg/kg/min. Pacemaker placed back on VVI 50/10    0400: no CT air leak noted    0615: pts silienkanu, Jose Camargo, called unit. Updated by RN    0544: while transferring to chair, pts MAP decreased to 39, pt symptomatic and c/o being hot. Bryan gtt titrated to 60mcg/min with increase in BP noted. Titrated back to 10mcg/min upon increase in BP    0800: Bedside shift change report given to Ivette Singer [facility] (oncoming nurse) by Selena Pop RN (offgoing nurse). Report included the following information SBAR, OR Summary, Intake/Output, MAR, Recent Results, Cardiac Rhythm NSR and Alarm Parameters .

## 2020-05-12 NOTE — PROGRESS NOTES
0745 received bedside report from OTONIEL Wagner RN; assumed care; Pt is resting in chair; Dobuta @ 1 mcg; Bryan @ 10; Amio @ 1;   0815 CI =3.3; SBP in 110's; Dobuta was turned off;  0930 Dr Ofelia Bains at the bedside;   [address]  pt is working with PT; pt stood up; Map dropped in 48; Pt was sat down; Map is back >65 within 1-2 min;  1000 CI =1.4; SBP in 120's; Map.74; CSC=256; Bryan was dropped to 15;  Shahab Hewitt NP was notified; no interventions at this moment;   1412 Bryan stopped;   1500 Shahab Hewitt NP at the bedside; Plan to remove Mac+ SG + art line in 2 hr if hemodynamically stable;   1825 Pt was delined: MAC; SG, art line were removed; Bacitracin was applied to central line cite per order; no hematoma/bleeding was observed; Mustafa was removed without complications. Uneventful day; pt's pain was well controlled with PO meds; nausea was medicated with Zofran and QueaseEase with intermittent results; Pt was up in chair once from 12 to 1400. Was not ab le to get up in the chair in the evening due to nausea. 1945 Bedside and Verbal shift change report given to Kristy FREDERICK RN (oncoming nurse) by Rob Ortiz RN (offgoing nurse). Report included the following information SBAR, Kardex, Procedure Summary, Intake/Output, MAR, Recent Results and Cardiac Rhythm NSR.

## 2020-05-13 ENCOUNTER — APPOINTMENT (OUTPATIENT)
Dept: GENERAL RADIOLOGY | Age: 50
DRG: 236 | End: 2020-05-13
Attending: NURSE PRACTITIONER
Payer: COMMERCIAL

## 2020-05-13 LAB
ADMINISTERED INITIALS, ADMINIT: NORMAL
ALBUMIN SERPL-MCNC: 3.3 G/DL (ref 3.5–5)
ALBUMIN/GLOB SERPL: 1.2 {RATIO} (ref 1.1–2.2)
ALP SERPL-CCNC: 39 U/L (ref 45–117)
ALT SERPL-CCNC: 28 U/L (ref 12–78)
ANION GAP SERPL CALC-SCNC: 9 MMOL/L (ref 5–15)
AST SERPL-CCNC: 108 U/L (ref 15–37)
BILIRUB SERPL-MCNC: 0.8 MG/DL (ref 0.2–1)
BUN SERPL-MCNC: 20 MG/DL (ref 6–20)
BUN/CREAT SERPL: 13 (ref 12–20)
CALCIUM SERPL-MCNC: 8.2 MG/DL (ref 8.5–10.1)
CARB RATIO, CHOR: 15
CARBOHYDRATE EATEN, CHO: 30 G
CHLORIDE SERPL-SCNC: 104 MMOL/L (ref 97–108)
CO2 SERPL-SCNC: 22 MMOL/L (ref 21–32)
CREAT SERPL-MCNC: 1.55 MG/DL (ref 0.55–1.02)
D50 ADMINISTERED, D50ADM: 0 ML
D50 ORDER, D50ORD: 0 ML
ERYTHROCYTE [DISTWIDTH] IN BLOOD BY AUTOMATED COUNT: 13.5 % (ref 11.5–14.5)
GLOBULIN SER CALC-MCNC: 2.7 G/DL (ref 2–4)
GLSCOM COMMENTS: NORMAL
GLUCOSE BLD STRIP.AUTO-MCNC: 104 MG/DL (ref 65–100)
GLUCOSE BLD STRIP.AUTO-MCNC: 104 MG/DL (ref 65–100)
GLUCOSE BLD STRIP.AUTO-MCNC: 107 MG/DL (ref 65–100)
GLUCOSE BLD STRIP.AUTO-MCNC: 108 MG/DL (ref 65–100)
GLUCOSE BLD STRIP.AUTO-MCNC: 111 MG/DL (ref 65–100)
GLUCOSE BLD STRIP.AUTO-MCNC: 113 MG/DL (ref 65–100)
GLUCOSE BLD STRIP.AUTO-MCNC: 121 MG/DL (ref 65–100)
GLUCOSE BLD STRIP.AUTO-MCNC: 128 MG/DL (ref 65–100)
GLUCOSE BLD STRIP.AUTO-MCNC: 131 MG/DL (ref 65–100)
GLUCOSE BLD STRIP.AUTO-MCNC: 135 MG/DL (ref 65–100)
GLUCOSE BLD STRIP.AUTO-MCNC: 140 MG/DL (ref 65–100)
GLUCOSE BLD STRIP.AUTO-MCNC: 147 MG/DL (ref 65–100)
GLUCOSE BLD STRIP.AUTO-MCNC: 167 MG/DL (ref 65–100)
GLUCOSE BLD STRIP.AUTO-MCNC: 173 MG/DL (ref 65–100)
GLUCOSE BLD STRIP.AUTO-MCNC: 234 MG/DL (ref 65–100)
GLUCOSE BLD STRIP.AUTO-MCNC: 255 MG/DL (ref 65–100)
GLUCOSE BLD STRIP.AUTO-MCNC: 406 MG/DL (ref 65–100)
GLUCOSE BLD STRIP.AUTO-MCNC: 87 MG/DL (ref 65–100)
GLUCOSE BLD STRIP.AUTO-MCNC: 92 MG/DL (ref 65–100)
GLUCOSE BLD STRIP.AUTO-MCNC: 93 MG/DL (ref 65–100)
GLUCOSE SERPL-MCNC: 117 MG/DL (ref 65–100)
GLUCOSE, GLC: 104 MG/DL
GLUCOSE, GLC: 104 MG/DL
GLUCOSE, GLC: 107 MG/DL
GLUCOSE, GLC: 108 MG/DL
GLUCOSE, GLC: 111 MG/DL
GLUCOSE, GLC: 113 MG/DL
GLUCOSE, GLC: 121 MG/DL
GLUCOSE, GLC: 128 MG/DL
GLUCOSE, GLC: 131 MG/DL
GLUCOSE, GLC: 135 MG/DL
GLUCOSE, GLC: 140 MG/DL
GLUCOSE, GLC: 147 MG/DL
GLUCOSE, GLC: 167 MG/DL
GLUCOSE, GLC: 173 MG/DL
GLUCOSE, GLC: 234 MG/DL
GLUCOSE, GLC: 255 MG/DL
GLUCOSE, GLC: 406 MG/DL
GLUCOSE, GLC: 87 MG/DL
GLUCOSE, GLC: 92 MG/DL
GLUCOSE, GLC: 93 MG/DL
HCT VFR BLD AUTO: 30.2 % (ref 35–47)
HGB BLD-MCNC: 9.9 G/DL (ref 11.5–16)
HIGH TARGET, HITG: 130 MG/DL
INSULIN ADMINSTERED, INSADM: 1.6 UNITS/HOUR
INSULIN ADMINSTERED, INSADM: 1.6 UNITS/HOUR
INSULIN ADMINSTERED, INSADM: 1.7 UNITS/HOUR
INSULIN ADMINSTERED, INSADM: 1.7 UNITS/HOUR
INSULIN ADMINSTERED, INSADM: 10.1 UNITS/HOUR
INSULIN ADMINSTERED, INSADM: 13.5 UNITS/HOUR
INSULIN ADMINSTERED, INSADM: 13.7 UNITS/HOUR
INSULIN ADMINSTERED, INSADM: 2.1 UNITS/HOUR
INSULIN ADMINSTERED, INSADM: 2.1 UNITS/HOUR
INSULIN ADMINSTERED, INSADM: 2.4 UNITS/HOUR
INSULIN ADMINSTERED, INSADM: 2.8 UNITS/HOUR
INSULIN ADMINSTERED, INSADM: 2.9 UNITS/HOUR
INSULIN ADMINSTERED, INSADM: 23.9 UNITS/HOUR
INSULIN ADMINSTERED, INSADM: 3.1 UNITS/HOUR
INSULIN ADMINSTERED, INSADM: 3.6 UNITS/HOUR
INSULIN ADMINSTERED, INSADM: 4.1 UNITS/HOUR
INSULIN ADMINSTERED, INSADM: 4.6 UNITS/HOUR
INSULIN ADMINSTERED, INSADM: 5.6 UNITS/HOUR
INSULIN ADMINSTERED, INSADM: 5.9 UNITS/HOUR
INSULIN ADMINSTERED, INSADM: 6.8 UNITS/HOUR
INSULIN BOLUS ADMINISTERED, INSBOLADM: 2 UNITS/HOUR
INSULIN BOLUS ORDERED, INSBOLORD: 2 UNITS/HOUR
INSULIN ORDER, INSORD: 1.6 UNITS/HOUR
INSULIN ORDER, INSORD: 1.6 UNITS/HOUR
INSULIN ORDER, INSORD: 1.7 UNITS/HOUR
INSULIN ORDER, INSORD: 1.7 UNITS/HOUR
INSULIN ORDER, INSORD: 10.1 UNITS/HOUR
INSULIN ORDER, INSORD: 13.5 UNITS/HOUR
INSULIN ORDER, INSORD: 13.7 UNITS/HOUR
INSULIN ORDER, INSORD: 2.1 UNITS/HOUR
INSULIN ORDER, INSORD: 2.1 UNITS/HOUR
INSULIN ORDER, INSORD: 2.4 UNITS/HOUR
INSULIN ORDER, INSORD: 2.8 UNITS/HOUR
INSULIN ORDER, INSORD: 2.9 UNITS/HOUR
INSULIN ORDER, INSORD: 23.9 UNITS/HOUR
INSULIN ORDER, INSORD: 3.1 UNITS/HOUR
INSULIN ORDER, INSORD: 3.6 UNITS/HOUR
INSULIN ORDER, INSORD: 4.1 UNITS/HOUR
INSULIN ORDER, INSORD: 4.6 UNITS/HOUR
INSULIN ORDER, INSORD: 5.6 UNITS/HOUR
INSULIN ORDER, INSORD: 5.9 UNITS/HOUR
INSULIN ORDER, INSORD: 6.8 UNITS/HOUR
LOW TARGET, LOT: 95 MG/DL
MAGNESIUM SERPL-MCNC: 2.3 MG/DL (ref 1.6–2.4)
MCH RBC QN AUTO: 31.9 PG (ref 26–34)
MCHC RBC AUTO-ENTMCNC: 32.8 G/DL (ref 30–36.5)
MCV RBC AUTO: 97.4 FL (ref 80–99)
MINUTES UNTIL NEXT BG, NBG: 60 MIN
MULTIPLIER, MUL: 0.05
MULTIPLIER, MUL: 0.06
MULTIPLIER, MUL: 0.07
MULTIPLIER, MUL: 0.08
MULTIPLIER, MUL: 0.09
NRBC # BLD: 0 K/UL (ref 0–0.01)
NRBC BLD-RTO: 0 PER 100 WBC
ORDER INITIALS, ORDINIT: NORMAL
PLATELET # BLD AUTO: 158 K/UL (ref 150–400)
PMV BLD AUTO: 9.7 FL (ref 8.9–12.9)
POTASSIUM SERPL-SCNC: 3.7 MMOL/L (ref 3.5–5.1)
PROT SERPL-MCNC: 6 G/DL (ref 6.4–8.2)
RBC # BLD AUTO: 3.1 M/UL (ref 3.8–5.2)
SERVICE CMNT-IMP: ABNORMAL
SERVICE CMNT-IMP: NORMAL
SODIUM SERPL-SCNC: 135 MMOL/L (ref 136–145)
WBC # BLD AUTO: 13 K/UL (ref 3.6–11)

## 2020-05-13 PROCEDURE — 74011250636 HC RX REV CODE- 250/636: Performed by: INTERNAL MEDICINE

## 2020-05-13 PROCEDURE — 77010033711 HC HIGH FLOW OXYGEN

## 2020-05-13 PROCEDURE — 74011636637 HC RX REV CODE- 636/637: Performed by: THORACIC SURGERY (CARDIOTHORACIC VASCULAR SURGERY)

## 2020-05-13 PROCEDURE — 80053 COMPREHEN METABOLIC PANEL: CPT

## 2020-05-13 PROCEDURE — 74011000258 HC RX REV CODE- 258: Performed by: NURSE PRACTITIONER

## 2020-05-13 PROCEDURE — 74011000258 HC RX REV CODE- 258: Performed by: THORACIC SURGERY (CARDIOTHORACIC VASCULAR SURGERY)

## 2020-05-13 PROCEDURE — 74011250637 HC RX REV CODE- 250/637: Performed by: NURSE PRACTITIONER

## 2020-05-13 PROCEDURE — 97116 GAIT TRAINING THERAPY: CPT

## 2020-05-13 PROCEDURE — 83735 ASSAY OF MAGNESIUM: CPT

## 2020-05-13 PROCEDURE — 74011000250 HC RX REV CODE- 250: Performed by: NURSE PRACTITIONER

## 2020-05-13 PROCEDURE — 74011250636 HC RX REV CODE- 250/636: Performed by: NURSE PRACTITIONER

## 2020-05-13 PROCEDURE — 97535 SELF CARE MNGMENT TRAINING: CPT

## 2020-05-13 PROCEDURE — 97110 THERAPEUTIC EXERCISES: CPT

## 2020-05-13 PROCEDURE — 36415 COLL VENOUS BLD VENIPUNCTURE: CPT

## 2020-05-13 PROCEDURE — 65660000001 HC RM ICU INTERMED STEPDOWN

## 2020-05-13 PROCEDURE — 74011636637 HC RX REV CODE- 636/637: Performed by: NURSE PRACTITIONER

## 2020-05-13 PROCEDURE — 85027 COMPLETE CBC AUTOMATED: CPT

## 2020-05-13 PROCEDURE — 82962 GLUCOSE BLOOD TEST: CPT

## 2020-05-13 PROCEDURE — 71045 X-RAY EXAM CHEST 1 VIEW: CPT

## 2020-05-13 RX ORDER — SODIUM CHLORIDE 0.9 % (FLUSH) 0.9 %
5-40 SYRINGE (ML) INJECTION EVERY 8 HOURS
Status: DISCONTINUED | OUTPATIENT
Start: 2020-05-13 | End: 2020-05-15 | Stop reason: HOSPADM

## 2020-05-13 RX ORDER — AMIODARONE HYDROCHLORIDE 200 MG/1
200 TABLET ORAL EVERY 12 HOURS
Status: DISCONTINUED | OUTPATIENT
Start: 2020-05-13 | End: 2020-05-15 | Stop reason: HOSPADM

## 2020-05-13 RX ORDER — SODIUM CHLORIDE 0.9 % (FLUSH) 0.9 %
5-40 SYRINGE (ML) INJECTION AS NEEDED
Status: DISCONTINUED | OUTPATIENT
Start: 2020-05-13 | End: 2020-05-15 | Stop reason: HOSPADM

## 2020-05-13 RX ADMIN — MAGNESIUM OXIDE TAB 400 MG (241.3 MG ELEMENTAL MG) 400 MG: 400 (241.3 MG) TAB at 19:28

## 2020-05-13 RX ADMIN — AMIODARONE HYDROCHLORIDE 0.5 MG/MIN: 50 INJECTION, SOLUTION INTRAVENOUS at 05:12

## 2020-05-13 RX ADMIN — CHLORHEXIDINE GLUCONATE 10 ML: 1.2 RINSE ORAL at 11:05

## 2020-05-13 RX ADMIN — INSULIN LISPRO 2 UNITS: 100 INJECTION, SOLUTION INTRAVENOUS; SUBCUTANEOUS at 12:57

## 2020-05-13 RX ADMIN — PROMETHAZINE HYDROCHLORIDE 12.5 MG: 25 INJECTION INTRAMUSCULAR; INTRAVENOUS at 02:57

## 2020-05-13 RX ADMIN — MUPIROCIN: 20 OINTMENT TOPICAL at 08:59

## 2020-05-13 RX ADMIN — ESCITALOPRAM OXALATE 10 MG: 10 TABLET ORAL at 08:58

## 2020-05-13 RX ADMIN — ASPIRIN 81 MG 81 MG: 81 TABLET ORAL at 08:58

## 2020-05-13 RX ADMIN — FAMOTIDINE 20 MG: 20 TABLET ORAL at 08:58

## 2020-05-13 RX ADMIN — CHLORHEXIDINE GLUCONATE 10 ML: 1.2 RINSE ORAL at 20:18

## 2020-05-13 RX ADMIN — PROMETHAZINE HYDROCHLORIDE 12.5 MG: 25 INJECTION INTRAMUSCULAR; INTRAVENOUS at 21:12

## 2020-05-13 RX ADMIN — OXYCODONE 10 MG: 5 TABLET ORAL at 02:04

## 2020-05-13 RX ADMIN — SENNOSIDES AND DOCUSATE SODIUM 1 TABLET: 8.6; 5 TABLET ORAL at 08:58

## 2020-05-13 RX ADMIN — SODIUM CHLORIDE 3.6 UNITS/HR: 900 INJECTION, SOLUTION INTRAVENOUS at 17:20

## 2020-05-13 RX ADMIN — FAMOTIDINE 20 MG: 20 TABLET ORAL at 20:18

## 2020-05-13 RX ADMIN — MAGNESIUM OXIDE TAB 400 MG (241.3 MG ELEMENTAL MG) 400 MG: 400 (241.3 MG) TAB at 08:58

## 2020-05-13 RX ADMIN — Medication 10 ML: at 21:12

## 2020-05-13 RX ADMIN — OXYCODONE 10 MG: 5 TABLET ORAL at 06:39

## 2020-05-13 RX ADMIN — AMIODARONE HYDROCHLORIDE 400 MG: 200 TABLET ORAL at 08:58

## 2020-05-13 RX ADMIN — ONDANSETRON 4 MG: 2 INJECTION INTRAMUSCULAR; INTRAVENOUS at 02:06

## 2020-05-13 RX ADMIN — SODIUM CHLORIDE 2.1 UNITS/HR: 9 INJECTION, SOLUTION INTRAVENOUS at 06:30

## 2020-05-13 RX ADMIN — CEFAZOLIN SODIUM 2 G: 300 INJECTION, POWDER, LYOPHILIZED, FOR SOLUTION INTRAVENOUS at 02:04

## 2020-05-13 RX ADMIN — OXYCODONE 5 MG: 5 TABLET ORAL at 20:18

## 2020-05-13 RX ADMIN — MUPIROCIN: 20 OINTMENT TOPICAL at 19:28

## 2020-05-13 RX ADMIN — OXYCODONE 5 MG: 5 TABLET ORAL at 11:03

## 2020-05-13 RX ADMIN — AMIODARONE HYDROCHLORIDE 200 MG: 200 TABLET ORAL at 20:18

## 2020-05-13 RX ADMIN — OXYCODONE 5 MG: 5 TABLET ORAL at 16:08

## 2020-05-13 RX ADMIN — POLYETHYLENE GLYCOL 3350 17 G: 17 POWDER, FOR SOLUTION ORAL at 08:58

## 2020-05-13 RX ADMIN — Medication 10 ML: at 06:20

## 2020-05-13 RX ADMIN — SENNOSIDES AND DOCUSATE SODIUM 1 TABLET: 8.6; 5 TABLET ORAL at 19:28

## 2020-05-13 NOTE — PROGRESS NOTES
1600: TRANSFER - IN REPORT:    Verbal report received from Li(name) on Yeny Hence  being received from [facility](unit) for routine progression of care      Report consisted of patients Situation, Background, Assessment and   Recommendations(SBAR). Information from the following report(s) SBAR, Kardex, MAR, Recent Results and Cardiac Rhythm NSR was reviewed with the receiving nurse. Opportunity for questions and clarification was provided. Assessment completed upon patients arrival to unit and care assumed. 1930: Bedside shift change report given to [address] [place] [address] (oncoming nurse) by [place] (offgoing nurse). Report included the following information SBAR, Kardex, MAR, Recent Results and Cardiac Rhythm NSR.

## 2020-05-13 NOTE — DIABETES MGMT
JACE VALLADARES  CLINICAL NURSE SPECIALIST CONSULT  [facility]    Follow up  NOTE    Presentation   Yeny Beard is a 52 y.o. female admitted with chest pain and work up. She will be going for cardiac surgery on Monday. PMH: Type 1 DM/ CAD/dyslipidemia/PUD/ CVA. Current clinical course has been uncomplicated. Recent Event: POD 2 CABG x4. Patient reported N/V last evening but resolved this morning. Diabetes: Patient has had  Type1 diabetes since she was 7yrs old. Ttreated with insulin pump. Family history positive for Type 2 diabetes. Consulted by Provider for advanced diabetes nursing assessment and care, specifically related to   [x] Transitioning off [facility] Single   [x] Inpatient management strategy  [] Home management assessment  [] Survival skill education    Diabetes-related medical history  Acute complications  NONe  Neurological complications  NONE  Microvascular disease  Retinopathy/ nephropathy  Macrovascular disease  CAD and Cerebral vascular accident  Other associated conditions     Dyslipidemia/    Diabetes medication history  Drug class Currently in use Discontinued Never used   Biguanide      DDP-4 inhibitor       Sulfonylurea      Thiazolidinedione      GLP-1 RA      SGLT-2 inhibitors      Basal insulin Via Medtronic pump - recieves 50units daily from pump     Bolus insulin        Subjective   Sitting up in chair at the bedside; We discussed her plan for her diabetes management after insulin drip discontinued. She is on board with staying on the insulin subcutaneously (basal/bolus/correction). She is concerned that she would have to reprogram her pump since it has been disconnected from her for several days. Also having manage her pump while continuing to recover from her heart surgery may be \"too much\" -    We discussed post surgery her plan and decided she will NOT be on her insulin pump post op.      Patient reports the following home diabetes self-care practices:  Eating pattern-has a very strict diet and only 'treats herself' with french fries. Physical activity pattern-does not have time;    Monitoring pattern-per CGM; Taking medications pattern  [x] Consistent administration  [x] Affordable    Social determinants of health impacting diabetes self-management practices   Concerned that you need to know more about how to stay healthy with diabetes    Objective   Physical exam  General Alert/ oriented   Vital Signs   Visit Vitals  /63   Pulse 70   Temp 98.8 °F (37.1 °C)   Resp 16   Ht 5' 5\" (1.651 m)   Wt 76.1 kg (167 lb 12.3 oz)   SpO2 97%   BMI 27.92 kg/m²     Skin  Warm and dry. Heart   Regular rate and rhythm. No murmurs, rubs or gallops  Lungs  Clear to auscultation without rales or rhonchi  Extremities No foot wounds    Diabetic foot exam: patient wanted to eat lunch -deferred. Laboratory  Lab Results   Component Value Date/Time    Hemoglobin A1c 7.3 (H) 05/09/2020 03:22 AM    Hemoglobin A1c (POC) 8.5 03/18/2020 01:45 PM     Lab Results   Component Value Date/Time    LDL, calculated 33.8 05/08/2020 12:52 AM     Lab Results   Component Value Date/Time    Creatinine (POC) 1.2 06/07/2013 12:15 AM    Creatinine 1.55 (H) 05/13/2020 04:20 AM     Lab Results   Component Value Date/Time    Sodium 135 (L) 05/13/2020 04:20 AM    Potassium 3.7 05/13/2020 04:20 AM    Chloride 104 05/13/2020 04:20 AM    CO2 22 05/13/2020 04:20 AM    Anion gap 9 05/13/2020 04:20 AM    Glucose 117 (H) 05/13/2020 04:20 AM    BUN 20 05/13/2020 04:20 AM    Creatinine 1.55 (H) 05/13/2020 04:20 AM    BUN/Creatinine ratio 13 05/13/2020 04:20 AM    GFR est AA 43 (L) 05/13/2020 04:20 AM    GFR est non-AA 36 (L) 05/13/2020 04:20 AM    Calcium 8.2 (L) 05/13/2020 04:20 AM    Bilirubin, total 0.8 05/13/2020 04:20 AM    AST (SGOT) 108 (H) 05/13/2020 04:20 AM    Alk.  phosphatase 39 (L) 05/13/2020 04:20 AM    Protein, total 6.0 (L) 05/13/2020 04:20 AM    Albumin 3.3 (L) 05/13/2020 04:20 AM    Globulin 2.7 05/13/2020 04:20 AM    A-G Ratio 1.2 05/13/2020 04:20 AM    ALT (SGPT) 28 05/13/2020 04:20 AM     Lab Results   Component Value Date/Time    ALT (SGPT) 28 05/13/2020 04:20 AM       Factors affecting BG pattern  Factor Dose Comments   Nutrition:  Carb-controlled meals     60 grams/meal        Blood glucose pattern      Assessment and Plan   Nursing Diagnosis Risk for unstable blood glucose pattern   Nursing Intervention Domain 5250 Decision-making Support   Nursing Interventions Examined current inpatient diabetes control   Explored factors facilitating and impeding inpatient management  Identified self-management practices impeding diabetes control  Explored corrective strategies with patient and responsible inpatient provider   Informed patient of rational for insulin strategy while hospitalized     Evaluation     with controlled Type1 diabetes, has  achieved inpatient blood glucose target of 100-180mg/dl via insulin drip. Per our conversation pre-op she stated that she would rather not be on her insulin pump post operatively as she feels it may be too much for her to worry about while recovering from heart surgery. After 48hours or when insulin drip discontinued,  It will be necessary that once BG trends >200mg/dl we initiate the insulin subcutaenous order set in order to maintain her BG <200mg/dl. Conferred with RN and cardiac surgery NP re: plan post insulin drip. Inpatient blood glucose management has been impacted by  [x] Kidney dysfunction  [x] Erratic meal consumption  [] Glucocorticoid use  [x]  Upcoming cardiac surgery (stress)    Recommendations   1. Insulin infusion per post-operative period    2. Lantus 2 hrs prior to d/c infusion when transitioning off    3. Blood glucose monitoring TIDAC once off insulin infusion.  When BG trends >200mg/dl, INITIATE insulin subcutaneous order set with following recommendations:     -Basal TDD (when on insulin pump= 12.4units) recommend:   Basal insulin =12units daily           -Mealtime bolus:  When taking PO meals AND consuming >50% carbohydrates with her meals: 4 to 7 units Humalog TID         -Correctional insulin-insulin sensitive      Billing Code(s)     [x] 80058 IP subsequent hospital care - 30 minutes      AJAY Jeffers  [facility]  Access via SHERICE [place] [phone]

## 2020-05-13 NOTE — PROGRESS NOTES
[facility] ICU Progress Note    Admit Date: 2020  POD:  2 Day Post-Op    Procedure:  Procedure(s):  CORONARY ARTERY BYPASS GRAFTING X 4 WITH LIMA, RESVGH, ECC, WEI AND EPIAORTIC U/S BY DR Mitchell Del Rosario        Subjective:   Pt seen with Dr. Neal Zendejas. On amio , insulin. On 8L Hi-flow per NC. Complaints of nausea and vomiting overnight. Feeling somewhat better this am and able to eat a little breakfast. Tmax 99. Objective:   Vitals:  Blood pressure 128/71, pulse 69, temperature 98.8 °F (37.1 °C), resp. rate (!) 31, height 5' 5\" (1.651 m), weight 167 lb 12.3 oz (76.1 kg), SpO2 99 %. Temp (24hrs), Av.5 °F (36.9 °C), Min:97.8 °F (36.6 °C), Max:99 °F (37.2 °C)    EKG/Rhythm:  SR    CT Output: 220 ml + 260 ml (last 24 hours)     Oxygen Therapy:  Oxygen Therapy  O2 Sat (%): 99 % (20 0800)  Pulse via Oximetry: 64 beats per minute (20 1740)  O2 Device: Other (comment)(midflo) (20 0800)  O2 Flow Rate (L/min): 8 l/min (20 0800)  FIO2 (%): 60 % (20 1443)    CXR:   CXR Results  (Last 48 hours)               20 0451  XR CHEST PORT Final result    Impression:  IMPRESSION:   1. Evidence of minimal bibasilar linear atelectasis. No evidence of lobar   consolidation. 2. Evidence of cardiomegaly. 3. Interval removal of the [place]-Neftali catheter. 4. Presence of bilateral chest tubes. Narrative:  Portable chest single view dated 2020       Comparison chest dated 2020       History is postop heart       A single portable AP semierect view of the chest was obtained. There has been   interval removal of the [place]-Neftali catheter. The bilateral chest tubes are again   noted. The cardiac silhouette is enlarged. There is evidence of a previous   thoracotomy/CABG. There is slight prominence of the basilar markings. This is   suggestive of atelectatic change. No areas of lobar consolidation are   identified.            20 0433  XR CHEST PORT Final result    Impression:  IMPRESSION: Stable mild left basilar opacity likely representing atelectasis. Narrative:  EXAM:  XR CHEST PORT       INDICATION: Heart surgery. Coronary artery disease. COMPARISON: 5/11/2020 at 1430 hours       TECHNIQUE: Portable AP semiupright chest view at 0347 hours       FINDINGS: The endotracheal tube and enteric tube have been removed. The right IJ   pulmonary artery catheter, mediastinal drains, and bilateral chest tubes are   stable. Sternal wires are present. Cardiac monitoring wires overlie the thorax. The cardiomediastinal contours are stable. The pulmonary vasculature is within   normal limits. There is stable mild left basilar opacity. The right lung and pleural spaces are   clear. There is no pneumothorax. The bones and upper abdomen are stable. 05/11/20 1435  XR CHEST PORT Final result    Impression:  IMPRESSION:   1. Evidence of an interval thoracotomy/CABG   2. Interval placement of an endotracheal tube, nasogastric tube, [place]-Neftali   catheter and bilateral chest tubes as described above. Narrative:  Portable chest single view dated 5/11/2020       Comparison chest dated 5/8/2020       History is postop heart       A single portable AP supine view of the chest was obtained. There is evidence of   an interval thoracotomy/CABG. An endotracheal tube is noted with its tip   situated at the level of the anterior aspect of the aortic arch. The marie is   not well visualized. A nasogastric tube is been introduced with its tip situated   over the body of the stomach. A [place]-Neftali catheter has been placed such that its   tip situated over the main pulmonary artery. No pneumothorax is detected. Bilateral chest tubes are seen.                    Admission Weight: Last Weight   Weight: 159 lb 9.8 oz (72.4 kg) Weight: 167 lb 12.3 oz (76.1 kg)     Intake / Output / Drain:  Current Shift: 05/13 0701 - 05/13 1900  In: 136.8 [I.V.:136.8]  Out: 70 [Drains:70]  Last 24 hrs.: Intake/Output Summary (Last 24 hours) at 2020 0921  Last data filed at 2020 0800  Gross per 24 hour   Intake 1918.99 ml   Output 725 ml   Net 1193.99 ml       EXAM:  General:  No obvious distress      Lungs:   Clear to auscultation upper, diminished in bases. Incision:  Prevena drs intact   Heart:  Regular rate and rhythm, S1, S2 normal, no murmur, click, rub or gallop. Abdomen:   Soft, non-tender. Bowel sounds normal. No masses,  No organomegaly. Extremities:  No edema. PPP. Neurologic:  Gross motor and sensory apparatus intact. Labs:   Recent Labs     20  0842  20  0420  20  1409   WBC  --   --  13.0*   < > 14.7*   HGB  --   --  9.9*   < > 12.4   HCT  --   --  30.2*   < > 36.1   PLT  --   --  158   < > 217   NA  --   --  135*   < > 142   K  --   --  3.7   < > 4.0   BUN  --   --  20   < > 19   CREA  --   --  1.55*   < > 1.19*   GLU  --   --  117*   < > 109*   GLUCPOC 113*   < >  --    < >  --    INR  --   --   --   --  1.2*    < > = values in this interval not displayed. Assessment:     Active Problems:    CAD (coronary artery disease) (2020)      S/P CABG x 4 (2020)         Plan/Recommendations/Medical Decision Makin. CAD/Hx of MI/Stent x 1 , s/p CABG: on asa, statin. No BB until appropriate.      2. DM1 w/ diabetic retinopathy: wears SQ insulin pump.  Hgba1c 7.8.   Cont insulin gtt per protocol. Diabetes health following. Per DTC, plan to transition off of Insulin gtt with bolus Lantus then when BS ar >200 with AccuCheck, transition back to Insulin pump. Discussed with the patient this am.     3. CVA w/ subsequent L basal ganglia bleed 2006: on asa/statin, strict BP control.      4. GERD/PUD: More historically when she was on plavix.  pepcid while inpt.      5. Dyslipidemia: Continue statin     6. CKD3:  Followed by Dr. Charles Sun.  Monitor labs. Creatinine slightly elevated today at 1.5. Continue to monitor.  Avoid hypotension and nephrotoxic agents.     7. HTN:  Resume home meds as BP allows. (PTA: coreg, aldactone, lisinopril.)     8. Anxiety/depression: Continue lexapro.      9. Recent open umbilical hernia repair w/ mesh: in 1/2020.  Resolved. 10. Elevated tbili/LFTs: Monitor    11. Atelectasis: Wean oxygen for 02 sat > 92%. IS and activity as tolerated. 12. Nausea/vomiting: PRN antiemetics. Stop Amio drip (will continue oral at decreased dose). Clear liquids and advance cautiously as tolerated. 13. GI/DVT prophylaxis: Pepcid, SCDs    Dispo: PT/OT/Cardiac Rehab. Case management for DC planning. Keep CT today due to drainage. Will transfer today. Spoke with patient's boyfriend, Jie Emmanuel, and gave him an update and provided emotional support. Questions asked and answered. He will give her a call later today.     Signed By: Jm Yang NP

## 2020-05-13 NOTE — PROGRESS NOTES
0800: Assumed care of patient from off going nurse Kristy. Patient is up to chair, nauseated. 0830: Dr. Shakira Riggins at bedside, updated. Will stop Amio IV and transition to PO this morning. PRN nausea meds to be adjusted by NP.   1200: No change to previous assessment. Tolerating meals well.   1600: TRANSFER - OUT REPORT:    Verbal report given to Carla San RN(name) on Cullen Stamp  being transferred to [facility](unit) for routine post - op       Report consisted of patients Situation, Background, Assessment and   Recommendations(SBAR). Information from the following report(s) SBAR and OR Summary was reviewed with the receiving nurse. Lines:   Peripheral IV 05/10/20 Left Forearm (Active)   Site Assessment Clean, dry, & intact 5/13/2020  4:35 PM   Phlebitis Assessment 0 5/13/2020  4:35 PM   Infiltration Assessment 0 5/13/2020  4:35 PM   Dressing Status Clean, dry, & intact 5/13/2020  4:35 PM   Dressing Type Transparent;Tape 5/13/2020  4:35 PM   Hub Color/Line Status Pink;Capped 5/13/2020  4:35 PM   Action Taken Open ports on tubing capped 5/13/2020  4:35 PM   Alcohol Cap Used Yes 5/13/2020  4:35 PM       Peripheral IV 05/11/20 Left; Outer Forearm (Active)   Site Assessment Clean, dry, & intact 5/13/2020  4:35 PM   Phlebitis Assessment 0 5/13/2020  4:35 PM   Infiltration Assessment 0 5/13/2020  4:35 PM   Dressing Status Clean, dry, & intact 5/13/2020  4:35 PM   Dressing Type Transparent;Tape 5/13/2020  4:35 PM   Hub Color/Line Status Pink; Infusing 5/13/2020  4:35 PM   Action Taken Open ports on tubing capped 5/13/2020  4:35 PM   Alcohol Cap Used Yes 5/13/2020  4:35 PM        Opportunity for questions and clarification was provided.       Patient transported with:   Monitor  O2 @ 6 liters  Registered Nurse

## 2020-05-13 NOTE — PROGRESS NOTES
Transitions of Care: 22% risk of re-admission      -Patient is anticipated to discharge home with home health services, PT/OT following   -Referral sent to [facility], authorization pending   -Family to transport home with Cardiac Surgery follow-up    The CM spoke with Cardiac Surgery NP, patient is progressing well and anticipate discharge home with home health services, possibly 5/15 vs. 5/16 pending progress. PT/OT working with the patient and recommending [facility], OT recommending [facility] vs. OP. The CM met with the patient at bedside to discuss home health services, the patient has no preference- the patient provided verbal Freedom of Choice and agreement with [facility], referral sent via cclink for review. The CM will follow for transitions of care.  JENNIE Lemons    [facility] has accepted this patient for home health services, information on AVS.

## 2020-05-13 NOTE — PROGRESS NOTES
Problem: Self Care Deficits Care Plan (Adult)  Goal: *Acute Goals and Plan of Care (Insert Text)  Description: FUNCTIONAL STATUS PRIOR TO ADMISSION: Patient was independent and active without use of DME. Patient reports she commutes an hour and a half to work daily for [employer], however patient has not been to work since March and she reports she has only been walking 30 feet a couple time a day in her home. HOME SUPPORT: The patient lived with boyfriend but did not require assist. Patient also reports she has a glover retriever. Occupational Therapy Goals  Initiated 5/12/2020  1. Patient will perform ADLs standing 5 mins without fatigue or LOB with supervision/set-up within 5 day(s). 2.  Patient will perform lower body ADLs with supervision/set-up within 5 day(s). 3.  Patient will perform gathering ADL items high and low 2/2 with supervision/set-up within 5 day(s). 4.  Patient will perform toilet transfers with supervision/set-up within 5 day(s). 5.  Patient will perform all aspects of toileting with supervision/set-up within 5 day(s). 6.  Patient will participate in cardiac/sternal upper extremity therapeutic exercise/activities to increase independence with ADLs with supervision/set-up for 5 minutes within 5 day(s). 5/13/2020 1550 by Anabelle Cowart OT  Outcome: Progressing Towards Goal      OCCUPATIONAL THERAPY TREATMENT  Patient: José Luis Montenegro (52 y.o. female)  Date: 5/13/2020  Diagnosis: CAD (coronary artery disease) [I25.10]   <principal problem not specified>  Procedure(s) (LRB):  CORONARY ARTERY BYPASS GRAFTING X 4 WITH LIMA, RESVGH, ECC, WEI AND EPIAORTIC U/S BY DR Bartolo Pepe (N/A) 2 Days Post-Op  Precautions: Fall, Sternal  Chart, occupational therapy assessment, plan of care, and goals were reviewed. ASSESSMENT  Patient continues with skilled OT services and is progressing towards goals.   Pt received in chair, good adherence to move in the tube principles and completed standing ADLs at table tray x 5 minutes with SBA and O2 >90% via 6L midflow. Verbally reviewed LB and UB dressing techniques with pt able to demonstrate tailor sitting in prep for LB ADLs. Will issue ADLs post heart sx handout for visual reinforcement. Patient is verbalizing and is demonstrating understanding of mindful-based movements (\"move in the tube\") principles of keeping UEs proximal to ribcage to prevent lateral pull on the sternum during load-bearing activities with visual cues required for compliance. Current Level of Function Impacting Discharge (ADLs): SBA sit to stand, limited by lines/leads, min A UB, min A LB    Other factors to consider for discharge: independent prior         PLAN :  Patient continues to benefit from skilled intervention to address the above impairments. Continue treatment per established plan of care. to address goals. Recommend with staff: Encourage OOB to chair for all meals, mobilize to [facility] with assist x 1, and participation with ADLs as able. Recommend next OT session: issue ADL handout, standing activities >5 minutes    Recommendation for discharge: (in order for the patient to meet his/her long term goals)  No skilled occupational therapy/ follow up rehabilitation needs identified at this time. This discharge recommendation:  Has been made in collaboration with the attending provider and/or case management    IF patient discharges home will need the following DME: none       SUBJECTIVE:   Patient stated I haven't brushed my teeth yet.     OBJECTIVE DATA SUMMARY:   Cognitive/Behavioral Status:  Neurologic State: Alert  Orientation Level: Oriented X4  Cognition: Appropriate decision making; Appropriate for age attention/concentration; Appropriate safety awareness  Perception: Appears intact  Perseveration: No perseveration noted  Safety/Judgement: Awareness of environment    Functional Mobility and Transfers for ADLs:  Bed Mobility:   OOB in chair    Transfers:  Sit to Stand: Stand-by assistance          Balance:  Sitting: Intact  Standing: Impaired; Without support  Standing - Static: Good    ADL Intervention:   Standing with setup of table tray for ADLs, 5 minutes    Grooming  Washing Face: Stand-by assistance  Brushing Teeth: Stand-by assistance         Cognitive Retraining  Safety/Judgement: Awareness of environment      Patient instructed on the ability to utilize upper extremities outside the tube when doing any non-load bearing activity such as washing hair/body, brushing teeth, retrieving clothing items, or scratching your back. Patient encouraged to also perform upper extremity exercises \"outside of the tube\" to prevent scar tissue formation around sternal incision site. Patient instructed no asymmetrical reaching over head to ensure B UEs when shoulders >90* i.e. reaching in cabinets and dressing. Instruction on upper body dressing techniques of over head, then arms through to decrease pain and unilateral shoulder flexion >90*. Instruction on the benefits of utilizing B UEs during functional tasks i.e. opening the fridge, stepping into the tub. May have to adjust home setup to increase ease with items closer to waist height to prevent deep bending and the automatic  of asymmetrical UE WB/pushing for stabilization during bending. Benefit to don clothing tailor sitting and don all clothing while sitting prior to standing. Patient demonstrated lower body dressing seated with Supervision. Instruction and indicated understanding on the benefits of loose clothing throughout to accommodate for post surgical swelling, decreased ROM and increased pain. Instruction and indicated understanding the technique of pull over shirt versus front open clothing.        Therapeutic Exercises:   Pt completed cardiac exercises with PT prior        Activity Tolerance:   Good on midflow  Please refer to the flowsheet for vital signs taken during this treatment. After treatment patient left in no apparent distress:   Sitting in chair and Call bell within reach    COMMUNICATION/COLLABORATION:   The patients plan of care was discussed with: Physical therapist and Registered nurse.      Ailyn Caballero OT  Time Calculation: 20 mins

## 2020-05-13 NOTE — PROGRESS NOTES
Problem: Mobility Impaired (Adult and Pediatric)  Goal: *Acute Goals and Plan of Care (Insert Text)  Description: FUNCTIONAL STATUS PRIOR TO ADMISSION: Patient was independent and active without use of DME. Commutes to work 1.5 hours, no history of falls. Wears insulin pump at baseline for Type I DM. HOME SUPPORT PRIOR TO ADMISSION: The patient lived with boyfriend but did not require assist.    Physical Therapy Goals  Initiated 5/12/2020  1. Patient will move from supine to sit and sit to supine , scoot up and down and roll side to side in bed with modified independence within 5 days. 2.  Patient will perform sit to/from stand with modified independence within 5 days. 3.  Patient will ambulate 150 feet with least restrictive assistive device and modified independence within 5 days. 4.  Patient will ascend/descend 4 stairs with bilateral handrail(s) with modified independence within 5 days. 5.  Patient will perform cardiac exercises per protocol with modified independence within 5 days. 6.  Patient will verbally recall and functionally demonstrate mindful-based movements (\"move in the tube\") principles without cues within 5 days. Outcome: Progressing Towards Goal   PHYSICAL THERAPY TREATMENT  Patient: Demetri Mejia (4 y.o. female)  Date: 5/13/2020  Diagnosis: CAD (coronary artery disease) [I25.10]   <principal problem not specified>  Procedure(s) (LRB):  CORONARY ARTERY BYPASS GRAFTING X 4 WITH LIMA, RESVGH, ECC, WEI AND EPIAORTIC U/S BY DR Lisa West (N/A) 2 Days Post-Op  Precautions: Sternal(move in the tube)  Chart, physical therapy assessment, plan of care and goals were reviewed. ASSESSMENT  Patient continues with skilled PT services and is progressing towards goals. Patient received seated in chair, agreeable to therapy, reporting improvement in nausea from this AM and overnight. Overall SBA-CGA for mobility and tolerated gait x60 ft on 6L/min with minimal SOB and stable sats and RR. Completed exercises as below with good ROM and little c/o pain. Anticipate continued progress with PT and d/c home with HHPT. Patient is verbalizing and is demonstrating understanding of mindful-based movements (\"move in the tube\") principles of keeping UEs proximal to ribcage to prevent lateral pull on the sternum during load-bearing activities with visual, verbal, and manual cues required for compliance. Current Level of Function Impacting Discharge (mobility/balance): SBA-CGA    Other factors to consider for discharge: currently on 6L/min         PLAN :  Patient continues to benefit from skilled intervention to address the above impairments. Continue treatment per established plan of care. to address goals. Recommendation for discharge: (in order for the patient to meet his/her long term goals)  Physical therapy at least 2 days/week in the home     This discharge recommendation:  Has been made in collaboration with the attending provider and/or case management    IF patient discharges home will need the following DME: none       SUBJECTIVE:   Patient stated I'm feeling better.     OBJECTIVE DATA SUMMARY:   Patient mobilized on continuous portable monitor/telemetry. Critical Behavior:  Neurologic State: Alert  Orientation Level: Oriented X4  Cognition: Appropriate decision making, Appropriate for age attention/concentration, Appropriate safety awareness  Safety/Judgement: Awareness of environment  Functional Mobility Training:  Transfers:  Sit to Stand: Stand-by assistance  Stand to Sit: Contact guard assistance  Balance:  Sitting: Intact  Standing: Impaired; Without support  Standing - Static: Good  Standing - Dynamic : Fair;Good  Ambulation/Gait Training:  Distance (ft): 60 Feet (ft)  Assistive Device: Gait belt(6 L/min)  Ambulation - Level of Assistance: Contact guard assistance;Stand-by assistance  Gait Abnormalities: Decreased step clearance  Speed/Halle: Pace decreased (<100 feet/min)  Step Length: Right shortened;Left shortened    Cardiac diagnosis intervention:  Patient instructed and educated on mindful movement principles based on Move in The Tube concept to include maintaining bilateral elbows close to rib cage when performing any load-bearing activity such as getting in/out of bed, pushing up from a chair, opening a door, or lifting a box. Patient was given a handout with diagrams of each correct/incorrect method of performing each of the above tasks. Therapeutic Exercises:   Patient instructed on the benefits and demonstrated cardiac exercises while seated with Supervision. Instructed and indicated understanding on how to progress reps, sets against gravity, pacing through progressive muscle strengthening standing based on surgeon clearance for more weight in prep for basic and instrumental ADLs. Instruction on the use of household items in place of weights as needed.      CARDIAC  EXERCISE   Sets   Reps   Active Active Assist   Passive Self ROM   Comments   Shoulder flexion 1 5 [x]                                            []                                            []                                            []                                               Shoulder abduction 1      5 [x]                                            []                                            []                                            []                                               Scapular elevation 1 5 [x]                                            []                                            []                                            []                                               Scapular retraction 1 5 [x]                                            []                                            []                                            []                                                  []                                            []                                            [] []                                                   Pain Rating:  Shoulders and upper back 2* chest tube    Activity Tolerance:   Good, desaturates with exertion and requires oxygen, and requires rest breaks  Please refer to the flowsheet for vital signs taken during this treatment. After treatment patient left in no apparent distress:   Sitting in chair, Heels elevated for pressure relief, and Call bell within reach    COMMUNICATION/COLLABORATION:   The patients plan of care was discussed with: Occupational therapist and Registered nurse.      Miriam Garay PT, DPT   Time Calculation: 23 mins

## 2020-05-13 NOTE — PROGRESS NOTES
2000: Bedside shift change report given to Kristy FREDERICK RN (oncoming nurse) by Ángela Laurent RN (offgoing nurse). Report included the following information SBAR, Intake/Output, MAR, Recent Results, Cardiac Rhythm NSR and Alarm Parameters . 2034: pts mary Jose Camargo called unit, updated by RN    0206: pt awoken by acute onset nausea, Zofran admin    0210: pt vomiting, saturation dropped to 86%. Pt peed in bed with vomiting episode-will place purewick    0235: O2 sat 79, resp therapist called to place pt on midflow. Dr. Tera Tripathi called to notify of vomiting, orders received for phenergan    0345: pt O2 sat 88-cough and deep breathing encouraged, IS done    0400: purewick placed d/t pt nausea    0611: pts isidrojuan antoniokanu Jose Camargo called unit, updated by RN on overnight events    0800: Bedside shift change report given to Kimberly Sadler RN (oncoming nurse) by Selena Pop RN (offgoing nurse). Report included the following information SBAR, Intake/Output, MAR, Recent Results, Cardiac Rhythm NSR and Alarm Parameters .

## 2020-05-13 NOTE — PROGRESS NOTES
1950: Bedside shift change report given to [employer] (oncoming nurse) by Wesley Strange (offgoing nurse). Report included the following information SBAR, Intake/Output, MAR, Accordion, Recent Results, Med Rec Status and Cardiac Rhythm NSR. Cardiac Surgery Shift Summary:    1. I/O's: Intake:   Meals: Greater than 75% of each meal (excellent)     Output: WDL (voiding without difficulty)   Has patient had BM since surgery? No     2. Oxygen Requirements: Patient on 1 L O2 (stable)    3. Daily Weights (weight change in 24 hours): No significant change in weight (0 - 2 lbs.)    4. Medication Administration: No variations to medication administration     5. Epicardial pacer box remains connected as backup (VVI 50/10), but no pacing noted on tele overnight    0740: Bedside shift change report given to Wesley Strange (oncoming nurse) by [employer] (offgoing nurse). Report included the following information SBAR, Intake/Output, MAR, Accordion, Recent Results, Med Rec Status and Cardiac Rhythm NSR.

## 2020-05-13 NOTE — PROGRESS NOTES
Problem: Diabetes Self-Management  Goal: *Disease process and treatment process  Description: Define diabetes and identify own type of diabetes; list 3 options for treating diabetes. Outcome: Progressing Towards Goal  Goal: *Incorporating nutritional management into lifestyle  Description: Describe effect of type, amount and timing of food on blood glucose; list 3 methods for planning meals. Outcome: Progressing Towards Goal  Goal: *Incorporating physical activity into lifestyle  Description: State effect of exercise on blood glucose levels. Outcome: Progressing Towards Goal  Goal: *Developing strategies to promote health/change behavior  Description: Define the ABC's of diabetes; identify appropriate screenings, schedule and personal plan for screenings. Outcome: Progressing Towards Goal  Goal: *Using medications safely  Description: State effect of diabetes medications on diabetes; name diabetes medication taking, action and side effects. Outcome: Progressing Towards Goal  Goal: *Monitoring blood glucose, interpreting and using results  Description: Identify recommended blood glucose targets  and personal targets. Outcome: Progressing Towards Goal  Goal: *Prevention, detection, treatment of acute complications  Description: List symptoms of hyper- and hypoglycemia; describe how to treat low blood sugar and actions for lowering  high blood glucose level. Outcome: Progressing Towards Goal  Goal: *Prevention, detection and treatment of chronic complications  Description: Define the natural course of diabetes and describe the relationship of blood glucose levels to long term complications of diabetes.   Outcome: Progressing Towards Goal  Goal: *Developing strategies to address psychosocial issues  Description: Describe feelings about living with diabetes; identify support needed and support network  Outcome: Progressing Towards Goal  Goal: *Insulin pump training  Outcome: Progressing Towards Goal  Goal: *Sick day guidelines  Outcome: Progressing Towards Goal  Goal: *Patient Specific Goal (EDIT GOAL, INSERT TEXT)  Outcome: Progressing Towards Goal  Note:      Problem: Patient Education: Go to Patient Education Activity  Goal: Patient/Family Education  Outcome: Progressing Towards Goal     Problem: Falls - Risk of  Goal: *Absence of Falls  Description: Document Handy Fall Risk and appropriate interventions in the flowsheet. Outcome: Progressing Towards Goal  Note: Fall Risk Interventions:  Mobility Interventions: Communicate number of staff needed for ambulation/transfer, Patient to call before getting OOB    Mentation Interventions: Adequate sleep, hydration, pain control, Evaluate medications/consider consulting pharmacy, Gait belt with transfers/ambulation, Increase mobility, More frequent rounding, Toileting rounds    Medication Interventions: Evaluate medications/consider consulting pharmacy    Elimination Interventions: Call light in reach, Patient to call for help with toileting needs, Toileting schedule/hourly rounds    History of Falls Interventions: Room close to nurse's station, Utilize gait belt for transfer/ambulation, Evaluate medications/consider consulting pharmacy         Problem: Patient Education: Go to Patient Education Activity  Goal: Patient/Family Education  Outcome: Progressing Towards Goal     Problem: Pressure Injury - Risk of  Goal: *Prevention of pressure injury  Description: Document Keny Scale and appropriate interventions in the flowsheet. Outcome: Progressing Towards Goal  Note: Pressure Injury Interventions:  Sensory Interventions: Assess changes in LOC, Keep linens dry and wrinkle-free, Maintain/enhance activity level, Minimize linen layers, Monitor skin under medical devices, Turn and reposition approx.  every two hours (pillows and wedges if needed)    Moisture Interventions: Absorbent underpads, Internal/External urinary devices, Minimize layers    Activity Interventions: Increase time out of bed, Chair cushion    Mobility Interventions: Chair cushion, Pressure redistribution bed/mattress (bed type), PT/OT evaluation    Nutrition Interventions: Document food/fluid/supplement intake    Friction and Shear Interventions: HOB 30 degrees or less, Minimize layers                Problem: Patient Education: Go to Patient Education Activity  Goal: Patient/Family Education  Outcome: Progressing Towards Goal     Problem: Patient Education: Go to Patient Education Activity  Goal: Patient/Family Education  Outcome: Progressing Towards Goal     Problem: CABG: Post-Op Day 2/Transfer Day  Goal: Off Pathway (Use only if patient is Off Pathway)  Outcome: Progressing Towards Goal  Goal: Activity/Safety  Outcome: Progressing Towards Goal  Goal: Consults, if ordered  Outcome: Progressing Towards Goal  Goal: Diagnostic Test/Procedures  Outcome: Progressing Towards Goal  Goal: Nutrition/Diet  Outcome: Progressing Towards Goal  Goal: Medications  Outcome: Progressing Towards Goal  Goal: Discharge Planning  Outcome: Progressing Towards Goal  Goal: Respiratory  Outcome: Progressing Towards Goal  Goal: Treatments/Interventions/Procedures  Outcome: Progressing Towards Goal  Goal: Psychosocial  Outcome: Progressing Towards Goal  Goal: *Hemodynamically stable without vasoactive medications  Outcome: Progressing Towards Goal  Goal: *Lungs clear or at baseline  Outcome: Progressing Towards Goal  Goal: *Optimal pain control at patient's stated goal  Outcome: Progressing Towards Goal  Goal: *Demonstrates progressive activity  Outcome: Progressing Towards Goal  Goal: *Tolerating diet  Outcome: Progressing Towards Goal     Problem: Discharge Planning  Goal: *Discharge to safe environment  Description: See CM notes.  JENNIE Ramirez    Outcome: Progressing Towards Goal     Problem: Patient Education: Go to Patient Education Activity  Goal: Patient/Family Education  Outcome: Progressing Towards Goal     Problem: Patient Education: Go to Patient Education Activity  Goal: Patient/Family Education  Outcome: Progressing Towards Goal     Problem: CABG: Post-Op Day 1  Goal: Off Pathway (Use only if patient is Off Pathway)  Outcome: Resolved/Met  Goal: Activity/Safety  Outcome: Resolved/Met  Goal: Consults, if ordered  Outcome: Resolved/Met  Goal: Diagnostic Test/Procedures  Outcome: Resolved/Met  Goal: Nutrition/Diet  Outcome: Resolved/Met  Goal: Medications  Outcome: Resolved/Met  Goal: Respiratory  Outcome: Resolved/Met  Goal: Treatments/Interventions/Procedures  Outcome: Resolved/Met  Goal: Psychosocial  Outcome: Resolved/Met  Goal: *Hemodynamically stable without vasoactive medications  Outcome: Resolved/Met  Goal: *Lungs clear or at baseline  Outcome: Resolved/Met  Goal: *Mechanical ventilation discontinued  Outcome: Resolved/Met  Goal: *Optimal pain control at patient's stated goal  Outcome: Resolved/Met  Goal: *Demonstrates progressive activity  Outcome: Resolved/Met  Goal: *Tolerating diet  Outcome: Resolved/Met  Goal: *Level of consciousness returns to baseline  Outcome: Resolved/Met

## 2020-05-14 ENCOUNTER — APPOINTMENT (OUTPATIENT)
Dept: GENERAL RADIOLOGY | Age: 50
DRG: 236 | End: 2020-05-14
Attending: NURSE PRACTITIONER
Payer: COMMERCIAL

## 2020-05-14 ENCOUNTER — HOME HEALTH ADMISSION (OUTPATIENT)
Dept: HOME HEALTH SERVICES | Facility: HOME HEALTH | Age: 50
End: 2020-05-14
Payer: COMMERCIAL

## 2020-05-14 LAB
ADMINISTERED INITIALS, ADMINIT: NORMAL
ANION GAP SERPL CALC-SCNC: 5 MMOL/L (ref 5–15)
APTT PPP: 24.6 SEC (ref 22.1–32)
BUN SERPL-MCNC: 23 MG/DL (ref 6–20)
BUN/CREAT SERPL: 16 (ref 12–20)
CALCIUM SERPL-MCNC: 8 MG/DL (ref 8.5–10.1)
CHLORIDE SERPL-SCNC: 102 MMOL/L (ref 97–108)
CO2 SERPL-SCNC: 25 MMOL/L (ref 21–32)
CREAT SERPL-MCNC: 1.41 MG/DL (ref 0.55–1.02)
D50 ADMINISTERED, D50ADM: 0 ML
D50 ORDER, D50ORD: 0 ML
ERYTHROCYTE [DISTWIDTH] IN BLOOD BY AUTOMATED COUNT: 13 % (ref 11.5–14.5)
GLSCOM COMMENTS: NORMAL
GLUCOSE BLD STRIP.AUTO-MCNC: 113 MG/DL (ref 65–100)
GLUCOSE BLD STRIP.AUTO-MCNC: 115 MG/DL (ref 65–100)
GLUCOSE BLD STRIP.AUTO-MCNC: 120 MG/DL (ref 65–100)
GLUCOSE BLD STRIP.AUTO-MCNC: 122 MG/DL (ref 65–100)
GLUCOSE BLD STRIP.AUTO-MCNC: 126 MG/DL (ref 65–100)
GLUCOSE BLD STRIP.AUTO-MCNC: 129 MG/DL (ref 65–100)
GLUCOSE BLD STRIP.AUTO-MCNC: 138 MG/DL (ref 65–100)
GLUCOSE BLD STRIP.AUTO-MCNC: 174 MG/DL (ref 65–100)
GLUCOSE BLD STRIP.AUTO-MCNC: 176 MG/DL (ref 65–100)
GLUCOSE BLD STRIP.AUTO-MCNC: 204 MG/DL (ref 65–100)
GLUCOSE BLD STRIP.AUTO-MCNC: 287 MG/DL (ref 65–100)
GLUCOSE BLD STRIP.AUTO-MCNC: 332 MG/DL (ref 65–100)
GLUCOSE BLD STRIP.AUTO-MCNC: 89 MG/DL (ref 65–100)
GLUCOSE BLD STRIP.AUTO-MCNC: 91 MG/DL (ref 65–100)
GLUCOSE BLD STRIP.AUTO-MCNC: 96 MG/DL (ref 65–100)
GLUCOSE SERPL-MCNC: 96 MG/DL (ref 65–100)
GLUCOSE, GLC: 113 MG/DL
GLUCOSE, GLC: 115 MG/DL
GLUCOSE, GLC: 120 MG/DL
GLUCOSE, GLC: 122 MG/DL
GLUCOSE, GLC: 126 MG/DL
GLUCOSE, GLC: 129 MG/DL
GLUCOSE, GLC: 138 MG/DL
GLUCOSE, GLC: 174 MG/DL
GLUCOSE, GLC: 176 MG/DL
GLUCOSE, GLC: 89 MG/DL
GLUCOSE, GLC: 91 MG/DL
GLUCOSE, GLC: 96 MG/DL
HCT VFR BLD AUTO: 31.2 % (ref 35–47)
HGB BLD-MCNC: 10.1 G/DL (ref 11.5–16)
HIGH TARGET, HITG: 130 MG/DL
INR PPP: 1 (ref 0.9–1.1)
INSULIN ADMINSTERED, INSADM: 2 UNITS/HOUR
INSULIN ADMINSTERED, INSADM: 2.3 UNITS/HOUR
INSULIN ADMINSTERED, INSADM: 3.3 UNITS/HOUR
INSULIN ADMINSTERED, INSADM: 3.6 UNITS/HOUR
INSULIN ADMINSTERED, INSADM: 3.8 UNITS/HOUR
INSULIN ADMINSTERED, INSADM: 3.9 UNITS/HOUR
INSULIN ADMINSTERED, INSADM: 4.2 UNITS/HOUR
INSULIN ADMINSTERED, INSADM: 4.3 UNITS/HOUR
INSULIN ADMINSTERED, INSADM: 5.4 UNITS/HOUR
INSULIN ADMINSTERED, INSADM: 7.3 UNITS/HOUR
INSULIN ADMINSTERED, INSADM: 7.7 UNITS/HOUR
INSULIN ADMINSTERED, INSADM: 8.3 UNITS/HOUR
INSULIN ORDER, INSORD: 2 UNITS/HOUR
INSULIN ORDER, INSORD: 2.3 UNITS/HOUR
INSULIN ORDER, INSORD: 3.3 UNITS/HOUR
INSULIN ORDER, INSORD: 3.6 UNITS/HOUR
INSULIN ORDER, INSORD: 3.8 UNITS/HOUR
INSULIN ORDER, INSORD: 3.9 UNITS/HOUR
INSULIN ORDER, INSORD: 4.2 UNITS/HOUR
INSULIN ORDER, INSORD: 4.3 UNITS/HOUR
INSULIN ORDER, INSORD: 5.4 UNITS/HOUR
INSULIN ORDER, INSORD: 7.3 UNITS/HOUR
INSULIN ORDER, INSORD: 7.7 UNITS/HOUR
INSULIN ORDER, INSORD: 8.3 UNITS/HOUR
LOW TARGET, LOT: 95 MG/DL
MAGNESIUM SERPL-MCNC: 2.2 MG/DL (ref 1.6–2.4)
MCH RBC QN AUTO: 31.3 PG (ref 26–34)
MCHC RBC AUTO-ENTMCNC: 32.4 G/DL (ref 30–36.5)
MCV RBC AUTO: 96.6 FL (ref 80–99)
MINUTES UNTIL NEXT BG, NBG: 60 MIN
MULTIPLIER, MUL: 0.06
MULTIPLIER, MUL: 0.07
MULTIPLIER, MUL: 0.08
MULTIPLIER, MUL: 0.1
NRBC # BLD: 0 K/UL (ref 0–0.01)
NRBC BLD-RTO: 0 PER 100 WBC
ORDER INITIALS, ORDINIT: NORMAL
PLATELET # BLD AUTO: 196 K/UL (ref 150–400)
PMV BLD AUTO: 9.4 FL (ref 8.9–12.9)
POTASSIUM SERPL-SCNC: 3.6 MMOL/L (ref 3.5–5.1)
PROTHROMBIN TIME: 10.4 SEC (ref 9–11.1)
RBC # BLD AUTO: 3.23 M/UL (ref 3.8–5.2)
SERVICE CMNT-IMP: ABNORMAL
SERVICE CMNT-IMP: NORMAL
SODIUM SERPL-SCNC: 132 MMOL/L (ref 136–145)
THERAPEUTIC RANGE,PTTT: NORMAL SECS (ref 58–77)
WBC # BLD AUTO: 10.9 K/UL (ref 3.6–11)

## 2020-05-14 PROCEDURE — 74011000258 HC RX REV CODE- 258: Performed by: THORACIC SURGERY (CARDIOTHORACIC VASCULAR SURGERY)

## 2020-05-14 PROCEDURE — 74011250637 HC RX REV CODE- 250/637: Performed by: NURSE PRACTITIONER

## 2020-05-14 PROCEDURE — 97116 GAIT TRAINING THERAPY: CPT

## 2020-05-14 PROCEDURE — 85730 THROMBOPLASTIN TIME PARTIAL: CPT

## 2020-05-14 PROCEDURE — 71045 X-RAY EXAM CHEST 1 VIEW: CPT

## 2020-05-14 PROCEDURE — 36415 COLL VENOUS BLD VENIPUNCTURE: CPT

## 2020-05-14 PROCEDURE — 77010033678 HC OXYGEN DAILY

## 2020-05-14 PROCEDURE — 65660000001 HC RM ICU INTERMED STEPDOWN

## 2020-05-14 PROCEDURE — 74011250636 HC RX REV CODE- 250/636: Performed by: NURSE PRACTITIONER

## 2020-05-14 PROCEDURE — 74011636637 HC RX REV CODE- 636/637: Performed by: THORACIC SURGERY (CARDIOTHORACIC VASCULAR SURGERY)

## 2020-05-14 PROCEDURE — 97110 THERAPEUTIC EXERCISES: CPT

## 2020-05-14 PROCEDURE — 97535 SELF CARE MNGMENT TRAINING: CPT

## 2020-05-14 PROCEDURE — 83735 ASSAY OF MAGNESIUM: CPT

## 2020-05-14 PROCEDURE — 85027 COMPLETE CBC AUTOMATED: CPT

## 2020-05-14 PROCEDURE — 85610 PROTHROMBIN TIME: CPT

## 2020-05-14 PROCEDURE — 82962 GLUCOSE BLOOD TEST: CPT

## 2020-05-14 PROCEDURE — 80048 BASIC METABOLIC PNL TOTAL CA: CPT

## 2020-05-14 RX ORDER — CARVEDILOL 3.12 MG/1
3.12 TABLET ORAL 2 TIMES DAILY WITH MEALS
Status: DISCONTINUED | OUTPATIENT
Start: 2020-05-14 | End: 2020-05-15 | Stop reason: HOSPADM

## 2020-05-14 RX ORDER — POTASSIUM CHLORIDE 750 MG/1
40 TABLET, FILM COATED, EXTENDED RELEASE ORAL
Status: COMPLETED | OUTPATIENT
Start: 2020-05-14 | End: 2020-05-14

## 2020-05-14 RX ORDER — METOCLOPRAMIDE HYDROCHLORIDE 5 MG/ML
5 INJECTION INTRAMUSCULAR; INTRAVENOUS EVERY 6 HOURS
Status: COMPLETED | OUTPATIENT
Start: 2020-05-14 | End: 2020-05-14

## 2020-05-14 RX ORDER — ROSUVASTATIN CALCIUM 10 MG/1
20 TABLET, COATED ORAL
Status: DISCONTINUED | OUTPATIENT
Start: 2020-05-14 | End: 2020-05-15 | Stop reason: HOSPADM

## 2020-05-14 RX ADMIN — METOCLOPRAMIDE 5 MG: 5 INJECTION, SOLUTION INTRAMUSCULAR; INTRAVENOUS at 12:14

## 2020-05-14 RX ADMIN — FAMOTIDINE 20 MG: 20 TABLET ORAL at 09:19

## 2020-05-14 RX ADMIN — POTASSIUM CHLORIDE 40 MEQ: 750 TABLET, FILM COATED, EXTENDED RELEASE ORAL at 09:22

## 2020-05-14 RX ADMIN — FAMOTIDINE 20 MG: 20 TABLET ORAL at 21:54

## 2020-05-14 RX ADMIN — CARVEDILOL 3.12 MG: 3.12 TABLET, FILM COATED ORAL at 09:19

## 2020-05-14 RX ADMIN — ASPIRIN 81 MG 81 MG: 81 TABLET ORAL at 09:22

## 2020-05-14 RX ADMIN — AMIODARONE HYDROCHLORIDE 200 MG: 200 TABLET ORAL at 09:19

## 2020-05-14 RX ADMIN — OXYCODONE 5 MG: 5 TABLET ORAL at 17:09

## 2020-05-14 RX ADMIN — MUPIROCIN: 20 OINTMENT TOPICAL at 09:19

## 2020-05-14 RX ADMIN — CHLORHEXIDINE GLUCONATE 10 ML: 1.2 RINSE ORAL at 09:19

## 2020-05-14 RX ADMIN — Medication 10 ML: at 12:14

## 2020-05-14 RX ADMIN — OXYCODONE 5 MG: 5 TABLET ORAL at 08:01

## 2020-05-14 RX ADMIN — ROSUVASTATIN 20 MG: 10 TABLET, FILM COATED ORAL at 21:54

## 2020-05-14 RX ADMIN — Medication 10 ML: at 06:28

## 2020-05-14 RX ADMIN — OXYCODONE 10 MG: 5 TABLET ORAL at 01:36

## 2020-05-14 RX ADMIN — POLYETHYLENE GLYCOL 3350 17 G: 17 POWDER, FOR SOLUTION ORAL at 09:18

## 2020-05-14 RX ADMIN — Medication 10 ML: at 21:55

## 2020-05-14 RX ADMIN — MAGNESIUM OXIDE TAB 400 MG (241.3 MG ELEMENTAL MG) 400 MG: 400 (241.3 MG) TAB at 17:09

## 2020-05-14 RX ADMIN — METOCLOPRAMIDE 5 MG: 5 INJECTION, SOLUTION INTRAMUSCULAR; INTRAVENOUS at 17:09

## 2020-05-14 RX ADMIN — MUPIROCIN: 20 OINTMENT TOPICAL at 17:10

## 2020-05-14 RX ADMIN — CHLORHEXIDINE GLUCONATE 10 ML: 1.2 RINSE ORAL at 21:57

## 2020-05-14 RX ADMIN — ESCITALOPRAM OXALATE 10 MG: 10 TABLET ORAL at 09:19

## 2020-05-14 RX ADMIN — CARVEDILOL 3.12 MG: 3.12 TABLET, FILM COATED ORAL at 17:09

## 2020-05-14 RX ADMIN — Medication 10 ML: at 17:10

## 2020-05-14 RX ADMIN — SENNOSIDES AND DOCUSATE SODIUM 1 TABLET: 8.6; 5 TABLET ORAL at 09:19

## 2020-05-14 RX ADMIN — SODIUM CHLORIDE 10.1 UNITS/HR: 900 INJECTION, SOLUTION INTRAVENOUS at 00:25

## 2020-05-14 RX ADMIN — AMIODARONE HYDROCHLORIDE 200 MG: 200 TABLET ORAL at 21:54

## 2020-05-14 RX ADMIN — OXYCODONE 10 MG: 5 TABLET ORAL at 21:54

## 2020-05-14 RX ADMIN — SENNOSIDES AND DOCUSATE SODIUM 1 TABLET: 8.6; 5 TABLET ORAL at 17:09

## 2020-05-14 RX ADMIN — MAGNESIUM OXIDE TAB 400 MG (241.3 MG ELEMENTAL MG) 400 MG: 400 (241.3 MG) TAB at 09:18

## 2020-05-14 RX ADMIN — OXYCODONE 5 MG: 5 TABLET ORAL at 12:14

## 2020-05-14 NOTE — PROGRESS NOTES
[facility] ICU Progress Note    Admit Date: 2020  POD:  3 Day Post-Op    Procedure:  Procedure(s):  CORONARY ARTERY BYPASS GRAFTING X 4 WITH LIMA, RESVGH, ECC, WEI AND EPIAORTIC U/S BY DR David Nolen        Subjective:   Pt seen with Dr. Chani Morelos. On insulin. On 1L NC. Tmax 99. Tolerated 100% of dinner. Objective:   Vitals:  Blood pressure 135/58, pulse 73, temperature 98 °F (36.7 °C), resp. rate 18, height 5' 5\" (1.651 m), weight 77.5 kg (170 lb 13.7 oz), SpO2 97 %. Temp (24hrs), Av.5 °F (36.9 °C), Min:98 °F (36.7 °C), Max:99.2 °F (37.3 °C)    EKG/Rhythm:  SR    CT Output: 200 ml    Oxygen Therapy:  Oxygen Therapy  O2 Sat (%): 97 % (20 0701)  Pulse via Oximetry: 64 beats per minute (20 1740)  O2 Device: Nasal cannula (20 0630)  O2 Flow Rate (L/min): 1 l/min (20 0630)  FIO2 (%): 60 % (20 1443)    CXR:   CXR Results  (Last 48 hours)               20 0454  XR CHEST PORT Final result    Impression:  IMPRESSION: No pneumothorax. Persistent left perihilar subsegmental atelectasis       Narrative:  EXAM: XR CHEST PORT       INDICATION: postop heart       COMPARISON: Prior day and multiple additional dating back to 2020       FINDINGS: A portable AP radiograph of the chest was obtained at 0 349 hours. The   patient is on a cardiac monitor. There are bilateral chest tubes and mediastinal   tubes in place without a pneumothorax. Minimal left perihilar subsegmental   atelectasis is presentThe cardiac and mediastinal contours and pulmonary   vascularity are stable. The bones and soft tissues are grossly within normal   limits. 20 0451  XR CHEST PORT Final result    Impression:  IMPRESSION:   1. Evidence of minimal bibasilar linear atelectasis. No evidence of lobar   consolidation. 2. Evidence of cardiomegaly. 3. Interval removal of the [place]-Neftali catheter. 4. Presence of bilateral chest tubes.        Narrative:  Portable chest single view dated 2020 Comparison chest dated 2020       History is postop heart       A single portable AP semierect view of the chest was obtained. There has been   interval removal of the [place]-Neftali catheter. The bilateral chest tubes are again   noted. The cardiac silhouette is enlarged. There is evidence of a previous   thoracotomy/CABG. There is slight prominence of the basilar markings. This is   suggestive of atelectatic change. No areas of lobar consolidation are   identified. Admission Weight: Last Weight   Weight: 72.4 kg (159 lb 9.8 oz) Weight: 77.5 kg (170 lb 13.7 oz)     Intake / Output / Drain:  Current Shift:  0701 -  1900  In: -   Out: 900 [Urine:900]  Last 24 hrs.:     Intake/Output Summary (Last 24 hours) at 2020 0855  Last data filed at 2020 0804  Gross per 24 hour   Intake 1216.33 ml   Output 1530 ml   Net -313.67 ml       EXAM:  General:  No obvious distress      Lungs:   Clear to auscultation upper, diminished in bases. Incision:  Prevena drs intact   Heart:  Regular rate and rhythm, S1, S2 normal, no murmur, click, rub or gallop. Abdomen:   Soft, non-tender. Bowel sounds normal. No masses,  No organomegaly. Extremities:  No edema. PPP. Neurologic:  Gross motor and sensory apparatus intact. Labs:   Recent Labs     20  0810  20  0244   WBC  --   --  10.9   HGB  --   --  10.1*   HCT  --   --  31.2*   PLT  --   --  196   NA  --   --  132*   K  --   --  3.6   BUN  --   --  23*   CREA  --   --  1.41*   GLU  --   --  96   GLUCPOC 120*   < >  --    INR  --   --  1.0    < > = values in this interval not displayed. Assessment:     Active Problems:    CAD (coronary artery disease) (2020)      S/P CABG x 4 (2020)         Plan/Recommendations/Medical Decision Makin. CAD/Hx of MI/Stent x 1 , s/p CABG: on asa, statin.  Start low dose coreg.      2. DM1 w/ diabetic retinopathy: wears SQ insulin pump.  Hgba1c 7.8.   Cont insulin gtt per protocol. Diabetes health following. After discussion w/ pt, would rather transition back to insulin pump today and avoid lantus. Pt has all supplies she needs her. Will cross check BS ACHS.       3. CVA w/ subsequent L basal ganglia bleed 2006: on asa/statin, strict BP control.      4. GERD/PUD: More historically when she was on plavix.  pepcid while inpt.      5. Dyslipidemia: Continue statin     6. CKD3:  Followed by Dr. Johana Pat.  Monitor labs. Creatinine improved 1.4. Continue to monitor. Avoid hypotension and nephrotoxic agents.     7. HTN: Start low dose coreg.   Resume home meds as BP allows. (PTA: coreg, aldactone, lisinopril.)     8. Anxiety/depression: Continue lexapro.      9. Recent open umbilical hernia repair w/ mesh: in 1/2020.  Resolved. 10. Elevated tbili/LFTs: Monitor    11. Atelectasis: on 1L NC. Wean oxygen for 02 sat > 92%. IS and activity as tolerated. 12. Nausea/vomiting: PRN antiemetics. Stop Amio drip (will continue oral at decreased dose). On cardiac diet. No BM since surgery -- passing flatus. Try reglan x 2 doses. 13. GI/DVT prophylaxis: Pepcid, SCDs    14. Hyponatremia/hypokalemia: , encourage PO intake. K 3.6 - replete per orders, monitor. Dispo: PT/OT/Cardiac Rehab. Working towards home w/ [facility] services, likely Friday. Plan to d/c CT/AV Wires today.      Signed By: Aditi Nichole NP

## 2020-05-14 NOTE — PROGRESS NOTES
Transition of Care Plan  RUR 22% risk of re-admission    Home with home health services-- PT/OT SN   Accepted by [facility] [phone]  Name and number of agency placed on discharge instructions    Family to transport home    Cardiac Surgery follow up      Patient in agreement with this plan   Discharge hopefully Friday 5/15/20

## 2020-05-14 NOTE — PROGRESS NOTES
Problem: Mobility Impaired (Adult and Pediatric)  Goal: *Acute Goals and Plan of Care (Insert Text)  Description: FUNCTIONAL STATUS PRIOR TO ADMISSION: Patient was independent and active without use of DME. Commutes to work 1.5 hours, no history of falls. Wears insulin pump at baseline for Type I DM. HOME SUPPORT PRIOR TO ADMISSION: The patient lived with boyfriend but did not require assist.    Physical Therapy Goals  Initiated 5/12/2020  1. Patient will move from supine to sit and sit to supine , scoot up and down and roll side to side in bed with modified independence within 5 days. 2.  Patient will perform sit to/from stand with modified independence within 5 days. 3.  Patient will ambulate 150 feet with least restrictive assistive device and modified independence within 5 days. 4.  Patient will ascend/descend 4 stairs with bilateral handrail(s) with modified independence within 5 days. 5.  Patient will perform cardiac exercises per protocol with modified independence within 5 days. 6.  Patient will verbally recall and functionally demonstrate mindful-based movements (\"move in the tube\") principles without cues within 5 days. Outcome: Progressing Towards Goal  PHYSICAL THERAPY TREATMENT  Patient: Clulen Abad (41 y.o. female)  Date: 5/14/2020  Diagnosis: CAD (coronary artery disease) [I25.10]   <principal problem not specified>  Procedure(s) (LRB):  CORONARY ARTERY BYPASS GRAFTING X 4 WITH LIMA, RESVGH, ECC, WEI AND EPIAORTIC U/S BY DR Marita Zaidi (N/A) 3 Days Post-Op  Precautions: Fall, Sternal(move in the tube)  Chart, physical therapy assessment, plan of care and goals were reviewed. ASSESSMENT  Patient continues with skilled PT services and is progressing towards goals. Patient received sitting in chair, agreeable to therapy. Transferring with supervision but did exhibit mild path deviations during gait training which she was able to self correct.  Reported feeling weak and fatigued. Sats stable 92-95% with gait on room air and required one standing rest break. Anticipate continued progress with acute PT and d/c home with HHPT. Encouraged her to ambulate 2 additional times this afternoon/this evening to improve lung expansion, strength, and endurance. Patient is not verbalizing and is demonstrating understanding of mindful-based movements (\"move in the tube\") principles of keeping UEs proximal to ribcage to prevent lateral pull on the sternum during load-bearing activities with no  cues required for compliance. Current Level of Function Impacting Discharge (mobility/balance): CGA for gait    Other factors to consider for discharge: independent at baseline         PLAN :  Patient continues to benefit from skilled intervention to address the above impairments. Continue treatment per established plan of care. to address goals. Recommendation for discharge: (in order for the patient to meet his/her long term goals)  Physical therapy at least 2 days/week in the home     This discharge recommendation:  Has been made in collaboration with the attending provider and/or case management    IF patient discharges home will need the following DME: none       SUBJECTIVE:   Patient stated I feel like I have no energy.     OBJECTIVE DATA SUMMARY:   Patient mobilized on continuous portable monitor/telemetry. Critical Behavior:  Neurologic State: Alert, Appropriate for age  Orientation Level: Oriented X4  Cognition: Appropriate decision making, Appropriate for age attention/concentration, Appropriate safety awareness, Follows commands  Safety/Judgement: Awareness of environment  Functional Mobility Training:  Transfers:  Sit to Stand: Supervision  Stand to Sit: Supervision  Bed to Chair: Supervision  Balance:  Sitting: Intact  Standing: Intact; Without support  Standing - Static: Good  Standing - Dynamic : Good;Fair  Ambulation/Gait Training:  Distance (ft): 200 Feet (ft)  Assistive Device: Gait belt(room air)  Ambulation - Level of Assistance: Stand-by assistance;Contact guard assistance  Gait Abnormalities: Decreased step clearance; Path deviations  Speed/Halle: Pace decreased (<100 feet/min)  Step Length: Right shortened;Left shortened    Cardiac diagnosis intervention:  Patient instructed and educated on mindful movement principles based on Move in The Tube concept to include maintaining bilateral elbows close to rib cage when performing any load-bearing activity such as getting in/out of bed, pushing up from a chair, opening a door, or lifting a box. Patient was given a handout with diagrams of each correct/incorrect method of performing each of the above tasks. Pain Rating:  No complaints    Activity Tolerance:   Good, SpO2 stable on RA, and requires rest breaks  Please refer to the flowsheet for vital signs taken during this treatment. After treatment patient left in no apparent distress:   Sitting in chair and Call bell within reach    COMMUNICATION/COLLABORATION:   The patients plan of care was discussed with: Occupational therapist and Registered nurse.      Vlad Rivera PT, DPT   Time Calculation: 25 mins

## 2020-05-14 NOTE — PROGRESS NOTES
Problem: Self Care Deficits Care Plan (Adult)  Goal: *Acute Goals and Plan of Care (Insert Text)  Description: FUNCTIONAL STATUS PRIOR TO ADMISSION: Patient was independent and active without use of DME. Patient reports she commutes an hour and a half to work daily for [employer], however patient has not been to work since March and she reports she has only been walking 30 feet a couple time a day in her home. HOME SUPPORT: The patient lived with boyfriend but did not require assist. Patient also reports she has a glover retriever. Occupational Therapy Goals  Initiated 5/12/2020  1. Patient will perform ADLs standing 5 mins without fatigue or LOB with supervision/set-up within 5 day(s). 2.  Patient will perform lower body ADLs with supervision/set-up within 5 day(s). 3.  Patient will perform gathering ADL items high and low 2/2 with supervision/set-up within 5 day(s). 4.  Patient will perform toilet transfers with supervision/set-up within 5 day(s). 5.  Patient will perform all aspects of toileting with supervision/set-up within 5 day(s). 6.  Patient will participate in cardiac/sternal upper extremity therapeutic exercise/activities to increase independence with ADLs with supervision/set-up for 5 minutes within 5 day(s). Outcome: Progressing Towards Goal    OCCUPATIONAL THERAPY TREATMENT  Patient: Ana Reese (39 y.o. female)  Date: 5/14/2020  Diagnosis: CAD (coronary artery disease) [I25.10]   <principal problem not specified>  Procedure(s) (LRB):  CORONARY ARTERY BYPASS GRAFTING X 4 WITH LIMA, RESVGH, ECC, WEI AND EPIAORTIC U/S BY DR Ben Rolon (N/A) 3 Days Post-Op  Precautions: Sternal(move in the tube)  Chart, occupational therapy assessment, plan of care, and goals were reviewed. ASSESSMENT  Patient continues with skilled OT services and is progressing towards goals. Pt received in chair on RA with O2 >90% at rest and with activity.  She completed standing cardiac exercises x 5 minutes with supervision and toileting, standing ADLs at sink with supervision. Pt fatigued after brushign hair and applying dry shampoo. Reviewed energy conservation and paying attention to her symptoms. She does have a place to sit in the shower and educated pt on importance of having a safe place to sit while bathing. She is progressing daily and expect to do well with discharge home and OP cardiac rehab followup    Patient is verbalizing and is demonstrating understanding of mindful-based movements (\"move in the tube\") principles of keeping UEs proximal to ribcage to prevent lateral pull on the sternum during load-bearing activities with out cues required for compliance. Current Level of Function Impacting Discharge (ADLs): supervision for standing ADLs, toileting     Other factors to consider for discharge: independent         PLAN :  Patient continues to benefit from skilled intervention to address the above impairments. Continue treatment per established plan of care. to address goals. Recommend with staff: Encourage OOB to chair for all meals, mobilize to bathroom with assist x 1, and participation with ADLs as able. Recommend next OT session: UB/LB dressing    Recommendation for discharge: (in order for the patient to meet his/her long term goals)  No skilled occupational therapy/ follow up rehabilitation needs identified at this time. This discharge recommendation:  Has been made in collaboration with the attending provider and/or case management    IF patient discharges home will need the following DME: none       SUBJECTIVE:   Patient stated Rachelle Gonzalez I am tired now.     OBJECTIVE DATA SUMMARY:   Cognitive/Behavioral Status:  Neurologic State: Alert; Appropriate for age  Orientation Level: Oriented X4  Cognition: Appropriate decision making; Appropriate for age attention/concentration; Appropriate safety awareness; Follows commands             Functional Mobility and Transfers for ADLs:  Bed Mobility:       Transfers:  Sit to Stand: Stand-by assistance  Functional Transfers  Toilet Transfer : Supervision  Bed to Chair: Stand-by assistance    Balance:  Sitting: Intact  Standing: Intact; Without support  Standing - Static: Good  Standing - Dynamic : Good    ADL Intervention:       Grooming  Washing Face: Supervision  Washing Hands: Supervision  Brushing/Combing Hair: Supervision                                   Therapeutic Exercises:   Patient instructed on the benefits and demonstrated cardiac exercises while standing with Supervision. Instructed and indicated understanding on how to progress reps, sets against gravity, pacing through progressive muscle strengthening standing based on surgeon clearance for more weight in prep for basic and instrumental ADLs. Instruction on the use of household items in place of weights as needed.     CARDIAC   EXERCISE    Sets    Reps    Active  Active Assist    Passive  Self ROM    Comments    Shoulder flexion  1  5   [x]                            []                             []                             []                                Shoulder abduction  1  5  [x]                             []                             []                             []                                Scapular elevation  1  5  [x]                             []                              []                             []                                Scapular retraction  1  5  [x]                             []                             []                             []                                Trunk rotation  1  5  [x]                             []                             []                             []                                Trunk sidebending  1  5  [x]                             []                              []                             []                                          Pain:  none    Activity Tolerance:   Good on RA  Please refer to the flowsheet for vital signs taken during this treatment. After treatment patient left in no apparent distress:   Sitting in chair and Call bell within reach    COMMUNICATION/COLLABORATION:   The patients plan of care was discussed with: Physical therapist and Registered nurse.      Timothy Del Angel OT  Time Calculation: 24 mins

## 2020-05-14 NOTE — PROGRESS NOTES
AV wires x 2 cut d/t resistance met. Cleaned prior. CT x 4 removed with some difficulty, required tension to remove. Vaseline gauze bridgette applied. Luh Hartmann removed at pt request.   Pt instructed to stay in bed x 1 hr (215pm). RN notified.

## 2020-05-14 NOTE — DIABETES MGMT
JACE VALLADARES  CLINICAL NURSE SPECIALIST CONSULT  [facility]    Follow up  NOTE    Presentation   Sonam Rivera is a 52 y.o. female admitted with chest pain and work up. She will be going for cardiac surgery on Monday. PMH: Type 1 DM/ CAD/dyslipidemia/PUD/ CVA. Current clinical course has been uncomplicated. Recent Event: POD 2 CABG x4. Diabetes: Patient has had  Type1 diabetes since she was 7yrs old. Ttreated with insulin pump. Family history positive for Type 2 diabetes. Consulted by Provider for advanced diabetes nursing assessment and care, specifically related to   [x] Transitioning off Ezra Shea   [x] Inpatient management strategy  [] Home management assessment  [] Survival skill education    Diabetes-related medical history  Acute complications  NONe  Neurological complications  NONE  Microvascular disease  Retinopathy/ nephropathy  Macrovascular disease  CAD and Cerebral vascular accident  Other associated conditions     Dyslipidemia/    Diabetes medication history  Drug class Currently in use Discontinued Never used   Biguanide      DDP-4 inhibitor       Sulfonylurea      Thiazolidinedione      GLP-1 RA      SGLT-2 inhibitors      Basal insulin Via Medtronic pump - recieves 50units daily from pump     Bolus insulin        Subjective   Ms. Villagomez is sitting up in chair this morning. She verbalized that she would rather just go back on her insulin pump versus on subcutaneous insulin. She has all supplies and feels confident with re-starting. Offered assistance with getting pump restarted and she said she's a\"all good\". Patient reports the following home diabetes self-care practices:  Eating pattern-has a very strict diet and only 'treats herself' with french fries. Physical activity pattern-does not have time;    Monitoring pattern-per CGM;      Taking medications pattern  [x] Consistent administration  [x] Affordable    Social determinants of health impacting diabetes self-management practices   Concerned that you need to know more about how to stay healthy with diabetes    Objective   Physical exam  General Alert/ oriented   Vital Signs   Visit Vitals  /58 (BP 1 Location: Right arm, BP Patient Position: Sitting)   Pulse 73   Temp 98 °F (36.7 °C)   Resp 18   Ht 5' 5\" (1.651 m)   Wt 77.5 kg (170 lb 13.7 oz)   SpO2 96%   BMI 28.43 kg/m²     Skin  Warm and dry. Heart   Regular rate and rhythm. No murmurs, rubs or gallops  Lungs  Clear to auscultation without rales or rhonchi  Extremities No foot wounds    Diabetic foot exam: patient wanted to eat lunch -deferred. Laboratory  Lab Results   Component Value Date/Time    Hemoglobin A1c 7.3 (H) 05/09/2020 03:22 AM    Hemoglobin A1c (POC) 8.5 03/18/2020 01:45 PM     Lab Results   Component Value Date/Time    LDL, calculated 33.8 05/08/2020 12:52 AM     Lab Results   Component Value Date/Time    Creatinine (POC) 1.2 06/07/2013 12:15 AM    Creatinine 1.41 (H) 05/14/2020 02:44 AM     Lab Results   Component Value Date/Time    Sodium 132 (L) 05/14/2020 02:44 AM    Potassium 3.6 05/14/2020 02:44 AM    Chloride 102 05/14/2020 02:44 AM    CO2 25 05/14/2020 02:44 AM    Anion gap 5 05/14/2020 02:44 AM    Glucose 96 05/14/2020 02:44 AM    BUN 23 (H) 05/14/2020 02:44 AM    Creatinine 1.41 (H) 05/14/2020 02:44 AM    BUN/Creatinine ratio 16 05/14/2020 02:44 AM    GFR est AA 48 (L) 05/14/2020 02:44 AM    GFR est non-AA 40 (L) 05/14/2020 02:44 AM    Calcium 8.0 (L) 05/14/2020 02:44 AM    Bilirubin, total 0.8 05/13/2020 04:20 AM    AST (SGOT) 108 (H) 05/13/2020 04:20 AM    Alk.  phosphatase 39 (L) 05/13/2020 04:20 AM    Protein, total 6.0 (L) 05/13/2020 04:20 AM    Albumin 3.3 (L) 05/13/2020 04:20 AM    Globulin 2.7 05/13/2020 04:20 AM    A-G Ratio 1.2 05/13/2020 04:20 AM    ALT (SGPT) 28 05/13/2020 04:20 AM     Lab Results   Component Value Date/Time    ALT (SGPT) 28 05/13/2020 04:20 AM       Factors affecting BG pattern  Factor Dose Comments   Nutrition:  Carb-controlled meals     60 grams/meal        Blood glucose pattern      Assessment and Plan   Nursing Diagnosis Risk for unstable blood glucose pattern   Nursing Intervention Domain 2888 Decision-making Support   Nursing Interventions Examined current inpatient diabetes control   Explored factors facilitating and impeding inpatient management  Identified self-management practices impeding diabetes control  Explored corrective strategies with patient and responsible inpatient provider   Informed patient of rational for insulin strategy while hospitalized     Evaluation     with controlled Type1 diabetes, has  achieved inpatient blood glucose target of 100-180mg/dl via insulin drip. Will be transitioning off insulin drip in 2 hours, then she will re-start her pump. She feels confident she is able to manage her pump. Inpatient blood glucose management has been impacted by  [x] Kidney dysfunction  [x] Erratic meal consumption  [] Glucocorticoid use  [x]  Upcoming cardiac surgery (stress)    Recommendations   1. Diabetes management patient's  insulin pump. Current settings for her basal and bolus infusion are below. Basal  MN-3AM 2.10  3AM-530AM 1.95  530AM-Noon 2.1 > 2.25  Noon--6PM: 1.9  6PM-930PM 2.1 > 2.25  930PM-MN 2.25  Carb Ratio  MN-9AM 7.5 > 7.0  9AM-6PM8.0 > 7.0  6PM-MN 7.0  Correction  1: 23 > 1:20    2. Will continue to follow   Billing Code(s)   Thank you for including us in their care. I spent 15 minutes in direct patient care today for this patient.   Time includes chart review, face to face with patient and collaboration with interdisciplinary care team.  [x] IP subsequent hospital care - 15 minutes      AJAY Jaeger  [facility]  Access via [facility] [place] [phone]

## 2020-05-14 NOTE — PROGRESS NOTES
0730: Bedside shift change report given to [address] (oncoming nurse) by Lottie Duggan (offgoing nurse). Report included the following information SBAR, Kardex, MAR, Recent Results and Cardiac Rhythm NSR.     1500: Pt's own insulin pump started. NP aware and gave ok. BG checked, 204, and 2.2 unit bolus given via patient operated pump. Problem: Diabetes Self-Management  Goal: *Disease process and treatment process  Description: Define diabetes and identify own type of diabetes; list 3 options for treating diabetes. Outcome: Progressing Towards Goal  Goal: *Incorporating nutritional management into lifestyle  Description: Describe effect of type, amount and timing of food on blood glucose; list 3 methods for planning meals. Outcome: Progressing Towards Goal  Goal: *Incorporating physical activity into lifestyle  Description: State effect of exercise on blood glucose levels. Outcome: Progressing Towards Goal  Goal: *Developing strategies to promote health/change behavior  Description: Define the ABC's of diabetes; identify appropriate screenings, schedule and personal plan for screenings. Outcome: Progressing Towards Goal  Goal: *Using medications safely  Description: State effect of diabetes medications on diabetes; name diabetes medication taking, action and side effects. Outcome: Progressing Towards Goal  Goal: *Monitoring blood glucose, interpreting and using results  Description: Identify recommended blood glucose targets  and personal targets. Outcome: Progressing Towards Goal  Goal: *Prevention, detection, treatment of acute complications  Description: List symptoms of hyper- and hypoglycemia; describe how to treat low blood sugar and actions for lowering  high blood glucose level.   Outcome: Progressing Towards Goal  Goal: *Prevention, detection and treatment of chronic complications  Description: Define the natural course of diabetes and describe the relationship of blood glucose levels to long term complications of diabetes. Outcome: Progressing Towards Goal  Goal: *Developing strategies to address psychosocial issues  Description: Describe feelings about living with diabetes; identify support needed and support network  Outcome: Progressing Towards Goal  Goal: *Insulin pump training  Outcome: Progressing Towards Goal  Goal: *Sick day guidelines  Outcome: Progressing Towards Goal  Goal: *Patient Specific Goal (EDIT GOAL, INSERT TEXT)  Outcome: Progressing Towards Goal     Problem: Patient Education: Go to Patient Education Activity  Goal: Patient/Family Education  Outcome: Progressing Towards Goal     Problem: Falls - Risk of  Goal: *Absence of Falls  Description: Document Handy Fall Risk and appropriate interventions in the flowsheet. Outcome: Progressing Towards Goal  Note: Fall Risk Interventions:  Mobility Interventions: OT consult for ADLs, Patient to call before getting OOB, PT Consult for mobility concerns    Mentation Interventions: Adequate sleep, hydration, pain control    Medication Interventions: Evaluate medications/consider consulting pharmacy, Patient to call before getting OOB, Teach patient to arise slowly    Elimination Interventions: Call light in reach, Patient to call for help with toileting needs    History of Falls Interventions: Evaluate medications/consider consulting pharmacy         Problem: Patient Education: Go to Patient Education Activity  Goal: Patient/Family Education  Outcome: Progressing Towards Goal     Problem: Pressure Injury - Risk of  Goal: *Prevention of pressure injury  Description: Document Keny Scale and appropriate interventions in the flowsheet.   Outcome: Progressing Towards Goal  Note: Pressure Injury Interventions:  Sensory Interventions: Assess changes in LOC, Maintain/enhance activity level, Minimize linen layers    Moisture Interventions: Absorbent underpads, Minimize layers    Activity Interventions: Increase time out of bed, Pressure redistribution bed/mattress(bed type), PT/OT evaluation    Mobility Interventions: Pressure redistribution bed/mattress (bed type), PT/OT evaluation    Nutrition Interventions: Document food/fluid/supplement intake, Offer support with meals,snacks and hydration    Friction and Shear Interventions: Lift sheet, Minimize layers                Problem: Patient Education: Go to Patient Education Activity  Goal: Patient/Family Education  Outcome: Progressing Towards Goal     Problem: Patient Education: Go to Patient Education Activity  Goal: Patient/Family Education  Outcome: Progressing Towards Goal     Problem: CABG: Post-Op Day 3  Goal: Activity/Safety  Outcome: Progressing Towards Goal  Goal: Consults, if ordered  Outcome: Progressing Towards Goal  Goal: Diagnostic Test/Procedures  Outcome: Progressing Towards Goal  Goal: Nutrition/Diet  Outcome: Progressing Towards Goal  Goal: Discharge Planning  Outcome: Progressing Towards Goal  Goal: Medications  Outcome: Progressing Towards Goal  Goal: Respiratory  Outcome: Progressing Towards Goal  Goal: Treatments/Interventions/Procedures  Outcome: Progressing Towards Goal  Goal: Psychosocial  Outcome: Progressing Towards Goal  Goal: *Hemodynamically stable  Outcome: Progressing Towards Goal  Goal: *Lungs clear or at baseline  Outcome: Progressing Towards Goal  Goal: *Optimal pain control at patient's stated goal  Outcome: Progressing Towards Goal  Goal: *Incisions without signs and symptoms of wound complication  Outcome: Progressing Towards Goal  Goal: *Demonstrates progressive activity  Outcome: Progressing Towards Goal  Goal: *Tolerating diet  Outcome: Progressing Towards Goal

## 2020-05-15 ENCOUNTER — HOME CARE VISIT (OUTPATIENT)
Dept: HOME HEALTH SERVICES | Facility: HOME HEALTH | Age: 50
End: 2020-05-15

## 2020-05-15 ENCOUNTER — APPOINTMENT (OUTPATIENT)
Dept: GENERAL RADIOLOGY | Age: 50
DRG: 236 | End: 2020-05-15
Attending: NURSE PRACTITIONER
Payer: COMMERCIAL

## 2020-05-15 VITALS
HEIGHT: 65 IN | BODY MASS INDEX: 28.47 KG/M2 | WEIGHT: 170.86 LBS | HEART RATE: 75 BPM | OXYGEN SATURATION: 97 % | TEMPERATURE: 98.8 F | RESPIRATION RATE: 16 BRPM | DIASTOLIC BLOOD PRESSURE: 55 MMHG | SYSTOLIC BLOOD PRESSURE: 116 MMHG

## 2020-05-15 LAB
ANION GAP SERPL CALC-SCNC: 4 MMOL/L (ref 5–15)
BUN SERPL-MCNC: 28 MG/DL (ref 6–20)
BUN/CREAT SERPL: 20 (ref 12–20)
CALCIUM SERPL-MCNC: 9.1 MG/DL (ref 8.5–10.1)
CHLORIDE SERPL-SCNC: 102 MMOL/L (ref 97–108)
CO2 SERPL-SCNC: 27 MMOL/L (ref 21–32)
CREAT SERPL-MCNC: 1.37 MG/DL (ref 0.55–1.02)
ERYTHROCYTE [DISTWIDTH] IN BLOOD BY AUTOMATED COUNT: 13.1 % (ref 11.5–14.5)
GLUCOSE BLD STRIP.AUTO-MCNC: 230 MG/DL (ref 65–100)
GLUCOSE BLD STRIP.AUTO-MCNC: 89 MG/DL (ref 65–100)
GLUCOSE SERPL-MCNC: 101 MG/DL (ref 65–100)
HCT VFR BLD AUTO: 31.5 % (ref 35–47)
HGB BLD-MCNC: 10.4 G/DL (ref 11.5–16)
MAGNESIUM SERPL-MCNC: 2.5 MG/DL (ref 1.6–2.4)
MCH RBC QN AUTO: 31.8 PG (ref 26–34)
MCHC RBC AUTO-ENTMCNC: 33 G/DL (ref 30–36.5)
MCV RBC AUTO: 96.3 FL (ref 80–99)
NRBC # BLD: 0 K/UL (ref 0–0.01)
NRBC BLD-RTO: 0 PER 100 WBC
PLATELET # BLD AUTO: 269 K/UL (ref 150–400)
PMV BLD AUTO: 9.4 FL (ref 8.9–12.9)
POTASSIUM SERPL-SCNC: 4.6 MMOL/L (ref 3.5–5.1)
RBC # BLD AUTO: 3.27 M/UL (ref 3.8–5.2)
SERVICE CMNT-IMP: ABNORMAL
SERVICE CMNT-IMP: NORMAL
SODIUM SERPL-SCNC: 133 MMOL/L (ref 136–145)
WBC # BLD AUTO: 10 K/UL (ref 3.6–11)

## 2020-05-15 PROCEDURE — 80048 BASIC METABOLIC PNL TOTAL CA: CPT

## 2020-05-15 PROCEDURE — 74011250637 HC RX REV CODE- 250/637: Performed by: NURSE PRACTITIONER

## 2020-05-15 PROCEDURE — 82962 GLUCOSE BLOOD TEST: CPT

## 2020-05-15 PROCEDURE — 83735 ASSAY OF MAGNESIUM: CPT

## 2020-05-15 PROCEDURE — 85027 COMPLETE CBC AUTOMATED: CPT

## 2020-05-15 PROCEDURE — 36415 COLL VENOUS BLD VENIPUNCTURE: CPT

## 2020-05-15 PROCEDURE — 71045 X-RAY EXAM CHEST 1 VIEW: CPT

## 2020-05-15 PROCEDURE — 97535 SELF CARE MNGMENT TRAINING: CPT

## 2020-05-15 RX ORDER — AMOXICILLIN 250 MG
1 CAPSULE ORAL 2 TIMES DAILY
Qty: 30 TAB | Refills: 0 | Status: SHIPPED | OUTPATIENT
Start: 2020-05-15 | End: 2020-05-21

## 2020-05-15 RX ORDER — FACIAL-BODY WIPES
10 EACH TOPICAL ONCE
Status: COMPLETED | OUTPATIENT
Start: 2020-05-15 | End: 2020-05-15

## 2020-05-15 RX ORDER — POLYETHYLENE GLYCOL 3350 17 G/17G
17 POWDER, FOR SOLUTION ORAL DAILY
Qty: 7 PACKET | Refills: 0 | Status: SHIPPED | OUTPATIENT
Start: 2020-05-16 | End: 2020-05-21

## 2020-05-15 RX ORDER — OXYCODONE HYDROCHLORIDE 5 MG/1
5 TABLET ORAL
Qty: 28 TAB | Refills: 0 | Status: SHIPPED | OUTPATIENT
Start: 2020-05-15 | End: 2020-05-22

## 2020-05-15 RX ORDER — AMIODARONE HYDROCHLORIDE 200 MG/1
TABLET ORAL
Qty: 42 TAB | Refills: 0 | Status: SHIPPED | OUTPATIENT
Start: 2020-05-15 | End: 2020-07-06

## 2020-05-15 RX ADMIN — BISACODYL 10 MG: 10 SUPPOSITORY RECTAL at 10:18

## 2020-05-15 RX ADMIN — SENNOSIDES AND DOCUSATE SODIUM 1 TABLET: 8.6; 5 TABLET ORAL at 08:35

## 2020-05-15 RX ADMIN — OXYCODONE 5 MG: 5 TABLET ORAL at 07:08

## 2020-05-15 RX ADMIN — MUPIROCIN: 20 OINTMENT TOPICAL at 08:34

## 2020-05-15 RX ADMIN — CHLORHEXIDINE GLUCONATE 10 ML: 1.2 RINSE ORAL at 08:34

## 2020-05-15 RX ADMIN — ASPIRIN 81 MG 81 MG: 81 TABLET ORAL at 08:34

## 2020-05-15 RX ADMIN — Medication 10 ML: at 07:05

## 2020-05-15 RX ADMIN — POLYETHYLENE GLYCOL 3350 17 G: 17 POWDER, FOR SOLUTION ORAL at 08:34

## 2020-05-15 RX ADMIN — ESCITALOPRAM OXALATE 10 MG: 10 TABLET ORAL at 08:34

## 2020-05-15 RX ADMIN — CARVEDILOL 3.12 MG: 3.12 TABLET, FILM COATED ORAL at 08:34

## 2020-05-15 RX ADMIN — FAMOTIDINE 20 MG: 20 TABLET ORAL at 08:34

## 2020-05-15 RX ADMIN — AMIODARONE HYDROCHLORIDE 200 MG: 200 TABLET ORAL at 08:34

## 2020-05-15 RX ADMIN — MAGNESIUM OXIDE TAB 400 MG (241.3 MG ELEMENTAL MG) 400 MG: 400 (241.3 MG) TAB at 08:34

## 2020-05-15 NOTE — DIABETES MGMT
[facility]    Discharge   NOTE    Presentation   Kasi Montalvo is a 52 y.o. female admitted with chest pain and work up. She will be going for cardiac surgery on Monday. PMH: Type 1 DM/ CAD/dyslipidemia/PUD/ CVA. Current clinical course has been uncomplicated. Recent Event: POD 3 CABG x4. Slated for discharge today. Diabetes: Patient has had  Type1 diabetes since she was 7yrs old. Ttreated with insulin pump. Family history positive for Type 2 diabetes. Consulted by Provider for advanced diabetes nursing assessment and care, specifically related to   [x] Transitioning off Kellen Hernández   [x] Inpatient management strategy  [] Home management assessment  [] Survival skill education    Diabetes-related medical history  Acute complications  NONe  Neurological complications  NONE  Microvascular disease  Retinopathy/ nephropathy  Macrovascular disease  CAD and Cerebral vascular accident  Other associated conditions     Dyslipidemia/    Diabetes medication history  Drug class Currently in use Discontinued Never used   Biguanide      DDP-4 inhibitor       Sulfonylurea      Thiazolidinedione      GLP-1 RA      SGLT-2 inhibitors      Basal insulin Via Medtronic pump - recieves 50units daily from pump     Bolus insulin        Subjective   Ms. Liang Joe is sitting up in chair this afternoon. Minimal complaints. She is very grateful for how things turned out  And the excellent care that she has received from everyone. Patient reports the following home diabetes self-care practices:  Eating pattern-has a very strict diet and only 'treats herself' with french fries. Physical activity pattern-does not have time;    Monitoring pattern-per CGM;      Taking medications pattern  [x] Consistent administration  [x] Affordable    Social determinants of health impacting diabetes self-management practices   Concerned that you need to know more about how to stay healthy with diabetes    Objective   Physical exam  General Alert/ oriented   Vital Signs   Visit Vitals  /55 (BP 1 Location: Right arm, BP Patient Position: Sitting)   Pulse 75   Temp 98.8 °F (37.1 °C)   Resp 16   Ht 5' 5\" (1.651 m)   Wt 77.5 kg (170 lb 13.7 oz)   SpO2 97%   BMI 28.43 kg/m²     Skin  Warm and dry. Heart   Regular rate and rhythm. No murmurs, rubs or gallops  Lungs  Clear to auscultation without rales or rhonchi  Extremities No foot wounds    Diabetic foot exam: patient wanted to eat lunch -deferred. Laboratory  Lab Results   Component Value Date/Time    Hemoglobin A1c 7.3 (H) 05/09/2020 03:22 AM    Hemoglobin A1c (POC) 8.5 03/18/2020 01:45 PM     Lab Results   Component Value Date/Time    LDL, calculated 33.8 05/08/2020 12:52 AM     Lab Results   Component Value Date/Time    Creatinine (POC) 1.2 06/07/2013 12:15 AM    Creatinine 1.37 (H) 05/15/2020 04:21 AM     Lab Results   Component Value Date/Time    Sodium 133 (L) 05/15/2020 04:21 AM    Potassium 4.6 05/15/2020 04:21 AM    Chloride 102 05/15/2020 04:21 AM    CO2 27 05/15/2020 04:21 AM    Anion gap 4 (L) 05/15/2020 04:21 AM    Glucose 101 (H) 05/15/2020 04:21 AM    BUN 28 (H) 05/15/2020 04:21 AM    Creatinine 1.37 (H) 05/15/2020 04:21 AM    BUN/Creatinine ratio 20 05/15/2020 04:21 AM    GFR est AA 50 (L) 05/15/2020 04:21 AM    GFR est non-AA 41 (L) 05/15/2020 04:21 AM    Calcium 9.1 05/15/2020 04:21 AM    Bilirubin, total 0.8 05/13/2020 04:20 AM    AST (SGOT) 108 (H) 05/13/2020 04:20 AM    Alk.  phosphatase 39 (L) 05/13/2020 04:20 AM    Protein, total 6.0 (L) 05/13/2020 04:20 AM    Albumin 3.3 (L) 05/13/2020 04:20 AM    Globulin 2.7 05/13/2020 04:20 AM    A-G Ratio 1.2 05/13/2020 04:20 AM    ALT (SGPT) 28 05/13/2020 04:20 AM     Lab Results   Component Value Date/Time    ALT (SGPT) 28 05/13/2020 04:20 AM       Factors affecting BG pattern  Factor Dose Comments   Nutrition:  Carb-controlled meals     60 grams/meal Blood glucose pattern      Assessment and Plan   Nursing Diagnosis Risk for unstable blood glucose pattern   Nursing Intervention Domain 8056 Decision-making Support   Nursing Interventions Examined current inpatient diabetes control   Explored factors facilitating and impeding inpatient management  Identified self-management practices impeding diabetes control  Explored corrective strategies with patient and responsible inpatient provider   Informed patient of rational for insulin strategy while hospitalized     Evaluation     with controlled Type1 diabetes, has  achieved inpatient blood glucose target of 100-180mg/dl via insulin drip. No issues with converting to her pump. She is managing her pump without difficulty    Inpatient blood glucose management has been impacted by  [x] Kidney dysfunction  [x] Erratic meal consumption  [] Glucocorticoid use  [x]  Upcoming cardiac surgery (stress)    Recommendations   1. Diabetes management patient's  insulin pump. Current settings for her basal and bolus infusion are below. Basal  MN-3AM 2.10  3AM-530AM 1.95  530AM-Noon 2.1 > 2.25  Noon--6PM: 1.9  6PM-930PM 2.1 > 2.25  930PM-MN 2.25  Carb Ratio  MN-9AM 7.5 > 7.0  9AM-6PM8.0 > 7.0  6PM-MN 7.0  Correction  1: 23 > 1:20    No discharge recommendations needed. Billing Code(s)   Thank you for including us in their care. I spent 30 minutes in direct patient care today for this patient.   Time includes chart review, face to face with patient and collaboration with interdisciplinary care team.  [x] IP subsequent hospital care - 30 minutes      AJAY Cruz  [facility]  Access [phone]

## 2020-05-15 NOTE — PROGRESS NOTES
[facility] ICU Progress Note    Admit Date: 2020  POD:  4 Day Post-Op    Procedure:  Procedure(s):  CORONARY ARTERY BYPASS GRAFTING X 4 WITH LIMA, RESVGH, ECC, WEI AND EPIAORTIC U/S BY DR Gonzalez Life        Subjective:   Pt seen with Dr. Joseluis Sotelo. Back on home SQ insulin pump. On RA. No BM  Since surgery      Objective:   Vitals:  Blood pressure 112/52, pulse 75, temperature 98.5 °F (36.9 °C), resp. rate 16, height 5' 5\" (1.651 m), weight 170 lb 13.7 oz (77.5 kg), SpO2 96 %. Temp (24hrs), Av.4 °F (36.9 °C), Min:97.9 °F (36.6 °C), Max:99.1 °F (37.3 °C)    EKG/Rhythm:  SR    Oxygen Therapy:  Oxygen Therapy  O2 Sat (%): 96 % (05/15/20 0738)  Pulse via Oximetry: 64 beats per minute (20 1740)  O2 Device: Room air (05/15/20 0404)  O2 Flow Rate (L/min): 1 l/min (20 0630)  FIO2 (%): 60 % (20 1443)    CXR:   CXR Results  (Last 48 hours)               20 1509  XR CHEST PORT Final result    Impression:  IMPRESSION: Left chest tube has been removed. There is a small left pneumothorax   involving the left upper and mid thorax. Jm Jimenez Results called to the ICU by myself. Narrative:  EXAM:  XR CHEST PORT       INDICATION:  difficult CT Removal, please do CXR at 3pm,       COMPARISON:  2020       FINDINGS: A portable AP radiograph of the chest was obtained at 1441 hours. Left   chest tube has been removed. There is suspicion of a pneumothorax involving the   left upper and mid thorax laterally. .. There is minor left basilar   atelectasis/scarring. Left lung is clear. Cardiomegaly is stable. Jm Jimenez 20 6640  XR CHEST PORT Final result    Impression:  IMPRESSION: No pneumothorax. Persistent left perihilar subsegmental atelectasis       Narrative:  EXAM: XR CHEST PORT       INDICATION: postop heart       COMPARISON: Prior day and multiple additional dating back to 2020       FINDINGS: A portable AP radiograph of the chest was obtained at 0 349 hours. The   patient is on a cardiac monitor. There are bilateral chest tubes and mediastinal   tubes in place without a pneumothorax. Minimal left perihilar subsegmental   atelectasis is presentThe cardiac and mediastinal contours and pulmonary   vascularity are stable. The bones and soft tissues are grossly within normal   limits. Admission Weight: Last Weight   Weight: 159 lb 9.8 oz (72.4 kg) Weight: 170 lb 13.7 oz (77.5 kg)     Intake / Output / Drain:  Current Shift: No intake/output data recorded. Last 24 hrs.:     Intake/Output Summary (Last 24 hours) at 5/15/2020 0741  Last data filed at 5/15/2020 0404  Gross per 24 hour   Intake 1516.14 ml   Output 2240 ml   Net -723.86 ml       EXAM:  General:  No obvious distress      Lungs:   Clear to auscultation upper, very diminished in bases. L > R   Incision:  No redness, swelling or drainage. Heart:  Regular rate and rhythm, S1, S2 normal, no murmur, click, rub or gallop. Abdomen:   Soft, non-tender. Bowel sounds normal. No masses,  No organomegaly. Extremities:  mild LE edema. PPP. Neurologic:  Gross motor and sensory apparatus intact. Labs:   Recent Labs     05/15/20  0619 05/15/20  0421  20  0244   WBC  --  10.0  --  10.9   HGB  --  10.4*  --  10.1*   HCT  --  31.5*  --  31.2*   PLT  --  269  --  196   NA  --  133*  --  132*   K  --  4.6  --  3.6   BUN  --  28*  --  23*   CREA  --  1.37*  --  1.41*   GLU  --  101*  --  96   GLUCPOC 89  --    < >  --    INR  --   --   --  1.0    < > = values in this interval not displayed. Assessment:     Active Problems:    CAD (coronary artery disease) (2020)      S/P CABG x 4 (2020)         Plan/Recommendations/Medical Decision Makin. CAD/Hx of MI/Stent x 1 , s/p CABG: on asa, statin, coreg.      2. DM1 w/ diabetic retinopathy: wears SQ insulin pump.  Hgba1c 7.8.  Diabetes health following. Back on home SQ insulin pump.   Will cross check BS ACHS.       3. CVA w/ subsequent L basal ganglia bleed 2006: on asa/statin, strict BP control.      4. GERD/PUD: More historically when she was on plavix.  pepcid while inpt.      5. Dyslipidemia: Continue statin     6. CKD3:  Followed by Dr. Whitney Haji.  Monitor labs. Creatinine improved. Continue to monitor. Avoid hypotension and nephrotoxic agents.     7. HTN: Cont coreg.   Resume home meds as BP allows. (PTA: coreg, aldactone, lisinopril.)     8. Anxiety/depression: Continue lexapro.      9. Recent open umbilical hernia repair w/ mesh: in 1/2020.  Resolved. 10. Elevated tbili/LFTs: Monitor    11. Atelectasis: small left pneumo after CT removal 5/14. CXR this AM. Significant LLL Atelectasis. on RA. IS and activity as tolerated. 12. Nausea/vomiting: PRN antiemetics. Stop Amio drip (will continue oral at decreased dose). On cardiac diet. No BM since surgery -- passing flatus. completed reglan x 2 doses. Try supp today. On pericolace, miralax. 13. GI/DVT prophylaxis: Pepcid,     14. Hyponatremia/hypokalemia: , encourage PO intake. K 4.6 - monitor. Dispo: PT/OT/Cardiac Rehab. Working towards home w/ [place] services, hopefully today if can pass bowels.       Signed By: Mickle Severance, NP

## 2020-05-15 NOTE — PROGRESS NOTES
1930   Bedside shift change report given to Mohan Escalona RN (oncoming nurse) by Sofie Torres RN (offgoing nurse). Report included the following information SBAR, Kardex, Intake/Output, MAR and Recent Results. 0800  Bedside shift change report given to RN (oncoming nurse) by Mohan Escalona RN (offgoing nurse). Report included the following information SBAR, Kardex, Intake/Output, MAR and Recent Results.

## 2020-05-15 NOTE — DISCHARGE SUMMARY
[facility] Discharge Summary     Patient ID:  Jolie Moncada  [id number]  39 y.o.  1970    Admit date: 5/8/2020    Discharge date: 5/15/2020     Admitting Physician: Chris Babin MD     Referring Cardiologist:  Dr. Krish South    PCP:  N/a     Admitting Diagnoses: CAD    Discharge Diagnoses:     Hospital Problems  Date Reviewed: 5/7/2020          Codes Class Noted POA    S/P CABG x 4 ICD-10-CM: Z95.1  ICD-9-CM: V45.81  5/11/2020 Unknown        CAD (coronary artery disease) ICD-10-CM: I25.10  ICD-9-CM: 414.00  5/8/2020 Unknown              Discharged Condition: improved    Disposition: home, see patient instructions for treatment and plan    Procedures for this admission:  Procedure(s):  CORONARY ARTERY BYPASS GRAFTING X 4 WITH LIMA, [address], ECC, WEI AND EPIAORTIC U/S BY DR David Nolen    Discharge Medications:      My Medications      START taking these medications      Instructions Each Dose to Equal Morning Noon Evening Bedtime   amiodarone 200 mg tablet  Commonly known as:  CORDARONE    Your last dose was: Your next dose is: Take 200 mg by mouth twice daily x 2 weeks, then decrease to 200 mg by mouth once daily x 2 weeks, your then done with this medication. oxyCODONE IR 5 mg immediate release tablet  Commonly known as:  ROXICODONE    Your last dose was: Your next dose is: Take 1 Tab by mouth every six (6) hours as needed for Pain for up to 7 days. Max Daily Amount: 20 mg.   5 mg                 polyethylene glycol 17 gram packet  Commonly known as:  MIRALAX  Start taking on: May 16, 2020    Your last dose was: Your next dose is: Take 1 Packet by mouth daily. Take daily until having regular bowel movements. 17 g                 senna-docusate 8.6-50 mg per tablet  Commonly known as:  PERICOLACE    Your last dose was: Your next dose is: Take 1 Tab by mouth two (2) times a day.    1 Tab                    CHANGE how you take these medications Instructions Each Dose to Equal Morning Noon Evening Bedtime   hydroCHLOROthiazide 25 mg tablet  Commonly known as:  HYDRODIURIL  What changed:  when to take this    Your last dose was: Your next dose is: Take 1 Tab by mouth daily. 25 mg                 spironolactone 25 mg tablet  Commonly known as:  ALDACTONE  What changed:  when to take this    Your last dose was: Your next dose is: Take 1 Tab by mouth three (3) days a week. 25 mg                    CONTINUE taking these medications      Instructions Each Dose to Equal Morning Noon Evening Bedtime   aspirin delayed-release 81 mg tablet    Your last dose was: Your next dose is: Take 81 mg by mouth nightly. 81 mg                 carvediloL 6.25 mg tablet  Commonly known as:  COREG    Your last dose was: Your next dose is: Take 1 Tab by mouth two (2) times a day. 6.25 mg                 flash glucose sensor Kit  Commonly known as:  FreeStyle Eliseo 14 Day Sensor    Your last dose was: Your next dose is:          Use to check blood sugar                  insulin lispro 100 unit/mL injection  Commonly known as:  HumaLOG U-100 Insulin    Your last dose was: Your next dose is:          Via insulin pump 125 units per day max                  Intrauterine Device (IUD) Iud    Your last dose was: Your next dose is:          by IntraUTERine route. Placed in Feb 2013                  Lexapro 10 mg tablet  Generic drug:  escitalopram oxalate    Your last dose was: Your next dose is: Take 10 mg by mouth daily. 10 mg                 Mimvey 1-0.5 mg per tablet  Generic drug:  estradiol-norethindrone    Your last dose was: Your next dose is:          TAKE 1 TABLET BY MOUTH EVERY DAY                  ondansetron 4 mg disintegrating tablet  Commonly known as:  Zofran ODT    Your last dose was: Your next dose is:           Take 1 Tab by mouth every eight (8) hours as needed for Nausea. 4 mg                 rosuvastatin 20 mg tablet  Commonly known as:  Crestor    Your last dose was: Your next dose is: Take 1 Tab by mouth nightly. 20 mg                 Vitamin B-12 5,000 mcg Subl  Generic drug:  cyanocobalamin (vitamin B-12)    Your last dose was: Your next dose is:          by SubLINGual route daily. STOP taking these medications    lisinopriL 40 mg tablet  Commonly known as:  Janine Faustin              Where to Get Your Medications      These medications were sent to [facility]/pharmacy #[id number]- [address], [address] [place] [address]    Hours:  24-hours Phone:  [phone]   · amiodarone 200 mg tablet  · oxyCODONE IR 5 mg immediate release tablet  · polyethylene glycol 17 gram packet  · senna-docusate 8.6-50 mg per tablet       HPI: Cesar Edwards is a 52 y.o. female who was referred for cardiac evaluation of coronary artery disease, referred by Dr. Jose Flood. Pt's PMH includes CAD/Hx of MI/Stent(2006), DM1 w/ diabetic retinopathy, CKD3, Dyslipidemia, GERD/PUD, CVA w/ subsequent L basal ganglia bleed(2016), HTN, anxiety/depression. Pt had scheduled outpatient cardiac catheterization on 5/7/20 after chief complaint of chest pain that started about 3 weeks ago. Episodes of pain last <5 minutes, described as squeezing, worse with exertion, improves with rest.  Denies associated SOB, N/V, syncope, dizziness, or edema.       Denies tobacco or drug use. Drinks 1 glass of wine/week. Single, has significant other Deyanira Stajoselin. Lives independently w/ partner. No children. Works in [employer]. Significant positive family history for heart disease in mother and father.      Cardiac Testing     Cardiac catheterization 5/7/2020: Obstructive 3VD     LAD patent prox stent, m95; D1 90 (small)     LCx m80, d80; OM1 80 (small), OM2 90, OM3 80 (small);  OM4 80.     RCA d90  Normal LVF (EF 70%).  No AVG/MR  Patent L SC.  RFA manual     ECHO: None    Hospital Course: Pt had  CORONARY ARTERY BYPASS GRAFTING X 4 (LIMA-LAD, seq SVG-OM-OM, SVG-PDA) RLE EVH on 5/11/20 by DR. Treviño. See operative note for full details. Pt was transferred to ICU in stable condition on the following drips: Precedex, Insulin and Bryan. Pt was extubated on 5/11/20 at 1747. POD#4:  1. CAD/Hx of MI/Stent x 1 2006, s/p CABG: on asa, statin, coreg.      2. DM1 w/ diabetic retinopathy: wears SQ insulin pump.  Hgba1c 7.8.  Diabetes health following. Back on home SQ insulin pump. Will cross check BS ACHS.       3. CVA w/ subsequent L basal ganglia bleed 2006: on asa/statin, strict BP control.      4. GERD/PUD: More historically when she was on plavix.  pepcid while inpt.      5. Dyslipidemia: Continue statin     6. CKD3:  Followed by Dr. Cory Banks.  Monitor labs. Creatinine improved. Continue to monitor. Avoid hypotension and nephrotoxic agents.     7. HTN: Cont coreg.   Resume home meds as BP allows. (PTA: coreg, aldactone, lisinopril.)     8. Anxiety/depression: Continue lexapro.      9. Recent open umbilical hernia repair w/ mesh: in 1/2020.  Resolved.      10. Elevated tbili/LFTs: Monitor     11. Atelectasis: small left pneumo after CT removal 5/14. CXR this AM. Significant LLL Atelectasis. on RA. IS and activity as tolerated.      12. Nausea/vomiting: PRN antiemetics. Stop Amio drip (will continue oral at decreased dose). On cardiac diet. No BM since surgery -- passing flatus. completed reglan x 2 doses. Try supp today. On pericolace, miralax.      13. GI/DVT prophylaxis: Pepcid,      14. Hyponatremia/hypokalemia: , encourage PO intake. K 4.6 - monitor.      Dispo: PT/OT/Cardiac Rehab. Working towards home w/ [facility] services, hopefully today if can pass bowels.         Referral to outpatient cardiac rehab made.      Discharge Vital Signs:   Visit Vitals  /55 (BP 1 Location: Right arm, BP Patient Position: Sitting)   Pulse 75   Temp 98.8 °F (37.1 °C)   Resp 16   Ht 5' 5\" (1.651 m)   Wt 170 lb 13.7 oz (77.5 kg)   SpO2 97%   BMI 28.43 kg/m²       Labs:   Recent Labs     05/15/20  1147  05/15/20  0421  05/14/20  0244   WBC  --   --  10.0  --  10.9   HGB  --   --  10.4*  --  10.1*   HCT  --   --  31.5*  --  31.2*   PLT  --   --  269  --  196   NA  --   --  133*  --  132*   K  --   --  4.6  --  3.6   BUN  --   --  28*  --  23*   CREA  --   --  1.37*  --  1.41*   GLU  --   --  101*  --  96   GLUCPOC 230*   < >  --    < >  --    INR  --   --   --   --  1.0    < > = values in this interval not displayed. Diagnostics:   CXR Results  (Last 48 hours)               05/15/20 0433  XR CHEST PORT Final result    Impression:  IMPRESSION:    1. No residual pneumothorax visualized. 2.  Unchanged linear left lung base opacity most consistent with atelectasis or   scarring. Narrative:  EXAM:  XR CHEST PORT       INDICATION: postop heart       COMPARISON: One day prior. TECHNIQUE: Single frontal view of the chest.       FINDINGS: No definite residual pneumothorax is visualized on the left. Lungs are   hypoventilatory with a persistent linear left lung base opacity, favored to be   atelectasis. No evidence of pleural effusion. Stable enlarged cardiomediastinal   silhouette status post median sternotomy. 05/14/20 1509  XR CHEST PORT Final result    Impression:  IMPRESSION: Left chest tube has been removed. There is a small left pneumothorax   involving the left upper and mid thorax. Shaaron Money Results called to the ICU by myself. Narrative:  EXAM:  XR CHEST PORT       INDICATION:  difficult CT Removal, please do CXR at 3pm,       COMPARISON:  5/14/2020       FINDINGS: A portable AP radiograph of the chest was obtained at 1441 hours. Left   chest tube has been removed. There is suspicion of a pneumothorax involving the   left upper and mid thorax laterally. .. There is minor left basilar   atelectasis/scarring. Left lung is clear. Cardiomegaly is stable. [place] Bound 05/14/20 0454  XR CHEST PORT Final result    Impression:  IMPRESSION: No pneumothorax. Persistent left perihilar subsegmental atelectasis       Narrative:  EXAM: XR CHEST PORT       INDICATION: postop heart       COMPARISON: Prior day and multiple additional dating back to 5/11/2020       FINDINGS: A portable AP radiograph of the chest was obtained at 0 349 hours. The   patient is on a cardiac monitor. There are bilateral chest tubes and mediastinal   tubes in place without a pneumothorax. Minimal left perihilar subsegmental   atelectasis is presentThe cardiac and mediastinal contours and pulmonary   vascularity are stable. The bones and soft tissues are grossly within normal   limits. Patient Instructions/Follow Up Care:  Discharge instructions were reviewed with the patient and family present. Questions were also answered at this time. Prescriptions and medications were reviewed. The patient has a follow up appointment with the Nurse Practitioner or Physician's Assistant on 5/21/20 at 1pm and 6/9/20 at 115pm. The patient was also instructed to follow up with her primary care physician as needed. The patient and family were encouraged to call with any questions or concerns.        Signed:  Lawyer Angel NP  5/15/2020  10:34 AM

## 2020-05-15 NOTE — PROGRESS NOTES
NUTRITION  Reason for Rescreen: LOS        RECOMMENDATIONS:   Continue current diet  Provide to review when moderate ETOH used (occassional glass wine at night can be resumed at home) - pt questioning during visit    Interventions   - added supplements/snacks: Ensure High Protein to try       Information obtained from:   patient  Past Medical History:   Diagnosis Date    Adverse effect of anesthesia     difficult to wake    Chronic kidney disease 1989    Dr Alexander Guzman, stage 3 Proteinuria    CKD (chronic kidney disease) stage 3, GFR 30-59 ml/min [facility] [address]     Coronary artery disease 8/26/2011    with silent MI in 2006 and s/p one stent    Diabetes mellitus type 1 ([facility] [address] 8/26/2011    Age 7, Insulin Pump    Diabetic retinopathy ([facility] [address]     Dyslipidemia 8/26/2011    GERD (gastroesophageal reflux disease)     History of peptic ulcer disease 8/26/2011    from plavix    Hypertension     denies states takes BP meds to reduce protein in kidneys due to kidney disease    PUD (peptic ulcer disease)     Stroke ([facility] [address] 2016    hx L basal ganglia bleed       Pt s/p CABG x 4 on 5/10. Diet advanced and toelrating well with no issues. On inuslin pump with good mgmt. Constipation with no BM since surgery but bowel regimen in place. Pt visited today, 100% B consumed. Has already been following low sodium diet at home with no questions. Did have questions on when she can resume an occassional glass of red wine. Discussed protein supplements as needed and will add Ensure High Protein (160kcal, 16g protein) for pt to try. Will rescreen per policy.      Diet:  consistent CHO, 2gm sodium  Supplements: None  Meal intake:   Patient Vitals for the past 168 hrs:   % Diet Eaten   05/14/20 1150 100 %   05/14/20 0804 100 %   05/13/20 2002 100 %   05/13/20 1200 50 %   05/13/20 0900 50 %   05/12/20 1236 25 %   05/12/20 0900 25 %   05/10/20 1018 0 %   05/09/20 1900 75 %   05/09/20 0929 100 %   05/08/20 1856 75 %   05/08/20 1113 50 % Weight Changes:   Gain  Wt Readings from Last 10 Encounters:   05/15/20 77.5 kg (170 lb 13.7 oz)   05/07/20 72.3 kg (159 lb 6.3 oz)   03/18/20 74.8 kg (165 lb)   01/06/20 76.5 kg (168 lb 9.6 oz)   12/18/19 72.8 kg (160 lb 7.9 oz)   12/12/19 75.5 kg (166 lb 6.4 oz)   12/11/19 74.8 kg (164 lb 14.5 oz)   11/13/19 75.5 kg (166 lb 6.4 oz)   09/11/19 71.9 kg (158 lb 9.6 oz)   04/16/19 73.5 kg (162 lb)       Nutrition-Related Concerns Identified:  None    Estimated Nutrition Needs:   Kcals/day: 2032 Kcals/day(1446-1590kcal)  Protein: 72 g(72-86g (1-1.2g/kg))  Fluid: 1500 ml(1ml/kcal)  Based On: Kcal/kg - specify (Comment)(20-22kcal/kg)  Weight Used: UBW(72.3kg)    PLAN:   - Check for acceptance of supplements    Will revisit per policy    Pam Keating RD  [facility], Pager #[phone] or via PerfectServe

## 2020-05-15 NOTE — PROGRESS NOTES
Transitions of Care Plan:      RUR: 16%      Patient s/p CABG POD x 4      Disposition: Home with [address] today; family to transport       CM updated by treatment team - anticipate discharge this afternoon. Patient set up with [facility] for home health s/p CABG. CM notified RN liaison with [facility] of patient's anticipated discharge this afternoon. Family will transport. Patient cleared for discharge from CM standpoint.     Marge Griffith, MPH

## 2020-05-15 NOTE — CARDIO/PULMONARY
Cardiac Rehab: To preserve PPE (now required for all staff in patient areas) and to prevent possible risk of COVID-19 exposure, patient was educated over the phone. Reinforced all post op instructions, reinforcing move in the tube guidelines. She has her CABG education folder and her bedside nurse will be placing red reminder bracelet on her wrist today as she is for discharge. [facility] is on her AVS for cardiac rehab and referral has been sent. Pt happy to be going home and had no questions.

## 2020-05-15 NOTE — PROGRESS NOTES
0730 Bedside shift change report given to Maverick Colon RN (oncoming nurse) by Cara Landry RN (offgoing nurse). Report included the following information SBAR, Kardex, Intake/Output, MAR and Recent Results. 1435 Discharge medications reviewed with patient and appropriate educational materials and side effects teaching were provided. I have reviewed discharge instructions with the patient. The patient verbalized understanding.

## 2020-05-15 NOTE — DISCHARGE INSTRUCTIONS
Cardiac Surgery Specialist    [address] [place] [address] [address] [address]  Office- [phone]  Fax- [phone]       Office- [phone]  Fax- [phone]  _____________________________________________________________  Dr. Margo Gaspar [facility]   Joyce Jessica, [facility]  DARIO Miranda PA-C, PA-C, DARIO Rangel, AGPCNP  ______________________________________________________________    Amelia Romero     Surgery & Date: Procedure(s):  CORONARY ARTERY BYPASS GRAFTING X 4 WITH LIMA, [address], ECC, WEI AND EPIAORTIC U/S BY  [facility]    Discharge Date: 5/15/2020     MEDICATIONS:  Please refer to your After Visit Summary for your medication list. If you do not have a prescription for a new medication, you may purchase the medication over the counter. DO NOT TAKE ANY MEDICATIONS THAT ARE NOT ON THIS LIST    INSTRUCTIONS:  1. NO SMOKING OR TOBACCO PRODUCTS  2. Follow all the instructions in your discharge book  3. You may shower. Wash all incisions twice daily with mild soap and water. No lotions, ointments or powder. 4. Call the office immediately for any redness, swelling, or drainage from your incision. 5. Take your temperature daily and call for a temperature of 101 degrees or higher or for any symptoms that make you think you have and infection. 6. Weigh yourself each morning. Call if you gain more than 5 pounds in 48 hours. 7. Use the incentive spirometer 6-8 times a day-10 breaths each time. Use a pillow or your bear to splint your breastbone when coughing or sneezing. 8. If you feel your breast bone clicking or popping, notify the office immediately.    9. Walk several hundred feet several times daily. DIET  Eat an American Heart Association diet. If you are having trouble with your appetite, eat what you can. Try eating small, frequent meals throughout the day. ACTIVITY  1. NO DRIVING--you will be evaluated to drive at your follow up visit. 2. Increase your activity by walking several times a day. Stay out of bed most of the day. 3. When sitting, keep your legs elevated. 4. You may ride in a car, but you must get out every hour and walk around. If you ride in a car with an airbag that can not be switched off, put the seat ALL the way back or ride in the back seat. 6. Any load bearing activity can be performed as long as it can be performed \"in the tube\". You can reach \"out of the tube\" when performing activities that don't require heavy lifting. Let pain be your guide, your pain level will keep you from doing anything extreme. FOLLOW UP  1. Your first follow up appointment will be on 5/21/20 at 1pm(by telephone). Our office is located in [address] [place] [address]. Your second follow up appointment will be in four weeks, on 6/9/20 at 115pm. Please call our office at [phone] if you are unable to make either one of these appointments. 2. You will be receiving a call before your 5 day appointment to begin cardiac rehab. They are located in the [address] [place] [address] on [facility]. Their phone number is [phone]. Please call if you have not been contacted 2-3 weeks after discharge from the hospital.  3. We will make an appointment with your cardiologist at your last appointment. 4. Consult you primary care physician regarding your influenza &   pneumovax vaccines. 5.   Please bring all medications with you to your appointment.     Signature:___________________________________________________

## 2020-05-15 NOTE — PROGRESS NOTES
Problem: Self Care Deficits Care Plan (Adult)  Goal: *Acute Goals and Plan of Care (Insert Text)  Description: FUNCTIONAL STATUS PRIOR TO ADMISSION: Patient was independent and active without use of DME. Patient reports she commutes an hour and a half to work daily for [employer], however patient has not been to work since March and she reports she has only been walking 30 feet a couple time a day in her home. HOME SUPPORT: The patient lived with boyfriend but did not require assist. Patient also reports she has a glover retriever. Occupational Therapy Goals  Initiated 5/12/2020  1. Patient will perform ADLs standing 5 mins without fatigue or LOB with supervision/set-up within 5 day(s). 2.  Patient will perform lower body ADLs with supervision/set-up within 5 day(s). 3.  Patient will perform gathering ADL items high and low 2/2 with supervision/set-up within 5 day(s). 4.  Patient will perform toilet transfers with supervision/set-up within 5 day(s). 5.  Patient will perform all aspects of toileting with supervision/set-up within 5 day(s). 6.  Patient will participate in cardiac/sternal upper extremity therapeutic exercise/activities to increase independence with ADLs with supervision/set-up for 5 minutes within 5 day(s). Outcome: Progressing Towards Goal       OCCUPATIONAL THERAPY TREATMENT  Patient: Payton Kumar (15 y.o. female)  Date: 5/15/2020  Diagnosis: CAD (coronary artery disease) [I25.10]   <principal problem not specified>  Procedure(s) (LRB):  CORONARY ARTERY BYPASS GRAFTING X 4 WITH LIMA, RESVGH, ECC, WEI AND EPIAORTIC U/S BY DR Kiera Landry (N/A) 4 Days Post-Op  Precautions: Fall, Sternal(move in the tube)  Chart, occupational therapy assessment, plan of care, and goals were reviewed. ASSESSMENT  Patient continues with skilled OT services and is progressing towards goals. Pt with anticipated discharge home today. Met with pt and reviewed UB/LB dressing as well as bathing.  Also reviewed importance of pacing herself and not over exerting due to decreased cardiopulmonary endurance. Pt asked appropriate questions and is doing well from OT standpoint. Pt has [facility] services set up for her at discharge. Patient is verbalizing and is demonstrating understanding of mindful-based movements (\"move in the tube\") principles of keeping UEs proximal to ribcage to prevent lateral pull on the sternum during load-bearing activities with out cues required for compliance. Current Level of Function Impacting Discharge (ADLs): supervision to mod I    Other factors to consider for discharge: lives with boyfriend         PLAN :  Patient continues to benefit from skilled intervention to address the above impairments. Continue treatment per established plan of care. to address goals. Recommend with staff: OOB to chair for meals, ambulate in hallway, ADLs    Recommend next OT session: none    Recommendation for discharge: (in order for the patient to meet his/her long term goals)  Occupational therapy at least 2 days/week in the home     This discharge recommendation:  Has been made in collaboration with the attending provider and/or case management    IF patient discharges home will need the following DME: none       SUBJECTIVE:   Patient stated I just want to do everything right.     OBJECTIVE DATA SUMMARY:   Cognitive/Behavioral Status:                      Functional Mobility and Transfers for ADLs:  Bed Mobility:   OOB in chair    Transfers:             ADL Intervention:        Pt received in chair, educated on ADLs post heart sx and pt indicated understanding. Patient instructed on the ability to utilize upper extremities outside the tube when doing any non-load bearing activity such as washing hair/body, brushing teeth, retrieving clothing items, or scratching your back.  Patient encouraged to also perform upper extremity exercises \"outside of the tube\" to prevent scar tissue formation around sternal incision site. Patient instructed in detail about activities to heed with caution, allowing pain to be the guide. These activities include but are not limited to: mowing the lawn, riding a bike, walking a dog, lifting a child, workshop hobbies, golfing, sexual activity, vacuuming, fishing, scrubbing the floors, and moving furniture. Patient was given the [address] [employer] [place] handout to describe each of these activities in detail. Patient instructed no asymmetrical reaching over head to ensure B UEs when shoulders >90* i.e. reaching in cabinets and dressing. Instruction on upper body dressing techniques of over head, then arms through to decrease pain and unilateral shoulder flexion >90*. Instruction on the benefits of utilizing B UEs during functional tasks i.e. opening the fridge, stepping into the tub. Instruction if continued pain at home with shoulder IR for BM hygiene can use wet wipes and toilet tongs PRN. Avoid valsalva maneuvers. May have to adjust home setup to increase ease with items closer to waist height to prevent deep bending and the automatic  of asymmetrical UE WB/pushing for stabilization during bending. Benefit to don clothing tailor sitting and don all clothing while sitting prior to standing. Patient demonstrated lower body dressing seated with Modified independent. Instruction and indicated understanding on the benefits of loose clothing throughout to accommodate for post surgical swelling, decreased ROM and increased pain. Instruction and indicated understanding the technique of pull over shirt versus front open clothing. Increase activity tolerance for home, work, and sexual intercourse by pacing self with increasing the arm exercises, sitting duration, frequency OOB, walking, standing, and ADLs. Instructed and indicated understanding of s/s of too much activity, how to respond to s/s safely.           Pain:  none    Activity Tolerance: Good  Please refer to the flowsheet for vital signs taken during this treatment. After treatment patient left in no apparent distress:   Sitting in chair    COMMUNICATION/COLLABORATION:   The patients plan of care was discussed with: Physical therapist and Registered nurse.      Jesse Medeiros OT  Time Calculation: 17 mins

## 2020-05-16 ENCOUNTER — HOME CARE VISIT (OUTPATIENT)
Dept: SCHEDULING | Facility: HOME HEALTH | Age: 50
End: 2020-05-16
Payer: COMMERCIAL

## 2020-05-16 VITALS
SYSTOLIC BLOOD PRESSURE: 146 MMHG | WEIGHT: 168.87 LBS | DIASTOLIC BLOOD PRESSURE: 72 MMHG | HEIGHT: 65 IN | HEART RATE: 78 BPM | RESPIRATION RATE: 18 BRPM | BODY MASS INDEX: 28.14 KG/M2 | TEMPERATURE: 98.3 F | OXYGEN SATURATION: 93 %

## 2020-05-16 PROCEDURE — G0299 HHS/HOSPICE OF RN EA 15 MIN: HCPCS

## 2020-05-16 PROCEDURE — 400013 HH SOC

## 2020-05-16 NOTE — OP NOTES
[address]  OPERATIVE REPORT    Name:  Manfred Barnes  MR#:  [id number]  :  1970  ACCOUNT #:  [de-identified]  DATE OF SERVICE:  2020      PREOPERATIVE DIAGNOSIS:  Multivessel coronary artery disease. POSTOPERATIVE DIAGNOSIS:  Multivessel coronary artery disease. PROCEDURES PERFORMED:  1. Coronary artery bypass graft x4 (left internal mammary artery to left anterior descending; saphenous vein graft to obtuse marginal to obtuse marginal; saphenous vein graft to posterior descending artery). 2.  Endoscopic vein harvesting, right lower extremity. SURGEON:  Clark Palencia MD; ; DARIO assistance was needed due to difficulty of procedure, dissection, and identification of pertinent anatomy throughout the case       ASSISTANT:  IRVING Goss    ANESTHESIA:  General endotracheal anesthesia. COMPLICATIONS:  None. SPECIMENS REMOVED:  None. IMPLANTS:  None. ESTIMATED BLOOD LOSS:  50 mL. INDICATIONS:  The patient is a very pleasant 43-year-old woman, recently diagnosed with multivessel coronary artery disease. She is now being brought to the operating room to undergo coronary artery bypass grafting. PROCEDURE:  The patient was brought to the operating room, had a right radial A-line placed without complications, [place] catheter placed without complications, general endotracheal anesthesia without complications. Chest, abdomen, and lower extremities were prepped and draped in the usual sterile fashion. A midline incision was made on the patient's sternum. Cautery dissection was used to dissect down to the level of sternal bone and the sternal bone with a sagittal saw and left pleural space was entered. During this portion of the procedure, endoscopic vein harvesting was performed on the right lower extremity without complication.   Once the left pleural space was entered, the left internal mammary artery was carefully dissected off the chest wall, taking care to place proximal clips on each portion of the internal mammary branch. Once this was completely dissected off the chest wall, an appropriate dose of heparin was given. Three minutes passed. Two large distal clips were placed and the artery was cut off the chest wall. There was good flow at this stage. Next, the pericardium was opened and pursestring sutures were applied. Once the ACT was above 460, an epiaortic ultrasound was performed, which did not identify any significant atherosclerotic plaque in the ascending aorta. We then placed 2 pursestring sutures in the ascending aorta and cannulated with a normal aortic perfuser. This was deaired and hooked up to bypass circuit. We then made a small V in the pericardium and matured the left internal mammary artery to a length of appropriate size up to the left anterior descending artery. We then placed a pursestring suture in the right atrium, placed a two-stage venous cannula. This was deaired and hooked up to the bypass circuit. We went on with cardiopulmonary bypass, placed an antegrade cardioplegia needle, deaired the cardioplegia line, and hooked up to the cannula, which was cross-clamped, pumped back up again. We gave 750 mL of initial cardioplegia, however, required subsequently 400 mL. We then tilted up the heart and dissected out the OM3 vessel with a 69 blade. We decided to further extend the arteriotomy with forward and reverse Hunt. We then performed the saphenous vein graft to OM3 anastomosis with 7-0 Prolene suture. We then made a similar anastomosis to the OM1 vessel and then brought the vein graft length to appropriate size and similarly cut it. We then dissected out the PDA vessel with a 69 blade. We decided to further extend the arteriotomy with forward and reverse Hunt. We then performed the saphenous vein graft to PDA anastomosis with 7-0 Prolene suture.   We then measured the vein graft length to appropriate size and similarly cut it. We then gave additional dose of cardioplegia. We then dissected out the left anterior descending artery with a 69 blade. We decided to further extend the arteriotomy with forward and reverse Hunt. We then performed the LIMA to LAD anastomosis with 8-0 Prolene suture. We then made 2 small nicks in the ascending aorta, used 4-punch x2, and performed proximal anastomosis to the right and left side grafts. Care was taken to de-air the ascending aorta before tying it down. We then brought the head of the bed up, pumped the flow down, removed the cross-clamp, pumped it back up again. Once the temperature reached 36.2, we successfully came off the coronary artery bypass. We measured flows in all the grafts and they were excellent. A and V wires were placed and AC locators and Hung drains. Wires were used to close the sternum and Vicryl sutures. I was present for the entire procedure.          Mildred Wills MD      [id number]  D:  05/16/2020 8:59  T:  05/16/2020 11:41  JOB #:  [id number]

## 2020-05-18 ENCOUNTER — HOME CARE VISIT (OUTPATIENT)
Dept: SCHEDULING | Facility: HOME HEALTH | Age: 50
End: 2020-05-18
Payer: COMMERCIAL

## 2020-05-18 PROCEDURE — G0300 HHS/HOSPICE OF LPN EA 15 MIN: HCPCS

## 2020-05-19 ENCOUNTER — TELEPHONE (OUTPATIENT)
Dept: CASE MANAGEMENT | Age: 50
End: 2020-05-19

## 2020-05-19 VITALS
RESPIRATION RATE: 19 BRPM | OXYGEN SATURATION: 98 % | BODY MASS INDEX: 28.32 KG/M2 | WEIGHT: 170.19 LBS | TEMPERATURE: 97.8 F | SYSTOLIC BLOOD PRESSURE: 110 MMHG | DIASTOLIC BLOOD PRESSURE: 78 MMHG | HEART RATE: 66 BPM

## 2020-05-19 NOTE — TELEPHONE ENCOUNTER
Cardiac Surgery Discharge - Follow up call placed to [facility]. Left voicemail and contact information.  Osmel Ramsey, RN

## 2020-05-19 NOTE — TELEPHONE ENCOUNTER
Cardiac Surgery Care Coordinator- Received return call from Arvind Little, reviewed plan of care after discharge. Answered multiple questions and encouraged her to verbalize. Confirmed follow up appt for Thursday.  Matias Evans RN

## 2020-05-20 ENCOUNTER — HOME CARE VISIT (OUTPATIENT)
Dept: SCHEDULING | Facility: HOME HEALTH | Age: 50
End: 2020-05-20
Payer: COMMERCIAL

## 2020-05-20 PROCEDURE — G0300 HHS/HOSPICE OF LPN EA 15 MIN: HCPCS

## 2020-05-21 ENCOUNTER — OFFICE VISIT (OUTPATIENT)
Dept: CARDIOLOGY CLINIC | Age: 50
End: 2020-05-21

## 2020-05-21 ENCOUNTER — HOME CARE VISIT (OUTPATIENT)
Dept: HOME HEALTH SERVICES | Facility: HOME HEALTH | Age: 50
End: 2020-05-21
Payer: COMMERCIAL

## 2020-05-21 DIAGNOSIS — Z95.1 S/P CABG X 4: Primary | ICD-10-CM

## 2020-05-21 NOTE — PROGRESS NOTES
Patient: Arjun Malone   Age: 52 y.o. Patient Care Team:  Hansel Vu MD as PCP - General (Family Practice)  Hansel Vu MD as PCP - [facility] Provider  Daquan Chou MD (Nephrology)  Becki Clancy MD (Obstetrics & Gynecology)  Crystal Rachel MD as Physician (Cardiology)  Shelly Moctezuma MD (Neurology)  David Finney MD (Orthopedic Surgery)  Cosme Isidro MD as Surgeon (General Surgery)  Magalie Ferreira MD (Cardiothoracic Surgery)    Diagnosis: The encounter diagnosis was S/P CABG x 4. Problem List:   Patient Active Problem List   Diagnosis Code    Coronary artery disease I25.10    Dyslipidemia E78.5    Diabetes mellitus type 1 ([facility] [address] [facility] E10.9    History of peptic ulcer disease Z87.11    Stroke (cerebrum) ([facility] [address] I63.9    Nontraumatic acute hemorrhage of basal ganglia (HCC) I61.9    Unstable angina (HCC) I20.0    CAD (coronary artery disease) I25.10    S/P CABG x 4 Z95.1        This service was provided thru telehealth, between RENAY Hardin at Cardiac Surgery Specialist's office and Arjun Malone at her home. Surgery: CORONARY ARTERY BYPASS GRAFTING X 4 (LIMA-LAD, seq SVG-OM-OM, SVG-PDA) Critical access hospital on 5/11/20     HPI:  Pt's post op follow up via telephone call. Pt reports daily improvement in activity and pain. She actually feels afraid to over do it but feels very good. Denies CP, dizziness, SOB. Had mild LE edema in R EVH leg, but resolved with elevation. Only using tylenol PRN. BS great with insulin pump, all < 150. Weighing daily. BM regular. Has ordered supportive bra. Incisions healing well. BP well controlled by [place] checks. BP: 126/60 By [place]. Current Medications:   Current Outpatient Medications   Medication Sig Dispense Refill    amiodarone (CORDARONE) 200 mg tablet Take 200 mg by mouth twice daily x 2 weeks, then decrease to 200 mg by mouth once daily x 2 weeks, your then done with this medication.  42 Tab 0    carvediloL (COREG) 6.25 mg tablet Take 1 Tab by mouth two (2) times a day. 90 Tab 3    flash glucose sensor (FREESTYLE RICCI 14 DAY SENSOR) kit Use to check blood sugar 6 Kit 3    insulin lispro (HUMALOG U-100 INSULIN) 100 unit/mL injection Via insulin pump 125 units per day max 100 mL 3    rosuvastatin (CRESTOR) 20 mg tablet Take 1 Tab by mouth nightly. 90 Tab 3    MIMVEY 1-0.5 mg per tablet TAKE 1 TABLET BY MOUTH EVERY DAY  11    aspirin delayed-release 81 mg tablet Take 81 mg by mouth nightly.  hydroCHLOROthiazide (HYDRODIURIL) 25 mg tablet Take 1 Tab by mouth daily. (Patient taking differently: Take 25 mg by mouth nightly.) 14 Tab 0    cyanocobalamin, vitamin B-12, (VITAMIN B-12) 5,000 mcg subl by SubLINGual route daily.  spironolactone (ALDACTONE) 25 mg tablet Take 1 Tab by mouth three (3) days a week. (Patient taking differently: Take 25 mg by mouth daily. ) 15 Tab 0    escitalopram (LEXAPRO) 10 mg tablet Take 10 mg by mouth daily.  oxyCODONE IR (ROXICODONE) 5 mg immediate release tablet Take 1 Tab by mouth every six (6) hours as needed for Pain for up to 7 days. Max Daily Amount: 20 mg. 28 Tab 0    ondansetron (ZOFRAN ODT) 4 mg disintegrating tablet Take 1 Tab by mouth every eight (8) hours as needed for Nausea. 10 Tab 0       Vitals: There were no vitals taken for this visit. N/a - d/t telephone visit. Allergies: is allergic to erythromycin. Physical Exam: N/a -d/t telephone visit    Assessment/Plan:   1. CAD/Hx of MI/Stent x 1 2006, s/p CABG: on asa, statin, coreg.      2. DM1 w/ diabetic retinopathy:  Hgba1c 7.8.  on home SQ insulin pump.   Managed by endocrinologist.       3. CVA w/ subsequent L basal ganglia bleed 2006: on asa/statin, strict BP control.      4. GERD/PUD: More historically when she was on plavix.       5. Dyslipidemia: Continue statin     6. CKD3:  Followed by Dr. Lindell Snellen. Avoid hypotension and nephrotoxic agents.     7. HTN: Cont coreg, HTCZ aldactone. Holding lisinopril until BP requires.     8. Anxiety/depression: Continue lexapro.      9. Recent open umbilical hernia repair w/ mesh: in 1/2020.  Resolved.     FU in 2-3 weeks. Pt is ready to start cardiac rehab.      Rehab - when reopens  Walking: yes  Glucometer: yes  Antibiotic card for valves: n/a

## 2020-05-22 ENCOUNTER — HOME CARE VISIT (OUTPATIENT)
Dept: SCHEDULING | Facility: HOME HEALTH | Age: 50
End: 2020-05-22
Payer: COMMERCIAL

## 2020-05-22 VITALS
SYSTOLIC BLOOD PRESSURE: 149 MMHG | TEMPERATURE: 98.4 F | DIASTOLIC BLOOD PRESSURE: 69 MMHG | OXYGEN SATURATION: 96 % | RESPIRATION RATE: 18 BRPM | HEART RATE: 63 BPM

## 2020-05-22 PROCEDURE — G0299 HHS/HOSPICE OF RN EA 15 MIN: HCPCS

## 2020-05-24 VITALS
SYSTOLIC BLOOD PRESSURE: 120 MMHG | HEART RATE: 73 BPM | TEMPERATURE: 97.7 F | RESPIRATION RATE: 17 BRPM | DIASTOLIC BLOOD PRESSURE: 66 MMHG | OXYGEN SATURATION: 99 % | BODY MASS INDEX: 27.92 KG/M2 | WEIGHT: 167.77 LBS

## 2020-05-25 ENCOUNTER — HOME CARE VISIT (OUTPATIENT)
Dept: SCHEDULING | Facility: HOME HEALTH | Age: 50
End: 2020-05-25
Payer: COMMERCIAL

## 2020-05-25 PROCEDURE — G0300 HHS/HOSPICE OF LPN EA 15 MIN: HCPCS

## 2020-05-28 VITALS
BODY MASS INDEX: 26.93 KG/M2 | DIASTOLIC BLOOD PRESSURE: 60 MMHG | OXYGEN SATURATION: 99 % | RESPIRATION RATE: 17 BRPM | SYSTOLIC BLOOD PRESSURE: 110 MMHG | TEMPERATURE: 97.1 F | HEART RATE: 73 BPM | WEIGHT: 161.82 LBS

## 2020-05-29 ENCOUNTER — HOME CARE VISIT (OUTPATIENT)
Dept: HOME HEALTH SERVICES | Facility: HOME HEALTH | Age: 50
End: 2020-05-29
Payer: COMMERCIAL

## 2020-05-29 ENCOUNTER — HOME CARE VISIT (OUTPATIENT)
Dept: SCHEDULING | Facility: HOME HEALTH | Age: 50
End: 2020-05-29
Payer: COMMERCIAL

## 2020-05-29 VITALS
TEMPERATURE: 98.4 F | OXYGEN SATURATION: 99 % | RESPIRATION RATE: 18 BRPM | DIASTOLIC BLOOD PRESSURE: 74 MMHG | SYSTOLIC BLOOD PRESSURE: 145 MMHG | HEART RATE: 68 BPM

## 2020-05-29 PROCEDURE — G0299 HHS/HOSPICE OF RN EA 15 MIN: HCPCS

## 2020-06-05 ENCOUNTER — VIRTUAL VISIT (OUTPATIENT)
Dept: ENDOCRINOLOGY | Age: 50
End: 2020-06-05

## 2020-06-05 DIAGNOSIS — E10.42 TYPE 1 DIABETES MELLITUS WITH DIABETIC POLYNEUROPATHY (HCC): Primary | ICD-10-CM

## 2020-06-05 NOTE — PROGRESS NOTES
Chief Complaint   Patient presents with    Diabetes     pcp and pharmacy verified. DOXY. ME. eye exam UTD            **THIS IS A VIRTUAL VISIT VIA A VIDEO ENCOUNTER. PATIENT AGREED TO HAVE THEIR CARE DELIVERED OVER VIDEO IN PLACE OF THEIR REGULARLY SCHEDULED OFFICE VISIT**      History of Present Illness: Demetri Mejia is a 52 y.o. female here for follow up of Type 1 diabetes. She was diagnosed with DM at the age of 13. Ms Mechelle Nash sent in her [facility] [address] readings for today's visit (see scanned documents). Pt was admitted to [facility] on 5/7/20 for CP she was found to have CAD and was transferred to [facility], where she had CABG x4. On admission her A1C was 7.8%. After her CABG she was followed by the diabatic CNS who controlled her BGs well. Since coming out of the hospital her BGs have been showing a pattern of lower BGs in the morning, spiking around 8-9AM and them slowing declining between MN and 6AM.  Pt notes her portions have been smaller and she is eatting less. She notes that she was having some HS BGs in the 100's range so she was cutting her HS basal rate down, because she was having some overnight BGs in the 50. Pt also notes that with the low BGs she finds herself not bolusing sufficently for what she is eating. She has been using more correction and not enough meal time bolus. Her current Pump Setting are as follows (Medtronic Pump)  Basal  MN-3AM 2.10  3AM-530AM 1.95  530AM-Noon 2.25  Noon--6PM: 1.9  6PM-930PM 2.25  930PM-MN 2.25  Carb Ratio  MN-9AM 7.0  9AM-6PM 7.0  6PM-MN 7.0  Correction  1: 20    She was diagnosed with being in Menopause in September 2019. She notes that she was having issues of hot flashes. She is now on HRT by her Gyn Dr. Jordy Braden    She changes her infusion site every 3 days. A typical day is as follows:  Pt wakes at 530-730AM  - breakfast: She has breakfast around 9AM, this AM she had yogurt, obdulia seed, walnuts and water.     - She denies snacking during the mid-morning  - lunch: She has lunch around 2PM, yesterday she had thin crust pizza, two slices. - She denies mid-afternoon snacking.  - dinner: She has dinner around 7-8PM, last night she had a hamburger, macaroni salad, baked beans and red wine.  -She goes to bed around 9-10PM    Exercise consists of she has been walking 3 times per day. \"I had a silent heart attack at the age of 41 (2006) and had a stent placed. She had the CABG x4 in May 2020. She follows with Dr. Christiano Worthington of Cardiology. After the CABG she was started on Amiodarone. She notes that once the prescription runs out, this will be D/C'ed and she will restart her Lisinopril. She had a TIA in September 2016. She follows a Neurologist (Dr. Valarie Andre). Pt has hx of Neuropathy. She has had proteinuria since the age of 18. She is followed by Dr. Shila Pickering Nephrology. Last eye exam was November 2019. Pt has hx of Retinopathy (since [facility]) and cataracts. Current Outpatient Medications   Medication Sig    amiodarone (CORDARONE) 200 mg tablet Take 200 mg by mouth twice daily x 2 weeks, then decrease to 200 mg by mouth once daily x 2 weeks, your then done with this medication. (Patient taking differently: Taking 1 daily)    carvediloL (COREG) 6.25 mg tablet Take 1 Tab by mouth two (2) times a day.  flash glucose sensor (FREESTYLE RICCI 14 DAY SENSOR) kit Use to check blood sugar    insulin lispro (HUMALOG U-100 INSULIN) 100 unit/mL injection Via insulin pump 125 units per day max    rosuvastatin (CRESTOR) 20 mg tablet Take 1 Tab by mouth nightly.  MIMVEY 1-0.5 mg per tablet TAKE 1 TABLET BY MOUTH EVERY DAY    aspirin delayed-release 81 mg tablet Take 81 mg by mouth nightly.  ondansetron (ZOFRAN ODT) 4 mg disintegrating tablet Take 1 Tab by mouth every eight (8) hours as needed for Nausea.  hydroCHLOROthiazide (HYDRODIURIL) 25 mg tablet Take 1 Tab by mouth daily.  (Patient taking differently: Take 25 mg by mouth nightly.)    cyanocobalamin, vitamin B-12, (VITAMIN B-12) 5,000 mcg subl by SubLINGual route daily.  spironolactone (ALDACTONE) 25 mg tablet Take 1 Tab by mouth three (3) days a week. (Patient taking differently: Take 25 mg by mouth daily.)    escitalopram (LEXAPRO) 10 mg tablet Take 10 mg by mouth daily. No current facility-administered medications for this visit. Allergies   Allergen Reactions    Erythromycin Hives     Review of Systems:  - Eyes: no blurry vision or double vision  - Cardiovascular: no chest pain  - Respiratory: no shortness of breath  - Musculoskeletal: no myalgias  - Neurological: no numbness/tingling in extremities    Physical Examination:  There were no vitals taken for this visit. - GENERAL: NCAT, Appears well nourished   - EYES: EOMI, non-icteric, no proptosis   - Ear/Nose/Throat: NCAT, no visible inflammation or masses   - CARDIOVASCULAR: no cyanosis, no visible JVD   - RESPIRATORY: respiratory effort normal without any distress or labored breathing   - MUSCULOSKELETAL: Normal ROM of neck and upper extremities observed   - SKIN: No rash on face, CABG scar healing well no drainage or erythema seen. - NEUROLOGIC:  No facial asymmetry (Cranial nerve 7 motor function), No gaze palsy   - PSYCHIATRIC: Normal affect, Normal insight and judgement       Data Reviewed:   A1C 7.8%  BG logs reviewed (see scanned documents)    Assessment/Plan:   1) DM > Based on her FreeStyle Eliseo readings after her hospitalization will make the following change to her pump setting. Basal  MN-3AM 2.10  3AM-530AM 1.95  530AM-Noon 2.25 > 2.35   Noon--6PM: 1.9  6PM-930PM 2.25  930PM-MN 2.25 > 1.95  Carb Ratio  MN-9AM 7.0  9AM-6PM 7.0  6PM-MN 7.0  Correction  1: 20  Pt encouraged to restart covering her full meals with boluses and not underbolus  Pt to continue the FreeStyle Eliseo CGM    Pt to send me her BG readings in 4 weeks.     Pt to call me if she has anymore low blood sugars    I spent 40 minutes of total time during this virtual visit with a tele-video encounter. All questions were answered and a copy of the instructions were sent to her via [employer] letter.          Copy sent to:  Dr. Carlie العلي

## 2020-06-09 ENCOUNTER — OFFICE VISIT (OUTPATIENT)
Dept: CARDIOLOGY CLINIC | Age: 50
End: 2020-06-09

## 2020-06-09 DIAGNOSIS — Z95.1 S/P CABG X 4: Primary | ICD-10-CM

## 2020-06-09 NOTE — PROGRESS NOTES
Patient: Torri Wheeler   Age: 52 y.o. Patient Care Team:  Jie Sanchez MD as PCP - General (Family Practice)  Jie Sanchez MD as PCP - [facility] Provider  Ronaldo Chou MD (Nephrology)  Rena Pickering MD (Obstetrics & Gynecology)  Vicky Stubbs MD as Physician (Cardiology)  Izabela Xavier MD (Neurology)  Chago Aparicio MD (Orthopedic Surgery)  Lamont Khan MD as Surgeon (General Surgery)  Saira Maldonado MD (Cardiothoracic Surgery)    Diagnosis: The encounter diagnosis was S/P CABG x 4. Problem List:   Patient Active Problem List   Diagnosis Code    Coronary artery disease I25.10    Dyslipidemia E78.5    Diabetes mellitus type 1 ([facility] [address] E10.9    History of peptic ulcer disease Z87.11    Stroke (cerebrum) ([facility] [address] I63.9    Nontraumatic acute hemorrhage of basal ganglia (HCC) I61.9    Unstable angina (HCC) I20.0    CAD (coronary artery disease) I25.10    S/P CABG x 4 Z95.1        This service was provided thru telehealth, between RENAY Sullivan at Cardiac Surgery Specialist's office and Torri Wheeler at her home. Surgery: CORONARY ARTERY BYPASS GRAFTING X 4 (LIMA-LAD, seq SVG-OM-OM, SVG-PDA) [facility] on 5/11/20     HPI:  Pt's post op follow up via telephone call. Doing great, taking walks 3x/day. Some soreness in left chest.  Blood sugars well controlled. Has had FU with endocrine(Dr. Alphonso Louis). Sternal incision all healed, may have small stitch slightly visible at top of incision. Denies SOB, dizziness, edema. Anxious to start cardiac rehab in a couple weeks. BP: <130/60     Current Medications:   Current Outpatient Medications   Medication Sig Dispense Refill    amiodarone (CORDARONE) 200 mg tablet Take 200 mg by mouth twice daily x 2 weeks, then decrease to 200 mg by mouth once daily x 2 weeks, your then done with this medication.  (Patient taking differently: Taking 1 daily) 42 Tab 0    carvediloL (COREG) 6.25 mg tablet Take 1 Tab by mouth two (2) times a day. 90 Tab 3    flash glucose sensor (FREESTYLE RICCI 14 DAY SENSOR) kit Use to check blood sugar 6 Kit 3    insulin lispro (HUMALOG U-100 INSULIN) 100 unit/mL injection Via insulin pump 125 units per day max 100 mL 3    rosuvastatin (CRESTOR) 20 mg tablet Take 1 Tab by mouth nightly. 90 Tab 3    MIMVEY 1-0.5 mg per tablet TAKE 1 TABLET BY MOUTH EVERY DAY  11    aspirin delayed-release 81 mg tablet Take 81 mg by mouth nightly.  ondansetron (ZOFRAN ODT) 4 mg disintegrating tablet Take 1 Tab by mouth every eight (8) hours as needed for Nausea. 10 Tab 0    hydroCHLOROthiazide (HYDRODIURIL) 25 mg tablet Take 1 Tab by mouth daily. (Patient taking differently: Take 25 mg by mouth nightly.) 14 Tab 0    cyanocobalamin, vitamin B-12, (VITAMIN B-12) 5,000 mcg subl by SubLINGual route daily.  spironolactone (ALDACTONE) 25 mg tablet Take 1 Tab by mouth three (3) days a week. (Patient taking differently: Take 25 mg by mouth daily. ) 15 Tab 0    escitalopram (LEXAPRO) 10 mg tablet Take 10 mg by mouth daily. Vitals: There were no vitals taken for this visit. N/a - d/t telephone visit. Allergies: is allergic to erythromycin. Physical Exam: N/a -d/t telephone visit    Assessment/Plan:   1. CAD/Hx of MI/Stent x 1 2006, s/p CABG: on asa, statin, coreg.      2. DM1 w/ diabetic retinopathy:  Hgba1c 7.8.  on home SQ insulin pump.   Managed by endocrinologist.       3. CVA w/ subsequent L basal ganglia bleed 2006: on asa/statin, strict BP control.      4. GERD/PUD: More historically when she was on plavix.       5. Dyslipidemia: Continue statin     6. CKD3:  Followed by Dr. Rosi Bahena. Avoid hypotension and nephrotoxic agents.     7. HTN: Cont coreg, HTCZ, aldactone. Holding lisinopril until BP requires--discuss w/ renal.     8. Anxiety/depression: Continue lexapro.      9. Recent open umbilical hernia repair w/ mesh: in 1/2020.  Resolved.     FU with cardiology in 1 month.       Pt is ready to start cardiac rehab.      Rehab - when reopens  Walking: yes  Glucometer: yes  Antibiotic card for valves: n/a

## 2020-06-14 NOTE — PROCEDURES
[address]   PULMONARY FUNCTION TEST    Name:  Nannette Sepulveda  MR#:  [id number]  :  1970  ACCOUNT #:  [de-identified]  DATE OF SERVICE:  2020      REQUESTING PHYSICIAN:  Aniket Castellanos MD    ATS criteria for reproducibility and acceptability was met. SPIROMETRY:  FEV1/FVC ratio is 83, which is normal.  FEV1 is 2.19 L, which is 65% predicted, which is reduced. FVC is 2.64 L, which is 61% predicted, which is also reduced. This suggests restrictive spirometry. Flow volume loop is normal.    INTERPRETATION:  Restrictive spirometry. Obstruction not ruled out. Clinical correlation is advised.       Tamika More MD [id number]/  D:  2020 13:21  T:  2020 16:36  JOB #:  [id number]  CC:  Aniket Castellanos MD

## 2020-06-22 ENCOUNTER — HOSPITAL ENCOUNTER (OUTPATIENT)
Dept: CARDIAC REHAB | Age: 50
Discharge: HOME | End: 2020-06-22
Payer: COMMERCIAL

## 2020-06-22 VITALS — BODY MASS INDEX: 26.82 KG/M2 | WEIGHT: 161 LBS | HEIGHT: 65 IN

## 2020-06-22 PROCEDURE — 93797 PHYS/QHP OP CAR RHAB WO ECG: CPT

## 2020-06-22 PROCEDURE — 93798 PHYS/QHP OP CAR RHAB W/ECG: CPT

## 2020-06-22 NOTE — CARDIO/PULMONARY
Cardiopulmonary Rehab Intake Note:    Met with Ms. Daryle Moores" Jorden Fife for initial cardiac pulmonary visit. . Mrs. Arron Vazquez  is a 52year old patient of Dr. Adilson Muñoz who presents with a CABG  dated 5-. History of HTN, Diabetes type !, CAD, dyslpidemia, GERD, PUD and CKD. LVEF 55-60%     Limitations to exercise are primarily related to deconditioning       She currently is not exercising at home.        She competed the 6 minute walk test on the treadmill, walking 390meters, mets 3.0, RPE 12 and RPD 0.  She denied any symptoms during his 6min walk test       Psychosocial: Pt scored 5 on PHQ9. 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 She enjoys doing anything outside for stress relief.       Patient was given an overview of cardiac rehab program, placement of electrode/leads, and rationale for program. Patient viewed the cardiac rehab DVD and an education notebook was given.       Heart rhythm on the monitor was a NSR/ST  Oxygen saturation was 97% on room air.    BMI 26.85

## 2020-06-24 ENCOUNTER — HOSPITAL ENCOUNTER (OUTPATIENT)
Dept: CARDIAC REHAB | Age: 50
Discharge: HOME | End: 2020-06-24
Payer: COMMERCIAL

## 2020-06-24 PROCEDURE — 93798 PHYS/QHP OP CAR RHAB W/ECG: CPT

## 2020-06-26 ENCOUNTER — HOSPITAL ENCOUNTER (OUTPATIENT)
Dept: CARDIAC REHAB | Age: 50
Discharge: HOME | End: 2020-06-26
Payer: COMMERCIAL

## 2020-06-26 PROCEDURE — 93798 PHYS/QHP OP CAR RHAB W/ECG: CPT

## 2020-06-29 ENCOUNTER — HOSPITAL ENCOUNTER (OUTPATIENT)
Dept: CARDIAC REHAB | Age: 50
Discharge: HOME | End: 2020-06-29
Payer: COMMERCIAL

## 2020-06-29 VITALS — BODY MASS INDEX: 26.63 KG/M2 | WEIGHT: 160 LBS

## 2020-06-29 PROCEDURE — 93798 PHYS/QHP OP CAR RHAB W/ECG: CPT

## 2020-06-29 RX ORDER — FLASH GLUCOSE SENSOR
KIT MISCELLANEOUS
Qty: 6 KIT | Refills: 3 | Status: SHIPPED | OUTPATIENT
Start: 2020-06-29 | End: 2021-05-25

## 2020-07-01 ENCOUNTER — HOSPITAL ENCOUNTER (OUTPATIENT)
Dept: CARDIAC REHAB | Age: 50
Discharge: HOME | End: 2020-07-01
Payer: COMMERCIAL

## 2020-07-01 VITALS — BODY MASS INDEX: 26.96 KG/M2 | WEIGHT: 162 LBS

## 2020-07-01 PROCEDURE — 93798 PHYS/QHP OP CAR RHAB W/ECG: CPT

## 2020-07-06 ENCOUNTER — VIRTUAL VISIT (OUTPATIENT)
Dept: ENDOCRINOLOGY | Age: 50
End: 2020-07-06

## 2020-07-06 ENCOUNTER — HOSPITAL ENCOUNTER (OUTPATIENT)
Dept: CARDIAC REHAB | Age: 50
Discharge: HOME | End: 2020-07-06
Payer: COMMERCIAL

## 2020-07-06 VITALS — BODY MASS INDEX: 26.76 KG/M2 | WEIGHT: 160.8 LBS

## 2020-07-06 DIAGNOSIS — E10.42 TYPE 1 DIABETES MELLITUS WITH DIABETIC POLYNEUROPATHY (HCC): Primary | ICD-10-CM

## 2020-07-06 PROCEDURE — 93798 PHYS/QHP OP CAR RHAB W/ECG: CPT

## 2020-07-06 NOTE — PROGRESS NOTES
Lab Results   Component Value Date/Time    Hemoglobin A1c 7.3 (H) 05/09/2020 03:22 AM    Hemoglobin A1c (POC) 8.5 03/18/2020 01:45 PM   '

## 2020-07-06 NOTE — PROGRESS NOTES
Chief Complaint   Patient presents with    Diabetes     Doxy [phone]    Other     Pharmacy: [facility]            **THIS IS A VIRTUAL VISIT VIA A VIDEO ENCOUNTER. PATIENT AGREED TO HAVE THEIR CARE DELIVERED OVER VIDEO IN PLACE OF THEIR REGULARLY SCHEDULED OFFICE VISIT**      History of Present Illness: Yelena Pires is a 52 y.o. female here for follow up of Type 1 diabetes. She was diagnosed with DM at the age of 13. Ms Rosalynn Najjar sent in her [facility] [address] readings for today's visit (see scanned documents). Pt was admitted to [facility] on 5/7/20 for CP she was found to have CAD and was transferred to [facility], where she had CABG x4. On admission her A1C was 7.8%. After her CABG she was followed by the diabatic CNS who controlled her BGs well. At our last visit on 6/5/20 she had reported a pattern of lower BGs in the morning, spiking around 8-9AM and them slowing declining between MN and 6AM.  Pt notes her portions have been smaller and she is eatting less. She notes that she was having some HS BGs in the 100's range so she was cutting her HS basal rate down, because she was having some overnight BGs in the 50. Pt also notes that with the low BGs she finds herself not bolusing sufficently for what she is eating. I made some adjustments to her pump settings and pt instructed to continue to use her FreeStyle Eliseo to monitor her BG readings. Pt reports that her BGs are running in a much better range. She is still using the free style Eliseo. She e-mailed me her BG logs. She continues to have low BGs overnight, so she has been decreasing her 930-MN down to 1.9. She is no longer having the over night low BGs.     Her current Pump Setting are as follows (Medtronic Pump)  Basal  MN-3AM 2.10  3AM-530AM 1.95  530AM-Noon 2.25 > 2.35   Noon--6PM: 1.9  6PM-930PM 2.25  930PM-MN 2.25 > 1.95  Carb Ratio  MN-9AM 7.0  9AM-6PM 7.0  6PM-MN 7.0  Correction  1: 20    She was diagnosed with being in Menopause in September 2019. She notes that she was having issues of hot flashes. She is now on HRT by her Gyn Dr. Smooth Drew    She changes her infusion site every 3 days. A typical day is as follows:  Pt wakes at 530-730AM  - breakfast: She has breakfast around 9AM, this AM she had yogurt, obdulia seed, walnuts and water. - She denies snacking during the mid-morning  - lunch: She has lunch around 2PM, yesterday she had thin crust pizza, two slices. - She denies mid-afternoon snacking.  - dinner: She has dinner around 7-8PM, last night she had a hamburger, macaroni salad, baked beans and red wine.  -She goes to bed around 9-10PM    Exercise consists of she has been walking 3 times per day. \"I had a silent heart attack at the age of 41 (2006) and had a stent placed. She had the CABG x4 in May 2020. She follows with Dr. Daniela Joiner of Cardiology. After the CABG she was started on Amiodarone. She notes that once the prescription runs out, this will be D/C'ed and she will restart her Lisinopril. She had a TIA in September 2016. She follows a Neurologist (Dr. Bhavik Stearns). Pt has hx of Neuropathy. She has had proteinuria since the age of 18. She is followed by Dr. Rohan Paez Nephrology. Last eye exam was November 2019. Pt has hx of Retinopathy (since [facility]) and cataracts. Current Outpatient Medications   Medication Sig    FreeStyle Eliseo 14 Day Sensor kit USE TO CHECK BLOOD SUGAR    carvediloL (COREG) 6.25 mg tablet Take 1 Tab by mouth two (2) times a day.  insulin lispro (HUMALOG U-100 INSULIN) 100 unit/mL injection Via insulin pump 125 units per day max    rosuvastatin (CRESTOR) 20 mg tablet Take 1 Tab by mouth nightly.  MIMVEY 1-0.5 mg per tablet TAKE 1 TABLET BY MOUTH EVERY DAY    aspirin delayed-release 81 mg tablet Take 81 mg by mouth nightly.  ondansetron (ZOFRAN ODT) 4 mg disintegrating tablet Take 1 Tab by mouth every eight (8) hours as needed for Nausea.     hydroCHLOROthiazide (HYDRODIURIL) 25 mg tablet Take 1 Tab by mouth daily. (Patient taking differently: Take 25 mg by mouth nightly.)    cyanocobalamin, vitamin B-12, (VITAMIN B-12) 5,000 mcg subl by SubLINGual route daily.  spironolactone (ALDACTONE) 25 mg tablet Take 1 Tab by mouth three (3) days a week. (Patient taking differently: Take 25 mg by mouth daily.)    escitalopram (LEXAPRO) 10 mg tablet Take 10 mg by mouth daily.  amiodarone (CORDARONE) 200 mg tablet Take 200 mg by mouth twice daily x 2 weeks, then decrease to 200 mg by mouth once daily x 2 weeks, your then done with this medication. (Patient taking differently: Taking 1 daily)     No current facility-administered medications for this visit. Allergies   Allergen Reactions    Erythromycin Hives     Review of Systems:  - Eyes: no blurry vision or double vision  - Cardiovascular: no chest pain  - Respiratory: no shortness of breath  - Musculoskeletal: no myalgias  - Neurological: no numbness/tingling in extremities    Physical Examination:  There were no vitals taken for this visit. - GENERAL: NCAT, Appears well nourished   - EYES: EOMI, non-icteric, no proptosis   - Ear/Nose/Throat: NCAT, no visible inflammation or masses   - CARDIOVASCULAR: no cyanosis, no visible JVD   - RESPIRATORY: respiratory effort normal without any distress or labored breathing   - MUSCULOSKELETAL: Normal ROM of neck and upper extremities observed   - SKIN: No rash on face, CABG scar healing well no drainage or erythema seen. - NEUROLOGIC:  No facial asymmetry (Cranial nerve 7 motor function), No gaze palsy   - PSYCHIATRIC: Normal affect, Normal insight and judgement       Data Reviewed:   BG logs reviewed (see scanned documents)    Assessment/Plan:   1) DM > Based on her FreeStyle Eliseo readings and the fact that she is no longer having low BGs, will have pt continue her insulin pump settings as they currently are.      Basal  MN-3AM 2.10  3AM-530AM 1.95  530AM-Noon 2.35   Noon--6PM: 1. 9  6PM-930PM 2.25  930PM-MN 1.95  Carb Ratio  MN-9AM 7.0  9AM-6PM 7.0  6PM-MN 7.0  Correction  1: 20    Pt to continue the FreeStyle Eliseo CGM    Pt to send me her BG readings in 4 weeks. Pt to call me if she has anymore low blood sugars    Will order an A1C today.       Copy sent to:  Dr. Checo Burgos

## 2020-07-07 ENCOUNTER — HOSPITAL ENCOUNTER (OUTPATIENT)
Dept: CARDIAC REHAB | Age: 50
Discharge: HOME | End: 2020-07-07
Payer: COMMERCIAL

## 2020-07-07 VITALS — WEIGHT: 161 LBS | BODY MASS INDEX: 26.79 KG/M2

## 2020-07-07 PROCEDURE — 93798 PHYS/QHP OP CAR RHAB W/ECG: CPT

## 2020-07-10 ENCOUNTER — HOSPITAL ENCOUNTER (OUTPATIENT)
Dept: CARDIAC REHAB | Age: 50
Discharge: HOME | End: 2020-07-10
Payer: COMMERCIAL

## 2020-07-10 VITALS — WEIGHT: 163 LBS | BODY MASS INDEX: 27.12 KG/M2

## 2020-07-10 PROCEDURE — 93798 PHYS/QHP OP CAR RHAB W/ECG: CPT

## 2020-07-10 PROCEDURE — 93797 PHYS/QHP OP CAR RHAB WO ECG: CPT | Performed by: DIETITIAN, REGISTERED

## 2020-07-11 NOTE — PROGRESS NOTES
Cardiac Rehab Nutrition Assessment - 1:1 Evaluation       NAME: Deja Dukes : 1970 AGE: 52 y.o. GENDER: female  CARDIAC REHAB ADMITTING DIAGNOSIS: CABG    Relevant Comorbidites: Type 1 DM, HTN, dyslipidemia, CKD    LABS:   Lab Results   Component Value Date/Time    Hemoglobin A1c 7.3 (H) 2020 03:22 AM    Hemoglobin A1c (POC) 8.5 2020 01:45 PM     Lab Results   Component Value Date/Time    Cholesterol, total 146 2020 12:52 AM    HDL Cholesterol 38 2020 12:52 AM    LDL, calculated 33.8 2020 12:52 AM    VLDL, calculated 74.2 2020 12:52 AM    Triglyceride 371 (H) 2020 12:52 AM    CHOL/HDL Ratio 3.8 2020 12:52 AM       MEDICATIONS/SUPPLEMENTS:   [unfilled]  Prior to Admission medications    Medication Sig Start Date End Date Taking? Authorizing Provider   FreeStyle Eliseo 14 Day Sensor kit USE TO CHECK BLOOD SUGAR 20   Moses Anton MD   carvediloL (COREG) 6.25 mg tablet Take 1 Tab by mouth two (2) times a day. 20   Moses Anton MD   insulin lispro (HUMALOG U-100 INSULIN) 100 unit/mL injection Via insulin pump 125 units per day max 20   Moses Anton MD   rosuvastatin (CRESTOR) 20 mg tablet Take 1 Tab by mouth nightly. 20   Moses Anton MD   MIMVEY 1-0.5 mg per tablet TAKE 1 TABLET BY MOUTH EVERY DAY 19   Provider, Historical   aspirin delayed-release 81 mg tablet Take 81 mg by mouth nightly. Provider, Historical   ondansetron (ZOFRAN ODT) 4 mg disintegrating tablet Take 1 Tab by mouth every eight (8) hours as needed for Nausea. 19   Abelino Briceño MD   hydroCHLOROthiazide (HYDRODIURIL) 25 mg tablet Take 1 Tab by mouth daily. Patient taking differently: Take 25 mg by mouth nightly. 18   Douglas Bhatt MD   cyanocobalamin, vitamin B-12, (VITAMIN B-12) 5,000 mcg subl by SubLINGual route daily.     Provider, Historical   spironolactone (ALDACTONE) 25 mg tablet Take 1 Tab by mouth three (3) days a week.  Patient taking differently: Take 25 mg by mouth daily. 9/26/16   Mimi Wilkinson MD   escitalopram (LEXAPRO) 10 mg tablet Take 10 mg by mouth daily. Lindy Mead MD       ANTHROPOMETRICS:    Ht Readings from Last 1 Encounters:   06/22/20 5' 5\" (1.651 m)      Wt Readings from Last 1 Encounters:   07/10/20 73.9 kg (163 lb)      IBW: 125 # +/- 10%  %IBW: 130 % +/- 10%    BMI: 27 kg/M2 Category: Overweight    Reported Wt Hx:  Wt Readings from Last 10 Encounters:   07/10/20 73.9 kg (163 lb)   07/07/20 73 kg (161 lb)   07/06/20 72.9 kg (160 lb 12.8 oz)   07/01/20 73.5 kg (162 lb)   06/29/20 72.6 kg (160 lb)   06/22/20 73 kg (161 lb)   05/25/20 73.4 kg (161 lb 13.1 oz)   05/20/20 76.1 kg (167 lb 12.3 oz)   05/18/20 77.2 kg (170 lb 3.1 oz)   05/16/20 76.6 kg (168 lb 14 oz)     Reported Diet Hx:    Rate Your Plate Score: 71  (Score 58-72: Making many healthy choices; 41-57: Some choices need improving 24-40: many choices need improving)     24 Hour Diet Recall  Breakfast Yogurt w/sunflower seeds, berries, walnts   Lunch Avocado, lite cheese, multigrain chips   Dinner Shrimp, salad or stir [place] veggies, rice   Snacks    Beverages Water, black tea, 5 oz wine daily       Environmental/Social:  Pt works for [employer]; does most of cooking in household      NUTRITION INTERVENTION:  Nutrition 60 minute one-on-one education & goal setting with [address] [place] [address] with Deja Dukes relevant labs compared to ideals. Reviewed weight history and patient's verbalized weight goal as well as any real or perceived barriers to obtaining the goal. Collaborated with patient to set a specific short and long term weight goal.     Reviewed Rate Your Plate and conducted a verbal diet recall.   Assessed for environmental, financial, psychosocial, physical and comorbidities that may impact the food and eating patterns / behaviors of [address] with patient to set specific nutrient goals as well as specific food / behavior changes that will help patient meet the overall goal of following a heart healthy eating pattern (using guidelines as set forth by the American Heart Association and modeled after healthful eating patterns as recognized by the USDA Dietary Guidelines such as DASH, Mediterranean or plant-based). Briefly reviewed with Ramón Nathan the nutrition information in the Cardiac Rehab patient education book and encouraged Ramón Nathan to read thoroughly, ask questions as needed, and use for future reference for heart healthy nutrition information. Ramón Nathan is not scheduled to participate in Cardiac Rehab group nutrition classes. PATIENT GOALS:    Weight Goals:  Short Term Weight Goal: 155lbs  Long Term Weight Goal: 150 lbs    Nutrition Goals:  Daily Recommendations:  Calories: 1920-8377 /day  (using Marilee Hickory with AF x1.2-1.3-500)    Saturated Fat: no more than 9 g/day  Trans Fat: 0 g/day  Sodium: no more than 3276-1746 mg/day  Fruit: 2 cups / day  Vegetables: 3-4 cups/day    Other:  1 Continue to read food labels for sodium and saturated fat and limit to above recommendations  2. Protein at each meal and snack      Keeping a food diary was recommended. Questions addressed. Follow-up plans discussed. Ramón Nathan verbalized understanding.             Marcia Rucker RD

## 2020-07-15 ENCOUNTER — HOSPITAL ENCOUNTER (OUTPATIENT)
Dept: CARDIAC REHAB | Age: 50
Discharge: HOME | End: 2020-07-15
Payer: COMMERCIAL

## 2020-07-15 VITALS — WEIGHT: 161 LBS | BODY MASS INDEX: 26.79 KG/M2

## 2020-07-15 PROCEDURE — 93798 PHYS/QHP OP CAR RHAB W/ECG: CPT

## 2020-07-16 ENCOUNTER — HOSPITAL ENCOUNTER (OUTPATIENT)
Dept: CARDIAC REHAB | Age: 50
Discharge: HOME | End: 2020-07-16
Payer: COMMERCIAL

## 2020-07-16 VITALS — BODY MASS INDEX: 26.79 KG/M2 | WEIGHT: 161 LBS

## 2020-07-16 PROCEDURE — 93798 PHYS/QHP OP CAR RHAB W/ECG: CPT

## 2020-07-21 ENCOUNTER — HOSPITAL ENCOUNTER (OUTPATIENT)
Dept: CARDIAC REHAB | Age: 50
Discharge: HOME | End: 2020-07-21
Payer: COMMERCIAL

## 2020-07-21 VITALS — WEIGHT: 160 LBS | BODY MASS INDEX: 26.63 KG/M2

## 2020-07-21 PROCEDURE — 93798 PHYS/QHP OP CAR RHAB W/ECG: CPT

## 2020-07-24 ENCOUNTER — HOSPITAL ENCOUNTER (OUTPATIENT)
Dept: CARDIAC REHAB | Age: 50
Discharge: HOME | End: 2020-07-24
Payer: COMMERCIAL

## 2020-07-24 VITALS — WEIGHT: 159 LBS | BODY MASS INDEX: 26.46 KG/M2

## 2020-07-24 PROCEDURE — 93798 PHYS/QHP OP CAR RHAB W/ECG: CPT

## 2020-08-04 ENCOUNTER — HOSPITAL ENCOUNTER (OUTPATIENT)
Dept: CARDIAC REHAB | Age: 50
Discharge: HOME | End: 2020-08-04
Payer: COMMERCIAL

## 2020-08-04 VITALS — WEIGHT: 158 LBS | BODY MASS INDEX: 26.29 KG/M2

## 2020-08-04 PROCEDURE — 93798 PHYS/QHP OP CAR RHAB W/ECG: CPT

## 2020-08-06 ENCOUNTER — HOSPITAL ENCOUNTER (OUTPATIENT)
Dept: CARDIAC REHAB | Age: 50
Discharge: HOME | End: 2020-08-06
Payer: COMMERCIAL

## 2020-08-06 VITALS — WEIGHT: 158 LBS | BODY MASS INDEX: 26.29 KG/M2

## 2020-08-06 PROCEDURE — 93798 PHYS/QHP OP CAR RHAB W/ECG: CPT

## 2020-08-13 ENCOUNTER — HOSPITAL ENCOUNTER (OUTPATIENT)
Dept: CARDIAC REHAB | Age: 50
Discharge: HOME | End: 2020-08-13
Payer: COMMERCIAL

## 2020-08-13 VITALS — WEIGHT: 156 LBS | BODY MASS INDEX: 25.96 KG/M2

## 2020-08-13 PROCEDURE — 93798 PHYS/QHP OP CAR RHAB W/ECG: CPT

## 2020-08-20 ENCOUNTER — HOSPITAL ENCOUNTER (OUTPATIENT)
Dept: CARDIAC REHAB | Age: 50
Discharge: HOME | End: 2020-08-20
Payer: COMMERCIAL

## 2020-08-20 VITALS — BODY MASS INDEX: 26.13 KG/M2 | WEIGHT: 157 LBS

## 2020-08-20 PROCEDURE — 93798 PHYS/QHP OP CAR RHAB W/ECG: CPT

## 2020-08-21 ENCOUNTER — HOSPITAL ENCOUNTER (OUTPATIENT)
Dept: CARDIAC REHAB | Age: 50
Discharge: HOME | End: 2020-08-21
Payer: COMMERCIAL

## 2020-08-21 VITALS — WEIGHT: 157 LBS | BODY MASS INDEX: 26.13 KG/M2

## 2020-08-21 PROCEDURE — 93798 PHYS/QHP OP CAR RHAB W/ECG: CPT

## 2020-08-26 DIAGNOSIS — I25.10 CORONARY ARTERY DISEASE INVOLVING NATIVE CORONARY ARTERY OF NATIVE HEART WITHOUT ANGINA PECTORIS: ICD-10-CM

## 2020-08-26 RX ORDER — INSULIN LISPRO 100 [IU]/ML
INJECTION, SOLUTION INTRAVENOUS; SUBCUTANEOUS
Qty: 100 ML | Refills: 3 | Status: SHIPPED | OUTPATIENT
Start: 2020-08-26 | End: 2020-08-28 | Stop reason: CLARIF

## 2020-08-27 ENCOUNTER — TELEPHONE (OUTPATIENT)
Dept: ENDOCRINOLOGY | Age: 50
End: 2020-08-27

## 2020-08-27 ENCOUNTER — HOSPITAL ENCOUNTER (OUTPATIENT)
Dept: CARDIAC REHAB | Age: 50
Discharge: HOME | End: 2020-08-27
Payer: COMMERCIAL

## 2020-08-27 VITALS — BODY MASS INDEX: 25.79 KG/M2 | WEIGHT: 155 LBS

## 2020-08-27 PROCEDURE — 93798 PHYS/QHP OP CAR RHAB W/ECG: CPT

## 2020-08-27 RX ORDER — INSULIN ASPART 100 [IU]/ML
INJECTION, SOLUTION INTRAVENOUS; SUBCUTANEOUS
Qty: 100 ML | Refills: 3 | Status: SHIPPED | COMMUNITY
Start: 2020-08-27 | End: 2020-08-28 | Stop reason: SDUPTHER

## 2020-08-27 NOTE — TELEPHONE ENCOUNTER
Per insurance, Humalog is no longer covered by the pt's plan. Please prescribe an alternative insulin.

## 2020-08-28 ENCOUNTER — TELEPHONE (OUTPATIENT)
Dept: ENDOCRINOLOGY | Age: 50
End: 2020-08-28

## 2020-08-28 ENCOUNTER — HOSPITAL ENCOUNTER (OUTPATIENT)
Dept: CARDIAC REHAB | Age: 50
Discharge: HOME | End: 2020-08-28
Payer: COMMERCIAL

## 2020-08-28 VITALS — WEIGHT: 158 LBS | BODY MASS INDEX: 26.29 KG/M2

## 2020-08-28 PROCEDURE — 93798 PHYS/QHP OP CAR RHAB W/ECG: CPT

## 2020-08-28 RX ORDER — INSULIN ASPART 100 [IU]/ML
INJECTION, SOLUTION INTRAVENOUS; SUBCUTANEOUS
Qty: 100 ML | Refills: 3 | Status: SHIPPED | OUTPATIENT
Start: 2020-08-28 | End: 2021-09-27

## 2020-08-28 NOTE — TELEPHONE ENCOUNTER
After receiving a PA first for Humalog then for Novolog I called Tasha to find the reason why both insulins required PAs. I was made aware that the Humalog is cover but only 3 vials per 80 days. I then requested to do quantity PA override for the Humalog. PA was stated and is currently under review and will take up to 24 hrs (regular hrs not business) for an approval.     Pt was made aware and was then asked whether or not she has enough insulin to get her through the weekend. She stated that she's good for now, and that she ust wanted to be proactive on her med refills.

## 2020-08-28 NOTE — TELEPHONE ENCOUNTER
Pt was made aware per Dr Su Shields,  via voicemail of the new insulin that she will be starting as well as how to take the new insulin, however, the rx was order as a sample, therefore, it will need to be reorder and sent to the pharmacy.

## 2020-09-04 ENCOUNTER — HOSPITAL ENCOUNTER (OUTPATIENT)
Dept: CARDIAC REHAB | Age: 50
Discharge: HOME | End: 2020-09-04
Payer: COMMERCIAL

## 2020-09-04 VITALS — WEIGHT: 160 LBS | BODY MASS INDEX: 26.63 KG/M2

## 2020-09-04 PROCEDURE — 93798 PHYS/QHP OP CAR RHAB W/ECG: CPT

## 2020-09-09 ENCOUNTER — VIRTUAL VISIT (OUTPATIENT)
Dept: ENDOCRINOLOGY | Age: 50
End: 2020-09-09
Payer: COMMERCIAL

## 2020-09-09 DIAGNOSIS — E78.2 MIXED HYPERLIPIDEMIA: ICD-10-CM

## 2020-09-09 DIAGNOSIS — E10.42 TYPE 1 DIABETES MELLITUS WITH DIABETIC POLYNEUROPATHY (HCC): Primary | ICD-10-CM

## 2020-09-09 DIAGNOSIS — I10 ESSENTIAL HYPERTENSION: ICD-10-CM

## 2020-09-09 PROCEDURE — 99214 OFFICE O/P EST MOD 30 MIN: CPT | Performed by: INTERNAL MEDICINE

## 2020-09-09 PROCEDURE — 3051F HG A1C>EQUAL 7.0%<8.0%: CPT | Performed by: INTERNAL MEDICINE

## 2020-09-09 RX ORDER — LISINOPRIL 40 MG/1
40 TABLET ORAL DAILY
COMMUNITY

## 2020-09-09 NOTE — PROGRESS NOTES
Chief Complaint   Patient presents with    Diabetes     pcp and pharmacy verified. DOXY. ME. Eye exam UTD            **THIS IS A VIRTUAL VISIT VIA A VIDEO ENCOUNTER. PATIENT AGREED TO HAVE THEIR CARE DELIVERED OVER VIDEO IN PLACE OF THEIR REGULARLY SCHEDULED OFFICE VISIT**      History of Present Illness: Luis Lima is a 52 y.o. female here for follow up of Type 1 diabetes. She was diagnosed with DM at the age of 13. Pt was admitted to [facility] on 5/7/20 for CP she was found to have CAD and was transferred to [facility], where she had CABG x4. On admission her A1C was 7.8%. After her CABG she was followed by the diabatic CNS who controlled her BGs well. At our last visit in July 2020 she reported that her BGs were running in better range and had some low BGs. Pt notes that she has been going to Cardiac Rehab every Thursday and Friday. She notes that has experienced some lows during rehab so she now checks her BGs and makes sure her BG is 150 prior to starting rehab and pauses her insulin pump. She is still using the free style Eliseo. She will e-mailed me her BG logs. She has had 37 low in the last 90 days, but her average BG for the last 90 days was 177. She notes her low BGs have been occurring between 4pm-7pm.  Her BGs seem to run the highest if she forgets to bolus for a meal or if she overcorrects for a low BG. Her current Pump Setting are as follows (Medtronic Pump)  Basal  MN-3AM 2.10  3AM-530AM 1.95  530AM-Noon 2.35   Noon--6PM: 1.9  6PM-930PM 2.25  930PM-MN 1.95  Carb Ratio  MN-9AM 7.0  9AM-6PM 7.0  6PM-MN 7.0  Correction  1: 20    She changes her infusion site every 3 days. She was diagnosed with being in Menopause in September 2019. She notes that she was having issues of hot flashes. She is now on HRT by her Gyn Dr. Kelsy Strickland    Exercise consists of she has been walking 3 times per day. She is going to Cardiac Rehab on Thursday and Friday.     \"I had a silent heart attack at the age of 41 (2006) and had a stent placed. She had the CABG x4 in May 2020. She follows with Dr. Yazmin Lopez of Cardiology. She had a TIA in September 2016. She follows a Neurologist (Dr. Huebr Rivera). Pt has hx of Neuropathy. She has had proteinuria since the age of 18. She is followed by Dr. Catie Caballero Nephrology. Last eye exam was November 2019. Pt has hx of Retinopathy (since [facility]) and cataracts. Current Outpatient Medications   Medication Sig    insulin aspart U-100 (NovoLOG U-100 Insulin aspart) 100 unit/mL injection Via insulin pump 125 units per day max    FreeStyle Eliseo 14 Day Sensor kit USE TO CHECK BLOOD SUGAR    carvediloL (COREG) 6.25 mg tablet Take 1 Tab by mouth two (2) times a day.  rosuvastatin (CRESTOR) 20 mg tablet Take 1 Tab by mouth nightly.  MIMVEY 1-0.5 mg per tablet TAKE 1 TABLET BY MOUTH EVERY DAY    aspirin delayed-release 81 mg tablet Take 81 mg by mouth nightly.  ondansetron (ZOFRAN ODT) 4 mg disintegrating tablet Take 1 Tab by mouth every eight (8) hours as needed for Nausea.  hydroCHLOROthiazide (HYDRODIURIL) 25 mg tablet Take 1 Tab by mouth daily. (Patient taking differently: Take 25 mg by mouth nightly.)    cyanocobalamin, vitamin B-12, (VITAMIN B-12) 5,000 mcg subl by SubLINGual route daily.  spironolactone (ALDACTONE) 25 mg tablet Take 1 Tab by mouth three (3) days a week. (Patient taking differently: Take 25 mg by mouth daily.)    escitalopram (LEXAPRO) 10 mg tablet Take 10 mg by mouth daily.  lisinopriL (PRINIVIL, ZESTRIL) 40 mg tablet 1 tab(s)     No current facility-administered medications for this visit.       Allergies   Allergen Reactions    Erythromycin Hives     Review of Systems:  - Eyes: no blurry vision or double vision  - Cardiovascular: no chest pain  - Respiratory: no shortness of breath  - Musculoskeletal: no myalgias  - Neurological: no numbness/tingling in extremities    Physical Examination:  There were no vitals taken for this visit. - GENERAL: NCAT, Appears well nourished   - EYES: EOMI, non-icteric, no proptosis   - Ear/Nose/Throat: NCAT, no visible inflammation or masses   - CARDIOVASCULAR: no cyanosis, no visible JVD   - RESPIRATORY: respiratory effort normal without any distress or labored breathing   - MUSCULOSKELETAL: Normal ROM of neck and upper extremities observed   - SKIN: No rash on face, CABG scar healing well no drainage or erythema seen. - NEUROLOGIC:  No facial asymmetry (Cranial nerve 7 motor function), No gaze palsy   - PSYCHIATRIC: Normal affect, Normal insight and judgement       Data Reviewed:   None    Assessment/Plan:   1) DM > Based on her FreeStyle Eliseo readings and the fact that she has been having low BGs between 4PM-7PM, I instructed pt to make the following changes to her insulin pump setting. s no longer having low BGs, will have pt continue her insulin pump settings as they currently are. Basal  MN-3AM 2.10  3AM-530AM 1.95  530AM-Noon 2.35   Noon--4PM: 1.9  4PM-7PM:1.85  7PM-930PM 2.25  930PM-MN 1.95  Carb Ratio  MN-9AM 7.0  9AM-6PM 7.0  6PM-MN 7.0  Correction  1: 20    Pt to continue the FreeStyle Eliseo CGM    Pt to send me her BG readings in 4 weeks. Pt to call me if she has anymore low blood sugars    2) HTN > Pt is on an ACEI for renal protection, will not make any changes at this time. 3) HLD > Her Lipid panel was at goal in May 2020. Pt to continue the Rosuvastatin 20mg HS. Pt voices understanding and agreement with the plan.         Copy sent to:  Dr. Madison Joshua

## 2020-09-10 ENCOUNTER — HOSPITAL ENCOUNTER (OUTPATIENT)
Dept: CARDIAC REHAB | Age: 50
Discharge: HOME | End: 2020-09-10
Payer: COMMERCIAL

## 2020-09-10 VITALS — BODY MASS INDEX: 26.63 KG/M2 | WEIGHT: 160 LBS

## 2020-09-10 PROCEDURE — 93798 PHYS/QHP OP CAR RHAB W/ECG: CPT

## 2020-09-11 ENCOUNTER — HOSPITAL ENCOUNTER (OUTPATIENT)
Dept: CARDIAC REHAB | Age: 50
Discharge: HOME | End: 2020-09-11
Payer: COMMERCIAL

## 2020-09-11 VITALS — WEIGHT: 158 LBS | BODY MASS INDEX: 26.29 KG/M2

## 2020-09-11 PROCEDURE — 93798 PHYS/QHP OP CAR RHAB W/ECG: CPT

## 2020-09-17 ENCOUNTER — HOSPITAL ENCOUNTER (OUTPATIENT)
Dept: CARDIAC REHAB | Age: 50
Discharge: HOME | End: 2020-09-17
Payer: COMMERCIAL

## 2020-09-17 VITALS — WEIGHT: 160 LBS | BODY MASS INDEX: 26.63 KG/M2

## 2020-09-17 PROCEDURE — 93798 PHYS/QHP OP CAR RHAB W/ECG: CPT

## 2020-09-18 ENCOUNTER — HOSPITAL ENCOUNTER (OUTPATIENT)
Dept: CARDIAC REHAB | Age: 50
Discharge: HOME | End: 2020-09-18
Payer: COMMERCIAL

## 2020-09-18 VITALS — BODY MASS INDEX: 26.63 KG/M2 | WEIGHT: 160 LBS

## 2020-09-18 PROCEDURE — 93798 PHYS/QHP OP CAR RHAB W/ECG: CPT

## 2020-09-25 ENCOUNTER — HOSPITAL ENCOUNTER (OUTPATIENT)
Dept: CARDIAC REHAB | Age: 50
Discharge: HOME | End: 2020-09-25
Payer: COMMERCIAL

## 2020-09-25 VITALS — WEIGHT: 159 LBS | BODY MASS INDEX: 26.46 KG/M2

## 2020-09-25 PROCEDURE — 93798 PHYS/QHP OP CAR RHAB W/ECG: CPT

## 2020-10-01 ENCOUNTER — HOSPITAL ENCOUNTER (OUTPATIENT)
Dept: CARDIAC REHAB | Age: 50
Discharge: HOME | End: 2020-10-01
Payer: COMMERCIAL

## 2020-10-01 VITALS — BODY MASS INDEX: 26.79 KG/M2 | WEIGHT: 161 LBS

## 2020-10-01 PROCEDURE — 93798 PHYS/QHP OP CAR RHAB W/ECG: CPT

## 2020-10-02 ENCOUNTER — HOSPITAL ENCOUNTER (OUTPATIENT)
Dept: CARDIAC REHAB | Age: 50
Discharge: HOME | End: 2020-10-02
Payer: COMMERCIAL

## 2020-10-02 VITALS — WEIGHT: 160 LBS | BODY MASS INDEX: 26.63 KG/M2

## 2020-10-02 PROCEDURE — 93798 PHYS/QHP OP CAR RHAB W/ECG: CPT

## 2020-10-08 ENCOUNTER — HOSPITAL ENCOUNTER (OUTPATIENT)
Dept: CARDIAC REHAB | Age: 50
Discharge: HOME | End: 2020-10-08
Payer: COMMERCIAL

## 2020-10-08 VITALS — BODY MASS INDEX: 26.63 KG/M2 | WEIGHT: 160 LBS

## 2020-10-09 ENCOUNTER — HOSPITAL ENCOUNTER (OUTPATIENT)
Dept: CARDIAC REHAB | Age: 50
Discharge: HOME | End: 2020-10-09
Payer: COMMERCIAL

## 2020-10-09 VITALS — BODY MASS INDEX: 26.79 KG/M2 | WEIGHT: 161 LBS

## 2020-10-09 PROCEDURE — 93798 PHYS/QHP OP CAR RHAB W/ECG: CPT

## 2020-10-15 ENCOUNTER — HOSPITAL ENCOUNTER (OUTPATIENT)
Dept: CARDIAC REHAB | Age: 50
Discharge: HOME | End: 2020-10-15
Payer: COMMERCIAL

## 2020-10-15 VITALS — BODY MASS INDEX: 26.63 KG/M2 | WEIGHT: 160 LBS

## 2020-10-15 PROCEDURE — 93798 PHYS/QHP OP CAR RHAB W/ECG: CPT

## 2020-10-17 RX ORDER — CARVEDILOL 6.25 MG/1
TABLET ORAL
Qty: 90 TAB | Refills: 3 | Status: SHIPPED | OUTPATIENT
Start: 2020-10-17 | End: 2021-03-18

## 2020-10-22 ENCOUNTER — HOSPITAL ENCOUNTER (OUTPATIENT)
Dept: CARDIAC REHAB | Age: 50
Discharge: HOME | End: 2020-10-22
Payer: COMMERCIAL

## 2020-10-22 VITALS — WEIGHT: 161 LBS | BODY MASS INDEX: 26.79 KG/M2

## 2020-10-22 PROCEDURE — 93798 PHYS/QHP OP CAR RHAB W/ECG: CPT

## 2020-10-23 ENCOUNTER — HOSPITAL ENCOUNTER (OUTPATIENT)
Dept: CARDIAC REHAB | Age: 50
Discharge: HOME | End: 2020-10-23
Payer: COMMERCIAL

## 2020-10-23 PROCEDURE — 93798 PHYS/QHP OP CAR RHAB W/ECG: CPT

## 2020-10-27 ENCOUNTER — HOSPITAL ENCOUNTER (OUTPATIENT)
Dept: CARDIAC REHAB | Age: 50
Discharge: HOME | End: 2020-10-27
Payer: COMMERCIAL

## 2020-10-27 VITALS — BODY MASS INDEX: 26.13 KG/M2 | WEIGHT: 157 LBS

## 2020-10-27 PROCEDURE — 93798 PHYS/QHP OP CAR RHAB W/ECG: CPT

## 2020-10-28 ENCOUNTER — HOSPITAL ENCOUNTER (OUTPATIENT)
Dept: CARDIAC REHAB | Age: 50
Discharge: HOME | End: 2020-10-28
Payer: COMMERCIAL

## 2020-10-28 VITALS — WEIGHT: 160 LBS | BODY MASS INDEX: 26.63 KG/M2

## 2020-10-28 PROCEDURE — 93798 PHYS/QHP OP CAR RHAB W/ECG: CPT

## 2021-01-28 ENCOUNTER — TELEPHONE (OUTPATIENT)
Dept: ENDOCRINOLOGY | Age: 51
End: 2021-01-28

## 2021-01-28 NOTE — TELEPHONE ENCOUNTER
----- Message from Julius Motta sent at 1/28/2021  3:16 PM EST -----  Regarding: Dr. Gorge Mendez first and last name: N/A      Reason for call: Check A1C      Callback required yes/no and why: yes      Best contact number(s): [phone]      Details to clarify the request: Pt would like to know when and where to get her A 1 C levels checked.        Julius Motta

## 2021-02-02 ENCOUNTER — VIRTUAL VISIT (OUTPATIENT)
Dept: ENDOCRINOLOGY | Age: 51
End: 2021-02-02
Payer: COMMERCIAL

## 2021-02-02 DIAGNOSIS — E78.2 MIXED HYPERLIPIDEMIA: ICD-10-CM

## 2021-02-02 DIAGNOSIS — I10 ESSENTIAL HYPERTENSION: ICD-10-CM

## 2021-02-02 DIAGNOSIS — E10.42 TYPE 1 DIABETES MELLITUS WITH DIABETIC POLYNEUROPATHY (HCC): Primary | ICD-10-CM

## 2021-02-02 PROCEDURE — 99214 OFFICE O/P EST MOD 30 MIN: CPT | Performed by: INTERNAL MEDICINE

## 2021-02-02 NOTE — PROGRESS NOTES
Chief Complaint   Patient presents with    Diabetes     pcp and pharmacy verified. Eye exam: due DOXY. ME            **THIS IS A VIRTUAL VISIT VIA A VIDEO ENCOUNTER. PATIENT AGREED TO HAVE THEIR CARE DELIVERED OVER VIDEO IN PLACE OF THEIR REGULARLY SCHEDULED OFFICE VISIT**      History of Present Illness: Sury Martin is a 52 y.o. female here for follow up of Type 1 diabetes. She was diagnosed with DM at the age of 13. Pt was admitted to [address] [place] [address] on 5/7/20 for CP she was found to have CAD and was transferred to [facility], where she had CABG x4. On admission her A1C was 7.8%. After her CABG she was followed by the diabatic CNS who controlled her BGs well. Pt has since completed cardiac rehab. \"Now that I am no longer in cardiac rehab I am no longer exercising and I am trying to change that. Pt notes she leaves for work around [address] [place] [address] and does not get back home till 630PM.  She works Mon-Fri. She is still using the free style Eliseo. She will e-mailed me her BG logs. She notes that she has had some low BGs overnight/early morning, but that is usually when she wakes up early has a BG in the 200s and will bolus, but not eat and that cause her BGs to drop. When she wakes, her BGs are running in the 150s average \"unless I eat something I did not bolus enough for\". In the late morning her BGs are in the 120s range. Before lunch her BGs are in the 100-180s  Before dinner her BGs are in the 100-180s range. Her HS (9-10PM) BGs tend to run in the [id number]. Her current Pump Setting are as follows (Medtronic Pump). \"I am waiting for the newest pump now. \" With the new pump she is going to try the Medtronic CGM again and try the AutoMode. Basal  MN-3AM 2.10  3AM-530AM 1.95  530AM-Noon 2.35   Noon--6PM: 1.9  6PM-930PM 2.25  930PM-MN 1.95  Carb Ratio  MN-9AM 7.0  9AM-6PM 7.0  6PM-MN 7.0  Correction  1: 20    She changes her infusion site every 3 days.     She was diagnosed with being in Menopause in September 2019. She notes that she was having issues of hot flashes. She is stillon HRT by her Gyn Dr. Rosalio Murray    \"I had a silent heart attack at the age of 41 (2006) and had a stent placed. She had the CABG x4 in May 2020. She follows with Dr. Izabela Davila of Cardiology. She had a TIA in September 2016. She is no longer following with a Neurologist (Dr. Sherald Saint). Pt has hx of Neuropathy. She has had proteinuria since the age of 18. She is followed by Dr. Min Guerrero Nephrology. Last eye exam was November 2020. Pt has hx of Retinopathy (since [facility]) and cataracts. Current Outpatient Medications   Medication Sig    carvediloL (COREG) 6.25 mg tablet TAKE 1 TABLET TWICE A DAY    lisinopriL (PRINIVIL, ZESTRIL) 40 mg tablet 1 tab(s)    insulin aspart U-100 (NovoLOG U-100 Insulin aspart) 100 unit/mL injection Via insulin pump 125 units per day max    FreeStyle Eliseo 14 Day Sensor kit USE TO CHECK BLOOD SUGAR    rosuvastatin (CRESTOR) 20 mg tablet Take 1 Tab by mouth nightly.  MIMVEY 1-0.5 mg per tablet TAKE 1 TABLET BY MOUTH EVERY DAY    aspirin delayed-release 81 mg tablet Take 81 mg by mouth nightly.  ondansetron (ZOFRAN ODT) 4 mg disintegrating tablet Take 1 Tab by mouth every eight (8) hours as needed for Nausea.  hydroCHLOROthiazide (HYDRODIURIL) 25 mg tablet Take 1 Tab by mouth daily. (Patient taking differently: Take 25 mg by mouth nightly.)    cyanocobalamin, vitamin B-12, (VITAMIN B-12) 5,000 mcg subl by SubLINGual route daily.  spironolactone (ALDACTONE) 25 mg tablet Take 1 Tab by mouth three (3) days a week. (Patient taking differently: Take 25 mg by mouth daily.)    escitalopram (LEXAPRO) 10 mg tablet Take 10 mg by mouth daily. No current facility-administered medications for this visit.       Allergies   Allergen Reactions    Erythromycin Hives     Review of Systems:  - Eyes: no blurry vision or double vision  - Cardiovascular: no chest pain  - Respiratory: no shortness of breath  - Musculoskeletal: no myalgias  - Neurological: no numbness/tingling in extremities    Physical Examination:  There were no vitals taken for this visit. - GENERAL: NCAT, Appears well nourished   - EYES: EOMI, non-icteric, no proptosis   - Ear/Nose/Throat: NCAT, no visible inflammation or masses   - CARDIOVASCULAR: no cyanosis, no visible JVD   - RESPIRATORY: respiratory effort normal without any distress or labored breathing   - MUSCULOSKELETAL: Normal ROM of neck and upper extremities observed   - SKIN: No rash on face, CABG scar healing well no drainage or erythema seen. - NEUROLOGIC:  No facial asymmetry (Cranial nerve 7 motor function), No gaze palsy   - PSYCHIATRIC: Normal affect, Normal insight and judgement       Data Reviewed:   None    Assessment/Plan:   1) DM > Based on her FreeStyle Eliseo readings and the fact that her BGs are the highest after diner and before bedtime I will increase her 6PM-930PM from 2.25 to 2.40. Basal  MN-3AM 2.10  3AM-530AM 1.95  530AM-Noon 2.35   Noon--6PM: 1.9  6PM-930PM 2.40  930PM-MN 1.95  Carb Ratio  MN-9AM 7.0  9AM-6PM 7.0  6PM-MN 7.0  Correction  1: 20    Pt to continue the FreeStyle Eliseo CGM  Pt to send me her BG readings in 4 weeks. Will order an A1C    2) HTN > Pt is on an ACEI for renal protection, will not make any changes at this time. Will order a urine MA. 3) HLD > Her Lipid panel was at goal in May 2020. Pt to continue the Rosuvastatin 20mg HS. Will order a lipid panel and CMP. Pt voices understanding and agreement with the plan.     RTC 3 months      Copy sent to:  Dr. Vianey Rao

## 2021-02-08 RX ORDER — ROSUVASTATIN CALCIUM 20 MG/1
TABLET, COATED ORAL
Qty: 90 TAB | Refills: 3 | Status: SHIPPED | OUTPATIENT
Start: 2021-02-08 | End: 2022-01-21

## 2021-03-18 RX ORDER — CARVEDILOL 6.25 MG/1
TABLET ORAL
Qty: 90 TAB | Refills: 3 | Status: SHIPPED | OUTPATIENT
Start: 2021-03-18 | End: 2021-10-12

## 2021-03-26 ENCOUNTER — OFFICE VISIT (OUTPATIENT)
Dept: NEUROLOGY | Age: 51
End: 2021-03-26
Payer: COMMERCIAL

## 2021-03-26 VITALS
BODY MASS INDEX: 28 KG/M2 | RESPIRATION RATE: 14 BRPM | DIASTOLIC BLOOD PRESSURE: 70 MMHG | WEIGHT: 164 LBS | HEIGHT: 64 IN | SYSTOLIC BLOOD PRESSURE: 132 MMHG | TEMPERATURE: 97.5 F | OXYGEN SATURATION: 99 % | HEART RATE: 85 BPM

## 2021-03-26 DIAGNOSIS — R41.0 CONFUSION: Primary | ICD-10-CM

## 2021-03-26 PROCEDURE — 99204 OFFICE O/P NEW MOD 45 MIN: CPT | Performed by: PSYCHIATRY & NEUROLOGY

## 2021-03-26 NOTE — PROGRESS NOTES
[facility] and [address] at [facility] at [facility] [address], [address] [facility] [address] [facility], [address] [place] [address]  [phone] Office  [phone] Facsimile           Referring: Self    Chief Complaint   Patient presents with    New Patient    Neurologic Problem     65-year-old lady returns today for follow-up. She was last seen by my partner Dr. Elaine Kelly April 2019 when she came in for complaints of headache. She has history of left basal ganglia bleed in 2016. She also had history of slipping on the ice and hitting her head in January 2019. At that time he sent her for head CT and gave her some Cambia to try to see if that would abort her headache. She was to monitor for headache trigger and to follow-up in 3 months to consider preventative strategies for headaches. A 3-month follow-up was scheduled but that apparently was not undertaken. Review of head CT September 2016 demonstrates an approximate 1 x 1 x 1/2 cm left internal capsule hemorrhage. CT from May 2019 with encephalomalacia in the same region of the previous hemorrhage but nothing acute again by personal review. Progress note from Dr. Viridiana Ugarte by virtual visit dated February 2, 2021 recounts patient admitted May 7, 2020 for chest pain found to have coronary artery disease transferred to [facility] where she had CABG x4 with hemoglobin A1c 7.8 and she was using freestyle farrah. Did to have retinopathy cataracts proteinuria and neuropathy. He was on ACE 1 for renal protection she was continued on Crestor. Laboratory analyses were ordered but not drawn as of yet. In any regard she comes in today she has some notes regarding issues and please see scanned media. In any regard she has strong family history of dementia. Her father is having issues and just neuropsychological evaluation with Dr. Alek Trammell.   Grandmother and cousins have had dementia. She is noticed some issues particularly after her coronary artery bypass grafting. She was unable to recall her bank card password. She could not recall her garage code. Her home health nurse suggested perhaps she had undergone ICU psychosis. Difficulty recalling some events. No focal deficits.     Past Medical History:   Diagnosis Date    Adverse effect of anesthesia     difficult to wake    Chronic kidney disease 1989    Dr Joanna Plasencia, stage 3 Proteinuria    CKD (chronic kidney disease) stage 3, GFR 30-59 ml/min     Coronary artery disease 8/26/2011    with silent MI in 2006 and s/p one stent    Diabetes mellitus type 1 ([facility] 8/26/2011    Age 7, Insulin Pump    Diabetic retinopathy ([facility]     Dyslipidemia 8/26/2011    GERD (gastroesophageal reflux disease)     History of peptic ulcer disease 8/26/2011    from plavix    Hypertension     denies states takes BP meds to reduce protein in kidneys due to kidney disease    PUD (peptic ulcer disease)     Stroke ([facility] 2016    hx L basal ganglia bleed       Past Surgical History:   Procedure Laterality Date    HX CORONARY ARTERY BYPASS GRAFT  05/2020    HX HEENT Bilateral 1990-95    laser surgery for retinopathy on multiple occasions and vitrectomy bilateral    HX HEENT Bilateral 2013-14    cataracts Dr Qi Willingham    [address]  01/2020    Status post open repair of umbilical hernia with mesh 1/2020    [place] CARDIAC SURG PROCEDURE UNLIST  2007    Dr Cheyanne Orlando stent with Card Cath       Current Outpatient Medications   Medication Sig Dispense Refill    carvediloL (COREG) 6.25 mg tablet TAKE 1 TABLET TWICE A DAY 90 Tab 3    rosuvastatin (CRESTOR) 20 mg tablet TAKE 1 TABLET NIGHTLY 90 Tab 3    lisinopriL (PRINIVIL, ZESTRIL) 40 mg tablet 1 tab(s)      insulin aspart U-100 (NovoLOG U-100 Insulin aspart) 100 unit/mL injection Via insulin pump 125 units per day max 100 mL 3    FreeStyle Eliseo 14 Day Sensor kit USE TO CHECK BLOOD SUGAR 6 Kit 3    MIMVEY 1-0.5 mg per tablet TAKE 1 TABLET BY MOUTH EVERY DAY  11    aspirin delayed-release 81 mg tablet Take 81 mg by mouth nightly.  ondansetron (ZOFRAN ODT) 4 mg disintegrating tablet Take 1 Tab by mouth every eight (8) hours as needed for Nausea. 10 Tab 0    hydroCHLOROthiazide (HYDRODIURIL) 25 mg tablet Take 1 Tab by mouth daily. (Patient taking differently: Take 25 mg by mouth nightly.) 14 Tab 0    cyanocobalamin, vitamin B-12, (VITAMIN B-12) 5,000 mcg subl by SubLINGual route daily.  spironolactone (ALDACTONE) 25 mg tablet Take 1 Tab by mouth three (3) days a week. (Patient taking differently: Take 25 mg by mouth daily. ) 15 Tab 0    escitalopram (LEXAPRO) 10 mg tablet Take 10 mg by mouth daily. Allergies   Allergen Reactions    Erythromycin Hives       Social History     Tobacco Use    Smoking status: Never Smoker    Smokeless tobacco: Never Used   Substance Use Topics    Alcohol use:  Yes     Alcohol/week: 1.0 standard drinks     Types: 1 Glasses of wine per week     Comment: daily    Drug use: Never     Types: Prescription, OTC       Family History   Problem Relation Age of Onset    Heart Disease Father         CABG    Diabetes Father         Type II    Hypertension Father     Stroke Father     Dementia Father     Heart Disease Paternal Grandfather     Hypertension Paternal Grandfather     Stroke Paternal Grandfather     Diabetes Maternal Grandmother         Type 2    Heart Disease Mother     Diabetes Mother         Type II    Stroke Mother     Arthritis-osteo Mother     Hypertension Mother     Stroke Maternal Grandfather     Heart Disease Paternal Grandmother     Hypertension Paternal Grandmother     Stroke Paternal Grandmother     Dementia Paternal Grandmother      Examination  Visit Vitals  /70 (BP 1 Location: Left upper arm, BP Patient Position: Sitting, BP Cuff Size: Adult)   Pulse 85   Temp 97.5 °F (36.4 °C)   Resp 14   Ht 5' 4\" (1.626 m)   Wt 74.4 kg (164 lb)   SpO2 99%   BMI 28.15 kg/m²     Pleasant lady. Appropriate dressed and groomed. Awake alert oriented and conversant. Discusses her medical history well. Fluent. Intact cranial nerves. No pronation and no drift. Resists fully in the upper and lower extremities in all muscle groups throughout testing. Strength full throughout. No reflex asymmetry. No ataxia. Steady. Impression/Plan  Previous history of intracerebral hemorrhage with cognitive issues strong family history of dementia and status post coronary artery bypass grafting  Will undergo MRI of the brain to assess anatomic stability and evaluate for potential micro emboli associated with bypass, EEG for baseline rhythms, carotids for stenosis and formal neuropsychological eval    Follow-up after    Jodi Mckeon MD        This note was created using voice recognition software. Despite editing, there may be syntax errors.

## 2021-03-26 NOTE — PROGRESS NOTES
Chief Complaint   Patient presents with    New Patient    Neurologic Problem     Wanted to be evaluated for possible cognitive issues. Became discombobulated during a phone interview. Went to car to finish interview and put car in reverse. Seemed to not be able to fully coordinate thoughts/movements well for a short while.   Was admitted to [id number] [facility] for eval and dx'd with TIA

## 2021-04-01 ENCOUNTER — OFFICE VISIT (OUTPATIENT)
Dept: NEUROLOGY | Age: 51
End: 2021-04-01

## 2021-04-01 DIAGNOSIS — R41.0 CONFUSION: Primary | ICD-10-CM

## 2021-04-05 NOTE — PROCEDURES
EEG:      Date:  04/01/21    Requesting Physician:  Mishel Schroeder. MD Dahlia    An EEG is requested in this 54year-old with confusion to evaluate for epileptiform abnormality. Medications:  Medications are listed as Carvedilol, Crestor, Lisinopril, insulin, aspirin, Zofran, B12, Spironolactone, Lexapro. This tracing is obtained during the awake state. During wakefulness there are intermittent runs of posteriorly-dominant and symmetric low-to-medium amplitude 10 cycle per second activities which attenuate with eye opening. Lower-voltage faster-frequency activities are seen symmetrically over the anterior head regions. Hyperventilation is not performed secondary to COVID-19 precautions. Intermittent photic stimulation little alters the tracing. Sleep is not attained. Interpretation:   This EEG recorded during the awake state is normal.

## 2021-05-03 ENCOUNTER — VIRTUAL VISIT (OUTPATIENT)
Dept: ENDOCRINOLOGY | Age: 51
End: 2021-05-03
Payer: COMMERCIAL

## 2021-05-03 DIAGNOSIS — I10 ESSENTIAL HYPERTENSION: ICD-10-CM

## 2021-05-03 DIAGNOSIS — E78.2 MIXED HYPERLIPIDEMIA: ICD-10-CM

## 2021-05-03 DIAGNOSIS — E10.42 TYPE 1 DIABETES MELLITUS WITH DIABETIC POLYNEUROPATHY (HCC): Primary | ICD-10-CM

## 2021-05-03 PROCEDURE — 99214 OFFICE O/P EST MOD 30 MIN: CPT | Performed by: INTERNAL MEDICINE

## 2021-05-03 NOTE — PROGRESS NOTES
Chief Complaint   Patient presents with    Diabetes     pcp and pharmacy verified. Eye exam: 2020. DOXY. ME            **THIS IS A VIRTUAL VISIT VIA A VIDEO ENCOUNTER. PATIENT AGREED TO HAVE THEIR CARE DELIVERED OVER VIDEO IN PLACE OF THEIR REGULARLY SCHEDULED OFFICE VISIT**      History of Present Illness: Gloria Campos is a 54 y.o. female here for follow up of Type 1 diabetes. She was diagnosed with DM at the age of 13. Pt was admitted to [facility] on 5/7/20 for CP she was found to have CAD and was transferred to [facility], where she had CABG x4. On admission her A1C was 7.8%. She saw Dr. Lieutenant Severs of Neurology in March 2021 for evaluation for HAs, and cognitive issues. She had a normal EEG on 4/1/21. She denies any recent illnesses, injuries or hospitalizations. \"I feel like I have had good blood sugars, but I have gained weight. February and March I got the [employer] vaccine and I just feel like things were just running odd during that time. For the most time my sugars have been running in the 100s range. I have just not been finding any time to work out or exercise. Pt notes she leaves for work around 745AM and does not get back home till 630-7PMPM.  She works Mon-Fri. She is still using the free style Eliseo. She will e-mail me her BG logs. Pt notes that her FBGs have been in the 120-170s range, before lunch her BGs run in the 120-170. Her pre-diner have been in the 120-170s. \"I will get random 200s, I will sometimes miss a bolus with dinner and my BGs can be higher in the evening, but not as high as they used to be. I will bolus if my sugars are high at bedtime\". She denies issues of hypoglycemia. Her current Pump Setting are as follows (Medtronic Pump). She got the newest Medtronic pump.   She had the education with the Medtronic teacher, but she feels that she does not have the time to check her BGs to calibrate the Medtronic CGM so she can not use the Automode. Basal  MN-3AM 2.10  3AM-530AM 1.95  530AM-Noon 2.35   Noon--6PM: 1.9  6PM-930PM 3.0  930PM-MN 1.95  Carb Ratio  MN-9AM 7.0  9AM-6PM 7.0  6PM-MN 7.0  Correction  1: 20    She changes her infusion site every 3 days. She was diagnosed with being in Menopause in September 2019. She notes that she was having issues of hot flashes. She is stillon HRT by her Gyn Dr. Margo Patel    \"I had a silent heart attack at the age of 41 (2006) and had a stent placed. She had the CABG x4 in May 2020. She follows with Dr. Bradford Brunson of Cardiology. She had a TIA in September 2016. She saw Dr. Porfirio Bella of Neurology in March 2021 for evaluation for HAs, and cognitive issues. She had a normal EEG on 4/1/21. Pt has hx of Neuropathy. She has had proteinuria since the age of 18. She is followed by Dr. Crabtree [facility]. Last eye exam was November 2020. Pt has hx of Retinopathy (since [facility]) and cataracts. Current Outpatient Medications   Medication Sig    carvediloL (COREG) 6.25 mg tablet TAKE 1 TABLET TWICE A DAY    rosuvastatin (CRESTOR) 20 mg tablet TAKE 1 TABLET NIGHTLY    lisinopriL (PRINIVIL, ZESTRIL) 40 mg tablet 40 mg daily.  insulin aspart U-100 (NovoLOG U-100 Insulin aspart) 100 unit/mL injection Via insulin pump 125 units per day max    FreeStyle Eliseo 14 Day Sensor kit USE TO CHECK BLOOD SUGAR    MIMVEY 1-0.5 mg per tablet TAKE 1 TABLET BY MOUTH EVERY DAY    aspirin delayed-release 81 mg tablet Take 81 mg by mouth nightly.  ondansetron (ZOFRAN ODT) 4 mg disintegrating tablet Take 1 Tab by mouth every eight (8) hours as needed for Nausea.  hydroCHLOROthiazide (HYDRODIURIL) 25 mg tablet Take 1 Tab by mouth daily. (Patient taking differently: Take 25 mg by mouth nightly.)    cyanocobalamin, vitamin B-12, (VITAMIN B-12) 5,000 mcg subl by SubLINGual route daily.  spironolactone (ALDACTONE) 25 mg tablet Take 1 Tab by mouth three (3) days a week.  (Patient taking differently: Take 25 mg by mouth daily.)    escitalopram (LEXAPRO) 10 mg tablet Take 10 mg by mouth daily. No current facility-administered medications for this visit. Allergies   Allergen Reactions    Erythromycin Hives     Review of Systems:  - Eyes: no blurry vision or double vision  - Cardiovascular: no chest pain  - Respiratory: no shortness of breath  - Musculoskeletal: no myalgias  - Neurological: no numbness/tingling in extremities    Physical Examination:  There were no vitals taken for this visit. - GENERAL: NCAT, Appears well nourished   - EYES: EOMI, non-icteric, no proptosis   - Ear/Nose/Throat: NCAT, no visible inflammation or masses   - CARDIOVASCULAR: no cyanosis, no visible JVD   - RESPIRATORY: respiratory effort normal without any distress or labored breathing   - MUSCULOSKELETAL: Normal ROM of neck and upper extremities observed   - SKIN: No rash on face, CABG scar healing well no drainage or erythema seen. - NEUROLOGIC:  No facial asymmetry (Cranial nerve 7 motor function), No gaze palsy   - PSYCHIATRIC: Normal affect, Normal insight and judgement       Data Reviewed:   Carotid Doppler  Right Carotid    The right CCA is patent. The right ICA is normal. The right ECA is patent. The right vertebral is antegrade. Left Carotid    The left CCA is patent. The left ICA is normal. The left ECA is patent. The left vertebral is antegrade. Assessment/Plan:   1) DM > She reports her BGs are running in the 120-170s range during the day, but can be up to 200 after dinner, particularly if she misses her dinner bolus. Pt reports her schedule is too busy/hectic to check her BGs manually so she can not use the Medtronic CGM and can not use the Automode on her pump. Will order an A1C and based on these results we can discuss making adjustments to her pump settings. For now will not make any adjustments to her settings.   Pt to continue the FreeStyle Eliseo CGM  Pt is checking her BGs 6-10 times per day, which she is to continue indefinitely. Using these sugar readings she self adjusts her insulin doses. Pt to send me her BG readings in 4 weeks. Will order an A1C    2) HTN > Pt is on an ACEI for renal protection, will not make any changes at this time. Will order a urine MA. 3) HLD > Her Lipid panel was at goal in May 2020. Pt to continue the Rosuvastatin 20mg HS. Will order a lipid panel and CMP. Pt voices understanding and agreement with the plan.     RTC 3 months      Copy sent to:  Dr. Yosef Mitchell

## 2021-05-14 LAB
ALBUMIN SERPL-MCNC: 4.5 G/DL (ref 3.8–4.8)
ALBUMIN/CREAT UR: 803 MG/G CREAT (ref 0–29)
ALBUMIN/GLOB SERPL: 2.1 {RATIO} (ref 1.2–2.2)
ALP SERPL-CCNC: 52 IU/L (ref 39–117)
ALT SERPL-CCNC: 17 IU/L (ref 0–32)
AST SERPL-CCNC: 20 IU/L (ref 0–40)
BILIRUB SERPL-MCNC: 0.8 MG/DL (ref 0–1.2)
BUN SERPL-MCNC: 22 MG/DL (ref 6–24)
BUN/CREAT SERPL: 18 (ref 9–23)
CALCIUM SERPL-MCNC: 9.7 MG/DL (ref 8.7–10.2)
CHLORIDE SERPL-SCNC: 100 MMOL/L (ref 96–106)
CHOLEST SERPL-MCNC: 133 MG/DL (ref 100–199)
CO2 SERPL-SCNC: 24 MMOL/L (ref 20–29)
CREAT SERPL-MCNC: 1.23 MG/DL (ref 0.57–1)
CREAT UR-MCNC: 28.2 MG/DL
EST. AVERAGE GLUCOSE BLD GHB EST-MCNC: 180 MG/DL
GLOBULIN SER CALC-MCNC: 2.1 G/DL (ref 1.5–4.5)
GLUCOSE SERPL-MCNC: 71 MG/DL (ref 65–99)
HBA1C MFR BLD: 7.9 % (ref 4.8–5.6)
HDLC SERPL-MCNC: 43 MG/DL
IMP & REVIEW OF LAB RESULTS: NORMAL
INTERPRETATION: NORMAL
LDLC SERPL CALC-MCNC: 64 MG/DL (ref 0–99)
MICROALBUMIN UR-MCNC: 226.5 UG/ML
POTASSIUM SERPL-SCNC: 4.7 MMOL/L (ref 3.5–5.2)
PROT SERPL-MCNC: 6.6 G/DL (ref 6–8.5)
SODIUM SERPL-SCNC: 138 MMOL/L (ref 134–144)
TRIGL SERPL-MCNC: 148 MG/DL (ref 0–149)
VLDLC SERPL CALC-MCNC: 26 MG/DL (ref 5–40)

## 2021-05-25 RX ORDER — FLASH GLUCOSE SENSOR
KIT MISCELLANEOUS
Qty: 6 KIT | Refills: 3 | Status: SHIPPED | OUTPATIENT
Start: 2021-05-25 | End: 2021-08-11

## 2021-06-10 RX ORDER — BLOOD-GLUCOSE SENSOR
EACH MISCELLANEOUS
Qty: 3 DEVICE | Refills: 11 | Status: SHIPPED | OUTPATIENT
Start: 2021-06-10 | End: 2021-08-11 | Stop reason: SDUPTHER

## 2021-06-10 RX ORDER — BLOOD-GLUCOSE TRANSMITTER
EACH MISCELLANEOUS
Qty: 1 DEVICE | Refills: 3 | Status: SHIPPED | OUTPATIENT
Start: 2021-06-10 | End: 2021-08-11 | Stop reason: SDUPTHER

## 2021-06-10 RX ORDER — BLOOD-GLUCOSE,RECEIVER,CONT
EACH MISCELLANEOUS
Qty: 1 EACH | Refills: 0 | Status: SHIPPED | OUTPATIENT
Start: 2021-06-10

## 2021-06-15 ENCOUNTER — HOSPITAL ENCOUNTER (OUTPATIENT)
Dept: MRI IMAGING | Age: 51
Discharge: HOME | End: 2021-06-15
Attending: PSYCHIATRY & NEUROLOGY
Payer: COMMERCIAL

## 2021-06-15 DIAGNOSIS — R41.0 CONFUSION: ICD-10-CM

## 2021-06-15 PROCEDURE — 70551 MRI BRAIN STEM W/O DYE: CPT

## 2021-08-11 ENCOUNTER — OFFICE VISIT (OUTPATIENT)
Dept: ENDOCRINOLOGY | Age: 51
End: 2021-08-11
Payer: COMMERCIAL

## 2021-08-11 VITALS
SYSTOLIC BLOOD PRESSURE: 131 MMHG | BODY MASS INDEX: 28 KG/M2 | DIASTOLIC BLOOD PRESSURE: 66 MMHG | WEIGHT: 164 LBS | HEART RATE: 79 BPM | HEIGHT: 64 IN

## 2021-08-11 DIAGNOSIS — E10.42 TYPE 1 DIABETES MELLITUS WITH DIABETIC POLYNEUROPATHY (HCC): Primary | ICD-10-CM

## 2021-08-11 DIAGNOSIS — E78.2 MIXED HYPERLIPIDEMIA: ICD-10-CM

## 2021-08-11 DIAGNOSIS — I10 ESSENTIAL HYPERTENSION: ICD-10-CM

## 2021-08-11 LAB — HBA1C MFR BLD HPLC: 7.6 %

## 2021-08-11 PROCEDURE — 99215 OFFICE O/P EST HI 40 MIN: CPT | Performed by: INTERNAL MEDICINE

## 2021-08-11 PROCEDURE — 3051F HG A1C>EQUAL 7.0%<8.0%: CPT | Performed by: INTERNAL MEDICINE

## 2021-08-11 PROCEDURE — 83036 HEMOGLOBIN GLYCOSYLATED A1C: CPT | Performed by: INTERNAL MEDICINE

## 2021-08-11 RX ORDER — BLOOD-GLUCOSE TRANSMITTER
EACH MISCELLANEOUS
Qty: 3 DEVICE | Refills: 3 | Status: SHIPPED | OUTPATIENT
Start: 2021-08-11

## 2021-08-11 RX ORDER — BLOOD-GLUCOSE SENSOR
EACH MISCELLANEOUS
Qty: 9 DEVICE | Refills: 3 | Status: SHIPPED | OUTPATIENT
Start: 2021-08-11 | End: 2021-11-05 | Stop reason: SDUPTHER

## 2021-08-11 NOTE — PROGRESS NOTES
Chief Complaint   Patient presents with    Diabetes     pcp and pharmacy verified       History of Present Illness: Lucien De La Torre is a 54 y.o. female here for follow up of Type 1 diabetes. She was diagnosed with DM at the age of 13. Pt was admitted to [facility] on 5/7/20 for CP she was found to have CAD and was transferred to [facility], where she had CABG x4. She saw Dr. Ayse Disla of Neurology in March 2021 for evaluation for HAs, and cognitive issues. She had a normal EEG on 4/1/21. At our last visit her A1C ws 7.9% she was reporting her BGs were the highest in the evening so I increased her basal setting from 930PM-Midnight from 1.95 units per hour to 2.2 units per hour. Her A1C today was 7.6%. She denies any recent illnesses, injuries or hospitalizations. Pt has received both COVID vaccinations (Nhan Mccloud). \"I am loving the [facility], it has been working well for me. \"    She reports her BGs are looking better overall, but when she is under a lot of stress her BGs tend to run higher. \"Yesterday my father had a medical emergency and my sugars yesterday morning was in the 230s. \"  \"in June I had a wake-up call, when I reflected on how much weight I had gained. I was coming home and grazing all night so I cut back on my mid-day insulin basal rate so that I was not as hungry when I got home and I have not been grazing as much at night. \"    She denies issues of hypoglycemia. Pt notes she has made some changes to her pump settings. Basal  MN-3AM 2.10  3AM-530AM 1.95  530AM-Noon 2.35   Noon--6PM: 1.9  6PM-930PM 2.5  930PM-MN 1.95  Carb Ratio  MN-9AM 7.0  9AM-6PM 7.0  6PM-MN 7.0  Correction  1: 20    She changes her infusion site every 3 days. I have just not been finding any time to work out or exercise. Pt notes she leaves for work around 745AM and does not get back home till 630-7PM.  She works Mon-Fri.   Pt wakes at 6AM  She has breakfast around 10AM  She has lunch around Noon-2PM  She has dinner around 630-730PM  She goes to bed around 10PM.    She was diagnosed with being in Menopause in September 2019. She notes that she was having issues of hot flashes. She is still on HRT by her Gyn Dr. Angela Porras. She still has the [place] IUD in place. \"I had a silent heart attack at the age of 41 (2006) and had a stent placed. She had the CABG x4 in May 2020. She follows with Dr. Laura Sidhu of Cardiology. She had a TIA in September 2016. She saw Dr. Lidia Rodriguez of Neurology in March 2021 for evaluation for HAs, and cognitive issues. She had a normal EEG on 4/1/21. Pt has hx of Neuropathy. She has had proteinuria since the age of 18. She is followed by Dr. Alma Adrian [facility]phrology. Last eye exam was November 2020. Pt has hx of Retinopathy (since [facility]) and cataracts. Current Outpatient Medications   Medication Sig    Blood-Glucose Sensor (Dexcom G6 Sensor) victor m Change sensor every 10 days. [phone]    Blood-Glucose Transmitter (Dexcom G6 Transmitter) victor m Change sensor every 10 days. [phone]    Blood-Glucose Meter,Continuous (Dexcom G6 ) misc Change sensor every 10 days. [phone]    carvediloL (COREG) 6.25 mg tablet TAKE 1 TABLET TWICE A DAY    rosuvastatin (CRESTOR) 20 mg tablet TAKE 1 TABLET NIGHTLY    lisinopriL (PRINIVIL, ZESTRIL) 40 mg tablet 40 mg daily.  insulin aspart U-100 (NovoLOG U-100 Insulin aspart) 100 unit/mL injection Via insulin pump 125 units per day max    MIMVEY 1-0.5 mg per tablet TAKE 1 TABLET BY MOUTH EVERY DAY    aspirin delayed-release 81 mg tablet Take 81 mg by mouth nightly.  ondansetron (ZOFRAN ODT) 4 mg disintegrating tablet Take 1 Tab by mouth every eight (8) hours as needed for Nausea.  hydroCHLOROthiazide (HYDRODIURIL) 25 mg tablet Take 1 Tab by mouth daily. (Patient taking differently: Take 25 mg by mouth nightly.)    cyanocobalamin, vitamin B-12, (VITAMIN B-12) 5,000 mcg subl by SubLINGual route daily.     spironolactone (ALDACTONE) 25 mg tablet Take 1 Tab by mouth three (3) days a week. (Patient taking differently: Take 25 mg by mouth daily.)    escitalopram (LEXAPRO) 10 mg tablet Take 10 mg by mouth daily. No current facility-administered medications for this visit. Allergies   Allergen Reactions    Erythromycin Hives     Review of Systems:  - Eyes: no blurry vision or double vision  - Cardiovascular: no chest pain  - Respiratory: no shortness of breath  - Musculoskeletal: no myalgias  - Neurological: no numbness/tingling in extremities    Physical Examination:  Blood pressure 131/66, pulse 79, height 5' 4\" (1.626 m), weight 164 lb (74.4 kg). - General: pleasant, no distress, good eye contact   - Neck: no carotid bruits  - Cardiovascular: regular, normal rate, nl s1 and s2, no m/r/g, 2+ DP pulses   - Respiratory: clear bilaterally  - Integumentary: no edema, no foot ulcers  - Psychiatric: normal mood and affect    Diabetic foot exam:     Left Foot:   Visual Exam: callous - present   Pulse DP: 2+ (normal)   Filament test: normal sensation    Vibratory sensation: normal      Right Foot:   Visual Exam: callous - present   Pulse DP: 2+ (normal)   Filament test: normal sensation    Vibratory sensation: normal      Data Reviewed:   Her A1C today was 7.6%. Assessment/Plan:   1) DM > Her A1C today was 7.6%. Since starting the Dexcom CGM and since stopping the grazing at night she notes her BGs have been running in a better range. For now will not make any change in her pump setting. Pt is checking her BGs 6-10 times per day, which she is to continue indefinitely. Using these sugar readings she self adjusts her insulin doses. Pt to send me her BG readings in 4 weeks. 2) HTN > Her BP today was 130/65, pt is on an ACEI for renal protection, will not make any changes at this time. 3) HLD > Her Lipid panel was at goal in March 2021. Pt to continue the Rosuvastatin 20mg HS.     Pt voices understanding and agreement with the plan. RTC 3 months    I spent 40 minutes on her case and > 50% of the time was spent in counseling on the above issues. Follow-up and Dispositions    · Return in about 3 months (around 11/11/2021).        Copy sent to:  Dr. Kathy Be

## 2021-09-08 ENCOUNTER — TELEPHONE (OUTPATIENT)
Dept: ENDOCRINOLOGY | Age: 51
End: 2021-09-08

## 2021-09-08 NOTE — TELEPHONE ENCOUNTER
----- Message from [address] [place] [address] sent at 9/8/2021 10:39 AM EDT -----  Regarding: Dr. Damion Dubois telephone  General Message/Vendor Calls    Caller's first and last name: Reina Rangel      Reason for call: Update      Callback required yes/no and why: yes       Best contact number(s):[phone]      Details to clarify the request: He would like to be contacted regarding the prescription request for the [address] [place] [address]

## 2021-09-08 NOTE — TELEPHONE ENCOUNTER
Per Aarti Candelario at [facility]. PA was approved. Just need the written order. He will fax here to be completed.

## 2021-09-10 RX ORDER — INSULIN PUMP CONTROLLER
EACH MISCELLANEOUS
Qty: 30 EACH | Refills: 3 | Status: SHIPPED | OUTPATIENT
Start: 2021-09-10

## 2021-09-27 DIAGNOSIS — E10.42 TYPE 1 DIABETES MELLITUS WITH DIABETIC POLYNEUROPATHY (HCC): Primary | ICD-10-CM

## 2021-09-27 RX ORDER — INSULIN ASPART 100 [IU]/ML
INJECTION, SOLUTION INTRAVENOUS; SUBCUTANEOUS
Qty: 100 ML | Refills: 3 | Status: SHIPPED | OUTPATIENT
Start: 2021-09-27 | End: 2021-10-05 | Stop reason: CLARIF

## 2021-10-05 RX ORDER — INSULIN LISPRO 100 [IU]/ML
INJECTION, SOLUTION INTRAVENOUS; SUBCUTANEOUS
Qty: 110 ML | Refills: 0 | Status: SHIPPED | OUTPATIENT
Start: 2021-10-05 | End: 2022-08-17 | Stop reason: SDUPTHER

## 2021-10-05 NOTE — TELEPHONE ENCOUNTER
----- Message from Amy Ruvalcaba III sent at 10/5/2021  3:11 PM EDT -----  Regarding: NUrse Ludwin Warren  Patient return call    Caller's first and last name and relationship (if not the patient): Self       Best contact number(s):[phone]      Whose call is being returned: Nurse Ludwin Warren       Details to clarify the request: Pt state for the humanlog and novalog its a insurance thing       Amy Ruvalcaba III

## 2021-10-05 NOTE — TELEPHONE ENCOUNTER
Received notification from [employer] that Novolog is not covered. Patient states has used Humalog in the past with no problem. Advised will pend a rx for Huamalog. Patient expressed understanding.

## 2021-10-07 ENCOUNTER — OFFICE VISIT (OUTPATIENT)
Dept: DIABETES SERVICES | Age: 51
End: 2021-10-07

## 2021-10-07 DIAGNOSIS — E10.42 TYPE 1 DIABETES MELLITUS WITH DIABETIC POLYNEUROPATHY (HCC): Primary | ICD-10-CM

## 2021-10-08 ENCOUNTER — TELEPHONE (OUTPATIENT)
Dept: ENDOCRINOLOGY | Age: 51
End: 2021-10-08

## 2021-10-08 NOTE — TELEPHONE ENCOUNTER
FYI: Received another PA request for patient's Novolog. The prescription for Humalog was sent on 10/05/221 by Dr. Alex Snyder. Per pharmacist, Sade Angeles., Humalog was not received. Verbally called in Rx to pharmacist. He read back the prescription for clarity. Pharmacist ran a test claim and Humalog is covered by insurance. Patient had been notified via [address] on 10/5/21. Chart said has not been read. Called and spoke with patient this am notifying her of the change. She said she did not have a problem with Humalog. Was changed due to insurance coverage. She states will read the [employer] message from 10/5/21.

## 2021-10-11 NOTE — PROGRESS NOTES
[facility] Program for Diabetes Health  Diabetes Self-Management Education & Support Program  Encounter note    SUMMARY  Diabetes self-care management training was completed related to pump training transitioning off Medtronic and onto Omnipod DASHGeorge Lopez is currently wearing Dexcom using both Clarity and Dexcom G6. Graham Lopez has many years experience with insulin pump therapy. She demonstrates excellent understanding of arb counting, troubleshooting pump sites/hypoglycemia risk along with pump/pod failures. EVALUATION:  Graahm Lopez has type 1 DM and is well known to this pump  and educator. She has a very strong hx with insulin pump systems and troubleshooting most any issues/alerts with her pumping systems. She shared that over the past year she has undergone CABG and has really focused on her relationship with her insulin pump delivery systems and has migrated to an integrated system using Dexcom as she has found that her Medtronic integrated system sensor was not as reliable/would miss needed calibrations at times and became difficult to wear. Shared that she has loved the Dexcom since starting and has found it to be very easy to use. She is looking forward to the integration with Omnipod. We did discuss that she will likely need 2day Pod wear (she will check on this and f/u with ) due to her current basal settings requiring ~ 100 units. Rosalinda asked insightful questions related to the differences in her systems. All pump settings were transferred directly from her Medtronic pump and per 's pump orders - no pump setting changes were made but orders are signed for adjusting insulin settings by 10-20% up/down within BG/SG provided. See media for scanned pump orders. Only change made to PDM programming was related to the max bolus setting which was programmed to the maximum bolus setting permitted w/in the system max = 30 units.     RECOMMENDATIONS:  1) will reach out to Graham Lopez next week for questions. 2) encouraged to download and access [employer] application for data downloads. 3) continue to reach out to  for pump setting changes as needed and if she needs her Rx changed to 2 day pod wear given the 200 unit max capacity for her pod compared to the 300 units her Medtronic reservoir held in the past.  4) provided contact information for questions for CPT/educator. Next provider visit is scheduled for 12/15/21. DATE DSMES TOPIC EVALUATION     10/7/21 HOW CAN BLOOD GLUCOSE MONITORING HELP ME?   a. Value of blood glucose monitoring   b. Realistic expectations   c. Differences between sensor and blood glucose values. d. Setting alerts on sensor for high/low/out of range  e. Using sensor glucose levels for treatment decisions. f. Using glucometer for treatment decisions   g. Use of sensor trend arrows when dosing insulin   h. Sensor sites on body and relative to infusion sets  i. Tips for improving adhesion    j. Downloading and using smartphone apps  k. Data sharing with provider         The participant    Can demonstrate their SG/glucometer procedure Yes   Logs their BG readings Yes    Carlos Morrissey is currently using Dexcom for her CGM. Reports typical sites for Dexcom are on her abdomen and asked for specific suggestions for where pump/sensor can be placed. We discussed that her Dexcom will eventually integrated with her Omnipod and will likely no longer need her DASH PDM and the functions of the PDM will be done via secure maryann. We did discuss [employer] as the software for downloading data for Omnipod pump reports. Encouraged Rosalinda to set her account up with [facility] - she agrees. We reviewed alerts/alarms and repeat alert options to decrease annoyance alerts/alarm fatigue. DATE DSMES TOPIC EVALUATION   10/7/21  HOW DO MY DIABETES MEDICATIONS WORK?   a.  Insulin pump therapy medications & methods    Insulin pump benefits and limitations   Understanding basal and bolus insulin   Navigating menus    Insulin pump settings   Infusion sets and site selection   Sensor and pump integration if applicable   Pump and smartphone apps   Troubleshooting hypo and hyperglycemia   Glucagon emergency kits   Medical procedures and warnings/warranty   Traveling with insulin pump and medications.  Barriers to medication adherence. The participant    Can describe the expected action & side effects of prescribed diabetes medications Yes.  Can demonstrate injection technique (if applicable) Yes for her pod and cannula insert. Pt completed insulin pump training per Omnipod guidelines for their DASH system. Completed training checklist per pump company guidelines and demonstrated excellent understanding. Pt navigated programming their PDM with minimum assistance. Pt completed their pod start, fill and insertion with minimum/some assistance and was able to demonstrate skills to ensure cannula insertion was correct. Settings program today are as follows for basal pattern and bolus calculator without meal presets under food library:(transferred directly for her Medtronic insulin pump)     Basal time setting  Basal Rate units/hour    12:00 am to 3:00 am  2.1    3:00 am to 6:00 am  1.95    6 am to 12 pm  2.35    12 pm to 6:00 pm  1.9     6:00 pm to 10:00 pm  2.5    10:00 pm  to 12:00 pm  1.95     Bolus Calculator: (all the below settings were set 24 hours). Time setting  Carb Ratio    g/unit  Time Setting Correction Factor   mg/dl/unit  Time setting Target BG (Correct Above)   mg/dl   12 am to 12 am  7 g  12 am to 12 am  20 mg/dl 12 am to 12 am   100 (130)      Maximum bolus: 30 units (maximum permitted by programming limits)  Maximum basal rate: 3.0 units/hour  Duration of Insulin Action: 3 hours  Reverse correction: On/Off  Minimum BG for bolus calculations: 50 mg/dl.     Majority of our time together was discussing difference in menus/setting names/limits of 200 units max fill per pod/correct above when compared to her Medtronic pump. Advised her that she may need to adjust her low reservoir alert as currently at 10 units and she is not use to having 100 units less available to her for therapy/dosing per \"set change\". Time in minutes: 180   billed to insulin pump company per contract       Russell Figueroa RD, [facility] on 10/11/2021 at 3:49 PM    [facility] [employer] for Telehealth:  José Luis Montenegro was evaluated in person for her pump start.    Encounter date: 10/7/2021  Time in appointment: 180 minutes

## 2021-10-12 RX ORDER — CARVEDILOL 6.25 MG/1
TABLET ORAL
Qty: 90 TABLET | Refills: 3 | Status: SHIPPED | OUTPATIENT
Start: 2021-10-12 | End: 2022-03-14 | Stop reason: SDUPTHER

## 2021-11-05 ENCOUNTER — TELEPHONE (OUTPATIENT)
Dept: ENDOCRINOLOGY | Age: 51
End: 2021-11-05

## 2021-11-05 RX ORDER — BLOOD-GLUCOSE SENSOR
EACH MISCELLANEOUS
Qty: 9 EACH | Refills: 3 | Status: SHIPPED | OUTPATIENT
Start: 2021-11-05 | End: 2022-08-17 | Stop reason: SDUPTHER

## 2021-11-05 NOTE — TELEPHONE ENCOUNTER
Patient states she needs a letter to take to the airport stating that because she wears an insulin pump,  she cannot go through the xray scanner (nor her extra pods) but must be \"wanded\" instead to prevent malfunction of her insulin pump and extra pods. Wants letter emailed to her at:  [email or web address]. States needs 90 day prescription to [place]. See refill encoutner.

## 2021-11-05 NOTE — TELEPHONE ENCOUNTER
----- Message from Lambert De Souza sent at 11/5/2021 10:09 AM EDT -----  Regarding: /Telephone  General Message/Vendor Calls    Caller's first and last name: n/a      Reason for call: Patient would like a call back from [facility] required yes/no and why: yes, to confirm      Best contact number(s): [phone]      Details to clarify the request: n/a      Lambert De Souza

## 2021-11-05 NOTE — TELEPHONE ENCOUNTER
Patient wants rx for 90 days for sensors to go to [facility].

## 2021-12-01 DIAGNOSIS — E10.42 TYPE 1 DIABETES MELLITUS WITH DIABETIC POLYNEUROPATHY (HCC): ICD-10-CM

## 2021-12-15 ENCOUNTER — OFFICE VISIT (OUTPATIENT)
Dept: ENDOCRINOLOGY | Age: 51
End: 2021-12-15
Payer: COMMERCIAL

## 2021-12-15 VITALS
HEART RATE: 73 BPM | BODY MASS INDEX: 28.92 KG/M2 | DIASTOLIC BLOOD PRESSURE: 63 MMHG | WEIGHT: 169.4 LBS | HEIGHT: 64 IN | SYSTOLIC BLOOD PRESSURE: 120 MMHG

## 2021-12-15 DIAGNOSIS — E10.42 TYPE 1 DIABETES MELLITUS WITH DIABETIC POLYNEUROPATHY (HCC): Primary | ICD-10-CM

## 2021-12-15 DIAGNOSIS — E78.2 MIXED HYPERLIPIDEMIA: ICD-10-CM

## 2021-12-15 DIAGNOSIS — I10 ESSENTIAL HYPERTENSION: ICD-10-CM

## 2021-12-15 LAB — HBA1C MFR BLD HPLC: 7.8 %

## 2021-12-15 PROCEDURE — 99215 OFFICE O/P EST HI 40 MIN: CPT | Performed by: INTERNAL MEDICINE

## 2021-12-15 PROCEDURE — 3051F HG A1C>EQUAL 7.0%<8.0%: CPT | Performed by: INTERNAL MEDICINE

## 2021-12-15 PROCEDURE — 83036 HEMOGLOBIN GLYCOSYLATED A1C: CPT | Performed by: INTERNAL MEDICINE

## 2021-12-15 NOTE — PROGRESS NOTES
Chief Complaint   Patient presents with    Diabetes     pcp and pharmacy verified       History of Present Illness: Arvind Little is a 54 y.o. female here for follow up of Type 1 diabetes. She was diagnosed with DM at the age of 13. \"I have decided I want to be 10 again and not an adult. I am very happy with the OmniPod and DexCom, but from August to October I lost weight and then we went on vacation and then as soon as we had the time change in November things went haywire. None of my other habits have changed any, I have been playing with my basal rates. I went from lows to highs when I make the changes. Last night I had pasta, steak, and eggplant and I did not take any insulin last night and when I went to bed last night and I would wake up in the 80s. For the last 2 weeks I have had to suspend my pump early in the morning, or my pump has alarmed me around 3-4AM.\"    Pt notes her BGs have been low overnight, but during the day her BGs are running in the 200s. Her A1C today was 7.8%. She denies any recent illnesses, injuries or hospitalizations. Pt has received both COVID vaccinations (Rosalba Ok). \"I am loving the [facility], it has been working well for me. \"    Basal  MN-3AM 1.9  3AM-530AM 1.80  530AM-Noon 1.75   Noon--6PM: 1.9  6PM-930PM 2.35  930PM-MN 1.95  Carb Ratio  MN-9AM 7.0  9AM-6PM 7.0  6PM-MN 7.0  Correction  1: 20    She changes her infusion site every 3 days. I have just not been finding any time to work out or exercise. Pt notes she leaves for work around 745AM and does not get back home till 630-7PM.  She works Mon-Fri. Pt wakes at 6AM  She has breakfast around 10AM  She has lunch around Noon-2PM  She has dinner around 630-730PM  She goes to bed around 10PM.    She was diagnosed with being in Menopause in September 2019. She notes that she was having issues of hot flashes. She is still on HRT by her Gyn Dr. Lamont Bumpers. She still has the [place] IUD in place.     \"I had a silent heart attack at the age of 41 (2006) and had a stent placed. She had the CABG x4 in May 2020. She follows with Dr. Emilee Ndiaye of Cardiology. She had a TIA in September 2016. She saw Dr. Karla Dotson of Neurology in March 2021 for evaluation for HAs, and cognitive issues. She had a normal EEG on 4/1/21. Pt has hx of Neuropathy. She has had proteinuria since the age of 18. She is followed by Dr. Neal Hamlin [facility]. Last eye exam was November 2020. Pt has hx of Retinopathy (since college) and cataracts. Current Outpatient Medications   Medication Sig    Blood-Glucose Sensor (Dexcom G6 Sensor) victor m Change sensor every 10 days. DX:E10.42    carvediloL (COREG) 6.25 mg tablet TAKE 1 TABLET TWICE A DAY    insulin lispro (HUMALOG) 100 unit/mL injection USE VIA INSULIN PUMP.  UNITS PER DAY. (replaces Novolog, per insurance preference)    Insulin Pump Cartridge (Omnipod Dash 5 Pack Pod) crtg Change pod every 3 days    Blood-Glucose Transmitter (Dexcom G6 Transmitter) victor m Change sensor every 10 days. [phone]    Blood-Glucose Meter,Continuous (Dexcom G6 ) misc Change sensor every 10 days. [phone]    rosuvastatin (CRESTOR) 20 mg tablet TAKE 1 TABLET NIGHTLY    lisinopriL (PRINIVIL, ZESTRIL) 40 mg tablet 40 mg daily.  MIMVEY 1-0.5 mg per tablet TAKE 1 TABLET BY MOUTH EVERY DAY    aspirin delayed-release 81 mg tablet Take 81 mg by mouth nightly.  ondansetron (ZOFRAN ODT) 4 mg disintegrating tablet Take 1 Tab by mouth every eight (8) hours as needed for Nausea.  hydroCHLOROthiazide (HYDRODIURIL) 25 mg tablet Take 1 Tab by mouth daily. (Patient taking differently: Take 25 mg by mouth nightly.)    cyanocobalamin, vitamin B-12, (VITAMIN B-12) 5,000 mcg subl by SubLINGual route daily.  spironolactone (ALDACTONE) 25 mg tablet Take 1 Tab by mouth three (3) days a week.  (Patient taking differently: Take 25 mg by mouth daily.)    escitalopram (LEXAPRO) 10 mg tablet Take 10 mg by mouth daily. No current facility-administered medications for this visit. Allergies   Allergen Reactions    Erythromycin Hives     Review of Systems:  - Eyes: no blurry vision or double vision  - Cardiovascular: no chest pain  - Respiratory: no shortness of breath  - Musculoskeletal: no myalgias  - Neurological: no numbness/tingling in extremities    Physical Examination:  Blood pressure 120/63, pulse 73, height 5' 4\" (1.626 m), weight 169 lb 6.4 oz (76.8 kg). - General: pleasant, no distress, good eye contact   - Neck: no carotid bruits  - Cardiovascular: regular, normal rate, nl s1 and s2, no m/r/g, 2+ DP pulses   - Respiratory: clear bilaterally  - Integumentary: no edema, no foot ulcers  - Psychiatric: normal mood and affect    Diabetic foot exam:     Left Foot:   Visual Exam: callous - present   Pulse DP: 2+ (normal)   Filament test: normal sensation    Vibratory sensation: normal      Right Foot:   Visual Exam: callous - present   Pulse DP: 2+ (normal)   Filament test: normal sensation    Vibratory sensation: normal      Data Reviewed:   Her A1C today was 7.8%. Assessment/Plan:   1) DM > Her A1C today was 7.8%. She has been having low BGs overnight and high BGs during the day, so will make the following changes     Basal  MN-3AM 1.9 > 1.9  3AM-530AM 1.80 > 1.7  530AM-Noon 1.75 > 1.9  Noon--6PM: 1.9  6PM-930PM 2.35  930PM-MN 1.95  Carb Ratio  MN-9AM 7.0  9AM-6PM 7.0  6PM-MN 7.0  Correction  1: 20    Pt is checking her BGs 6-10 times per day, which she is to continue indefinitely. Using these sugar readings she self adjusts her insulin doses. Pt to send me her BG readings in 4 weeks. 2) HTN > Her BP today was 120/63, pt is on an ACEI for renal protection, will not make any changes at this time. 3) HLD > Her Lipid panel was at goal in March 2021. Pt to continue the Rosuvastatin 20mg HS. Pt voices understanding and agreement with the plan.     RTC 4 months    I spent 40 minutes on her case and > 50% of the time was spent in counseling on the above issues. Follow-up and Dispositions    · Return in about 4 months (around 4/15/2022).        Copy sent to:  Dr. Miguel Bauer

## 2021-12-15 NOTE — LETTER
12/15/2021    Patient: Inez Mathew   YOB: 1970   Date of Visit: 12/15/2021     Janis Mata, [address] [place] [phone]  Via Fax: [phone]    Dear Janis Mata MD,      Thank you for referring Ms. Jasper Holter to [address] [place] [address] for evaluation. My notes for this consultation are attached. If you have questions, please do not hesitate to call me. I look forward to following your patient along with you.       Sincerely,    Maximino Cross MD

## 2022-03-14 RX ORDER — CARVEDILOL 6.25 MG/1
TABLET ORAL
Qty: 180 TABLET | Refills: 3 | Status: SHIPPED | OUTPATIENT
Start: 2022-03-14

## 2022-04-01 DIAGNOSIS — E10.42 TYPE 1 DIABETES MELLITUS WITH DIABETIC POLYNEUROPATHY (HCC): ICD-10-CM

## 2022-04-20 ENCOUNTER — OFFICE VISIT (OUTPATIENT)
Dept: ENDOCRINOLOGY | Age: 52
End: 2022-04-20
Payer: COMMERCIAL

## 2022-04-20 VITALS
HEIGHT: 64 IN | SYSTOLIC BLOOD PRESSURE: 125 MMHG | DIASTOLIC BLOOD PRESSURE: 70 MMHG | BODY MASS INDEX: 28.44 KG/M2 | HEART RATE: 73 BPM | WEIGHT: 166.6 LBS

## 2022-04-20 DIAGNOSIS — E10.42 TYPE 1 DIABETES MELLITUS WITH DIABETIC POLYNEUROPATHY (HCC): Primary | ICD-10-CM

## 2022-04-20 DIAGNOSIS — E78.2 MIXED HYPERLIPIDEMIA: ICD-10-CM

## 2022-04-20 DIAGNOSIS — I10 ESSENTIAL HYPERTENSION: ICD-10-CM

## 2022-04-20 LAB — HBA1C MFR BLD HPLC: 8.2 %

## 2022-04-20 PROCEDURE — 99214 OFFICE O/P EST MOD 30 MIN: CPT | Performed by: INTERNAL MEDICINE

## 2022-04-20 PROCEDURE — 83036 HEMOGLOBIN GLYCOSYLATED A1C: CPT | Performed by: INTERNAL MEDICINE

## 2022-04-20 NOTE — LETTER
4/20/2022    Patient: Lena Jones   YOB: 1970   Date of Visit: 4/20/2022     Ledy Carrera MD  [address] [place] [phone]  Via Fax: [phone]    Dear Ledy Carrera MD,      Thank you for referring Ms. Geovany Rea to [address] [place] [address] for evaluation. My notes for this consultation are attached. If you have questions, please do not hesitate to call me. I look forward to following your patient along with you.       Sincerely,    Kal Burt MD

## 2022-04-20 NOTE — PROGRESS NOTES
Chief Complaint   Patient presents with    Diabetes     pcp and pharmacy verified       History of Present Illness: Amando Mtz is a 50 y.o. female here for follow up of Type 1 diabetes. She was diagnosed with DM at the age of 13. Pt has been calling in noting that as she has been going through menopause her BGs have been higher and we have been adjusting her pump setting, particularly overnight. She reports she has had some lows as well and so she is under dosing her insulin and \"correcting highs later\". She notes that with the changes we have been making to her pump settings she notes her BGs are improving. \"My big issue is I have been in HRT since 2019 and my new PCP Dr. Patrice Tesfaye, he recommended stopping HRT and tried NSRI and when I make that change I felt my mood improve, but my blood sugars got worse and my menopause symptoms worsened and I could not sleep well at night. \"    Her A1C today was 8.2%. Pt has received both COVID vaccinations (Fouzia Rossford). \"I am loving the [facility], it has been working well for me. \"    Reviewing her DexCom Clarity her BGs run over 200 from 8PM till 6AM and then improve till she eats breakfast and it will increase and will be high from 10am-6PM.  She reports she has had BGs drop to the 40-50 (which she notes have occurred from 6-9AM and 4-7PM)    Basal  MN-3AM 2.0  3AM-530AM 1.65  530AM-Noon 2.0   Noon--6PM: 2.05  6PM-10PM 2.45  10PM-MN 2.0  Carb Ratio  MN-9AM 7.0  9AM-6PM 7.0  6PM-MN 7.0  Correction  1: 20    She changes her infusion site every 3 days. I have just not been finding any time to work out or exercise. Pt notes she leaves for work around 745AM and does not get back home till 630-7PM.  She works Mon-Fri. Pt wakes at 6AM  She has breakfast around 9-10AM  She has lunch around Noon-2PM  She has dinner around 7-8PM  She goes to bed around 10PM.    She is no longer on the HRT. Her Gyn is Dr. Mary Gonzáles. She still has the [place] IUD in place.     \"I had a silent heart attack at the age of 41 (2006) and had a stent placed. She had the CABG x4 in May 2020. She follows with Dr. Shawnee Cruz of Cardiology. She had a TIA in September 2016. She saw Dr. Ayse Disla of Neurology in March 2021 for evaluation for HAs, and cognitive issues. She had a normal EEG on 4/1/21. Pt has hx of Neuropathy. She has had proteinuria since the age of 18. She is followed by Dr. Bailee Varghese Nephrology. Last eye exam was January 2022. Pt has hx of Retinopathy (since college) and cataracts. Current Outpatient Medications   Medication Sig    carvediloL (COREG) 6.25 mg tablet TAKE 1 TABLET TWICE A DAY    rosuvastatin (CRESTOR) 20 mg tablet TAKE 1 TABLET NIGHTLY    Blood-Glucose Sensor (Dexcom G6 Sensor) victor m Change sensor every 10 days. DX:E10.42    insulin lispro (HUMALOG) 100 unit/mL injection USE VIA INSULIN PUMP.  UNITS PER DAY. (replaces Novolog, per insurance preference)    Insulin Pump Cartridge (Omnipod Dash 5 Pack Pod) crtg Change pod every 3 days    Blood-Glucose Transmitter (Dexcom G6 Transmitter) victor m Change sensor every 10 days. [phone]    Blood-Glucose Meter,Continuous (Dexcom G6 ) misc Change sensor every 10 days. [phone]    lisinopriL (PRINIVIL, ZESTRIL) 40 mg tablet 40 mg daily.  aspirin delayed-release 81 mg tablet Take 81 mg by mouth nightly.  ondansetron (ZOFRAN ODT) 4 mg disintegrating tablet Take 1 Tab by mouth every eight (8) hours as needed for Nausea.  hydroCHLOROthiazide (HYDRODIURIL) 25 mg tablet Take 1 Tab by mouth daily. (Patient taking differently: Take 25 mg by mouth nightly.)    cyanocobalamin, vitamin B-12, (VITAMIN B-12) 5,000 mcg subl by SubLINGual route daily.  spironolactone (ALDACTONE) 25 mg tablet Take 1 Tab by mouth three (3) days a week. (Patient taking differently: Take 25 mg by mouth daily.)    escitalopram (LEXAPRO) 10 mg tablet Take 10 mg by mouth daily.      No current facility-administered medications for this visit. Allergies   Allergen Reactions    Erythromycin Hives    Venlafaxine Other (comments)     Caused high blood sugars     Review of Systems:  - Eyes: no blurry vision or double vision  - Cardiovascular: no chest pain  - Respiratory: no shortness of breath  - Musculoskeletal: no myalgias  - Neurological: no numbness/tingling in extremities    Physical Examination:  Blood pressure 125/70, pulse 73, height 5' 4\" (1.626 m), weight 166 lb 9.6 oz (75.6 kg). - General: pleasant, no distress, good eye contact   - Neck: no carotid bruits  - Cardiovascular: regular, normal rate, nl s1 and s2, no m/r/g, 2+ DP pulses   - Respiratory: clear bilaterally  - Integumentary: no edema, no foot ulcers  - Psychiatric: normal mood and affect    Diabetic foot exam:     Left Foot:   Visual Exam: callous - present   Pulse DP: 2+ (normal)   Filament test: normal sensation    Vibratory sensation: normal      Right Foot:   Visual Exam: callous - present   Pulse DP: 2+ (normal)   Filament test: normal sensation    Vibratory sensation: normal      Data Reviewed:   Her A1C today was 8.2%. Assessment/Plan:   1) DM > Her A1C today was 8.2%. Based on her Dexcom clarity readings I will make the following pump changes. Basal  MN-3AM 2.0  3AM-530AM 1.65 > 1.55  530AM-Noon 2.0   Noon--6PM: 2.05 > 2.15  6PM-10PM 2.45 > 2.40  10PM-MN 2.0 > 2.1  Carb Ratio  MN-9AM 7.0  9AM-6PM 7.0  6PM-MN 7.0  Correction  1: 20    Pt is checking her BGs 6-10 times per day, which she is to continue indefinitely. Using these sugar readings she self adjusts her insulin doses. Pt to send me her BG readings in 4 weeks. 2) HTN > Her BP today was 125/70, pt is on an ACEI for renal protection, will not make any changes at this time. 3) HLD > Her Lipid panel was at goal in March 2021. Pt to continue the Rosuvastatin 20mg HS. Pt voices understanding and agreement with the plan.     RTC 4 months    I spent 40 minutes on her case and > 50% of the time was spent in counseling on the above issues. Follow-up and Dispositions    · Return in about 4 months (around 8/20/2022).        Copy sent to:  Dr. Cornel Duvall

## 2022-06-03 ENCOUNTER — DOCUMENTATION ONLY (OUTPATIENT)
Dept: ENDOCRINOLOGY | Age: 52
End: 2022-06-03

## 2022-06-08 ENCOUNTER — HOSPITAL ENCOUNTER (OUTPATIENT)
Dept: WOUND CARE | Age: 52
Discharge: HOME | End: 2022-06-08
Payer: COMMERCIAL

## 2022-06-08 VITALS
DIASTOLIC BLOOD PRESSURE: 91 MMHG | HEART RATE: 71 BPM | SYSTOLIC BLOOD PRESSURE: 180 MMHG | TEMPERATURE: 98.6 F | RESPIRATION RATE: 16 BRPM

## 2022-06-08 DIAGNOSIS — L97.312 ULCER OF RIGHT ANKLE, WITH FAT LAYER EXPOSED (HCC): Primary | ICD-10-CM

## 2022-06-08 PROCEDURE — 11042 DBRDMT SUBQ TIS 1ST 20SQCM/<: CPT

## 2022-06-08 PROCEDURE — 99213 OFFICE O/P EST LOW 20 MIN: CPT

## 2022-06-08 PROCEDURE — 99203 OFFICE O/P NEW LOW 30 MIN: CPT

## 2022-06-08 RX ORDER — LIDOCAINE HYDROCHLORIDE 20 MG/ML
JELLY TOPICAL ONCE
Status: CANCELLED | OUTPATIENT
Start: 2022-06-08 | End: 2022-06-08

## 2022-06-08 RX ORDER — CLOBETASOL PROPIONATE 0.5 MG/G
OINTMENT TOPICAL ONCE
Status: CANCELLED | OUTPATIENT
Start: 2022-06-08 | End: 2022-06-08

## 2022-06-08 RX ORDER — LIDOCAINE HYDROCHLORIDE 40 MG/ML
SOLUTION TOPICAL ONCE
Status: CANCELLED | OUTPATIENT
Start: 2022-06-08 | End: 2022-06-08

## 2022-06-08 RX ORDER — TRIAMCINOLONE ACETONIDE 1 MG/G
OINTMENT TOPICAL ONCE
Status: CANCELLED | OUTPATIENT
Start: 2022-06-08 | End: 2022-06-08

## 2022-06-08 RX ORDER — BETAMETHASONE DIPROPIONATE 0.5 MG/G
OINTMENT TOPICAL ONCE
Status: CANCELLED | OUTPATIENT
Start: 2022-06-08 | End: 2022-06-08

## 2022-06-08 RX ORDER — LIDOCAINE 50 MG/G
OINTMENT TOPICAL ONCE
Status: CANCELLED | OUTPATIENT
Start: 2022-06-08 | End: 2022-06-08

## 2022-06-08 RX ORDER — SILVER SULFADIAZINE 10 G/1000G
CREAM TOPICAL ONCE
Status: CANCELLED | OUTPATIENT
Start: 2022-06-08 | End: 2022-06-08

## 2022-06-08 RX ORDER — MUPIROCIN 20 MG/G
OINTMENT TOPICAL ONCE
Status: CANCELLED | OUTPATIENT
Start: 2022-06-08 | End: 2022-06-08

## 2022-06-08 RX ORDER — BACITRACIN 500 [USP'U]/G
OINTMENT TOPICAL ONCE
Status: CANCELLED | OUTPATIENT
Start: 2022-06-08 | End: 2022-06-08

## 2022-06-08 RX ORDER — GENTAMICIN SULFATE 1 MG/G
OINTMENT TOPICAL ONCE
Status: CANCELLED | OUTPATIENT
Start: 2022-06-08 | End: 2022-06-08

## 2022-06-08 RX ORDER — LIDOCAINE 40 MG/G
CREAM TOPICAL ONCE
Status: CANCELLED | OUTPATIENT
Start: 2022-06-08 | End: 2022-06-08

## 2022-06-08 RX ORDER — BACITRACIN ZINC AND POLYMYXIN B SULFATE 500; 1000 [USP'U]/G; [USP'U]/G
OINTMENT TOPICAL ONCE
Status: CANCELLED | OUTPATIENT
Start: 2022-06-08 | End: 2022-06-08

## 2022-06-08 NOTE — WOUND CARE
[address]   Progress Note and Procedure Note     [facility] RECORD NUMBER:  [id number]  AGE: 50 y.o. RACE WHITE/NON-  GENDER: female  : 1970  EPISODE DATE:  2022    Subjective:     Chief Complaint   Patient presents with    Follow-up     wound right ankle. HISTORY of PRESENT ILLNESS HPI    Hanna Edwina is a 50 y.o. female who presents today for wound/ulcer evaluation.    History of Wound Context: ran over by [employer] during team building outing at work  GrabCAD Pain Timing/Severity: moderate  Quality of pain: aching and dull  Severity:  3 / 10   Modifying Factors: Pain worsens with touch, ambulation  Associated Signs/Symptoms: none    Ulcer Identification:  Ulcer Type: traumatic    Contributing Factors: edema, diabetes, poor glucose control and arterial insufficiency    Wound: right lateral ankle         PAST MEDICAL HISTORY    Past Medical History:   Diagnosis Date    Adverse effect of anesthesia     difficult to wake    Chronic kidney disease     Dr Jonh Tony, stage 3 Proteinuria    CKD (chronic kidney disease) stage 3, GFR 30-59 ml/min ([facility])     Coronary artery disease 2011    with silent MI in  and s/p one stent    Diabetes mellitus type 1 ([facility] 2011    Age 7, Insulin Pump    Diabetic retinopathy ([facility]     Dyslipidemia 2011    GERD (gastroesophageal reflux disease)     History of peptic ulcer disease 2011    from plavix    Hypertension     denies states takes BP meds to reduce protein in kidneys due to kidney disease    PUD (peptic ulcer disease)     Stroke ([facility] 2016    hx L basal ganglia bleed        PAST SURGICAL HISTORY    Past Surgical History:   Procedure Laterality Date    HX CORONARY ARTERY BYPASS GRAFT  2020    HX HEENT Bilateral     laser surgery for retinopathy on multiple occasions and vitrectomy bilateral    HX HEENT Bilateral -    cataracts Dr John Bradshaw HERNIA REPAIR  01/2020    Status post open repair of umbilical hernia with mesh 1/2020    MD CARDIAC SURG PROCEDURE UNLIST  2007    Dr Janet Gonzáles stent with Card Cath       FAMILY HISTORY    Family History   Problem Relation Age of Onset    Heart Disease Father         CABG    Diabetes Father         Type II    Hypertension Father     Stroke Father     Dementia Father     Heart Disease Paternal Grandfather     Hypertension Paternal Grandfather     Stroke Paternal Grandfather     Diabetes Maternal Grandmother         Type 2    Heart Disease Mother     Diabetes Mother         Type II    Stroke Mother     OSTEOARTHRITIS Mother     Hypertension Mother     Stroke Maternal Grandfather     Heart Disease Paternal Grandmother     Hypertension Paternal Grandmother     Stroke Paternal Grandmother     Dementia Paternal Grandmother        SOCIAL HISTORY    Social History     Tobacco Use    Smoking status: Never Smoker    Smokeless tobacco: Never Used   Substance Use Topics    Alcohol use: Yes     Alcohol/week: 1.0 standard drink     Types: 1 Glasses of wine per week     Comment: daily    Drug use: Never     Types: Prescription, OTC       ALLERGIES    Allergies   Allergen Reactions    Erythromycin Hives    Venlafaxine Other (comments)     Caused high blood sugars       MEDICATIONS    Current Outpatient Medications on File Prior to Encounter   Medication Sig Dispense Refill    carvediloL (COREG) 6.25 mg tablet TAKE 1 TABLET TWICE A  Tablet 3    rosuvastatin (CRESTOR) 20 mg tablet TAKE 1 TABLET NIGHTLY 90 Tablet 3    Blood-Glucose Sensor (Dexcom G6 Sensor) victor m Change sensor every 10 days. DX:E10.42 9 Each 3    insulin lispro (HUMALOG) 100 unit/mL injection USE VIA INSULIN PUMP.  UNITS PER DAY. (replaces Novolog, per insurance preference) 110 mL 0    Insulin Pump Cartridge (Omnipod Dash 5 Pack Pod) crtg Change pod every 3 days 30 Each 3    Blood-Glucose Transmitter (Dexcom G6 Transmitter) victor m Change sensor every 10 days. [phone] 3 Device 3    Blood-Glucose Meter,Continuous (Dexcom G6 ) misc Change sensor every 10 days. [phone] Each 0    lisinopriL (PRINIVIL, ZESTRIL) 40 mg tablet 40 mg daily.  aspirin delayed-release 81 mg tablet Take 81 mg by mouth nightly.  hydroCHLOROthiazide (HYDRODIURIL) 25 mg tablet Take 1 Tab by mouth daily. (Patient taking differently: Take 25 mg by mouth nightly.) 14 Tab 0    cyanocobalamin, vitamin B-12, (VITAMIN B-12) 5,000 mcg subl by SubLINGual route daily.  spironolactone (ALDACTONE) 25 mg tablet Take 1 Tab by mouth three (3) days a week. (Patient taking differently: Take 25 mg by mouth daily. ) 15 Tab 0    escitalopram (LEXAPRO) 10 mg tablet Take 10 mg by mouth daily.  ondansetron (ZOFRAN ODT) 4 mg disintegrating tablet Take 1 Tab by mouth every eight (8) hours as needed for Nausea. (Patient not taking: Reported on 6/8/2022) 10 Tab 0     No current facility-administered medications on file prior to encounter. REVIEW OF SYSTEMS    A comprehensive review of systems was negative except for that written in the HPI. Objective:     Visit Vitals  BP (!) 180/91 (BP 1 Location: Right upper arm, BP Patient Position: At rest;Sitting)   Pulse 71   Temp 98.6 °F (37 °C)   Resp 16       Wt Readings from Last 3 Encounters:   04/20/22 75.6 kg (166 lb 9.6 oz)   12/15/21 76.8 kg (169 lb 6.4 oz)   08/11/21 74.4 kg (164 lb)       PHYSICAL EXAM    Pertinent items are noted in HPI. Objective:    Vascular:  B/L LE  DP 1/4; PT 1/4  capillary fill time brisk, pitting edema is present, skin temperature is cool, varicosities are present. Dermatological:    Wound: right lateral ankle  Measurements: per RN note  Margins: intact, erythematous but localized to circumferentially around wound  Drainage: seropurulent  Odor: mild to moderate  Wound base: fibrotic, necrotic  Lymphangitic streaking? No.  Undermining?  No.  Sinus tracts? No.  Exposed bone? No.  Subcutaneous crepitation on palpation? No.      Skin is dry and scaly, exhibits hemosiderin deposition. There is no maceration of the interspaces of the feet b/l. No hyperkeratotic lesions noted      Neurological:  DTR are present, protective sensation per 5.07 Grimes Dalila monofilament is diminished, patient is AAOx3, mood is normal. Epicritic sensation is intact. Orthopedic:  B/L LE are symmetric, ROM of ankle, STJ, 1st MTPJ is limited, MMT 5 out of 5 for B/L LE. No pedal amputations ntoed    Constitutional: Pt is a well developed, well appearing female. Lymphatics: negative tenderness to palpation of neck/axillary/inguinal nodes. Imaging / Labs / Cx / Px: On file with [facility], pt relates no fx/dislocations  Debridement Wound Care        Problem List Items Addressed This Visit     None          Procedure Note  Indications:  Based on my examination of this patient's wound(s)/ulcer(s) today, debridement is required to promote healing and evaluate the wound base. Performed by: Charlie Stallworth DPM    Consent obtained: Yes    Time out taken: Yes    Debridement: Excisional    Using scissors and forceps the wound(s)/ulcer(s) was/were sharply debrided down through and including the removal of    subcutaneous tissue    Devitalized Tissue Debrided: slough    Pre Debridement Measurements:  Are located in the Wound/Ulcer Documentation Flow Sheet    Non-Pressure ulcer, fat layer exposed    Wound/Ulcer #: 1    Post Debridement Measurements:  Wound/Ulcer Descriptions are Pre Debridement except measurements:    Wound Abdomen Mid (Active)   Number of days: 903       Wound Ankle Lateral;Right brown, hard scab over wound.  (Active)   Wound Image   06/08/22 1329   Wound Etiology Traumatic 06/08/22 1329   Dressing Status Dry 06/08/22 1329   Cleansed Cleansed with saline 06/08/22 1329   Wound Length (cm) 2 cm 06/08/22 1329   Wound Width (cm) 2.2 cm 06/08/22 1329   Wound Depth (cm) 0.2 cm 06/08/22 1329   Wound Surface Area (cm^2) 4.4 cm^2 06/08/22 1329   Wound Volume (cm^3) 0.88 cm^3 06/08/22 1329   Wound Assessment Eschar dry 06/08/22 1329   Drainage Amount Scant 06/08/22 1329   Drainage Description Serous 06/08/22 1329   Wound Odor None 06/08/22 1329   Sophy-Wound/Incision Assessment Blanchable erythema 06/08/22 1329   Edges Epibole (rolled edges) 06/08/22 1329   Wound Thickness Description Full thickness 06/08/22 1329   Number of days: 0        Total Surface Area Debrided:  <20 sq cm     Estimated Blood Loss:  None    Hemostasis Achieved: Pressure    Procedural Pain: 0 / 10     Post Procedural Pain: 3 / 10     Response to treatment: Patient tolerated treatment with mild discomfort but relieved after procedure was completed          Assessment:    s91.011s laceration without FB right ankle, sequela    Ulcer right ankle to fat    Diabetes type 1 with foot ulcer  Problem List Items Addressed This Visit     None          Wound Abdomen Mid (Active)   Number of days: 903       Wound Ankle Lateral;Right brown, hard scab over wound. (Active)   Wound Image   06/08/22 1329   Wound Etiology Traumatic 06/08/22 1329   Dressing Status Dry 06/08/22 1329   Cleansed Cleansed with saline 06/08/22 1329   Wound Length (cm) 2 cm 06/08/22 1329   Wound Width (cm) 2.2 cm 06/08/22 1329   Wound Depth (cm) 0.2 cm 06/08/22 1329   Wound Surface Area (cm^2) 4.4 cm^2 06/08/22 1329   Wound Volume (cm^3) 0.88 cm^3 06/08/22 1329   Wound Assessment Eschar dry 06/08/22 1329   Drainage Amount Scant 06/08/22 1329   Drainage Description Serous 06/08/22 1329   Wound Odor None 06/08/22 1329   Sophy-Wound/Incision Assessment Blanchable erythema 06/08/22 1329   Edges Epibole (rolled edges) 06/08/22 1329   Wound Thickness Description Full thickness 06/08/22 1329   Number of days: 0         Plan:     Treatment Note please see attached Discharge Instructions    Written patient dismissal instructions given to patient or POA.          Wound care with medihoney/gauze/tape every other day    Do not get wound wet or air it out    D/c peroxide to wound    Finish abx as rx by pcp    Electronically signed by Antonella Vaughn DPM on 6/8/2022 at 2:46 PM

## 2022-06-08 NOTE — WOUND CARE
06/08/22 1329   Wound Ankle Lateral;Right brown, hard scab over wound. Date First Assessed: 06/08/22   Present on Hospital Admission: Yes  Wound Approximate Age at First Assessment (Weeks): 6 weeks  Primary Wound Type: Traumatic  Location: Ankle  Wound Location Orientation: Lateral;Right  Wound Description: brown, hard s. ..    Wound Image    Wound Etiology Traumatic   Dressing Status Dry   Cleansed Cleansed with saline   Wound Length (cm) 2 cm   Wound Width (cm) 2.2 cm   Wound Depth (cm) 0.2 cm   Wound Surface Area (cm^2) 4.4 cm^2   Wound Volume (cm^3) 0.88 cm^3   Wound Assessment Eschar dry   Drainage Amount Scant   Drainage Description Serous   Wound Odor None   Sophy-Wound/Incision Assessment Blanchable erythema   Edges Epibole (rolled edges)   Wound Thickness Description Full thickness     Visit Vitals  BP (!) 180/91 (BP 1 Location: Right upper arm, BP Patient Position: At rest;Sitting)   Pulse 71   Temp 98.6 °F (37 °C)   Resp 16

## 2022-06-08 NOTE — DISCHARGE INSTRUCTIONS
Discharge Instructions/Wound Orders  [place] [id number] [address] [facility], [address]  Telephone: [phone]  NAME:  Soheila Mark OF BIRTH:  1970  MEDICAL RECORD NUMBER:  [id number]  DATE:  6/8/2022  Wound Care Orders:  Right ankle: Cleanse wound with baby shampoo, rinse and pat dry, Apply medihoney gel to wound bed. Cover with gauze and secure with medipore or kind tape. Change dressing every other day. Continue antibiotic as directed. Dietary:  [] Diet as tolerated: [x] Calorie Diabetic Diet:Low carb and no Sugar [] No Added Salt:[] Increase Protein: [] Other:Limit the amount of liquid you are drinking and avoid drinking in between meals   Activity:  [x] Activity as tolerated:  [] Patient has no activity restrictions     [] Strict Bedrest: [] Remain off Work:     [] May return to full duty work:                                   [] Return to work with restrictions:             Return Appointment:   [x] Return Appointment: With Dr Diandra Todd in [address])  [] Ordered tests:    Electronically signed Dominguez Thomas RN on 6/8/2022 at 2:02 PM     Anabel Arnold 281: Should you experience any significant changes in your wound(s) or have questions about your wound care, please contact the [address] [place] [address] at [address] [place] [address] 8:00 am - 4:30. If you need help with your wound outside these hours and cannot wait until we are again available, contact your PCP or go to the hospital emergency room. PLEASE NOTE: IF YOU ARE UNABLE TO OBTAIN WOUND SUPPLIES, CONTINUE TO USE THE SUPPLIES YOU HAVE AVAILABLE UNTIL YOU ARE ABLE TO REACH US. IT IS MOST IMPORTANT TO KEEP THE WOUND COVERED AT ALL TIMES.      Physician Signature:_______________________    Date: ___________ Time:  ____________

## 2022-06-08 NOTE — WOUND CARE
06/08/22 1439   Wound Ankle Lateral;Right brown, hard scab over wound. Date First Assessed: 06/08/22   Present on Hospital Admission: Yes  Wound Approximate Age at First Assessment (Weeks): 6 weeks  Primary Wound Type: Traumatic  Location: Ankle  Wound Location Orientation: Lateral;Right  Wound Description: brown, hard s. ..    Dressing/Treatment   (medihoney, border foam)

## 2022-06-13 RX ORDER — MUPIROCIN 20 MG/G
OINTMENT TOPICAL ONCE
Status: CANCELLED | OUTPATIENT
Start: 2022-06-13 | End: 2022-06-13

## 2022-06-13 RX ORDER — BACITRACIN 500 [USP'U]/G
OINTMENT TOPICAL ONCE
Status: CANCELLED | OUTPATIENT
Start: 2022-06-13 | End: 2022-06-13

## 2022-06-13 RX ORDER — LIDOCAINE HYDROCHLORIDE 20 MG/ML
JELLY TOPICAL ONCE
Status: CANCELLED | OUTPATIENT
Start: 2022-06-13 | End: 2022-06-13

## 2022-06-13 RX ORDER — TRIAMCINOLONE ACETONIDE 1 MG/G
OINTMENT TOPICAL ONCE
Status: CANCELLED | OUTPATIENT
Start: 2022-06-13 | End: 2022-06-13

## 2022-06-13 RX ORDER — CLOBETASOL PROPIONATE 0.5 MG/G
OINTMENT TOPICAL ONCE
Status: CANCELLED | OUTPATIENT
Start: 2022-06-13 | End: 2022-06-13

## 2022-06-13 RX ORDER — LIDOCAINE 40 MG/G
CREAM TOPICAL ONCE
Status: CANCELLED | OUTPATIENT
Start: 2022-06-13 | End: 2022-06-13

## 2022-06-13 RX ORDER — GENTAMICIN SULFATE 1 MG/G
OINTMENT TOPICAL ONCE
Status: CANCELLED | OUTPATIENT
Start: 2022-06-13 | End: 2022-06-13

## 2022-06-13 RX ORDER — BACITRACIN ZINC AND POLYMYXIN B SULFATE 500; 1000 [USP'U]/G; [USP'U]/G
OINTMENT TOPICAL ONCE
Status: CANCELLED | OUTPATIENT
Start: 2022-06-13 | End: 2022-06-13

## 2022-06-13 RX ORDER — LIDOCAINE HYDROCHLORIDE 40 MG/ML
SOLUTION TOPICAL ONCE
Status: CANCELLED | OUTPATIENT
Start: 2022-06-13 | End: 2022-06-13

## 2022-06-13 RX ORDER — BETAMETHASONE DIPROPIONATE 0.5 MG/G
OINTMENT TOPICAL ONCE
Status: CANCELLED | OUTPATIENT
Start: 2022-06-13 | End: 2022-06-13

## 2022-06-13 RX ORDER — SILVER SULFADIAZINE 10 G/1000G
CREAM TOPICAL ONCE
Status: CANCELLED | OUTPATIENT
Start: 2022-06-13 | End: 2022-06-13

## 2022-06-13 RX ORDER — LIDOCAINE 50 MG/G
OINTMENT TOPICAL ONCE
Status: CANCELLED | OUTPATIENT
Start: 2022-06-13 | End: 2022-06-13

## 2022-06-17 ENCOUNTER — HOSPITAL ENCOUNTER (OUTPATIENT)
Dept: WOUND CARE | Age: 52
Discharge: HOME | End: 2022-06-17
Payer: COMMERCIAL

## 2022-06-17 VITALS
SYSTOLIC BLOOD PRESSURE: 148 MMHG | DIASTOLIC BLOOD PRESSURE: 72 MMHG | HEART RATE: 71 BPM | RESPIRATION RATE: 15 BRPM | TEMPERATURE: 98.3 F

## 2022-06-17 DIAGNOSIS — L97.312 ULCER OF RIGHT ANKLE, WITH FAT LAYER EXPOSED (HCC): Primary | ICD-10-CM

## 2022-06-17 PROCEDURE — 99203 OFFICE O/P NEW LOW 30 MIN: CPT | Performed by: PODIATRIST

## 2022-06-17 PROCEDURE — 74011000250 HC RX REV CODE- 250: Performed by: PODIATRIST

## 2022-06-17 PROCEDURE — 11042 DBRDMT SUBQ TIS 1ST 20SQCM/<: CPT | Performed by: PODIATRIST

## 2022-06-17 RX ORDER — MUPIROCIN 20 MG/G
OINTMENT TOPICAL ONCE
Status: CANCELLED | OUTPATIENT
Start: 2022-06-17 | End: 2022-06-17

## 2022-06-17 RX ORDER — BACITRACIN ZINC AND POLYMYXIN B SULFATE 500; 1000 [USP'U]/G; [USP'U]/G
OINTMENT TOPICAL ONCE
Status: CANCELLED | OUTPATIENT
Start: 2022-06-17 | End: 2022-06-17

## 2022-06-17 RX ORDER — GENTAMICIN SULFATE 1 MG/G
OINTMENT TOPICAL ONCE
Status: CANCELLED | OUTPATIENT
Start: 2022-06-17 | End: 2022-06-17

## 2022-06-17 RX ORDER — CLOBETASOL PROPIONATE 0.5 MG/G
OINTMENT TOPICAL ONCE
Status: CANCELLED | OUTPATIENT
Start: 2022-06-17 | End: 2022-06-17

## 2022-06-17 RX ORDER — TRIAMCINOLONE ACETONIDE 1 MG/G
OINTMENT TOPICAL ONCE
Status: CANCELLED | OUTPATIENT
Start: 2022-06-17 | End: 2022-06-17

## 2022-06-17 RX ORDER — SILVER SULFADIAZINE 10 G/1000G
CREAM TOPICAL ONCE
Status: CANCELLED | OUTPATIENT
Start: 2022-06-17 | End: 2022-06-17

## 2022-06-17 RX ORDER — LIDOCAINE HYDROCHLORIDE 40 MG/ML
SOLUTION TOPICAL ONCE
Status: CANCELLED | OUTPATIENT
Start: 2022-06-17 | End: 2022-06-17

## 2022-06-17 RX ORDER — LIDOCAINE 50 MG/G
OINTMENT TOPICAL ONCE
Status: CANCELLED | OUTPATIENT
Start: 2022-06-17 | End: 2022-06-17

## 2022-06-17 RX ORDER — LIDOCAINE HYDROCHLORIDE 20 MG/ML
JELLY TOPICAL ONCE
Status: CANCELLED | OUTPATIENT
Start: 2022-06-17 | End: 2022-06-17

## 2022-06-17 RX ORDER — BACITRACIN 500 [USP'U]/G
OINTMENT TOPICAL ONCE
Status: CANCELLED | OUTPATIENT
Start: 2022-06-17 | End: 2022-06-17

## 2022-06-17 RX ORDER — BETAMETHASONE DIPROPIONATE 0.5 MG/G
OINTMENT TOPICAL ONCE
Status: CANCELLED | OUTPATIENT
Start: 2022-06-17 | End: 2022-06-17

## 2022-06-17 RX ORDER — LIDOCAINE 40 MG/G
CREAM TOPICAL ONCE
Status: CANCELLED | OUTPATIENT
Start: 2022-06-17 | End: 2022-06-17

## 2022-06-17 RX ADMIN — Medication: at 09:13

## 2022-06-17 NOTE — WOUND CARE
06/17/22 0909   Right Leg Edema Point of Measurement   Leg circumference 33 cm   Ankle circumference 19.5 cm   Wound Ankle Lateral;Right brown, hard scab over wound. Date First Assessed: 06/08/22   Present on Hospital Admission: Yes  Wound Approximate Age at First Assessment (Weeks): 6 weeks  Primary Wound Type: Traumatic  Location: Ankle  Wound Location Orientation: Lateral;Right  Wound Description: brown, hard s. .. Wound Image    Wound Etiology Traumatic   Dressing Status Old drainage noted   Cleansed Cleansed with saline   Wound Length (cm) 1.4 cm   Wound Width (cm) 1.6 cm   Wound Depth (cm) 0.4 cm   Wound Surface Area (cm^2) 2.24 cm^2   Change in Wound Size % 49.09   Wound Volume (cm^3) 0.896 cm^3   Wound Healing % -2   Wound Assessment Slough;Pink/red   Drainage Amount Moderate   Drainage Description Serous   Wound Odor None   Sophy-Wound/Incision Assessment Blanchable erythema; Maceration   Edges Defined edges   Wound Thickness Description Full thickness     Visit Vitals  BP (!) 148/72 (BP 1 Location: Right upper arm, BP Patient Position: Sitting)   Pulse 71   Temp 98.3 °F (36.8 °C)   Resp 15

## 2022-06-17 NOTE — WOUND CARE
06/17/22 0933   Left Leg Edema Point of Measurement   Compression Therapy Tubular elastic support bandage   Wound Ankle Lateral;Right brown, hard scab over wound. Date First Assessed: 06/08/22   Present on Hospital Admission: Yes  Wound Approximate Age at First Assessment (Weeks): 6 weeks  Primary Wound Type: Traumatic  Location: Ankle  Wound Location Orientation: Lateral;Right  Wound Description: brown, hard s. .. Dressing/Treatment Gauze dressing/dressing sponge; Honey gel/honey paste; Hydrofera Blue   Discharge Condition: Stable     Pain: 0    Ambulatory Status: Walking    Discharge Destination: Home     Transportation: Car    Accompanied by: Self     Discharge instructions reviewed with Patient and copy or written instructions have been provided. All questions/concerns have been addressed at this time.

## 2022-06-17 NOTE — WOUND CARE
[address]   Progress Note and Procedure Note     [facility] RECORD NUMBER:  [id number]  AGE: 50 y.o. RACE WHITE/NON-  GENDER: female  : 1970  EPISODE DATE:  2022    Subjective:     Chief Complaint   Patient presents with    Follow-up     right lateral ankle         HISTORY of PRESENT ILLNESS HPI    Domingo Bess is a 50 y.o. female who presents today for wound/ulcer evaluation.    History of Wound Context: ran over by [employer] during team building outing at work  W4 Pain Timing/Severity: moderate  Quality of pain: aching and dull  Severity:  3 / 10   Modifying Factors: Pain worsens with touch, ambulation  Associated Signs/Symptoms: none    Ulcer Identification:  Ulcer Type: traumatic    Contributing Factors: edema, diabetes, poor glucose control and arterial insufficiency    Wound: right lateral ankle         PAST MEDICAL HISTORY    Past Medical History:   Diagnosis Date    Adverse effect of anesthesia     difficult to wake    Chronic kidney disease     Dr Jody Frye, stage 3 Proteinuria    CKD (chronic kidney disease) stage 3, GFR 30-59 ml/min ([facility])     Coronary artery disease 2011    with silent MI in  and s/p one stent    Diabetes mellitus type 1 ([facility] [address] [facility] 2011    Age 7, Insulin Pump    Diabetic retinopathy ([facility] [address] [facility]     Dyslipidemia 2011    GERD (gastroesophageal reflux disease)     History of peptic ulcer disease 2011    from plavix    Hypertension     denies states takes BP meds to reduce protein in kidneys due to kidney disease    PUD (peptic ulcer disease)     Stroke ([facility] [address] 2016    hx L basal ganglia bleed        PAST SURGICAL HISTORY    Past Surgical History:   Procedure Laterality Date    HX CORONARY ARTERY BYPASS GRAFT  2020    HX HEENT Bilateral     laser surgery for retinopathy on multiple occasions and vitrectomy bilateral    HX HEENT Bilateral     cataracts Dr Annette Joe HERNIA REPAIR  01/2020    Status post open repair of umbilical hernia with mesh 1/2020    [place] CARDIAC SURG PROCEDURE UNLIST  2007    Dr Lilian Ghosh stent with Card Cath       FAMILY HISTORY    Family History   Problem Relation Age of Onset    Heart Disease Father         CABG    Diabetes Father         Type II    Hypertension Father     Stroke Father     Dementia Father     Heart Disease Paternal Grandfather     Hypertension Paternal Grandfather     Stroke Paternal Grandfather     Diabetes Maternal Grandmother         Type 2    Heart Disease Mother     Diabetes Mother         Type II    Stroke Mother     OSTEOARTHRITIS Mother     Hypertension Mother     Stroke Maternal Grandfather     Heart Disease Paternal Grandmother     Hypertension Paternal Grandmother     Stroke Paternal Grandmother     Dementia Paternal Grandmother        SOCIAL HISTORY    Social History     Tobacco Use    Smoking status: Never Smoker    Smokeless tobacco: Never Used   Substance Use Topics    Alcohol use: Yes     Alcohol/week: 1.0 standard drink     Types: 1 Glasses of wine per week     Comment: daily    Drug use: Never     Types: Prescription, OTC       ALLERGIES    Allergies   Allergen Reactions    Erythromycin Hives    Venlafaxine Other (comments)     Caused high blood sugars       MEDICATIONS    Current Outpatient Medications on File Prior to Encounter   Medication Sig Dispense Refill    carvediloL (COREG) 6.25 mg tablet TAKE 1 TABLET TWICE A  Tablet 3    rosuvastatin (CRESTOR) 20 mg tablet TAKE 1 TABLET NIGHTLY 90 Tablet 3    Blood-Glucose Sensor (Dexcom G6 Sensor) victor m Change sensor every 10 days. DX:E10.42 9 Each 3    insulin lispro (HUMALOG) 100 unit/mL injection USE VIA INSULIN PUMP.  UNITS PER DAY. (replaces Novolog, per insurance preference) 110 mL 0    Insulin Pump Cartridge (Omnipod Dash 5 Pack Pod) crtg Change pod every 3 days 30 Each 3    Blood-Glucose Transmitter (Dexcom G6 Transmitter) victor m Change sensor every 10 days. [phone] 3 Device 3    Blood-Glucose Meter,Continuous (Dexcom G6 ) misc Change sensor every 10 days. [id number] [phone] 1 Each 0    lisinopriL (PRINIVIL, ZESTRIL) 40 mg tablet 40 mg daily.  aspirin delayed-release 81 mg tablet Take 81 mg by mouth nightly.  hydroCHLOROthiazide (HYDRODIURIL) 25 mg tablet Take 1 Tab by mouth daily. (Patient taking differently: Take 25 mg by mouth nightly.) 14 Tab 0    cyanocobalamin, vitamin B-12, (VITAMIN B-12) 5,000 mcg subl by SubLINGual route daily.  spironolactone (ALDACTONE) 25 mg tablet Take 1 Tab by mouth three (3) days a week. (Patient taking differently: Take 25 mg by mouth daily. ) 15 Tab 0    escitalopram (LEXAPRO) 10 mg tablet Take 10 mg by mouth daily.  ondansetron (ZOFRAN ODT) 4 mg disintegrating tablet Take 1 Tab by mouth every eight (8) hours as needed for Nausea. 10 Tab 0     No current facility-administered medications on file prior to encounter. REVIEW OF SYSTEMS    A comprehensive review of systems was negative except for that written in the HPI. Objective:     Visit Vitals  BP (!) 148/72 (BP 1 Location: Right upper arm, BP Patient Position: Sitting)   Pulse 71   Temp 98.3 °F (36.8 °C)   Resp 15       Wt Readings from Last 3 Encounters:   04/20/22 75.6 kg (166 lb 9.6 oz)   12/15/21 76.8 kg (169 lb 6.4 oz)   08/11/21 74.4 kg (164 lb)       PHYSICAL EXAM    Pertinent items are noted in HPI. Objective:    Vascular:  B/L LE  DP 1/4; PT 1/4  capillary fill time brisk, pitting edema is present, skin temperature is cool, varicosities are present. Dermatological:    Wound: right lateral ankle  Measurements: per RN note  Margins: macerated, intact, erythematous but localized to circumferentially around wound  Drainage: seropurulent  Odor: mild to moderate  Wound base: fibrotic, necrotic  Lymphangitic streaking? No.  Undermining? No.  Sinus tracts? No.  Exposed bone?  No.  Subcutaneous crepitation on palpation? No.      Skin is dry and scaly, exhibits hemosiderin deposition. There is no maceration of the interspaces of the feet b/l. No hyperkeratotic lesions noted      Neurological:  DTR are present, protective sensation per 5.07 Raymond Dalila monofilament is diminished, patient is AAOx3, mood is normal. Epicritic sensation is intact. Orthopedic:  B/L LE are symmetric, ROM of ankle, STJ, 1st MTPJ is limited, MMT 5 out of 5 for B/L LE. No pedal amputations ntoed    Constitutional: Pt is a well developed, well appearing female. Lymphatics: negative tenderness to palpation of neck/axillary/inguinal nodes. Imaging / Labs / Cx / Px: On file with [facility], pt relates no fx/dislocations  Debridement Wound Care        Problem List Items Addressed This Visit        Integumentary    Ulcer of right ankle, with fat layer exposed ([address] - Primary    Relevant Orders    INITIATE OUTPATIENT WOUND CARE PROTOCOL          Procedure Note  Indications:  Based on my examination of this patient's wound(s)/ulcer(s) today, debridement is required to promote healing and evaluate the wound base. Performed by: Brielle Cortés DPM    Consent obtained: Yes    Time out taken: Yes    Debridement: Excisional    Using scissors and forceps the wound(s)/ulcer(s) was/were sharply debrided down through and including the removal of    subcutaneous tissue    Devitalized Tissue Debrided: slough    Pre Debridement Measurements:  Are located in the Wound/Ulcer Documentation Flow Sheet    Non-Pressure ulcer, fat layer exposed    Wound/Ulcer #: 1    Post Debridement Measurements:  Wound/Ulcer Descriptions are Pre Debridement except measurements:    WOUND POA CONDITIONS    Wound Abdomen Mid (Active)   Number of days: 912       Wound Ankle Lateral;Right brown, hard scab over wound.  (Active)   Wound Image   06/17/22 0909   Wound Etiology Traumatic 06/17/22 0909   Dressing Status Old drainage noted 06/17/22 0909   Cleansed Cleansed with saline 06/17/22 0909   Wound Length (cm) 1.4 cm 06/17/22 0909   Wound Width (cm) 1.6 cm 06/17/22 0909   Wound Depth (cm) 0.4 cm 06/17/22 0909   Wound Surface Area (cm^2) 2.24 cm^2 06/17/22 0909   Change in Wound Size % 49.09 06/17/22 0909   Wound Volume (cm^3) 0.896 cm^3 06/17/22 0909   Wound Healing % -2 06/17/22 0909   Post-Procedure Length (cm) 1.4 cm 06/17/22 0918   Post-Procedure Width (cm) 1.6 cm 06/17/22 0918   Post-Procedure Depth (cm) 0.5 cm 06/17/22 0918   Post-Procedure Surface Area (cm^2) 2.24 cm^2 06/17/22 0918   Post-Procedure Volume (cm^3) 1.12 cm^3 06/17/22 0918   Wound Assessment Slough;Pink/red 06/17/22 0909   Drainage Amount Moderate 06/17/22 0909   Drainage Description Serous 06/17/22 0909   Wound Odor None 06/17/22 0909   Sophy-Wound/Incision Assessment Blanchable erythema; Maceration 06/17/22 0909   Edges Defined edges 06/17/22 0909   Wound Thickness Description Full thickness 06/17/22 0909   Number of days: 9       [REMOVED] Wound Sternum (Removed)   Number of days: 14       [REMOVED] Wound Leg Right (Removed)   Number of days: 14           Total Surface Area Debrided:  <20 sq cm     Estimated Blood Loss:  None    Hemostasis Achieved: Pressure    Procedural Pain: 0 / 10     Post Procedural Pain: 3 / 10     Response to treatment: Patient tolerated treatment with mild discomfort but relieved after procedure was completed          Assessment:    s91.011s laceration without FB right ankle, sequela    Ulcer right ankle to fat    Diabetes type 1 with foot ulcer  Problem List Items Addressed This Visit        Integumentary    Ulcer of right ankle, with fat layer exposed ([address] - Primary    Relevant Orders    INITIATE OUTPATIENT WOUND CARE PROTOCOL          Plan:     Treatment Note please see attached Discharge Instructions    Written patient dismissal instructions given to patient or POA.          Wound care with medihoney/HFB/optilock/gauze/tape every other day    Do not get wound wet or air it out    D/c peroxide to wound    Finish abx as rx by pcp    F/u 2 weeks after trip      Electronically signed by Leticia Muller DPM on 6/17/2022 at 2:46 PM

## 2022-06-21 ENCOUNTER — DOCUMENTATION ONLY (OUTPATIENT)
Dept: ENDOCRINOLOGY | Age: 52
End: 2022-06-21

## 2022-07-01 ENCOUNTER — HOSPITAL ENCOUNTER (OUTPATIENT)
Dept: WOUND CARE | Age: 52
Discharge: HOME | End: 2022-07-01
Payer: COMMERCIAL

## 2022-07-01 VITALS
RESPIRATION RATE: 20 BRPM | DIASTOLIC BLOOD PRESSURE: 81 MMHG | SYSTOLIC BLOOD PRESSURE: 166 MMHG | TEMPERATURE: 97.3 F | HEART RATE: 67 BPM

## 2022-07-01 DIAGNOSIS — L97.312 ULCER OF RIGHT ANKLE, WITH FAT LAYER EXPOSED (HCC): Primary | ICD-10-CM

## 2022-07-01 PROCEDURE — 74011000250 HC RX REV CODE- 250: Performed by: PODIATRIST

## 2022-07-01 PROCEDURE — 11042 DBRDMT SUBQ TIS 1ST 20SQCM/<: CPT | Performed by: PODIATRIST

## 2022-07-01 RX ORDER — LIDOCAINE 50 MG/G
OINTMENT TOPICAL ONCE
Status: CANCELLED | OUTPATIENT
Start: 2022-07-01 | End: 2022-07-01

## 2022-07-01 RX ORDER — CLOBETASOL PROPIONATE 0.5 MG/G
OINTMENT TOPICAL ONCE
Status: CANCELLED | OUTPATIENT
Start: 2022-07-01 | End: 2022-07-01

## 2022-07-01 RX ORDER — LIDOCAINE 40 MG/G
CREAM TOPICAL ONCE
Status: CANCELLED | OUTPATIENT
Start: 2022-07-01 | End: 2022-07-01

## 2022-07-01 RX ORDER — LIDOCAINE HYDROCHLORIDE 20 MG/ML
JELLY TOPICAL ONCE
Status: CANCELLED | OUTPATIENT
Start: 2022-07-01 | End: 2022-07-01

## 2022-07-01 RX ORDER — GENTAMICIN SULFATE 1 MG/G
OINTMENT TOPICAL ONCE
Status: CANCELLED | OUTPATIENT
Start: 2022-07-01 | End: 2022-07-01

## 2022-07-01 RX ORDER — BACITRACIN 500 [USP'U]/G
OINTMENT TOPICAL ONCE
Status: CANCELLED | OUTPATIENT
Start: 2022-07-01 | End: 2022-07-01

## 2022-07-01 RX ORDER — BACITRACIN ZINC AND POLYMYXIN B SULFATE 500; 1000 [USP'U]/G; [USP'U]/G
OINTMENT TOPICAL ONCE
Status: CANCELLED | OUTPATIENT
Start: 2022-07-01 | End: 2022-07-01

## 2022-07-01 RX ORDER — TRIAMCINOLONE ACETONIDE 1 MG/G
OINTMENT TOPICAL ONCE
Status: CANCELLED | OUTPATIENT
Start: 2022-07-01 | End: 2022-07-01

## 2022-07-01 RX ORDER — MUPIROCIN 20 MG/G
OINTMENT TOPICAL ONCE
Status: CANCELLED | OUTPATIENT
Start: 2022-07-01 | End: 2022-07-01

## 2022-07-01 RX ORDER — BETAMETHASONE DIPROPIONATE 0.5 MG/G
OINTMENT TOPICAL ONCE
Status: CANCELLED | OUTPATIENT
Start: 2022-07-01 | End: 2022-07-01

## 2022-07-01 RX ORDER — SILVER SULFADIAZINE 10 G/1000G
CREAM TOPICAL ONCE
Status: CANCELLED | OUTPATIENT
Start: 2022-07-01 | End: 2022-07-01

## 2022-07-01 RX ORDER — LIDOCAINE HYDROCHLORIDE 40 MG/ML
SOLUTION TOPICAL ONCE
Status: CANCELLED | OUTPATIENT
Start: 2022-07-01 | End: 2022-07-01

## 2022-07-01 RX ADMIN — Medication: at 09:21

## 2022-07-01 NOTE — WOUND CARE
07/01/22 0912   Anesthetic   Anesthetic 4% Lidocaine Liquid Topical   Right Leg Edema Point of Measurement   Leg circumference 32.5 cm   Ankle circumference 19 cm   RLE Peripheral Vascular    Capillary Refill Less than/equal to 3 seconds   Color Appropriate for race   Temperature Warm   Pedal Pulse Palpable   Wound Ankle Lateral;Right brown, hard scab over wound. Date First Assessed: 06/08/22   Present on Hospital Admission: Yes  Wound Approximate Age at First Assessment (Weeks): 6 weeks  Primary Wound Type: Traumatic  Location: Ankle  Wound Location Orientation: Lateral;Right  Wound Description: brown, hard s. ..    Wound Image    Wound Etiology Traumatic   Wound Length (cm) 1.2 cm   Wound Width (cm) 1.5 cm   Wound Depth (cm) 0.3 cm   Wound Surface Area (cm^2) 1.8 cm^2   Change in Wound Size % 59.09   Wound Volume (cm^3) 0.54 cm^3   Wound Healing % 39   Wound Assessment Slough;Pink/red   Drainage Amount Moderate   Drainage Description Serosanguinous   Wound Odor None   Sophy-Wound/Incision Assessment Maceration   Edges Defined edges   Wound Thickness Description Full thickness   Pain 1   Pain Scale 1 Numeric (0 - 10)   Pain Intensity 1 0     Visit Vitals  BP (!) 166/81 (BP 1 Location: Right upper arm, BP Patient Position: Sitting)   Pulse 67   Temp 97.3 °F (36.3 °C)   Resp 20

## 2022-07-01 NOTE — WOUND CARE
[address]   Progress Note and Procedure Note     [facility] RECORD NUMBER:  [id number]  AGE: 50 y.o. RACE WHITE/NON-  GENDER: female  : 1970  EPISODE DATE:  2022    Subjective:     Chief Complaint   Patient presents with    Wound Check     right lateral ankle         HISTORY of PRESENT ILLNESS SAMIRA Moncada is a 50 y.o. female who presents today for wound/ulcer evaluation.    History of Wound Context: ran over by [employer] during team building outing at work  Joy Cabral  Pain Timing/Severity: moderate  Quality of pain: aching and dull  Severity:  3 / 10   Modifying Factors: Pain worsens with touch, ambulation  Associated Signs/Symptoms: none    Ulcer Identification:  Ulcer Type: traumatic    Contributing Factors: edema, diabetes, poor glucose control and arterial insufficiency    Wound: right lateral ankle         PAST MEDICAL HISTORY    Past Medical History:   Diagnosis Date    Adverse effect of anesthesia     difficult to wake    Chronic kidney disease     Dr Nirmala Ontiveros, stage 3 Proteinuria    CKD (chronic kidney disease) stage 3, GFR 30-59 ml/min ([facility])     Coronary artery disease 2011    with silent MI in  and s/p one stent    Diabetes mellitus type 1 ([facility] 2011    Age 7, Insulin Pump    Diabetic retinopathy ([facility]     Dyslipidemia 2011    GERD (gastroesophageal reflux disease)     History of peptic ulcer disease 2011    from plavix    Hypertension     denies states takes BP meds to reduce protein in kidneys due to kidney disease    PUD (peptic ulcer disease)     Stroke ([facility] 2016    hx L basal ganglia bleed        PAST SURGICAL HISTORY    Past Surgical History:   Procedure Laterality Date    HX CORONARY ARTERY BYPASS GRAFT  2020    HX HEENT Bilateral     laser surgery for retinopathy on multiple occasions and vitrectomy bilateral    HX HEENT Bilateral -    cataracts Dr Anastasia Snell HERNIA REPAIR  01/2020    Status post open repair of umbilical hernia with mesh 1/2020    GA CARDIAC SURG PROCEDURE UNLIST  2007    Dr Amber Salgado stent with Card Cath       FAMILY HISTORY    Family History   Problem Relation Age of Onset    Heart Disease Father         CABG    Diabetes Father         Type II    Hypertension Father     Stroke Father     Dementia Father     Heart Disease Paternal Grandfather     Hypertension Paternal Grandfather     Stroke Paternal Grandfather     Diabetes Maternal Grandmother         Type 2    Heart Disease Mother     Diabetes Mother         Type II    Stroke Mother     OSTEOARTHRITIS Mother     Hypertension Mother     Stroke Maternal Grandfather     Heart Disease Paternal Grandmother     Hypertension Paternal Grandmother     Stroke Paternal Grandmother     Dementia Paternal Grandmother        SOCIAL HISTORY    Social History     Tobacco Use    Smoking status: Never Smoker    Smokeless tobacco: Never Used   Substance Use Topics    Alcohol use: Yes     Alcohol/week: 1.0 standard drink     Types: 1 Glasses of wine per week     Comment: daily    Drug use: Never     Types: Prescription, OTC       ALLERGIES    Allergies   Allergen Reactions    Erythromycin Hives    Venlafaxine Other (comments)     Caused high blood sugars       MEDICATIONS    Current Outpatient Medications on File Prior to Encounter   Medication Sig Dispense Refill    carvediloL (COREG) 6.25 mg tablet TAKE 1 TABLET TWICE A  Tablet 3    rosuvastatin (CRESTOR) 20 mg tablet TAKE 1 TABLET NIGHTLY 90 Tablet 3    insulin lispro (HUMALOG) 100 unit/mL injection USE VIA INSULIN PUMP.  UNITS PER DAY. (replaces Novolog, per insurance preference) 110 mL 0    lisinopriL (PRINIVIL, ZESTRIL) 40 mg tablet 40 mg daily.  aspirin delayed-release 81 mg tablet Take 81 mg by mouth nightly.  hydroCHLOROthiazide (HYDRODIURIL) 25 mg tablet Take 1 Tab by mouth daily.  (Patient taking differently: Take 25 mg by mouth nightly.) 14 Tab 0    cyanocobalamin, vitamin B-12, (VITAMIN B-12) 5,000 mcg subl by SubLINGual route daily.  spironolactone (ALDACTONE) 25 mg tablet Take 1 Tab by mouth three (3) days a week. (Patient taking differently: Take 25 mg by mouth daily. ) 15 Tab 0    escitalopram (LEXAPRO) 10 mg tablet Take 10 mg by mouth daily.  Blood-Glucose Sensor (Dexcom G6 Sensor) victor m Change sensor every 10 days. DX:E10.42 9 Each 3    Insulin Pump Cartridge (Omnipod Dash 5 Pack Pod) crtg Change pod every 3 days 30 Each 3    Blood-Glucose Transmitter (Dexcom G6 Transmitter) victor m Change sensor every 10 days. [phone] 3 Device 3    Blood-Glucose Meter,Continuous (Dexcom G6 ) misc Change sensor every 10 days. [phone] 1 Each 0    ondansetron (ZOFRAN ODT) 4 mg disintegrating tablet Take 1 Tab by mouth every eight (8) hours as needed for Nausea. 10 Tab 0     No current facility-administered medications on file prior to encounter. REVIEW OF SYSTEMS    A comprehensive review of systems was negative except for that written in the HPI. Objective:     Visit Vitals  BP (!) 166/81 (BP 1 Location: Right upper arm, BP Patient Position: Sitting)   Pulse 67   Temp 97.3 °F (36.3 °C)   Resp 20       Wt Readings from Last 3 Encounters:   04/20/22 75.6 kg (166 lb 9.6 oz)   12/15/21 76.8 kg (169 lb 6.4 oz)   08/11/21 74.4 kg (164 lb)       PHYSICAL EXAM    Pertinent items are noted in HPI. Objective:    Vascular:  B/L LE  DP 1/4; PT 1/4  capillary fill time brisk, pitting edema is present, skin temperature is cool, varicosities are present. Dermatological:    Wound: right lateral ankle  Measurements: per RN note  Margins: macerated, intact, no erythema  Drainage: seropurulent  Odor: mild to moderate  Wound base: fibrotic, necrotic with some granular budding  Lymphangitic streaking? No.  Undermining? No.  Sinus tracts? No.  Exposed bone?  No.  Subcutaneous crepitation on palpation? No.      Skin is dry and scaly, exhibits hemosiderin deposition. There is no maceration of the interspaces of the feet b/l. No hyperkeratotic lesions noted      Neurological:  DTR are present, protective sensation per 5.07 [place] monofilament is diminished, patient is AAOx3, mood is normal. Epicritic sensation is intact. Orthopedic:  B/L LE are symmetric, ROM of ankle, STJ, 1st MTPJ is limited, MMT 5 out of 5 for B/L LE. No pedal amputations ntoed    Constitutional: Pt is a well developed, well appearing female. Lymphatics: negative tenderness to palpation of neck/axillary/inguinal nodes. Imaging / Labs / Cx / Px: On file with [facility], pt relates no fx/dislocations  Debridement Wound Care        Problem List Items Addressed This Visit        Integumentary    Ulcer of right ankle, with fat layer exposed ([address] - Primary    Relevant Medications    lidocaine (ALOCANE) 4 % topical gel (Completed)    Other Relevant Orders    INITIATE OUTPATIENT WOUND CARE PROTOCOL          Procedure Note  Indications:  Based on my examination of this patient's wound(s)/ulcer(s) today, debridement is required to promote healing and evaluate the wound base. Performed by: Emile Quesada DPM    Consent obtained: Yes    Time out taken: Yes    Debridement: Excisional    Using scissors and forceps the wound(s)/ulcer(s) was/were sharply debrided down through and including the removal of    subcutaneous tissue    Devitalized Tissue Debrided: slough    Pre Debridement Measurements:  Are located in the Wound/Ulcer Documentation Flow Sheet    Non-Pressure ulcer, fat layer exposed    Wound/Ulcer #: 1    Post Debridement Measurements:  Wound/Ulcer Descriptions are Pre Debridement except measurements:    WOUND POA CONDITIONS    Wound Abdomen Mid (Active)   Number of days: 926       Wound Ankle Lateral;Right brown, hard scab over wound.  (Active)   Wound Image   07/01/22 0912   Wound Etiology Traumatic 07/01/22 0912 Dressing Status Old drainage noted 06/17/22 0909   Cleansed Cleansed with saline 06/17/22 8576   Dressing/Treatment Gauze dressing/dressing sponge; Honey gel/honey paste; Hydrofera Blue 06/17/22 0933   Wound Length (cm) 1.2 cm 07/01/22 0912   Wound Width (cm) 1.5 cm 07/01/22 0912   Wound Depth (cm) 0.3 cm 07/01/22 0912   Wound Surface Area (cm^2) 1.8 cm^2 07/01/22 0912   Change in Wound Size % 59.09 07/01/22 0912   Wound Volume (cm^3) 0.54 cm^3 07/01/22 0912   Wound Healing % 39 07/01/22 0912   Post-Procedure Length (cm) 1.2 cm 07/01/22 0936   Post-Procedure Width (cm) 1.5 cm 07/01/22 0936   Post-Procedure Depth (cm) 0.4 cm 07/01/22 0936   Post-Procedure Surface Area (cm^2) 1.8 cm^2 07/01/22 0936   Post-Procedure Volume (cm^3) 0.72 cm^3 07/01/22 0936   Wound Assessment Slough;[place]/red 07/01/22 0912   Drainage Amount Moderate 07/01/22 0912   Drainage Description Serosanguinous 07/01/22 0912   Wound Odor None 07/01/22 0912   Sophy-Wound/Incision Assessment Maceration 07/01/22 0912   Edges Defined edges 07/01/22 0912   Wound Thickness Description Full thickness 07/01/22 0912   Number of days: 23       [REMOVED] Wound Sternum (Removed)   Number of days: 14       [REMOVED] Wound Leg Right (Removed)   Number of days: 14         Total Surface Area Debrided:  <20 sq cm     Estimated Blood Loss:  None    Hemostasis Achieved: Pressure    Procedural Pain: 0 / 10     Post Procedural Pain: 3 / 10     Response to treatment: Patient tolerated treatment with mild discomfort but relieved after procedure was completed          Assessment:    s91.011s laceration without FB right ankle, sequela    Ulcer right ankle to fat    Diabetes type 1 with foot ulcer  Problem List Items Addressed This Visit        Integumentary    Ulcer of right ankle, with fat layer exposed ([address] - Primary    Relevant Medications    lidocaine (ALOCANE) 4 % topical gel (Completed)    Other Relevant Orders    INITIATE OUTPATIENT WOUND CARE PROTOCOL          Plan: Treatment Note please see attached Discharge Instructions    Written patient dismissal instructions given to patient or POA.          Wound care with medihoney/HFB/optilock/gauze/tape every other day    Do not get wound wet or air it out    D/c peroxide to wound    Has finished oral abx course    F/u 2 weeks, wound improving    Electronically signed by Efrain Linares DPM on 7/1/2022 at 2:46 PM

## 2022-07-01 NOTE — DISCHARGE INSTRUCTIONS
Discharge Instructions for  [facility]  [address] [place], [address] [facility]  Telephone: [phone]    NAME:  Veda Tesfaye OF BIRTH:  1970  DATE:  6/17/2022    [] Wound and dressing supply provider: {DME provider:55742}    [] Home Healthcare: {Home Health :50527}    [] Supplements : {VITAMINS W:84389}    Wound Cleansing:   Do not scrub or use excessive force. Cleanse wound prior to applying a clean dressing with:    [] Normal Saline   [] Keep Wound Dry in Shower      [] Wound Cleanser (***)  [] May Shower at Discharge   [x] cleanse with Zada  and Zada  baby shampoo lather leave 2-3 then rinse with water, pat dry and redress wound. Dressings:           Wound Location RIGHT LOWER LEG   Apply Primary Dressing:  medihoney HFBR gauze kerlix tape single tube    Change dressing:   [] Daily      [x] Every Other Day   [] Three times per week  [] Once a week   [] Do Not Change Dressing     [] Other:    Dietary:  [x] Diet as tolerated: [] Diabetic Diet:   [x] Increase Protein: examples ( Meat, cheese, eggs, greek yogurt, premier protein drink, fish, nuts )   [] Other:    Return Appointment:  [] Wound assessment with Nurse at wound center in *** days     [x] Return Appointment: With Dr. Alfie Salinas  2 week       Electronically signed on 6/17/2022 at [address] [place] [address]: Should you experience any significant changes in your wound(s) or have questions about your wound care, please contact the [facility] Main at [address] [place] [address] 8:00 am - 4:30. If you need help with your wound outside these hours and cannot wait until we are again available, contact your PCP or go to the hospital emergency room. PLEASE NOTE: IF YOU ARE UNABLE TO OBTAIN WOUND SUPPLIES, CONTINUE TO USE THE SUPPLIES YOU HAVE AVAILABLE UNTIL YOU ARE ABLE TO REACH US. IT IS MOST IMPORTANT TO KEEP THE WOUND COVERED AT ALL TIMES. Physician Signature:_______________________  Dr. Sri Alejandra

## 2022-07-15 ENCOUNTER — HOSPITAL ENCOUNTER (OUTPATIENT)
Dept: WOUND CARE | Age: 52
Discharge: HOME | End: 2022-07-15
Payer: COMMERCIAL

## 2022-07-15 VITALS
HEART RATE: 61 BPM | DIASTOLIC BLOOD PRESSURE: 66 MMHG | SYSTOLIC BLOOD PRESSURE: 151 MMHG | TEMPERATURE: 98.1 F | RESPIRATION RATE: 18 BRPM

## 2022-07-15 DIAGNOSIS — L97.312 ULCER OF RIGHT ANKLE, WITH FAT LAYER EXPOSED (HCC): Primary | ICD-10-CM

## 2022-07-15 PROCEDURE — 74011000250 HC RX REV CODE- 250: Performed by: PODIATRIST

## 2022-07-15 PROCEDURE — 11042 DBRDMT SUBQ TIS 1ST 20SQCM/<: CPT | Performed by: PODIATRIST

## 2022-07-15 RX ORDER — GENTAMICIN SULFATE 1 MG/G
OINTMENT TOPICAL ONCE
Status: CANCELLED | OUTPATIENT
Start: 2022-07-15 | End: 2022-07-15

## 2022-07-15 RX ORDER — TRIAMCINOLONE ACETONIDE 1 MG/G
OINTMENT TOPICAL ONCE
Status: CANCELLED | OUTPATIENT
Start: 2022-07-15 | End: 2022-07-15

## 2022-07-15 RX ORDER — LIDOCAINE 50 MG/G
OINTMENT TOPICAL ONCE
Status: CANCELLED | OUTPATIENT
Start: 2022-07-15 | End: 2022-07-15

## 2022-07-15 RX ORDER — CLOBETASOL PROPIONATE 0.5 MG/G
OINTMENT TOPICAL ONCE
Status: CANCELLED | OUTPATIENT
Start: 2022-07-15 | End: 2022-07-15

## 2022-07-15 RX ORDER — LIDOCAINE 40 MG/G
CREAM TOPICAL ONCE
Status: CANCELLED | OUTPATIENT
Start: 2022-07-15 | End: 2022-07-15

## 2022-07-15 RX ORDER — BACITRACIN ZINC AND POLYMYXIN B SULFATE 500; 1000 [USP'U]/G; [USP'U]/G
OINTMENT TOPICAL ONCE
Status: CANCELLED | OUTPATIENT
Start: 2022-07-15 | End: 2022-07-15

## 2022-07-15 RX ORDER — MUPIROCIN 20 MG/G
OINTMENT TOPICAL ONCE
Status: CANCELLED | OUTPATIENT
Start: 2022-07-15 | End: 2022-07-15

## 2022-07-15 RX ORDER — GABAPENTIN 100 MG/1
100 CAPSULE ORAL
COMMUNITY

## 2022-07-15 RX ORDER — LIDOCAINE HYDROCHLORIDE 20 MG/ML
JELLY TOPICAL ONCE
Status: CANCELLED | OUTPATIENT
Start: 2022-07-15 | End: 2022-07-15

## 2022-07-15 RX ORDER — BACITRACIN 500 [USP'U]/G
OINTMENT TOPICAL ONCE
Status: CANCELLED | OUTPATIENT
Start: 2022-07-15 | End: 2022-07-15

## 2022-07-15 RX ORDER — LIDOCAINE HYDROCHLORIDE 40 MG/ML
SOLUTION TOPICAL ONCE
Status: CANCELLED | OUTPATIENT
Start: 2022-07-15 | End: 2022-07-15

## 2022-07-15 RX ORDER — BETAMETHASONE DIPROPIONATE 0.5 MG/G
OINTMENT TOPICAL ONCE
Status: CANCELLED | OUTPATIENT
Start: 2022-07-15 | End: 2022-07-15

## 2022-07-15 RX ORDER — SILVER SULFADIAZINE 10 G/1000G
CREAM TOPICAL ONCE
Status: CANCELLED | OUTPATIENT
Start: 2022-07-15 | End: 2022-07-15

## 2022-07-15 RX ADMIN — Medication: at 09:16

## 2022-07-15 NOTE — DISCHARGE INSTRUCTIONS
Discharge Instructions for  [facility]  [address] [place], [address] [place] [facility]  Telephone: [phone]    NAME:  Sierra Peña OF BIRTH:  1970  DATE:  7/1/2022    Wound Cleansing:   Do not scrub or use excessive force. Cleanse wound prior to applying a clean dressing with:    [] Normal Saline   [] Keep Wound Dry in Shower      [] Wound Cleanser (***)  [] May Shower at Discharge   [x] cleanse with Teresa Rast and Teresa Rast baby shampoo lather leave 2-3 then rinse with water, pat dry and redress wound. Dressings:           Wound Location RIGHT LOWER LEG   Apply Primary Dressing:  medihoney HFBR gauze kerlix tape single tube    Change dressing:   [] Daily      [x] Every Other Day   [] Three times per week  [] Once a week   [] Do Not Change Dressing     [] Other:    Dietary:  [x] Diet as tolerated: [] Diabetic Diet:   [x] Increase Protein: examples ( Meat, cheese, eggs, greek yogurt, premier protein drink, fish, nuts )   [] Other:    Return Appointment:  [] Wound assessment with Nurse at wound center in *** days     [x] Return Appointment: With Dr. Swetha Velasco  2 week       Electronically signed on 7/1/2022 at [facility]: Should you experience any significant changes in your wound(s) or have questions about your wound care, please contact the [address] [place] [address] at [address] [place] [address] 8:00 am - 4:30. If you need help with your wound outside these hours and cannot wait until we are again available, contact your PCP or go to the hospital emergency room. PLEASE NOTE: IF YOU ARE UNABLE TO OBTAIN WOUND SUPPLIES, CONTINUE TO USE THE SUPPLIES YOU HAVE AVAILABLE UNTIL YOU ARE ABLE TO REACH US. IT IS MOST IMPORTANT TO KEEP THE WOUND COVERED AT ALL TIMES.      Physician Signature:_______________________  Dr. Swetha Velasco

## 2022-07-15 NOTE — WOUND CARE
Discharge Condition: Stable     Pain: 1    Ambulatory Status: Walking    Discharge Destination: Home     Transportation: Car    Accompanied by: Self     Discharge instructions reviewed with Patient and copy or written instructions have been provided. All questions/concerns have been addressed at this time.

## 2022-07-15 NOTE — DISCHARGE INSTRUCTIONS
Discharge Instructions for  [facility]  [address] [place], [address] [facility]  Telephone: [phone]    NAME:  Gray Miranda OF BIRTH:  1970  DATE:  7/15/2022    Wound Cleansing:   Do not scrub or use excessive force. Cleanse wound prior to applying a clean dressing with:    [] Normal Saline   [] Keep Wound Dry in Shower      [] Wound Cleanser (***)  [] May Shower at Discharge   [x] cleanse with Delta Rounds and Delta Rounds baby shampoo lather leave 2-3 then rinse with water, pat dry and redress wound. Dressings:           Wound Location RIGHT LOWER LEG   Apply Primary Dressing:  madelyn gauze kerlix tape single tube    Change dressing:   [] Daily      [x] Every Other Day   [] Three times per week  [] Once a week   [] Do Not Change Dressing     [] Other:    Dietary:  [x] Diet as tolerated: [] Diabetic Diet:   [x] Increase Protein: examples ( Meat, cheese, eggs, greek yogurt, premier protein drink, fish, nuts )   [] Other:    Return Appointment:  [] Wound assessment with Nurse at wound center in *** days     [x] Return Appointment: With Dr. Micah Livingston  2 week       Electronically signed on 7/15/2022 at [address] [place] [address]: Should you experience any significant changes in your wound(s) or have questions about your wound care, please contact the [facility] Main at [address] [place] [address] 8:00 am - 4:30. If you need help with your wound outside these hours and cannot wait until we are again available, contact your PCP or go to the hospital emergency room. PLEASE NOTE: IF YOU ARE UNABLE TO OBTAIN WOUND SUPPLIES, CONTINUE TO USE THE SUPPLIES YOU HAVE AVAILABLE UNTIL YOU ARE ABLE TO REACH US. IT IS MOST IMPORTANT TO KEEP THE WOUND COVERED AT ALL TIMES.      Physician Signature:_______________________  Dr. Micah Livingston

## 2022-07-15 NOTE — WOUND CARE
07/15/22 0913   Anesthetic   Anesthetic 4% Lidocaine Liquid Topical   Right Leg Edema Point of Measurement   Leg circumference 33 cm   Ankle circumference 18.5 cm   RLE Peripheral Vascular    Capillary Refill Less than/equal to 3 seconds   Color Appropriate for race   Temperature Warm   Pedal Pulse Palpable   Wound Ankle Lateral;Right #1   Date First Assessed: 06/08/22   Present on Hospital Admission: Yes  Wound Approximate Age at First Assessment (Weeks): 6 weeks  Primary Wound Type: Traumatic  Location: Ankle  Wound Location Orientation: Lateral;Right  Wound Description: #1   Wound Image    Wound Etiology Traumatic   Cleansed Cleansed with saline   Wound Length (cm) 1.2 cm   Wound Width (cm) 1.5 cm   Wound Depth (cm) 0.3 cm   Wound Surface Area (cm^2) 1.8 cm^2   Change in Wound Size % 59.09   Wound Volume (cm^3) 0.54 cm^3   Wound Healing % 39   Wound Assessment Slough;Pink/red   Drainage Amount Moderate   Drainage Description Serosanguinous   Wound Odor None   Sophy-Wound/Incision Assessment Maceration;Blanchable erythema   Edges Defined edges   Wound Thickness Description Full thickness   Pain 1   Pain Scale 1 Numeric (0 - 10)   Pain Intensity 1 0     Visit Vitals  BP (!) 151/66 (BP 1 Location: Left upper arm, BP Patient Position: Sitting)   Pulse 61   Temp 98.1 °F (36.7 °C)   Resp 18

## 2022-07-15 NOTE — WOUND CARE
[address]   Progress Note and Procedure Note     [facility] RECORD NUMBER:  [id number]  AGE: 50 y.o. RACE WHITE/NON-  GENDER: female  : 1970  EPISODE DATE:  7/15/2022    Subjective:     Chief Complaint   Patient presents with    Wound Check     right lateral ankle         HISTORY of PRESENT ILLNESS HPI    Lyle Campbell is a 50 y.o. female who presents today for wound/ulcer evaluation.    History of Wound Context: ran over by [employer] during team building outing at work  Joy Cabral  Pain Timing/Severity: moderate  Quality of pain: aching and dull  Severity:  3 / 10   Modifying Factors: Pain worsens with touch, ambulation  Associated Signs/Symptoms: none    Ulcer Identification:  Ulcer Type: traumatic    Contributing Factors: edema, diabetes, poor glucose control and arterial insufficiency    Wound: right lateral ankle         PAST MEDICAL HISTORY    Past Medical History:   Diagnosis Date    Adverse effect of anesthesia     difficult to wake    Chronic kidney disease     Dr Delgado Doing, stage 3 Proteinuria    CKD (chronic kidney disease) stage 3, GFR 30-59 ml/min ([facility])     Coronary artery disease 2011    with silent MI in  and s/p one stent    Diabetes mellitus type 1 ([facility] [facility] 2011    Age 7, Insulin Pump    Diabetic retinopathy ([facility]     Dyslipidemia 2011    GERD (gastroesophageal reflux disease)     History of peptic ulcer disease 2011    from plavix    Hypertension     denies states takes BP meds to reduce protein in kidneys due to kidney disease    PUD (peptic ulcer disease)     Stroke ([facility] [address] [facility] 2016    hx L basal ganglia bleed        PAST SURGICAL HISTORY    Past Surgical History:   Procedure Laterality Date    HX CORONARY ARTERY BYPASS GRAFT  2020    HX HEENT Bilateral     laser surgery for retinopathy on multiple occasions and vitrectomy bilateral    HX HEENT Bilateral -    cataracts Dr Earl Steward HERNIA REPAIR  01/2020    Status post open repair of umbilical hernia with mesh 1/2020    HI CARDIAC SURG PROCEDURE UNLIST  2007    Dr Landry Velasco stent with Card Cath       FAMILY HISTORY    Family History   Problem Relation Age of Onset    Heart Disease Father         CABG    Diabetes Father         Type II    Hypertension Father     Stroke Father     Dementia Father     Heart Disease Paternal Grandfather     Hypertension Paternal Grandfather     Stroke Paternal Grandfather     Diabetes Maternal Grandmother         Type 2    Heart Disease Mother     Diabetes Mother         Type II    Stroke Mother     OSTEOARTHRITIS Mother     Hypertension Mother     Stroke Maternal Grandfather     Heart Disease Paternal Grandmother     Hypertension Paternal Grandmother     Stroke Paternal Grandmother     Dementia Paternal Grandmother        SOCIAL HISTORY    Social History     Tobacco Use    Smoking status: Never Smoker    Smokeless tobacco: Never Used   Substance Use Topics    Alcohol use: Yes     Alcohol/week: 1.0 standard drink     Types: 1 Glasses of wine per week     Comment: daily    Drug use: Never     Types: Prescription, OTC       ALLERGIES    Allergies   Allergen Reactions    Erythromycin Hives    Venlafaxine Other (comments)     Caused high blood sugars       MEDICATIONS    Current Outpatient Medications on File Prior to Encounter   Medication Sig Dispense Refill    gabapentin (NEURONTIN) 100 mg capsule Take 100 mg by mouth nightly.  carvediloL (COREG) 6.25 mg tablet TAKE 1 TABLET TWICE A  Tablet 3    rosuvastatin (CRESTOR) 20 mg tablet TAKE 1 TABLET NIGHTLY 90 Tablet 3    lisinopriL (PRINIVIL, ZESTRIL) 40 mg tablet 40 mg daily.  aspirin delayed-release 81 mg tablet Take 81 mg by mouth nightly.  hydroCHLOROthiazide (HYDRODIURIL) 25 mg tablet Take 1 Tab by mouth daily.  (Patient taking differently: Take 25 mg by mouth nightly.) 14 Tab 0    cyanocobalamin, vitamin B-12, (VITAMIN B-12) 5,000 mcg subl by SubLINGual route daily.  spironolactone (ALDACTONE) 25 mg tablet Take 1 Tab by mouth three (3) days a week. (Patient taking differently: Take 25 mg by mouth daily. ) 15 Tab 0    escitalopram (LEXAPRO) 10 mg tablet Take 10 mg by mouth daily.  Blood-Glucose Sensor (Dexcom G6 Sensor) victor m Change sensor every 10 days. DX:E10.42 9 Each 3    insulin lispro (HUMALOG) 100 unit/mL injection USE VIA INSULIN PUMP.  UNITS PER DAY. (replaces Novolog, per insurance preference) 110 mL 0    Insulin Pump Cartridge (Omnipod Dash 5 Pack Pod) crtg Change pod every 3 days 30 Each 3    Blood-Glucose Transmitter (Dexcom G6 Transmitter) victor m Change sensor every 10 days. [phone] Device 3    Blood-Glucose Meter,Continuous (Dexcom G6 ) misc Change sensor every 10 days. [phone] [id number] [phone] 1 Each 0    ondansetron (ZOFRAN ODT) 4 mg disintegrating tablet Take 1 Tab by mouth every eight (8) hours as needed for Nausea. 10 Tab 0     No current facility-administered medications on file prior to encounter. REVIEW OF SYSTEMS    A comprehensive review of systems was negative except for that written in the HPI. Objective:     Visit Vitals  BP (!) 151/66 (BP 1 Location: Left upper arm, BP Patient Position: Sitting)   Pulse 61   Temp 98.1 °F (36.7 °C)   Resp 18       Wt Readings from Last 3 Encounters:   04/20/22 75.6 kg (166 lb 9.6 oz)   12/15/21 76.8 kg (169 lb 6.4 oz)   08/11/21 74.4 kg (164 lb)       PHYSICAL EXAM    Pertinent items are noted in HPI. Objective:    Vascular:  B/L LE  DP 1/4; PT 1/4  capillary fill time brisk, pitting edema is present, skin temperature is cool, varicosities are present. Dermatological:    Wound: right lateral ankle  Measurements: per RN note  Margins: mildly macerated, intact, no erythema  Drainage: serous, mild  Odor: none  Wound base: 90% granular budding  Lymphangitic streaking? No.  Undermining? No.  Sinus tracts? No.  Exposed bone? No.  Subcutaneous crepitation on palpation? No.      Skin is dry and scaly, exhibits hemosiderin deposition. There is no maceration of the interspaces of the feet b/l. No hyperkeratotic lesions noted      Neurological:  DTR are present, protective sensation per 5.07 [place] Dalila monofilament is diminished, patient is AAOx3, mood is normal. Epicritic sensation is intact. Orthopedic:  B/L LE are symmetric, ROM of ankle, STJ, 1st MTPJ is limited, MMT 5 out of 5 for B/L LE. No pedal amputations ntoed    Constitutional: Pt is a well developed, well appearing female. Lymphatics: negative tenderness to palpation of neck/axillary/inguinal nodes. Imaging / Labs / Cx / Px: On file with [facility], pt relates no fx/dislocations  Debridement Wound Care        Problem List Items Addressed This Visit        Integumentary    Ulcer of right ankle, with fat layer exposed ([address] - Primary    Relevant Medications    lidocaine (ALOCANE) 4 % topical gel (Completed) (Start on 7/15/2022 10:00 AM)    Other Relevant Orders    INITIATE OUTPATIENT WOUND CARE PROTOCOL          Procedure Note  Indications:  Based on my examination of this patient's wound(s)/ulcer(s) today, debridement is required to promote healing and evaluate the wound base.     Performed by: Umer Drake DPM    Consent obtained: Yes    Time out taken: Yes    Debridement: Excisional    Using scissors and forceps the wound(s)/ulcer(s) was/were sharply debrided down through and including the removal of    subcutaneous tissue    Devitalized Tissue Debrided: slough    Pre Debridement Measurements:  Are located in the Wound/Ulcer Documentation Flow Sheet    Non-Pressure ulcer, fat layer exposed    Wound/Ulcer #: 1    Post Debridement Measurements:  Wound/Ulcer Descriptions are Pre Debridement except measurements:    WOUND POA CONDITIONS    Wound Abdomen Mid (Active)   Number of days: 940       Wound Ankle Lateral;Right #1 (Active)   Wound Image   07/15/22 0925   Wound Etiology Traumatic 07/15/22 0913   Dressing Status Old drainage noted 06/17/22 0909   Cleansed Cleansed with saline 07/15/22 0913   Dressing/Treatment Gauze dressing/dressing sponge; Honey gel/honey paste; Hydrofera Blue 06/17/22 0933   Wound Length (cm) 1.2 cm 07/15/22 0913   Wound Width (cm) 1.5 cm 07/15/22 0913   Wound Depth (cm) 0.3 cm 07/15/22 0913   Wound Surface Area (cm^2) 1.8 cm^2 07/15/22 0913   Change in Wound Size % 59.09 07/15/22 0913   Wound Volume (cm^3) 0.54 cm^3 07/15/22 0913   Wound Healing % 39 07/15/22 0913   Post-Procedure Length (cm) 1.2 cm 07/15/22 0935   Post-Procedure Width (cm) 1.5 cm 07/15/22 0935   Post-Procedure Depth (cm) 0.4 cm 07/15/22 0935   Post-Procedure Surface Area (cm^2) 1.8 cm^2 07/15/22 0935   Post-Procedure Volume (cm^3) 0.72 cm^3 07/15/22 0935   Wound Assessment Slough;[place]/red 07/15/22 0913   Drainage Amount Moderate 07/15/22 0913   Drainage Description Serosanguinous 07/15/22 0913   Wound Odor None 07/15/22 0913   Sophy-Wound/Incision Assessment Maceration;Blanchable erythema 07/15/22 0913   Edges Defined edges 07/15/22 0913   Wound Thickness Description Full thickness 07/15/22 0913   Number of days: 40       [REMOVED] Wound Sternum (Removed)   Number of days: 14       [REMOVED] Wound Leg Right (Removed)   Number of days: 14         Total Surface Area Debrided:  <20 sq cm     Estimated Blood Loss:  None    Hemostasis Achieved: Pressure    Procedural Pain: 0 / 10     Post Procedural Pain: 3 / 10     Response to treatment: Patient tolerated treatment with mild discomfort but relieved after procedure was completed          Assessment:    s91.011s laceration without FB right ankle, sequela    Ulcer right ankle to fat    Diabetes type 1 with foot ulcer  Problem List Items Addressed This Visit        Integumentary    Ulcer of right ankle, with fat layer exposed ([address] - Primary    Relevant Medications    lidocaine (ALOCANE) 4 % topical gel (Completed) (Start on 7/15/2022 10:00 AM) Other Relevant Orders    INITIATE OUTPATIENT WOUND CARE PROTOCOL          Plan:     Treatment Note please see attached Discharge Instructions    Written patient dismissal instructions given to patient or POA.          Wound care with madelyn/gauze/tape every other day    Do not get wound wet or air it out    D/c peroxide to wound    Has finished oral abx course    F/u 2 weeks, wound improving    Electronically signed by Micah Livingston DPM on 7/15/2022 at 2:46 PM

## 2022-07-29 ENCOUNTER — HOSPITAL ENCOUNTER (OUTPATIENT)
Dept: WOUND CARE | Age: 52
Discharge: HOME | End: 2022-07-29
Payer: COMMERCIAL

## 2022-07-29 VITALS
TEMPERATURE: 98.2 F | HEART RATE: 63 BPM | DIASTOLIC BLOOD PRESSURE: 74 MMHG | RESPIRATION RATE: 18 BRPM | SYSTOLIC BLOOD PRESSURE: 170 MMHG

## 2022-07-29 DIAGNOSIS — L97.312 ULCER OF RIGHT ANKLE, WITH FAT LAYER EXPOSED (HCC): Primary | ICD-10-CM

## 2022-07-29 PROCEDURE — 11042 DBRDMT SUBQ TIS 1ST 20SQCM/<: CPT | Performed by: PODIATRIST

## 2022-07-29 PROCEDURE — 74011000250 HC RX REV CODE- 250: Performed by: PODIATRIST

## 2022-07-29 RX ORDER — BACITRACIN ZINC AND POLYMYXIN B SULFATE 500; 1000 [USP'U]/G; [USP'U]/G
OINTMENT TOPICAL ONCE
Status: CANCELLED | OUTPATIENT
Start: 2022-07-29 | End: 2022-07-29

## 2022-07-29 RX ORDER — LIDOCAINE HYDROCHLORIDE 20 MG/ML
JELLY TOPICAL ONCE
Status: CANCELLED | OUTPATIENT
Start: 2022-07-29 | End: 2022-07-29

## 2022-07-29 RX ORDER — LIDOCAINE 50 MG/G
OINTMENT TOPICAL ONCE
Status: CANCELLED | OUTPATIENT
Start: 2022-07-29 | End: 2022-07-29

## 2022-07-29 RX ORDER — SILVER SULFADIAZINE 10 G/1000G
CREAM TOPICAL ONCE
Status: CANCELLED | OUTPATIENT
Start: 2022-07-29 | End: 2022-07-29

## 2022-07-29 RX ORDER — BACITRACIN 500 [USP'U]/G
OINTMENT TOPICAL ONCE
Status: CANCELLED | OUTPATIENT
Start: 2022-07-29 | End: 2022-07-29

## 2022-07-29 RX ORDER — LIDOCAINE HYDROCHLORIDE 40 MG/ML
SOLUTION TOPICAL ONCE
Status: CANCELLED | OUTPATIENT
Start: 2022-07-29 | End: 2022-07-29

## 2022-07-29 RX ORDER — MUPIROCIN 20 MG/G
OINTMENT TOPICAL ONCE
Status: CANCELLED | OUTPATIENT
Start: 2022-07-29 | End: 2022-07-29

## 2022-07-29 RX ORDER — TRIAMCINOLONE ACETONIDE 1 MG/G
OINTMENT TOPICAL ONCE
Status: CANCELLED | OUTPATIENT
Start: 2022-07-29 | End: 2022-07-29

## 2022-07-29 RX ORDER — BETAMETHASONE DIPROPIONATE 0.5 MG/G
OINTMENT TOPICAL ONCE
Status: CANCELLED | OUTPATIENT
Start: 2022-07-29 | End: 2022-07-29

## 2022-07-29 RX ORDER — LIDOCAINE 40 MG/G
CREAM TOPICAL ONCE
Status: CANCELLED | OUTPATIENT
Start: 2022-07-29 | End: 2022-07-29

## 2022-07-29 RX ORDER — CLOBETASOL PROPIONATE 0.5 MG/G
OINTMENT TOPICAL ONCE
Status: CANCELLED | OUTPATIENT
Start: 2022-07-29 | End: 2022-07-29

## 2022-07-29 RX ORDER — GENTAMICIN SULFATE 1 MG/G
OINTMENT TOPICAL ONCE
Status: CANCELLED | OUTPATIENT
Start: 2022-07-29 | End: 2022-07-29

## 2022-07-29 RX ADMIN — Medication: at 09:07

## 2022-07-29 NOTE — WOUND CARE
07/29/22 0923   Right Leg Edema Point of Measurement   Compression Therapy Tubular elastic support bandage   Wound Ankle Lateral;Right #1   Date First Assessed: 06/08/22   Present on Hospital Admission: Yes  Wound Approximate Age at First Assessment (Weeks): 6 weeks  Primary Wound Type: Traumatic  Location: Ankle  Wound Location Orientation: Lateral;Right  Wound Description: #1   Dressing/Treatment Collagen with Ag;Gauze dressing/dressing sponge;Silicone border   Discharge Condition: Stable     Pain: 0    Ambulatory Status: Walking    Discharge Destination: Home     Transportation: Car    Accompanied by: Self     Discharge instructions reviewed with Patient and copy or written instructions have been provided. All questions/concerns have been addressed at this time.

## 2022-07-29 NOTE — DISCHARGE INSTRUCTIONS
Discharge Instructions for  [id number]  [address] [place], [address] [facility]  Telephone: [phone]    NAME:  Maria Alejandra Ambriz OF BIRTH:  1970  DATE:  7/29/2022    Wound Cleansing:   Do not scrub or use excessive force. Cleanse wound prior to applying a clean dressing with:    [] Normal Saline   [] Keep Wound Dry in Shower      [] Wound Cleanser (***)  [] May Shower at Discharge   [x] cleanse with Martina Mutters and Martina Mutters baby shampoo lather leave 2-3 then rinse with water, pat dry and redress wound. Dressings:           Wound Location RIGHT LOWER LEG   Apply Primary Dressing:  madelyn gauze kerlix tape single tube    Change dressing:   [] Daily      [x] Every Other Day   [] Three times per week  [] Once a week   [] Do Not Change Dressing     [] Other:    Dietary:  [x] Diet as tolerated: [] Diabetic Diet:   [x] Increase Protein: examples ( Meat, cheese, eggs, greek yogurt, premier protein drink, fish, nuts )   [] Other:    Return Appointment:  [] Wound assessment with Nurse at wound center in *** days     [x] Return Appointment: With Dr. Jaspreet Rodriguez  2 week       Electronically signed on 7/29/2022 at [facility]: Should you experience any significant changes in your wound(s) or have questions about your wound care, please contact the [facility] at [address] [place] [address] 8:00 am - 4:30. If you need help with your wound outside these hours and cannot wait until we are again available, contact your PCP or go to the hospital emergency room. PLEASE NOTE: IF YOU ARE UNABLE TO OBTAIN WOUND SUPPLIES, CONTINUE TO USE THE SUPPLIES YOU HAVE AVAILABLE UNTIL YOU ARE ABLE TO REACH US. IT IS MOST IMPORTANT TO KEEP THE WOUND COVERED AT ALL TIMES.      Physician Signature:_______________________  Dr. Jaspreet Rodriguez

## 2022-07-29 NOTE — WOUND CARE
07/29/22 0904   Anesthetic   Anesthetic 4% Lidocaine Liquid Topical   Right Leg Edema Point of Measurement   Leg circumference 33.5 cm   Ankle circumference 19 cm   RLE Peripheral Vascular    Capillary Refill Less than/equal to 3 seconds   Color Appropriate for race   Temperature Warm   Pedal Pulse Palpable   Wound Ankle Lateral;Right #1   Date First Assessed: 06/08/22   Present on Hospital Admission: Yes  Wound Approximate Age at First Assessment (Weeks): 6 weeks  Primary Wound Type: Traumatic  Location: Ankle  Wound Location Orientation: Lateral;Right  Wound Description: #1   Wound Image    Cleansed Cleansed with saline   Wound Length (cm) 1 cm   Wound Width (cm) 1.5 cm   Wound Depth (cm) 0.2 cm   Wound Surface Area (cm^2) 1.5 cm^2   Change in Wound Size % 65.91   Wound Volume (cm^3) 0.3 cm^3   Wound Healing % 66   Wound Assessment Slough;Pink/red   Drainage Amount Moderate   Drainage Description Serous; Serosanguinous   Wound Odor None   Sophy-Wound/Incision Assessment Maceration;Blanchable erythema  (gentian violet staining)   Edges Defined edges;Epibole (rolled edges)   Wound Thickness Description Full thickness   Visit Vitals  BP (!) 170/74 (BP 1 Location: Right upper arm, BP Patient Position: Sitting)   Pulse 63   Temp 98.2 °F (36.8 °C)   Resp 18

## 2022-07-29 NOTE — WOUND CARE
[address]   Progress Note and Procedure Note     [facility] RECORD NUMBER:  [id number]  AGE: 50 y.o. RACE WHITE/NON-  GENDER: female  : 1970  EPISODE DATE:  2022    Subjective:     Chief Complaint   Patient presents with    Wound Check     Right ankle         HISTORY of PRESENT ILLNESS HPI    Arielle Do is a 50 y.o. female who presents today for wound/ulcer evaluation.    History of Wound Context: ran over by [employer] during team building outing at work  [employer]ral  Pain Timing/Severity: moderate  Quality of pain: aching and dull  Severity:  3 / 10   Modifying Factors: Pain worsens with touch, ambulation  Associated Signs/Symptoms: none    Ulcer Identification:  Ulcer Type: traumatic    Contributing Factors: edema, diabetes, poor glucose control and arterial insufficiency    Wound: right lateral ankle         PAST MEDICAL HISTORY    Past Medical History:   Diagnosis Date    Adverse effect of anesthesia     difficult to wake    Chronic kidney disease     Dr Deborah Garg, stage 3 Proteinuria    CKD (chronic kidney disease) stage 3, GFR 30-59 ml/min ([facility])     Coronary artery disease 2011    with silent MI in  and s/p one stent    Diabetes mellitus type 1 ([facility] 2011    Age 7, Insulin Pump    Diabetic retinopathy ([facility] [address]     Dyslipidemia 2011    GERD (gastroesophageal reflux disease)     History of peptic ulcer disease 2011    from plavix    Hypertension     denies states takes BP meds to reduce protein in kidneys due to kidney disease    PUD (peptic ulcer disease)     Stroke ([facility] [address] 2016    hx L basal ganglia bleed        PAST SURGICAL HISTORY    Past Surgical History:   Procedure Laterality Date    HX CORONARY ARTERY BYPASS GRAFT  2020    HX HEENT Bilateral     laser surgery for retinopathy on multiple occasions and vitrectomy bilateral    HX HEENT Bilateral -    cataracts Dr Ludwig Kawasaki    [address]  2020 Status post open repair of umbilical hernia with mesh 1/2020    [place] CARDIAC SURG PROCEDURE UNLIST  2007    Dr Georgi Thomas stent with Card Cath       FAMILY HISTORY    Family History   Problem Relation Age of Onset    Heart Disease Father         CABG    Diabetes Father         Type II    Hypertension Father     Stroke Father     Dementia Father     Heart Disease Paternal Grandfather     Hypertension Paternal Grandfather     Stroke Paternal Grandfather     Diabetes Maternal Grandmother         Type 2    Heart Disease Mother     Diabetes Mother         Type II    Stroke Mother     OSTEOARTHRITIS Mother     Hypertension Mother     Stroke Maternal Grandfather     Heart Disease Paternal Grandmother     Hypertension Paternal Grandmother     Stroke Paternal Grandmother     Dementia Paternal Grandmother        SOCIAL HISTORY    Social History     Tobacco Use    Smoking status: Never    Smokeless tobacco: Never   Substance Use Topics    Alcohol use: Yes     Alcohol/week: 1.0 standard drink     Types: 1 Glasses of wine per week     Comment: daily    Drug use: Never     Types: Prescription, OTC       ALLERGIES    Allergies   Allergen Reactions    Erythromycin Hives    Venlafaxine Other (comments)     Caused high blood sugars       MEDICATIONS    Current Outpatient Medications on File Prior to Encounter   Medication Sig Dispense Refill    gabapentin (NEURONTIN) 100 mg capsule Take 100 mg by mouth nightly. carvediloL (COREG) 6.25 mg tablet TAKE 1 TABLET TWICE A  Tablet 3    rosuvastatin (CRESTOR) 20 mg tablet TAKE 1 TABLET NIGHTLY 90 Tablet 3    Blood-Glucose Sensor (Dexcom G6 Sensor) victor m Change sensor every 10 days. DX:E10.42 9 Each 3    insulin lispro (HUMALOG) 100 unit/mL injection USE VIA INSULIN PUMP.  UNITS PER DAY. (replaces Novolog, per insurance preference) 110 mL 0    Insulin Pump Cartridge (Omnipod Dash 5 Pack Pod) crtg Change pod every 3 days 30 Each 3    Blood-Glucose Transmitter (Dexcom G6 Transmitter) victor m Change sensor every 10 days. [phone] Device 3    Blood-Glucose Meter,Continuous (Dexcom G6 ) misc Change sensor every 10 days. [id number] [phone] 1 Each 0    lisinopriL (PRINIVIL, ZESTRIL) 40 mg tablet 40 mg daily. aspirin delayed-release 81 mg tablet Take 81 mg by mouth nightly. ondansetron (ZOFRAN ODT) 4 mg disintegrating tablet Take 1 Tab by mouth every eight (8) hours as needed for Nausea. 10 Tab 0    hydroCHLOROthiazide (HYDRODIURIL) 25 mg tablet Take 1 Tab by mouth daily. (Patient taking differently: Take 25 mg by mouth nightly.) 14 Tab 0    cyanocobalamin, vitamin B-12, (VITAMIN B-12) 5,000 mcg subl by SubLINGual route daily. spironolactone (ALDACTONE) 25 mg tablet Take 1 Tab by mouth three (3) days a week. (Patient taking differently: Take 25 mg by mouth daily. ) 15 Tab 0    escitalopram (LEXAPRO) 10 mg tablet Take 10 mg by mouth daily. No current facility-administered medications on file prior to encounter. REVIEW OF SYSTEMS    A comprehensive review of systems was negative except for that written in the HPI. Objective:     Visit Vitals  BP (!) 170/74 (BP 1 Location: Right upper arm, BP Patient Position: Sitting)   Pulse 63   Temp 98.2 °F (36.8 °C)   Resp 18       Wt Readings from Last 3 Encounters:   04/20/22 75.6 kg (166 lb 9.6 oz)   12/15/21 76.8 kg (169 lb 6.4 oz)   08/11/21 74.4 kg (164 lb)       PHYSICAL EXAM    Pertinent items are noted in HPI. Objective:    Vascular:  B/L LE  DP 1/4; PT 1/4  capillary fill time brisk, pitting edema is present, skin temperature is cool, varicosities are present. Dermatological:    Wound: right lateral ankle  Measurements: per RN note  Margins: mildly macerated, intact, no erythema  Drainage: serous, mild  Odor: none  Wound base: 90% granular budding  Lymphangitic streaking? No.  Undermining? No.  Sinus tracts? No.  Exposed bone? No.  Subcutaneous crepitation on palpation?  No.      Skin is dry and scaly, exhibits hemosiderin deposition. There is no maceration of the interspaces of the feet b/l. No hyperkeratotic lesions noted      Neurological:  DTR are present, protective sensation per 5.07 [place] Dalila monofilament is diminished, patient is AAOx3, mood is normal. Epicritic sensation is intact. Orthopedic:  B/L LE are symmetric, ROM of ankle, STJ, 1st MTPJ is limited, MMT 5 out of 5 for B/L LE. No pedal amputations ntoed    Constitutional: Pt is a well developed, well appearing female. Lymphatics: negative tenderness to palpation of neck/axillary/inguinal nodes. Imaging / Labs / Cx / Px: On file with [facility], pt relates no fx/dislocations  Debridement Wound Care        Problem List Items Addressed This Visit          Integumentary    Ulcer of right ankle, with fat layer exposed ([address] - Primary    Relevant Medications    lidocaine (ALOCANE) 4 % topical gel (Completed) (Start on 7/29/2022 10:00 AM)    Other Relevant Orders    INITIATE OUTPATIENT WOUND CARE PROTOCOL       Procedure Note  Indications:  Based on my examination of this patient's wound(s)/ulcer(s) today, debridement is required to promote healing and evaluate the wound base.     Performed by: Jose Pak DPM    Consent obtained: Yes    Time out taken: Yes    Debridement: Excisional    Using scissors and forceps the wound(s)/ulcer(s) was/were sharply debrided down through and including the removal of    subcutaneous tissue    Devitalized Tissue Debrided: slough    Pre Debridement Measurements:  Are located in the Wound/Ulcer Documentation Flow Sheet    Non-Pressure ulcer, fat layer exposed    Wound/Ulcer #: 1    Post Debridement Measurements:  Wound/Ulcer Descriptions are Pre Debridement except measurements:    WOUND POA CONDITIONS    Wound Abdomen Mid (Active)   Number of days: 954       Wound Ankle Lateral;Right #1 (Active)   Wound Image   07/29/22 0904   Wound Etiology Traumatic 07/15/22 0913   Dressing Status Old drainage noted 06/17/22 0909   Cleansed Cleansed with saline 07/29/22 0904   Dressing/Treatment Collagen with Ag;Gauze dressing/dressing sponge;Silicone border 05/34/32 0923   Wound Length (cm) 1 cm 07/29/22 0904   Wound Width (cm) 1.5 cm 07/29/22 0904   Wound Depth (cm) 0.2 cm 07/29/22 0904   Wound Surface Area (cm^2) 1.5 cm^2 07/29/22 0904   Change in Wound Size % 65.91 07/29/22 0904   Wound Volume (cm^3) 0.3 cm^3 07/29/22 0904   Wound Healing % 66 07/29/22 0904   Post-Procedure Length (cm) 1.2 cm 07/15/22 0935   Post-Procedure Width (cm) 1.5 cm 07/15/22 0935   Post-Procedure Depth (cm) 0.4 cm 07/15/22 0935   Post-Procedure Surface Area (cm^2) 1.8 cm^2 07/15/22 0935   Post-Procedure Volume (cm^3) 0.72 cm^3 07/15/22 0935   Wound Assessment Slough;[place]/red 07/29/22 0904   Drainage Amount Moderate 07/29/22 0904   Drainage Description Serous; Serosanguinous 07/29/22 0904   Wound Odor None 07/29/22 0904   Sophy-Wound/Incision Assessment Maceration;Blanchable erythema 07/29/22 0904   Edges Defined edges;Epibole (rolled edges) 07/29/22 0904   Wound Thickness Description Full thickness 07/29/22 0904   Number of days: 51       [REMOVED] Wound Sternum (Removed)   Number of days: 14       [REMOVED] Wound Leg Right (Removed)   Number of days: 14           Total Surface Area Debrided:  <20 sq cm     Estimated Blood Loss:  None    Hemostasis Achieved: Pressure    Procedural Pain: 0 / 10     Post Procedural Pain: 3 / 10     Response to treatment: Patient tolerated treatment with mild discomfort but relieved after procedure was completed          Assessment:    s91.011s laceration without FB right ankle, sequela    Ulcer right ankle to fat    Diabetes type 1 with foot ulcer  Problem List Items Addressed This Visit          Integumentary    Ulcer of right ankle, with fat layer exposed ([address] - Primary    Relevant Medications    lidocaine (ALOCANE) 4 % topical gel (Completed) (Start on 7/29/2022 10:00 AM)    Other Relevant Orders    INITIATE OUTPATIENT WOUND CARE PROTOCOL       Plan:     Treatment Note please see attached Discharge Instructions    Written patient dismissal instructions given to patient or POA.          Wound care with madelyn/gauze/tape every other day    Do not get wound wet or air it out    D/c peroxide to wound    Has finished oral abx course    F/u 2 weeks, wound improving    Electronically signed by Emile Quesada DPM on 7/29/2022 at 2:46 PM

## 2022-08-12 ENCOUNTER — HOSPITAL ENCOUNTER (OUTPATIENT)
Dept: WOUND CARE | Age: 52
Discharge: HOME | End: 2022-08-12
Payer: COMMERCIAL

## 2022-08-12 VITALS
SYSTOLIC BLOOD PRESSURE: 183 MMHG | DIASTOLIC BLOOD PRESSURE: 81 MMHG | HEART RATE: 69 BPM | RESPIRATION RATE: 18 BRPM | TEMPERATURE: 97.9 F

## 2022-08-12 DIAGNOSIS — L97.312 ULCER OF RIGHT ANKLE, WITH FAT LAYER EXPOSED (HCC): Primary | ICD-10-CM

## 2022-08-12 PROCEDURE — 74011000250 HC RX REV CODE- 250: Performed by: PODIATRIST

## 2022-08-12 PROCEDURE — 11042 DBRDMT SUBQ TIS 1ST 20SQCM/<: CPT | Performed by: PODIATRIST

## 2022-08-12 RX ORDER — BACITRACIN ZINC AND POLYMYXIN B SULFATE 500; 1000 [USP'U]/G; [USP'U]/G
OINTMENT TOPICAL ONCE
Status: CANCELLED | OUTPATIENT
Start: 2022-08-12 | End: 2022-08-12

## 2022-08-12 RX ORDER — LIDOCAINE HYDROCHLORIDE 20 MG/ML
JELLY TOPICAL ONCE
Status: CANCELLED | OUTPATIENT
Start: 2022-08-12 | End: 2022-08-12

## 2022-08-12 RX ORDER — GENTAMICIN SULFATE 1 MG/G
OINTMENT TOPICAL ONCE
Status: CANCELLED | OUTPATIENT
Start: 2022-08-12 | End: 2022-08-12

## 2022-08-12 RX ORDER — BETAMETHASONE DIPROPIONATE 0.5 MG/G
OINTMENT TOPICAL ONCE
Status: CANCELLED | OUTPATIENT
Start: 2022-08-12 | End: 2022-08-12

## 2022-08-12 RX ORDER — TRIAMCINOLONE ACETONIDE 1 MG/G
OINTMENT TOPICAL ONCE
Status: CANCELLED | OUTPATIENT
Start: 2022-08-12 | End: 2022-08-12

## 2022-08-12 RX ORDER — BACITRACIN 500 [USP'U]/G
OINTMENT TOPICAL ONCE
Status: CANCELLED | OUTPATIENT
Start: 2022-08-12 | End: 2022-08-12

## 2022-08-12 RX ORDER — CLOBETASOL PROPIONATE 0.5 MG/G
OINTMENT TOPICAL ONCE
Status: CANCELLED | OUTPATIENT
Start: 2022-08-12 | End: 2022-08-12

## 2022-08-12 RX ORDER — LIDOCAINE 50 MG/G
OINTMENT TOPICAL ONCE
Status: CANCELLED | OUTPATIENT
Start: 2022-08-12 | End: 2022-08-12

## 2022-08-12 RX ORDER — SILVER SULFADIAZINE 10 G/1000G
CREAM TOPICAL ONCE
Status: CANCELLED | OUTPATIENT
Start: 2022-08-12 | End: 2022-08-12

## 2022-08-12 RX ORDER — LIDOCAINE 40 MG/G
CREAM TOPICAL ONCE
Status: CANCELLED | OUTPATIENT
Start: 2022-08-12 | End: 2022-08-12

## 2022-08-12 RX ORDER — LIDOCAINE HYDROCHLORIDE 40 MG/ML
SOLUTION TOPICAL ONCE
Status: CANCELLED | OUTPATIENT
Start: 2022-08-12 | End: 2022-08-12

## 2022-08-12 RX ORDER — MUPIROCIN 20 MG/G
OINTMENT TOPICAL ONCE
Status: CANCELLED | OUTPATIENT
Start: 2022-08-12 | End: 2022-08-12

## 2022-08-12 RX ADMIN — Medication: at 09:09

## 2022-08-12 NOTE — WOUND CARE
[address]   Progress Note and Procedure Note     [facility] RECORD NUMBER:  [id number]  AGE: 50 y.o. RACE WHITE/NON-  GENDER: female  : 1970  EPISODE DATE:  2022    Subjective:     Chief Complaint   Patient presents with    Wound Check     Right lateral ankle wound         HISTORY of PRESENT ILLNESS SAMIRA Pruitt is a 50 y.o. female who presents today for wound/ulcer evaluation.    History of Wound Context: ran over by [employer] during team building outing at work  [employer] Pain Timing/Severity: moderate  Quality of pain: aching and dull  Severity:  3 / 10   Modifying Factors: Pain worsens with touch, ambulation  Associated Signs/Symptoms: none    Ulcer Identification:  Ulcer Type: traumatic    Contributing Factors: edema, diabetes, poor glucose control and arterial insufficiency    Wound: right lateral ankle         PAST MEDICAL HISTORY    Past Medical History:   Diagnosis Date    Adverse effect of anesthesia     difficult to wake    Chronic kidney disease     Dr Echo Francis, stage 3 Proteinuria    CKD (chronic kidney disease) stage 3, GFR 30-59 ml/min ([facility])     Coronary artery disease 2011    with silent MI in  and s/p one stent    Diabetes mellitus type 1 ([facility] [address] 2011    Age 7, Insulin Pump    Diabetic retinopathy ([facility] [address]     Dyslipidemia 2011    GERD (gastroesophageal reflux disease)     History of peptic ulcer disease 2011    from plavix    Hypertension     denies states takes BP meds to reduce protein in kidneys due to kidney disease    PUD (peptic ulcer disease)     Stroke ([facility] [address] 2016    hx L basal ganglia bleed        PAST SURGICAL HISTORY    Past Surgical History:   Procedure Laterality Date    HX CORONARY ARTERY BYPASS GRAFT  2020    HX HEENT Bilateral     laser surgery for retinopathy on multiple occasions and vitrectomy bilateral    HX HEENT Bilateral -    cataracts Dr Roberto Almonte REPAIR  01/2020    Status post open repair of umbilical hernia with mesh 1/2020    [place] CARDIAC SURG PROCEDURE UNLIST  2007    Dr Dylan Huynh stent with Card Cath       FAMILY HISTORY    Family History   Problem Relation Age of Onset    Heart Disease Father         CABG    Diabetes Father         Type II    Hypertension Father     Stroke Father     Dementia Father     Heart Disease Paternal Grandfather     Hypertension Paternal Grandfather     Stroke Paternal Grandfather     Diabetes Maternal Grandmother         Type 2    Heart Disease Mother     Diabetes Mother         Type II    Stroke Mother     OSTEOARTHRITIS Mother     Hypertension Mother     Stroke Maternal Grandfather     Heart Disease Paternal Grandmother     Hypertension Paternal Grandmother     Stroke Paternal Grandmother     Dementia Paternal Grandmother        SOCIAL HISTORY    Social History     Tobacco Use    Smoking status: Never    Smokeless tobacco: Never   Substance Use Topics    Alcohol use: Yes     Alcohol/week: 1.0 standard drink     Types: 1 Glasses of wine per week     Comment: daily    Drug use: Never     Types: Prescription, OTC       ALLERGIES    Allergies   Allergen Reactions    Erythromycin Hives    Venlafaxine Other (comments)     Caused high blood sugars       MEDICATIONS    Current Outpatient Medications on File Prior to Encounter   Medication Sig Dispense Refill    gabapentin (NEURONTIN) 100 mg capsule Take 100 mg by mouth nightly. carvediloL (COREG) 6.25 mg tablet TAKE 1 TABLET TWICE A  Tablet 3    rosuvastatin (CRESTOR) 20 mg tablet TAKE 1 TABLET NIGHTLY 90 Tablet 3    Blood-Glucose Sensor (Dexcom G6 Sensor) victor m Change sensor every 10 days. DX:E10.42 9 Each 3    insulin lispro (HUMALOG) 100 unit/mL injection USE VIA INSULIN PUMP.  UNITS PER DAY. (replaces Novolog, per insurance preference) 110 mL 0    Insulin Pump Cartridge (Omnipod Dash 5 Pack Pod) crtg Change pod every 3 days 30 Each 3    Blood-Glucose Transmitter (Dexcom G6 Transmitter) victor m Change sensor every 10 days. [phone] Device 3    Blood-Glucose Meter,Continuous (Dexcom G6 ) misc Change sensor every 10 days. [id number] 47 32 80 1 Each 0    lisinopriL (PRINIVIL, ZESTRIL) 40 mg tablet 40 mg daily. aspirin delayed-release 81 mg tablet Take 81 mg by mouth nightly. ondansetron (ZOFRAN ODT) 4 mg disintegrating tablet Take 1 Tab by mouth every eight (8) hours as needed for Nausea. 10 Tab 0    hydroCHLOROthiazide (HYDRODIURIL) 25 mg tablet Take 1 Tab by mouth daily. (Patient taking differently: Take 25 mg by mouth nightly.) 14 Tab 0    cyanocobalamin, vitamin B-12, 5,000 mcg subl by SubLINGual route daily. spironolactone (ALDACTONE) 25 mg tablet Take 1 Tab by mouth three (3) days a week. (Patient taking differently: Take 25 mg by mouth in the morning.) 15 Tab 0    escitalopram oxalate (LEXAPRO) 10 mg tablet Take 10 mg by mouth daily. No current facility-administered medications on file prior to encounter. REVIEW OF SYSTEMS    A comprehensive review of systems was negative except for that written in the HPI. Objective:     Visit Vitals  BP (!) 183/81 (BP 1 Location: Left upper arm, BP Patient Position: Sitting)   Pulse 69   Temp 97.9 °F (36.6 °C)   Resp 18       Wt Readings from Last 3 Encounters:   04/20/22 75.6 kg (166 lb 9.6 oz)   12/15/21 76.8 kg (169 lb 6.4 oz)   08/11/21 74.4 kg (164 lb)       PHYSICAL EXAM    Pertinent items are noted in HPI. Objective:    Vascular:  B/L LE  DP 1/4; PT 1/4  capillary fill time brisk, pitting edema is present, skin temperature is cool, varicosities are present. Dermatological:    Wound: right lateral ankle  Measurements: per RN note  Margins: maceration improved, epibole noted, intact, no erythema  Drainage: serous, mild  Odor: none  Wound base: 90% granular budding  Lymphangitic streaking? No.  Undermining? No.  Sinus tracts? No.  Exposed bone?  No.  Subcutaneous crepitation on palpation? No.      Skin is dry and scaly, exhibits hemosiderin deposition. There is no maceration of the interspaces of the feet b/l. No hyperkeratotic lesions noted      Neurological:  DTR are present, protective sensation per 5.07 [place] Dalila monofilament is diminished, patient is AAOx3, mood is normal. Epicritic sensation is intact. Orthopedic:  B/L LE are symmetric, ROM of ankle, STJ, 1st MTPJ is limited, MMT 5 out of 5 for B/L LE. No pedal amputations ntoed    Constitutional: Pt is a well developed, well appearing female. Lymphatics: negative tenderness to palpation of neck/axillary/inguinal nodes. Imaging / Labs / Cx / Px: On file with [facility], pt relates no fx/dislocations  Debridement Wound Care        Problem List Items Addressed This Visit          Integumentary    Ulcer of right ankle, with fat layer exposed ([address] - Primary    Relevant Medications    lidocaine (ALOCANE) 4 % topical gel (Completed) (Start on 8/12/2022 10:00 AM)    Other Relevant Orders    INITIATE OUTPATIENT WOUND CARE PROTOCOL       Procedure Note  Indications:  Based on my examination of this patient's wound(s)/ulcer(s) today, debridement is required to promote healing and evaluate the wound base.     Performed by: Jaspreet Rodriguez DPM    Consent obtained: Yes    Time out taken: Yes    Debridement: Excisional    Using scissors and forceps the wound(s)/ulcer(s) was/were sharply debrided down through and including the removal of    subcutaneous tissue    Devitalized Tissue Debrided: slough    Pre Debridement Measurements:  Are located in the Wound/Ulcer Documentation Flow Sheet    Non-Pressure ulcer, fat layer exposed    Wound/Ulcer #: 1    Post Debridement Measurements:  Wound/Ulcer Descriptions are Pre Debridement except measurements:    WOUND POA CONDITIONS  WOUND POA CONDITIONS    Wound Abdomen Mid (Active)   Number of days: 968       Wound Ankle Lateral;Right #1 (Active)   Wound Image   08/12/22 0904   Wound Etiology Traumatic 07/15/22 0913   Dressing Status Old drainage noted 06/17/22 0909   Cleansed Soap and water 08/12/22 0904   Dressing/Treatment Collagen with Ag;Gauze dressing/dressing sponge;Silicone border 40/38/66 0923   Wound Length (cm) 0.7 cm 08/12/22 0904   Wound Width (cm) 1.4 cm 08/12/22 0904   Wound Depth (cm) 0.2 cm 08/12/22 0904   Wound Surface Area (cm^2) 0.98 cm^2 08/12/22 0904   Change in Wound Size % 77.73 08/12/22 0904   Wound Volume (cm^3) 0.196 cm^3 08/12/22 0904   Wound Healing % 78 08/12/22 0904   Post-Procedure Length (cm) 0.7 cm 08/12/22 0911   Post-Procedure Width (cm) 1.4 cm 08/12/22 0911   Post-Procedure Depth (cm) 0.2 cm 08/12/22 0911   Post-Procedure Surface Area (cm^2) 0.98 cm^2 08/12/22 0911   Post-Procedure Volume (cm^3) 0.196 cm^3 08/12/22 0911   Wound Assessment Slough;[place]/red 08/12/22 0904   Drainage Amount Moderate 08/12/22 0904   Drainage Description Serosanguinous 08/12/22 0904   Wound Odor None 08/12/22 0904   Sophy-Wound/Incision Assessment Maceration 08/12/22 0904   Edges Defined edges 08/12/22 0904   Wound Thickness Description Full thickness 08/12/22 0904   Number of days: 65       [REMOVED] Wound Sternum (Removed)   Number of days: 14       [REMOVED] Wound Leg Right (Removed)   Number of days: 14           Total Surface Area Debrided:  <20 sq cm     Estimated Blood Loss:  None    Hemostasis Achieved: Pressure    Procedural Pain: 0 / 10     Post Procedural Pain: 3 / 10     Response to treatment: Patient tolerated treatment with mild discomfort but relieved after procedure was completed          Assessment:    s91.011s laceration without FB right ankle, sequela    Ulcer right ankle to fat    Diabetes type 1 with foot ulcer  Problem List Items Addressed This Visit          Integumentary    Ulcer of right ankle, with fat layer exposed ([address] - Primary    Relevant Medications    lidocaine (ALOCANE) 4 % topical gel (Completed) (Start on 8/12/2022 10:00 AM)    Other Relevant Orders INITIATE OUTPATIENT WOUND CARE PROTOCOL       Plan:     Treatment Note please see attached Discharge Instructions    Written patient dismissal instructions given to patient or POA.          Wound care with madelyn/gauze/tape every other day    Do not get wound wet or air it out    D/c peroxide to wound    Has finished oral abx course    F/u 2 weeks, wound improving in appearance and size    Electronically signed by Joelle Mahoney DPM on 8/12/2022 at 2:46 PM

## 2022-08-12 NOTE — DISCHARGE INSTRUCTIONS
Discharge Instructions for  [facility]  [address] [place], [address] [facility] [address]  Telephone: [phone]    NAME:  Jimi Chandra OF BIRTH:  1970  DATE:  8/12/2022    Wound Cleansing:   Do not scrub or use excessive force. Cleanse wound prior to applying a clean dressing with:    [] Normal Saline   [] Keep Wound Dry in Shower      [] Wound Cleanser (***)  [] May Shower at Discharge   [x] cleanse with [employer] baby shampoo lather leave 2-3 then rinse with water, pat dry and redress wound. Dressings:           Wound Location RIGHT LOWER LEG   Apply Primary Dressing:  madelyn gauze kerlix tape single tube    Change dressing:   [] Daily      [x] Every Other Day   [] Three times per week  [] Once a week   [] Do Not Change Dressing     [] Other:    Dietary:  [x] Diet as tolerated: [] Diabetic Diet:   [x] Increase Protein: examples ( Meat, cheese, eggs, greek yogurt, premier protein drink, fish, nuts )   [] Other:    Return Appointment:  [] Wound assessment with Nurse at wound center in *** days     [x] Return Appointment: With Dr. Regi Ricks  2 week       Electronically signed on 8/12/2022 at [facility]: Should you experience any significant changes in your wound(s) or have questions about your wound care, please contact the [facility] at [address] [place] [address] 8:00 am - 4:30. If you need help with your wound outside these hours and cannot wait until we are again available, contact your PCP or go to the hospital emergency room. PLEASE NOTE: IF YOU ARE UNABLE TO OBTAIN WOUND SUPPLIES, CONTINUE TO USE THE SUPPLIES YOU HAVE AVAILABLE UNTIL YOU ARE ABLE TO REACH US. IT IS MOST IMPORTANT TO KEEP THE WOUND COVERED AT ALL TIMES.      Physician Signature:_______________________  Dr. Regi Ricks

## 2022-08-12 NOTE — WOUND CARE
08/12/22 0904   Anesthetic   Anesthetic 4% Lidocaine Liquid Topical   Right Leg Edema Point of Measurement   Leg circumference 33 cm   Ankle circumference 19.4 cm   RLE Peripheral Vascular    Capillary Refill Less than/equal to 3 seconds   Color Appropriate for race   Temperature Warm   Pedal Pulse Palpable   Wound Ankle Lateral;Right #1   Date First Assessed: 06/08/22   Present on Hospital Admission: Yes  Wound Approximate Age at First Assessment (Weeks): 6 weeks  Primary Wound Type: Traumatic  Location: Ankle  Wound Location Orientation: Lateral;Right  Wound Description: #1   Wound Image    Cleansed Soap and water   Wound Length (cm) 0.7 cm   Wound Width (cm) 1.4 cm   Wound Depth (cm) 0.2 cm   Wound Surface Area (cm^2) 0.98 cm^2   Change in Wound Size % 77.73   Wound Volume (cm^3) 0.196 cm^3   Wound Healing % 78   Wound Assessment Slough;Pink/red   Drainage Amount Moderate   Drainage Description Serosanguinous   Wound Odor None   Sophy-Wound/Incision Assessment Maceration   Edges Defined edges   Wound Thickness Description Full thickness   Pain 1   Pain Scale 1 Numeric (0 - 10)   Pain Intensity 1 0   Visit Vitals  BP (!) 183/81 (BP 1 Location: Left upper arm, BP Patient Position: Sitting)   Pulse 69   Temp 97.9 °F (36.6 °C)   Resp 18

## 2022-08-18 DIAGNOSIS — E78.2 MIXED HYPERLIPIDEMIA: ICD-10-CM

## 2022-08-18 DIAGNOSIS — I10 ESSENTIAL HYPERTENSION: ICD-10-CM

## 2022-08-18 DIAGNOSIS — E10.42 TYPE 1 DIABETES MELLITUS WITH DIABETIC POLYNEUROPATHY (HCC): Primary | ICD-10-CM

## 2022-08-18 RX ORDER — BLOOD-GLUCOSE SENSOR
EACH MISCELLANEOUS
Qty: 9 EACH | Refills: 3 | Status: SHIPPED | OUTPATIENT
Start: 2022-08-18 | End: 2022-10-28 | Stop reason: SDUPTHER

## 2022-08-26 ENCOUNTER — HOSPITAL ENCOUNTER (OUTPATIENT)
Dept: WOUND CARE | Age: 52
Discharge: HOME | End: 2022-08-26
Payer: COMMERCIAL

## 2022-08-26 VITALS
SYSTOLIC BLOOD PRESSURE: 145 MMHG | HEART RATE: 64 BPM | TEMPERATURE: 97 F | DIASTOLIC BLOOD PRESSURE: 73 MMHG | RESPIRATION RATE: 16 BRPM

## 2022-08-26 DIAGNOSIS — L97.312 ULCER OF RIGHT ANKLE, WITH FAT LAYER EXPOSED (HCC): Primary | ICD-10-CM

## 2022-08-26 PROCEDURE — 11042 DBRDMT SUBQ TIS 1ST 20SQCM/<: CPT

## 2022-08-26 PROCEDURE — 74011000250 HC RX REV CODE- 250: Performed by: PODIATRIST

## 2022-08-26 RX ORDER — LIDOCAINE HYDROCHLORIDE 20 MG/ML
JELLY TOPICAL ONCE
Status: CANCELLED | OUTPATIENT
Start: 2022-08-26 | End: 2022-08-26

## 2022-08-26 RX ORDER — LIDOCAINE HYDROCHLORIDE 40 MG/ML
SOLUTION TOPICAL ONCE
Status: CANCELLED | OUTPATIENT
Start: 2022-08-26 | End: 2022-08-26

## 2022-08-26 RX ORDER — SILVER SULFADIAZINE 10 G/1000G
CREAM TOPICAL ONCE
Status: CANCELLED | OUTPATIENT
Start: 2022-08-26 | End: 2022-08-26

## 2022-08-26 RX ORDER — LIDOCAINE 50 MG/G
OINTMENT TOPICAL ONCE
Status: CANCELLED | OUTPATIENT
Start: 2022-08-26 | End: 2022-08-26

## 2022-08-26 RX ORDER — AMLODIPINE BESYLATE 5 MG/1
5 TABLET ORAL DAILY
COMMUNITY

## 2022-08-26 RX ORDER — MUPIROCIN 20 MG/G
OINTMENT TOPICAL ONCE
Status: CANCELLED | OUTPATIENT
Start: 2022-08-26 | End: 2022-08-26

## 2022-08-26 RX ORDER — BACITRACIN ZINC AND POLYMYXIN B SULFATE 500; 1000 [USP'U]/G; [USP'U]/G
OINTMENT TOPICAL ONCE
Status: CANCELLED | OUTPATIENT
Start: 2022-08-26 | End: 2022-08-26

## 2022-08-26 RX ORDER — GENTAMICIN SULFATE 1 MG/G
OINTMENT TOPICAL ONCE
Status: CANCELLED | OUTPATIENT
Start: 2022-08-26 | End: 2022-08-26

## 2022-08-26 RX ORDER — BACITRACIN 500 [USP'U]/G
OINTMENT TOPICAL ONCE
Status: CANCELLED | OUTPATIENT
Start: 2022-08-26 | End: 2022-08-26

## 2022-08-26 RX ORDER — BETAMETHASONE DIPROPIONATE 0.5 MG/G
OINTMENT TOPICAL ONCE
Status: CANCELLED | OUTPATIENT
Start: 2022-08-26 | End: 2022-08-26

## 2022-08-26 RX ORDER — LIDOCAINE 40 MG/G
CREAM TOPICAL ONCE
Status: CANCELLED | OUTPATIENT
Start: 2022-08-26 | End: 2022-08-26

## 2022-08-26 RX ORDER — TRIAMCINOLONE ACETONIDE 1 MG/G
OINTMENT TOPICAL ONCE
Status: CANCELLED | OUTPATIENT
Start: 2022-08-26 | End: 2022-08-26

## 2022-08-26 RX ORDER — CLOBETASOL PROPIONATE 0.5 MG/G
OINTMENT TOPICAL ONCE
Status: CANCELLED | OUTPATIENT
Start: 2022-08-26 | End: 2022-08-26

## 2022-08-26 RX ADMIN — Medication: at 09:29

## 2022-08-26 NOTE — WOUND CARE
08/26/22 0951   Right Leg Edema Point of Measurement   Compression Therapy Tubular elastic support bandage   Wound Ankle Lateral;Right #1   Date First Assessed: 06/08/22   Present on Hospital Admission: Yes  Wound Approximate Age at First Assessment (Weeks): 6 weeks  Primary Wound Type: Traumatic  Location: Ankle  Wound Location Orientation: Lateral;Right  Wound Description: #1   Dressing/Treatment Collagen with Ag;Foam;Gauze dressing/dressing sponge     Discharge Condition: Stable     Pain: 0    Ambulatory Status: Walking    Discharge Destination: Home     Transportation: Car    Accompanied by: Self     Discharge instructions reviewed with Patient and copy or written instructions have been provided. All questions/concerns have been addressed at this time.

## 2022-08-26 NOTE — WOUND CARE
08/26/22 0924   Right Leg Edema Point of Measurement   Leg circumference 34 cm   Ankle circumference 19.5 cm   RLE Peripheral Vascular    Capillary Refill Less than/equal to 3 seconds   Color Appropriate for race   Temperature Warm   Pedal Pulse Palpable   Wound Ankle Lateral;Right #1   Date First Assessed: 06/08/22   Present on Hospital Admission: Yes  Wound Approximate Age at First Assessment (Weeks): 6 weeks  Primary Wound Type: Traumatic  Location: Ankle  Wound Location Orientation: Lateral;Right  Wound Description: #1   Wound Image    Dressing Status   (ZURDO)   Wound Length (cm) 0.5 cm   Wound Width (cm) 0.6 cm   Wound Depth (cm) 0.1 cm   Wound Surface Area (cm^2) 0.3 cm^2   Change in Wound Size % 93.18   Wound Volume (cm^3) 0.03 cm^3   Wound Healing % 97   Wound Assessment Slough;Pink/red   Drainage Amount Moderate   Drainage Description Serosanguinous   Wound Odor None   Sophy-Wound/Incision Assessment Maceration   Edges Defined edges   Visit Vitals  BP (!) 145/73 (BP 1 Location: Right upper arm, BP Patient Position: Sitting)   Pulse 64   Temp 97 °F (36.1 °C)   Resp 16

## 2022-08-26 NOTE — WOUND CARE
[address]   Progress Note and Procedure Note     [address] [facility] RECORD NUMBER:  [id number]  AGE: 50 y.o. RACE WHITE/NON-  GENDER: female  : 1970  EPISODE DATE:  2022    Subjective:     Chief Complaint   Patient presents with    Follow-up     Right lateral ankle wound         HISTORY of PRESENT ILLNESS HPI    Khris France is a 50 y.o. female who presents today for wound/ulcer evaluation.    History of Wound Context: ran over by [employer] during team building outing at work  Joy Cabral  Pain Timing/Severity: moderate  Quality of pain: aching and dull  Severity:  3 / 10   Modifying Factors: Pain worsens with touch, ambulation  Associated Signs/Symptoms: none    Ulcer Identification:  Ulcer Type: traumatic    Contributing Factors: edema, diabetes, poor glucose control and arterial insufficiency    Wound: right lateral ankle         PAST MEDICAL HISTORY    Past Medical History:   Diagnosis Date    Adverse effect of anesthesia     difficult to wake    Chronic kidney disease     Dr Keaton Miramontes, stage 3 Proteinuria    CKD (chronic kidney disease) stage 3, GFR 30-59 ml/min ([facility])     Coronary artery disease 2011    with silent MI in  and s/p one stent    Diabetes mellitus type 1 ([facility] 2011    Age 7, Insulin Pump    Diabetic retinopathy ([facility]     Dyslipidemia 2011    GERD (gastroesophageal reflux disease)     History of peptic ulcer disease 2011    from plavix    Hypertension     denies states takes BP meds to reduce protein in kidneys due to kidney disease    PUD (peptic ulcer disease)     Stroke ([facility] 2016    hx L basal ganglia bleed        PAST SURGICAL HISTORY    Past Surgical History:   Procedure Laterality Date    HX CORONARY ARTERY BYPASS GRAFT  2020    HX HEENT Bilateral     laser surgery for retinopathy on multiple occasions and vitrectomy bilateral    HX HEENT Bilateral -    cataracts Dr Mona Baumann REPAIR  01/2020    Status post open repair of umbilical hernia with mesh 1/2020    RI CARDIAC SURG PROCEDURE UNLIST  2007    Dr Jeremy Brenner stent with Card Cath       FAMILY HISTORY    Family History   Problem Relation Age of Onset    Heart Disease Father         CABG    Diabetes Father         Type II    Hypertension Father     Stroke Father     Dementia Father     Heart Disease Paternal Grandfather     Hypertension Paternal Grandfather     Stroke Paternal Grandfather     Diabetes Maternal Grandmother         Type 2    Heart Disease Mother     Diabetes Mother         Type II    Stroke Mother     OSTEOARTHRITIS Mother     Hypertension Mother     Stroke Maternal Grandfather     Heart Disease Paternal Grandmother     Hypertension Paternal Grandmother     Stroke Paternal Grandmother     Dementia Paternal Grandmother        SOCIAL HISTORY    Social History     Tobacco Use    Smoking status: Never    Smokeless tobacco: Never   Substance Use Topics    Alcohol use: Yes     Alcohol/week: 1.0 standard drink     Types: 1 Glasses of wine per week     Comment: daily    Drug use: Never     Types: Prescription, OTC       ALLERGIES    Allergies   Allergen Reactions    Erythromycin Hives    Venlafaxine Other (comments)     Caused high blood sugars       MEDICATIONS    Current Outpatient Medications on File Prior to Encounter   Medication Sig Dispense Refill    amLODIPine (NORVASC) 5 mg tablet Take 5 mg by mouth daily. insulin lispro (HUMALOG) 100 unit/mL injection USE VIA INSULIN PUMP.  UNITS PER DAY. (replaces Novolog, per insurance preference) 110 mL 3    Blood-Glucose Sensor (Dexcom G6 Sensor) victor m Change sensor every 10 days. DX:E10.42 9 Each 3    gabapentin (NEURONTIN) 100 mg capsule Take 100 mg by mouth nightly.       carvediloL (COREG) 6.25 mg tablet TAKE 1 TABLET TWICE A  Tablet 3    rosuvastatin (CRESTOR) 20 mg tablet TAKE 1 TABLET NIGHTLY 90 Tablet 3    Insulin Pump Cartridge (Omnipod Dash 5 Pack Pod) crtg Change pod every 3 days 30 Each 3    Blood-Glucose Transmitter (Dexcom G6 Transmitter) victor m Change sensor every 10 days. [phone] Device 3    Blood-Glucose Meter,Continuous (Dexcom G6 ) misc Change sensor every 10 days. [phone] 1 Each 0    lisinopriL (PRINIVIL, ZESTRIL) 40 mg tablet 40 mg daily. aspirin delayed-release 81 mg tablet Take 81 mg by mouth nightly. ondansetron (ZOFRAN ODT) 4 mg disintegrating tablet Take 1 Tab by mouth every eight (8) hours as needed for Nausea. 10 Tab 0    hydroCHLOROthiazide (HYDRODIURIL) 25 mg tablet Take 1 Tab by mouth daily. (Patient taking differently: Take 25 mg by mouth nightly.) 14 Tab 0    cyanocobalamin, vitamin B-12, 5,000 mcg subl by SubLINGual route daily. spironolactone (ALDACTONE) 25 mg tablet Take 1 Tab by mouth three (3) days a week. (Patient taking differently: Take 25 mg by mouth daily. ) 15 Tab 0    escitalopram oxalate (LEXAPRO) 10 mg tablet Take 10 mg by mouth daily. No current facility-administered medications on file prior to encounter. REVIEW OF SYSTEMS    A comprehensive review of systems was negative except for that written in the HPI. Objective:     Visit Vitals  BP (!) 145/73 (BP 1 Location: Right upper arm, BP Patient Position: Sitting)   Pulse 64   Temp 97 °F (36.1 °C)   Resp 16       Wt Readings from Last 3 Encounters:   04/20/22 75.6 kg (166 lb 9.6 oz)   12/15/21 76.8 kg (169 lb 6.4 oz)   08/11/21 74.4 kg (164 lb)       PHYSICAL EXAM    Pertinent items are noted in HPI. Objective:    Vascular:  B/L LE  DP 1/4; PT 1/4  capillary fill time brisk, pitting edema is present, skin temperature is cool, varicosities are present. Dermatological:    Wound: right lateral ankle  Measurements: per RN note  Margins: maceration improved, epibole noted, intact, no erythema  Drainage: serous, mild  Odor: none  Wound base: 90% granular budding  Lymphangitic streaking? No.  Undermining? No.  Sinus tracts? No.  Exposed bone? No.  Subcutaneous crepitation on palpation? No.      Skin is dry and scaly, exhibits hemosiderin deposition. There is no maceration of the interspaces of the feet b/l. No hyperkeratotic lesions noted      Neurological:  DTR are present, protective sensation per 5.07 [place] Dalila monofilament is diminished, patient is AAOx3, mood is normal. Epicritic sensation is intact. Orthopedic:  B/L LE are symmetric, ROM of ankle, STJ, 1st MTPJ is limited, MMT 5 out of 5 for B/L LE. No pedal amputations ntoed    Constitutional: Pt is a well developed, well appearing female. Lymphatics: negative tenderness to palpation of neck/axillary/inguinal nodes. Imaging / Labs / Cx / Px: On file with [facility], pt relates no fx/dislocations  Debridement Wound Care        Problem List Items Addressed This Visit          Integumentary    Ulcer of right ankle, with fat layer exposed ([address] - Primary    Relevant Medications    amLODIPine (NORVASC) 5 mg tablet    lidocaine (ALOCANE) 4 % topical gel (Completed)    Other Relevant Orders    INITIATE OUTPATIENT WOUND CARE PROTOCOL       Procedure Note  Indications:  Based on my examination of this patient's wound(s)/ulcer(s) today, debridement is required to promote healing and evaluate the wound base.     Performed by: Harish Gonzalez DPM    Consent obtained: Yes    Time out taken: Yes    Debridement: Excisional    Using scissors and forceps the wound(s)/ulcer(s) was/were sharply debrided down through and including the removal of    subcutaneous tissue    Devitalized Tissue Debrided: slough    Pre Debridement Measurements:  Are located in the Wound/Ulcer Documentation Flow Sheet    Non-Pressure ulcer, fat layer exposed    Wound/Ulcer #: 1    Post Debridement Measurements:  Wound/Ulcer Descriptions are Pre Debridement except measurements:    WOUND POA CONDITIONS    Wound Abdomen Mid (Active)   Number of days: 982       Wound Ankle Lateral;Right #1 (Active)   Wound Image 08/26/22 0924   Wound Etiology Traumatic 07/15/22 0913   Dressing Status Old drainage noted 06/17/22 0909   Cleansed Soap and water 08/12/22 0904   Dressing/Treatment Collagen with Ag;Foam;Gauze dressing/dressing sponge 08/26/22 0951   Wound Length (cm) 0.5 cm 08/26/22 0924   Wound Width (cm) 0.6 cm 08/26/22 0924   Wound Depth (cm) 0.1 cm 08/26/22 0924   Wound Surface Area (cm^2) 0.3 cm^2 08/26/22 0924   Change in Wound Size % 93.18 08/26/22 0924   Wound Volume (cm^3) 0.03 cm^3 08/26/22 0924   Wound Healing % 97 08/26/22 0924   Post-Procedure Length (cm) 0.5 cm 08/26/22 0936   Post-Procedure Width (cm) 0.6 cm 08/26/22 0936   Post-Procedure Depth (cm) 0.2 cm 08/26/22 0936   Post-Procedure Surface Area (cm^2) 0.3 cm^2 08/26/22 0936   Post-Procedure Volume (cm^3) 0.06 cm^3 08/26/22 0936   Wound Assessment Slough;Jessie/red 08/26/22 0924   Drainage Amount Moderate 08/26/22 0924   Drainage Description Serosanguinous 08/26/22 0924   Wound Odor None 08/26/22 0924   Sophy-Wound/Incision Assessment Maceration 08/26/22 0924   Edges Defined edges 08/26/22 0924   Wound Thickness Description Full thickness 08/12/22 0904   Number of days: 79       [REMOVED] Wound Sternum (Removed)   Number of days: 14       [REMOVED] Wound Leg Right (Removed)   Number of days: 14             Total Surface Area Debrided:  <20 sq cm     Estimated Blood Loss:  None    Hemostasis Achieved: Pressure    Procedural Pain: 0 / 10     Post Procedural Pain: 3 / 10     Response to treatment: Patient tolerated treatment with mild discomfort but relieved after procedure was completed          Assessment:    s91.011s laceration without FB right ankle, sequela    Ulcer right ankle to fat    Diabetes type 1 with foot ulcer  Problem List Items Addressed This Visit          Integumentary    Ulcer of right ankle, with fat layer exposed ([facility]) - Primary    Relevant Medications    amLODIPine (NORVASC) 5 mg tablet    lidocaine (ALOCANE) 4 % topical gel (Completed) Other Relevant Orders    INITIATE OUTPATIENT WOUND CARE PROTOCOL       Plan:     Treatment Note please see attached Discharge Instructions    Written patient dismissal instructions given to patient or POA.          Wound care with madelyn/gauze/tape every other day    Do not get wound wet or air it out    D/c peroxide to wound    Has finished oral abx course    F/u 2 weeks, wound improving  much in appearance and size    Electronically signed by Jose Pak DPM on 8/26/2022 at 2:46 PM

## 2022-08-30 LAB
25(OH)D3+25(OH)D2 SERPL-MCNC: 32.4 NG/ML (ref 30–100)
ALBUMIN SERPL-MCNC: 4.4 G/DL (ref 3.8–4.8)
ALBUMIN/CREAT UR: 1207 MG/G CREAT (ref 0–29)
ALBUMIN/GLOB SERPL: 2.1 {RATIO} (ref 1.2–2.2)
ALP SERPL-CCNC: 75 IU/L (ref 44–121)
ALT SERPL-CCNC: 28 IU/L (ref 0–32)
AST SERPL-CCNC: 28 IU/L (ref 0–40)
BILIRUB SERPL-MCNC: 0.7 MG/DL (ref 0–1.2)
BUN SERPL-MCNC: 20 MG/DL (ref 6–24)
BUN/CREAT SERPL: 17 (ref 9–23)
CALCIUM SERPL-MCNC: 10.6 MG/DL (ref 8.7–10.2)
CHLORIDE SERPL-SCNC: 102 MMOL/L (ref 96–106)
CHOLEST SERPL-MCNC: 139 MG/DL (ref 100–199)
CO2 SERPL-SCNC: 24 MMOL/L (ref 20–29)
CREAT SERPL-MCNC: 1.16 MG/DL (ref 0.57–1)
CREAT UR-MCNC: 21.7 MG/DL
EGFR: 57 ML/MIN/1.73
ERYTHROCYTE [DISTWIDTH] IN BLOOD BY AUTOMATED COUNT: 12.2 % (ref 11.7–15.4)
EST. AVERAGE GLUCOSE BLD GHB EST-MCNC: 189 MG/DL
GLOBULIN SER CALC-MCNC: 2.1 G/DL (ref 1.5–4.5)
GLUCOSE SERPL-MCNC: 148 MG/DL (ref 65–99)
HBA1C MFR BLD: 8.2 % (ref 4.8–5.6)
HCT VFR BLD AUTO: 35 % (ref 34–46.6)
HDLC SERPL-MCNC: 44 MG/DL
HGB BLD-MCNC: 11.8 G/DL (ref 11.1–15.9)
IMP & REVIEW OF LAB RESULTS: NORMAL
INTERPRETATION: NORMAL
LDLC SERPL CALC-MCNC: 72 MG/DL (ref 0–99)
MCH RBC QN AUTO: 32.4 PG (ref 26.6–33)
MCHC RBC AUTO-ENTMCNC: 33.7 G/DL (ref 31.5–35.7)
MCV RBC AUTO: 96 FL (ref 79–97)
MICROALBUMIN UR-MCNC: 261.9 UG/ML
PLATELET # BLD AUTO: 286 X10E3/UL (ref 150–450)
POTASSIUM SERPL-SCNC: 4.8 MMOL/L (ref 3.5–5.2)
PROT SERPL-MCNC: 6.5 G/DL (ref 6–8.5)
RBC # BLD AUTO: 3.64 X10E6/UL (ref 3.77–5.28)
SODIUM SERPL-SCNC: 140 MMOL/L (ref 134–144)
TRIGL SERPL-MCNC: 127 MG/DL (ref 0–149)
VLDLC SERPL CALC-MCNC: 23 MG/DL (ref 5–40)
WBC # BLD AUTO: 6.5 X10E3/UL (ref 3.4–10.8)

## 2022-08-31 ENCOUNTER — OFFICE VISIT (OUTPATIENT)
Dept: ENDOCRINOLOGY | Age: 52
End: 2022-08-31
Payer: COMMERCIAL

## 2022-08-31 VITALS
SYSTOLIC BLOOD PRESSURE: 115 MMHG | BODY MASS INDEX: 27.72 KG/M2 | DIASTOLIC BLOOD PRESSURE: 60 MMHG | HEART RATE: 65 BPM | HEIGHT: 64 IN | WEIGHT: 162.4 LBS

## 2022-08-31 DIAGNOSIS — E10.42 TYPE 1 DIABETES MELLITUS WITH DIABETIC POLYNEUROPATHY (HCC): Primary | ICD-10-CM

## 2022-08-31 DIAGNOSIS — E78.2 MIXED HYPERLIPIDEMIA: ICD-10-CM

## 2022-08-31 DIAGNOSIS — I10 ESSENTIAL HYPERTENSION: ICD-10-CM

## 2022-08-31 PROCEDURE — 3052F HG A1C>EQUAL 8.0%<EQUAL 9.0%: CPT | Performed by: INTERNAL MEDICINE

## 2022-08-31 PROCEDURE — 99215 OFFICE O/P EST HI 40 MIN: CPT | Performed by: INTERNAL MEDICINE

## 2022-08-31 RX ORDER — INSULIN LISPRO 100 [IU]/ML
INJECTION, SOLUTION INTRAVENOUS; SUBCUTANEOUS
Qty: 110 ML | Refills: 3 | Status: SHIPPED | OUTPATIENT
Start: 2022-08-31

## 2022-08-31 NOTE — LETTER
8/31/2022    Patient: Arielle Do   YOB: 1970   Date of Visit: 8/31/2022     Jordyn Donahue MD  [address] [address]  Via Fax: [phone]    Dear Jordyn Donahue MD,      Thank you for referring Ms. Jenna Sales to [address] [place] [address] for evaluation. My notes for this consultation are attached. If you have questions, please do not hesitate to call me. I look forward to following your patient along with you.       Sincerely,    Jessica Adler MD

## 2022-08-31 NOTE — PROGRESS NOTES
Chief Complaint   Patient presents with    Diabetes     Pcp and pharmacy verified       History of Present Illness: Yeny Beard is a 50 y.o. female here for follow up of Type 1 diabetes. She was diagnosed with DM at the age of 13. \"My big issue is I have been in HRT since 2019 and my new PCP Dr. Genevieve Little, he recommended stopping HRT and tried NSRI and when I make that change I felt my mood improve, but my blood sugars got worse and my menopause symptoms worsened and I could not sleep well at night. \"    \"I had a seguway run over my ankle in late April. I went to the hospital and it was not fully healing well and I have been in would care since June 8th. \" She notes her wound are healing well. She has not been able to get in water or swim ever since. Pt is changing her bandage every other day. \"I felt like thing were going well, till July and then I went on vacation and I was having issues of low BGs. After I got back from vacation, my sugars got high again. I just adjusted my rates about a week ago. \"      Her A1C last week was 8.2%. Pt has received both COVID vaccinations (Mariano Chandler). \"I am loving the [facility], it has been working well for me. \"    Reviewing her DexCom Clarity her BGs run over 200 from 8PM till 6AM and then improve till she eats breakfast and it will increase and will be high from 9AM-3PM.  Her BGs seem to be the lowest between 7AM-9AM.  After 9AM her BGs start to increase again and stay high till around 3PM.  Pt is waking up around 3AM and giving her a bolus of insulin. She is also pausing her pump around 7AM because she has had some low/trendings. For the last week she reports her BGs are starting to drop around 4-5PM and she is eating a snack and \"over-eating\" to prevent low.       Basal  MN-3AM 2.25  3AM-530AM 1.65  530AM-Noon 1.6   Noon--6PM: 2.05  6PM-10PM 2.25  10PM-MN 2.1  Carb Ratio  MN-9AM 7.0  9AM-6PM 7.0  6PM-MN 7.0  Correction  1: 20    She changes her infusion site every 3 days.    I have just not been finding any time to work out or exercise. Pt notes she leaves for work around 745AM and does not get back home till 630-7PM.  She works Mon-Fri. Pt wakes at 6AM  She has breakfast around 9-10AM  She has lunch around Noon-2PM  She has dinner around 7-8PM  She goes to bed around 10PM.    She is no longer on the HRT. Her Gyn is Dr. Kenya Ulrich. She still has the [facility] IUD in place. \"I had a silent heart attack at the age of 41 (2006) and had a stent placed. She had the CABG x4 in May 2020. She follows with Dr. Rosita Gunn of Cardiology. She had a TIA in September 2016. She saw Dr. Maribel Arias of Neurology in March 2021 for evaluation for HAs, and cognitive issues. She had a normal EEG on 4/1/21. Pt is seeing a new Gyn, who has her on Gabapentin to help with her hot flashes at night. She is also taking lexapro 10mg daily. \"We discussed possibly starting progesterone. \"    Pt has hx of Neuropathy. She has had proteinuria since the age of 18. She is followed by Dr. Terese Olszewski Nephrology. Last eye exam was January 2022. Pt has hx of Retinopathy (since college) and cataracts. Current Outpatient Medications   Medication Sig    ferrous sulfate (IRON PO) Take 1 Tablet by mouth daily. OTC Gentle Iron    amLODIPine (NORVASC) 5 mg tablet Take 5 mg by mouth daily. insulin lispro (HUMALOG) 100 unit/mL injection USE VIA INSULIN PUMP.  UNITS PER DAY. (replaces Novolog, per insurance preference)    Blood-Glucose Sensor (Dexcom G6 Sensor) victor m Change sensor every 10 days. DX:E10.42    gabapentin (NEURONTIN) 100 mg capsule Take 100 mg by mouth nightly. carvediloL (COREG) 6.25 mg tablet TAKE 1 TABLET TWICE A DAY (Patient taking differently: 12.5 mg two (2) times daily (with meals). )    rosuvastatin (CRESTOR) 20 mg tablet TAKE 1 TABLET NIGHTLY    Insulin Pump Cartridge (Omnipod Dash 5 Pack Pod) crtg Change pod every 3 days    Blood-Glucose Transmitter (Dexcom G6 Transmitter) victor m Change sensor every 10 days. [phone]    Blood-Glucose Meter,Continuous (Dexcom G6 ) misc Change sensor every 10 days. [phone]    lisinopriL (PRINIVIL, ZESTRIL) 40 mg tablet 40 mg daily. aspirin delayed-release 81 mg tablet Take 81 mg by mouth nightly. ondansetron (ZOFRAN ODT) 4 mg disintegrating tablet Take 1 Tab by mouth every eight (8) hours as needed for Nausea. hydroCHLOROthiazide (HYDRODIURIL) 25 mg tablet Take 1 Tab by mouth daily. (Patient taking differently: Take 25 mg by mouth nightly.)    cyanocobalamin, vitamin B-12, 5,000 mcg subl by SubLINGual route daily. spironolactone (ALDACTONE) 25 mg tablet Take 1 Tab by mouth three (3) days a week. (Patient taking differently: Take 25 mg by mouth daily.)    escitalopram oxalate (LEXAPRO) 10 mg tablet Take 10 mg by mouth daily. No current facility-administered medications for this visit. Allergies   Allergen Reactions    Erythromycin Hives    Venlafaxine Other (comments)     Caused high blood sugars     Review of Systems:  - Eyes: no blurry vision or double vision  - Cardiovascular: no chest pain  - Respiratory: no shortness of breath  - Musculoskeletal: no myalgias  - Neurological: no numbness/tingling in extremities    Physical Examination:  Blood pressure 115/60, pulse 65, height 5' 4\" (1.626 m), weight 162 lb 6.4 oz (73.7 kg).   General: pleasant, no distress, good eye contact   Neck: no carotid bruits  Cardiovascular: regular, normal rate, nl s1 and s2, no m/r/g, 2+ DP pulses   Respiratory: clear bilaterally  Integumentary: no edema, no foot ulcers  Psychiatric: normal mood and affect    Diabetic foot exam:     Left Foot:   Visual Exam: callous - present   Pulse DP: 2+ (normal)   Filament test: normal sensation    Vibratory sensation: normal      Right Foot:   Visual Exam: callous - present , right leg bandaged   Pulse DP: 2+ (normal)   Filament test: normal sensation    Vibratory sensation: normal      Data Reviewed:   Component      Latest Ref Rng & Units 8/29/2022   Glucose      65 - 99 mg/dL 148 (H)   BUN      6 - 24 mg/dL 20   Creatinine      0.57 - 1.00 mg/dL 1.16 (H)   eGFR      >59 mL/min/1.73 57 (L)   BUN/Creatinine ratio      9 - 23 17   Sodium      134 - 144 mmol/L 140   Potassium      3.5 - 5.2 mmol/L 4.8   Chloride      96 - 106 mmol/L 102   CO2      20 - 29 mmol/L 24   Calcium      8.7 - 10.2 mg/dL 10.6 (H)   Protein, total      6.0 - 8.5 g/dL 6.5   Albumin      3.8 - 4.8 g/dL 4.4   GLOBULIN, TOTAL      1.5 - 4.5 g/dL 2.1   A-G Ratio      1.2 - 2.2 2.1   Bilirubin, total      0.0 - 1.2 mg/dL 0.7   Alk. phosphatase      44 - 121 IU/L 75   AST      0 - 40 IU/L 28   ALT      0 - 32 IU/L 28   WBC      3.4 - 10.8 x10E3/uL 6.5   RBC      3.77 - 5.28 x10E6/uL 3.64 (L)   HGB      11.1 - 15.9 g/dL 11.8   HCT      34.0 - 46.6 % 35.0   MCV      79 - 97 fL 96   MCH      26.6 - 33.0 pg 32.4   MCHC      31.5 - 35.7 g/dL 33.7   RDW      11.7 - 15.4 % 12.2   PLATELET      900 - 836 x10E3/uL 286   Cholesterol, total      100 - 199 mg/dL 139   Triglyceride      0 - 149 mg/dL 127   HDL Cholesterol      >39 mg/dL 44   VLDL, calculated      5 - 40 mg/dL 23   LDL, calculated      0 - 99 mg/dL 72   Creatinine, urine      Not Estab. mg/dL 21.7   Microalbumin, urine      Not Estab. ug/mL 261.9   Microalbumin/Creat. Ratio      0 - 29 mg/g creat 1,207 (H)   Hemoglobin A1c, (calculated)      4.8 - 5.6 % 8.2 (H)   Estimated average glucose      mg/dL 189   VITAMIN D, 25-HYDROXY      30.0 - 100.0 ng/mL 32.4       Assessment/Plan:   1) DM > Her A1C last week was 8.2%. Based on her Dexcom clarity readings I will make the following pump changes. Basal  MN-430AM 2.3  430AM-10AM 1.60  10AM-2PM 1.75  2PM-4PM 2.0  4PM-8PM 1.9  8PM-MN2.3  Carb Ratio  MN-9AM 7.0  9AM-6PM 7.0  6PM-MN 7.0  Correction  1: 20    Pt is checking her BGs 6-10 times per day, which she is to continue indefinitely.  Using these sugar readings she self adjusts her insulin doses. Pt to send me her BG readings in 4 weeks. 2) HTN > Her BP today was 115/60, pt is on an ACEI for renal protection, will not make any changes at this time. 3) HLD > Her Lipid panel was at goal in August 2022. Pt to continue the Rosuvastatin 20mg HS. Pt voices understanding and agreement with the plan. RTC 4 months    I spent 40 minutes on her case and > 50% of the time was spent in counseling on the above issues.            Copy sent to:  Dr. Gregoria Crawford

## 2022-09-09 ENCOUNTER — HOSPITAL ENCOUNTER (OUTPATIENT)
Dept: WOUND CARE | Age: 52
Discharge: HOME | End: 2022-09-09
Payer: COMMERCIAL

## 2022-09-09 ENCOUNTER — TELEPHONE (OUTPATIENT)
Dept: ENDOCRINOLOGY | Age: 52
End: 2022-09-09

## 2022-09-09 VITALS
HEART RATE: 63 BPM | TEMPERATURE: 97.5 F | DIASTOLIC BLOOD PRESSURE: 67 MMHG | SYSTOLIC BLOOD PRESSURE: 167 MMHG | RESPIRATION RATE: 16 BRPM

## 2022-09-09 DIAGNOSIS — L97.312 ULCER OF RIGHT ANKLE, WITH FAT LAYER EXPOSED (HCC): Primary | ICD-10-CM

## 2022-09-09 PROCEDURE — 74011000250 HC RX REV CODE- 250: Performed by: PODIATRIST

## 2022-09-09 PROCEDURE — 11042 DBRDMT SUBQ TIS 1ST 20SQCM/<: CPT | Performed by: PODIATRIST

## 2022-09-09 RX ORDER — LIDOCAINE 50 MG/G
OINTMENT TOPICAL ONCE
Status: CANCELLED | OUTPATIENT
Start: 2022-09-09 | End: 2022-09-09

## 2022-09-09 RX ORDER — SILVER SULFADIAZINE 10 G/1000G
CREAM TOPICAL ONCE
Status: CANCELLED | OUTPATIENT
Start: 2022-09-09 | End: 2022-09-09

## 2022-09-09 RX ORDER — LIDOCAINE 40 MG/G
CREAM TOPICAL ONCE
Status: CANCELLED | OUTPATIENT
Start: 2022-09-09 | End: 2022-09-09

## 2022-09-09 RX ORDER — BACITRACIN ZINC AND POLYMYXIN B SULFATE 500; 1000 [USP'U]/G; [USP'U]/G
OINTMENT TOPICAL ONCE
Status: CANCELLED | OUTPATIENT
Start: 2022-09-09 | End: 2022-09-09

## 2022-09-09 RX ORDER — BETAMETHASONE DIPROPIONATE 0.5 MG/G
OINTMENT TOPICAL ONCE
Status: CANCELLED | OUTPATIENT
Start: 2022-09-09 | End: 2022-09-09

## 2022-09-09 RX ORDER — LIDOCAINE HYDROCHLORIDE 20 MG/ML
JELLY TOPICAL ONCE
Status: CANCELLED | OUTPATIENT
Start: 2022-09-09 | End: 2022-09-09

## 2022-09-09 RX ORDER — CLOBETASOL PROPIONATE 0.5 MG/G
OINTMENT TOPICAL ONCE
Status: CANCELLED | OUTPATIENT
Start: 2022-09-09 | End: 2022-09-09

## 2022-09-09 RX ORDER — TRIAMCINOLONE ACETONIDE 1 MG/G
OINTMENT TOPICAL ONCE
Status: CANCELLED | OUTPATIENT
Start: 2022-09-09 | End: 2022-09-09

## 2022-09-09 RX ORDER — LIDOCAINE HYDROCHLORIDE 40 MG/ML
SOLUTION TOPICAL ONCE
Status: CANCELLED | OUTPATIENT
Start: 2022-09-09 | End: 2022-09-09

## 2022-09-09 RX ORDER — MUPIROCIN 20 MG/G
OINTMENT TOPICAL ONCE
Status: CANCELLED | OUTPATIENT
Start: 2022-09-09 | End: 2022-09-09

## 2022-09-09 RX ORDER — BACITRACIN 500 [USP'U]/G
OINTMENT TOPICAL ONCE
Status: CANCELLED | OUTPATIENT
Start: 2022-09-09 | End: 2022-09-09

## 2022-09-09 RX ORDER — GENTAMICIN SULFATE 1 MG/G
OINTMENT TOPICAL ONCE
Status: CANCELLED | OUTPATIENT
Start: 2022-09-09 | End: 2022-09-09

## 2022-09-09 RX ADMIN — Medication: at 09:10

## 2022-09-09 NOTE — WOUND CARE
09/09/22 0921   Right Leg Edema Point of Measurement   Compression Therapy Tubular elastic support bandage   Wound Ankle Lateral;Right #1   Date First Assessed: 06/08/22   Present on Hospital Admission: Yes  Wound Approximate Age at First Assessment (Weeks): 6 weeks  Primary Wound Type: Traumatic  Location: Ankle  Wound Location Orientation: Lateral;Right  Wound Description: #1   Dressing/Treatment Collagen with Ag;Silicone border   Discharge Condition: Stable     Pain: 0    Ambulatory Status: Walking    Discharge Destination: Home     Transportation: Car    Accompanied by: Self     Discharge instructions reviewed with Patient and copy or written instructions have been provided. All questions/concerns have been addressed at this time.

## 2022-09-09 NOTE — TELEPHONE ENCOUNTER
----- Message from Jose Luis Chase sent at 9/8/2022  5:33 PM EDT -----  Regarding: Tiny Ar Dr Fabio Maldonado,  I received an email stating that [id number] [facility] would not refill the Humalog - after calling them they said it requires a pre authorization. Here are the three options they asked me to share with you to resolve filling the script:  phone: [phone]  [facility]  Fax: [phone]  Thank you for all the work:)

## 2022-09-09 NOTE — DISCHARGE INSTRUCTIONS
ischarge Instructions for  [facility]  [address] [place], [address] [facility]  Telephone: [phone]    NAME:  Hilda Cabrera OF BIRTH:  1970  DATE:  9/9/2022    Wound Cleansing:   Do not scrub or use excessive force. Cleanse wound prior to applying a clean dressing with:  [] Normal Saline   [] Keep Wound Dry in Shower        [] May Shower at Discharge   [x] cleanse with Marletta Savanna and [place] baby shampoo lather leave 2-3 then rinse with water, pat dry and redress wound. Dressings:           Wound Location Right Lateral Ankle     Apply Primary Dressing:  Vera, gauze, kerlix, tape, single tube    Change dressing:   [] Daily      [x] Every Other Day   [] Three times per week  [] Once a week   [] Do Not Change Dressing     [] Other:    Dietary:  [x] Diet as tolerated: [] Diabetic Diet:   [x] Increase Protein: examples ( Meat, cheese, eggs, greek yogurt, premier protein drink, fish, nuts )   [] Other:    Return Appointment:  []     [x] Return Appointment: With Dr. Sheri Lennon  2 weeks     Electronically signed on 9/92022 at [facility]: Should you experience any significant changes in your wound(s) or have questions about your wound care, please contact the [address] [place] [address] at [address] [place] [address] 8:00 am - 4:30. If you need help with your wound outside these hours and cannot wait until we are again available, contact your PCP or go to the hospital emergency room. PLEASE NOTE: IF YOU ARE UNABLE TO OBTAIN WOUND SUPPLIES, CONTINUE TO USE THE SUPPLIES YOU HAVE AVAILABLE UNTIL YOU ARE ABLE TO REACH US. IT IS MOST IMPORTANT TO KEEP THE WOUND COVERED AT ALL TIMES.      Physician Signature:_______________________  Dr. Sheri Lennon

## 2022-09-09 NOTE — WOUND CARE
09/09/22 0908   Anesthetic   Anesthetic 4% Lidocaine Liquid Topical   Right Leg Edema Point of Measurement   Leg circumference 34 cm   Ankle circumference 19.5 cm   RLE Peripheral Vascular    Capillary Refill Less than/equal to 3 seconds   Color Appropriate for race   Temperature Cool   Pedal Pulse Palpable   Wound Ankle Lateral;Right #1   Date First Assessed: 06/08/22   Present on Hospital Admission: Yes  Wound Approximate Age at First Assessment (Weeks): 6 weeks  Primary Wound Type: Traumatic  Location: Ankle  Wound Location Orientation: Lateral;Right  Wound Description: #1   Wound Image    Cleansed Cleansed with saline   Wound Length (cm) 1 cm   Wound Width (cm) 1.5 cm   Wound Depth (cm) 0.2 cm   Wound Surface Area (cm^2) 1.5 cm^2   Change in Wound Size % 65.91   Wound Volume (cm^3) 0.3 cm^3   Wound Healing % 66   Wound Assessment Slough;Pink/red;Granulation tissue   Drainage Amount Moderate   Drainage Description Serosanguinous   Wound Odor None   Sophy-Wound/Incision Assessment Intact; Blanchable erythema   Edges Flat/open edges   Visit Vitals  BP (!) 167/67 (BP 1 Location: Left upper arm, BP Patient Position: Sitting)   Pulse 63   Temp 97.5 °F (36.4 °C)   Resp 16

## 2022-09-09 NOTE — WOUND CARE
[address]   Progress Note and Procedure Note     [facility] RECORD NUMBER:  [id number]  AGE: 50 y.o. RACE WHITE/NON-  GENDER: female  : 1970  EPISODE DATE:  2022    Subjective:     Chief Complaint   Patient presents with    Wound Check     Right ankle         HISTORY of PRESENT ILLNESS HPI    Cullen Abad is a 50 y.o. female who presents today for wound/ulcer evaluation.    History of Wound Context: ran over by [employer] during team building outing at work  [employer] Pain Timing/Severity: moderate  Quality of pain: aching and dull  Severity:  3 / 10   Modifying Factors: Pain worsens with touch, ambulation  Associated Signs/Symptoms: none    Ulcer Identification:  Ulcer Type: traumatic    Contributing Factors: edema, diabetes, poor glucose control and arterial insufficiency    Wound: right lateral ankle         PAST MEDICAL HISTORY    Past Medical History:   Diagnosis Date    Adverse effect of anesthesia     difficult to wake    Chronic kidney disease     Dr Guanaco Mcgowan, stage 3 Proteinuria    CKD (chronic kidney disease) stage 3, GFR 30-59 ml/min ([facility])     Coronary artery disease 2011    with silent MI in  and s/p one stent    Diabetes mellitus type 1 ([facility] 2011    Age 7, Insulin Pump    Diabetic retinopathy ([facility]     Dyslipidemia 2011    GERD (gastroesophageal reflux disease)     History of peptic ulcer disease 2011    from plavix    Hypertension     denies states takes BP meds to reduce protein in kidneys due to kidney disease    PUD (peptic ulcer disease)     Stroke ([facility] 2016    hx L basal ganglia bleed        PAST SURGICAL HISTORY    Past Surgical History:   Procedure Laterality Date    HX CORONARY ARTERY BYPASS GRAFT  2020    HX HEENT Bilateral     laser surgery for retinopathy on multiple occasions and vitrectomy bilateral    HX HEENT Bilateral -    cataracts [address]  2020 Status post open repair of umbilical hernia with mesh 1/2020    [place] CARDIAC SURG PROCEDURE UNLIST  2007    Dr Srinivasan Nash stent with Card Cath       FAMILY HISTORY    Family History   Problem Relation Age of Onset    Heart Disease Father         CABG    Diabetes Father         Type II    Hypertension Father     Stroke Father     Dementia Father     Heart Disease Paternal Grandfather     Hypertension Paternal Grandfather     Stroke Paternal Grandfather     Diabetes Maternal Grandmother         Type 2    Heart Disease Mother     Diabetes Mother         Type II    Stroke Mother     OSTEOARTHRITIS Mother     Hypertension Mother     Stroke Maternal Grandfather     Heart Disease Paternal Grandmother     Hypertension Paternal Grandmother     Stroke Paternal Grandmother     Dementia Paternal Grandmother        SOCIAL HISTORY    Social History     Tobacco Use    Smoking status: Never    Smokeless tobacco: Never   Substance Use Topics    Alcohol use: Yes     Alcohol/week: 1.0 standard drink     Types: 1 Glasses of wine per week     Comment: daily    Drug use: Never     Types: Prescription, OTC       ALLERGIES    Allergies   Allergen Reactions    Erythromycin Hives    Venlafaxine Other (comments)     Caused high blood sugars       MEDICATIONS    Current Outpatient Medications on File Prior to Encounter   Medication Sig Dispense Refill    ferrous sulfate (IRON PO) Take 1 Tablet by mouth daily. OTC Gentle Iron      insulin lispro (HUMALOG) 100 unit/mL injection USE VIA INSULIN PUMP.  UNITS PER DAY. (replaces Novolog, per insurance preference) 110 mL 3    amLODIPine (NORVASC) 5 mg tablet Take 5 mg by mouth daily. Blood-Glucose Sensor (Dexcom G6 Sensor) victor m Change sensor every 10 days. DX:E10.42 9 Each 3    gabapentin (NEURONTIN) 100 mg capsule Take 100 mg by mouth nightly. carvediloL (COREG) 6.25 mg tablet TAKE 1 TABLET TWICE A DAY (Patient taking differently: 12.5 mg two (2) times daily (with meals). ) 180 Tablet 3    rosuvastatin (CRESTOR) 20 mg tablet TAKE 1 TABLET NIGHTLY 90 Tablet 3    Insulin Pump Cartridge (Omnipod Dash 5 Pack Pod) crtg Change pod every 3 days 30 Each 3    Blood-Glucose Transmitter (Dexcom G6 Transmitter) victor m Change sensor every 10 days. [phone] Device 3    Blood-Glucose Meter,Continuous (Dexcom G6 ) misc Change sensor every 10 days. [phone] 1 Each 0    lisinopriL (PRINIVIL, ZESTRIL) 40 mg tablet 40 mg daily. aspirin delayed-release 81 mg tablet Take 81 mg by mouth nightly. ondansetron (ZOFRAN ODT) 4 mg disintegrating tablet Take 1 Tab by mouth every eight (8) hours as needed for Nausea. 10 Tab 0    hydroCHLOROthiazide (HYDRODIURIL) 25 mg tablet Take 1 Tab by mouth daily. (Patient taking differently: Take 25 mg by mouth nightly.) 14 Tab 0    cyanocobalamin, vitamin B-12, 5,000 mcg subl by SubLINGual route daily. spironolactone (ALDACTONE) 25 mg tablet Take 1 Tab by mouth three (3) days a week. (Patient taking differently: Take 25 mg by mouth daily. ) 15 Tab 0    escitalopram oxalate (LEXAPRO) 10 mg tablet Take 10 mg by mouth daily. No current facility-administered medications on file prior to encounter. REVIEW OF SYSTEMS    A comprehensive review of systems was negative except for that written in the HPI. Objective:     Visit Vitals  BP (!) 167/67 (BP 1 Location: Left upper arm, BP Patient Position: Sitting)   Pulse 63   Temp 97.5 °F (36.4 °C)   Resp 16       Wt Readings from Last 3 Encounters:   08/31/22 73.7 kg (162 lb 6.4 oz)   04/20/22 75.6 kg (166 lb 9.6 oz)   12/15/21 76.8 kg (169 lb 6.4 oz)       PHYSICAL EXAM    Pertinent items are noted in HPI. Objective:    Vascular:  B/L LE  DP 1/4; PT 1/4  capillary fill time brisk, pitting edema is present, skin temperature is cool, varicosities are present.     Dermatological:    Wound: right lateral ankle  Measurements: per RN note  Margins: maceration improved, epibole noted, intact, no erythema  Drainage: serous, mild  Odor: none  Wound base: 90% granular budding  Lymphangitic streaking? No.  Undermining? No.  Sinus tracts? No.  Exposed bone? No.  Subcutaneous crepitation on palpation? No.      Skin is dry and scaly, exhibits hemosiderin deposition. There is no maceration of the interspaces of the feet b/l. No hyperkeratotic lesions noted      Neurological:  DTR are present, protective sensation per 5.07 Taiban Dalila monofilament is diminished, patient is AAOx3, mood is normal. Epicritic sensation is intact. Orthopedic:  B/L LE are symmetric, ROM of ankle, STJ, 1st MTPJ is limited, MMT 5 out of 5 for B/L LE. No pedal amputations ntoed    Constitutional: Pt is a well developed, well appearing female. Lymphatics: negative tenderness to palpation of neck/axillary/inguinal nodes. Imaging / Labs / Cx / Px: On file with [facility], pt relates no fx/dislocations  Debridement Wound Care        Problem List Items Addressed This Visit          Integumentary    Ulcer of right ankle, with fat layer exposed ([address] - Primary    Relevant Medications    lidocaine (ALOCANE) 4 % topical gel (Completed) (Start on 9/9/2022 10:00 AM)    Other Relevant Orders    INITIATE OUTPATIENT WOUND CARE PROTOCOL       Procedure Note  Indications:  Based on my examination of this patient's wound(s)/ulcer(s) today, debridement is required to promote healing and evaluate the wound base.     Performed by: Marilyn Sandoval DPM    Consent obtained: Yes    Time out taken: Yes    Debridement: Excisional    Using scissors and forceps the wound(s)/ulcer(s) was/were sharply debrided down through and including the removal of    subcutaneous tissue    Devitalized Tissue Debrided: slough    Pre Debridement Measurements:  Are located in the Wound/Ulcer Documentation Flow Sheet    Non-Pressure ulcer, fat layer exposed    Wound/Ulcer #: 1    Post Debridement Measurements:  Wound/Ulcer Descriptions are Pre Debridement except measurements:  WOUND POA CONDITIONS    Wound Abdomen Mid (Active)   Number of days: 996       Wound Ankle Lateral;Right #1 (Active)   Wound Image   09/09/22 0908   Wound Etiology Traumatic 07/15/22 0913   Dressing Status Old drainage noted 06/17/22 0909   Cleansed Cleansed with saline 09/09/22 0908   Dressing/Treatment Collagen with Ag;Silicone border 05/98/24 0921   Wound Length (cm) 1 cm 09/09/22 0908   Wound Width (cm) 1.5 cm 09/09/22 0908   Wound Depth (cm) 0.2 cm 09/09/22 0908   Wound Surface Area (cm^2) 1.5 cm^2 09/09/22 0908   Change in Wound Size % 65.91 09/09/22 0908   Wound Volume (cm^3) 0.3 cm^3 09/09/22 0908   Wound Healing % 66 09/09/22 0908   Post-Procedure Length (cm) 1 cm 09/09/22 0913   Post-Procedure Width (cm) 1.5 cm 09/09/22 0913   Post-Procedure Depth (cm) 0.3 cm 09/09/22 0913   Post-Procedure Surface Area (cm^2) 1.5 cm^2 09/09/22 0913   Post-Procedure Volume (cm^3) 0.45 cm^3 09/09/22 0913   Wound Assessment Slough;Pink/red;Granulation tissue 09/09/22 0908   Drainage Amount Moderate 09/09/22 0908   Drainage Description Serosanguinous 09/09/22 0908   Wound Odor None 09/09/22 0908   Sophy-Wound/Incision Assessment Intact; Blanchable erythema 09/09/22 0908   Edges Flat/open edges 09/09/22 0908   Wound Thickness Description Full thickness 08/12/22 0904   Number of days: 93       [REMOVED] Wound Sternum (Removed)   Number of days: 14       [REMOVED] Wound Leg Right (Removed)   Number of days: 14           Total Surface Area Debrided:  <20 sq cm     Estimated Blood Loss:  None    Hemostasis Achieved: Pressure    Procedural Pain: 0 / 10     Post Procedural Pain: 3 / 10     Response to treatment: Patient tolerated treatment with mild discomfort but relieved after procedure was completed          Assessment:    s91.011s laceration without FB right ankle, sequela    Ulcer right ankle to fat    Diabetes type 1 with foot ulcer  Problem List Items Addressed This Visit          Integumentary    Ulcer of right ankle, with fat layer exposed ([facility] - Primary    Relevant Medications    lidocaine (ALOCANE) 4 % topical gel (Completed) (Start on 9/9/2022 10:00 AM)    Other Relevant Orders    INITIATE OUTPATIENT WOUND CARE PROTOCOL       Plan:     Treatment Note please see attached Discharge Instructions    Written patient dismissal instructions given to patient or POA.          Wound care with madelyn/gauze/tape every other day    Do not get wound wet or air it out    D/c peroxide to wound    Has finished oral abx course    F/u 2 weeks, wound improving  much in appearance and size    Electronically signed by Celena Salazar DPM on 9/9/2022 at 2:46 PM

## 2022-09-15 ENCOUNTER — DOCUMENTATION ONLY (OUTPATIENT)
Dept: ENDOCRINOLOGY | Age: 52
End: 2022-09-15

## 2022-09-30 ENCOUNTER — HOSPITAL ENCOUNTER (OUTPATIENT)
Dept: WOUND CARE | Age: 52
Discharge: HOME | End: 2022-09-30
Payer: COMMERCIAL

## 2022-09-30 VITALS
TEMPERATURE: 97.7 F | HEART RATE: 75 BPM | RESPIRATION RATE: 16 BRPM | DIASTOLIC BLOOD PRESSURE: 97 MMHG | SYSTOLIC BLOOD PRESSURE: 136 MMHG

## 2022-09-30 DIAGNOSIS — L97.312 ULCER OF RIGHT ANKLE, WITH FAT LAYER EXPOSED (HCC): Primary | ICD-10-CM

## 2022-09-30 PROCEDURE — 11042 DBRDMT SUBQ TIS 1ST 20SQCM/<: CPT | Performed by: PODIATRIST

## 2022-09-30 RX ORDER — GENTAMICIN SULFATE 1 MG/G
OINTMENT TOPICAL ONCE
Status: CANCELLED | OUTPATIENT
Start: 2022-09-30 | End: 2022-09-30

## 2022-09-30 RX ORDER — MUPIROCIN 20 MG/G
OINTMENT TOPICAL ONCE
Status: CANCELLED | OUTPATIENT
Start: 2022-09-30 | End: 2022-09-30

## 2022-09-30 RX ORDER — TRIAMCINOLONE ACETONIDE 1 MG/G
OINTMENT TOPICAL ONCE
Status: CANCELLED | OUTPATIENT
Start: 2022-09-30 | End: 2022-09-30

## 2022-09-30 RX ORDER — CLOBETASOL PROPIONATE 0.5 MG/G
OINTMENT TOPICAL ONCE
Status: CANCELLED | OUTPATIENT
Start: 2022-09-30 | End: 2022-09-30

## 2022-09-30 RX ORDER — LIDOCAINE 40 MG/G
CREAM TOPICAL ONCE
Status: CANCELLED | OUTPATIENT
Start: 2022-09-30 | End: 2022-09-30

## 2022-09-30 RX ORDER — BETAMETHASONE DIPROPIONATE 0.5 MG/G
OINTMENT TOPICAL ONCE
Status: CANCELLED | OUTPATIENT
Start: 2022-09-30 | End: 2022-09-30

## 2022-09-30 RX ORDER — LIDOCAINE HYDROCHLORIDE 40 MG/ML
SOLUTION TOPICAL ONCE
Status: CANCELLED | OUTPATIENT
Start: 2022-09-30 | End: 2022-09-30

## 2022-09-30 RX ORDER — SILVER SULFADIAZINE 10 G/1000G
CREAM TOPICAL ONCE
Status: CANCELLED | OUTPATIENT
Start: 2022-09-30 | End: 2022-09-30

## 2022-09-30 RX ORDER — LIDOCAINE HYDROCHLORIDE 20 MG/ML
JELLY TOPICAL ONCE
Status: CANCELLED | OUTPATIENT
Start: 2022-09-30 | End: 2022-09-30

## 2022-09-30 RX ORDER — BACITRACIN 500 [USP'U]/G
OINTMENT TOPICAL ONCE
Status: CANCELLED | OUTPATIENT
Start: 2022-09-30 | End: 2022-09-30

## 2022-09-30 RX ORDER — LIDOCAINE 50 MG/G
OINTMENT TOPICAL ONCE
Status: CANCELLED | OUTPATIENT
Start: 2022-09-30 | End: 2022-09-30

## 2022-09-30 RX ORDER — BACITRACIN ZINC AND POLYMYXIN B SULFATE 500; 1000 [USP'U]/G; [USP'U]/G
OINTMENT TOPICAL ONCE
Status: CANCELLED | OUTPATIENT
Start: 2022-09-30 | End: 2022-09-30

## 2022-09-30 NOTE — DISCHARGE INSTRUCTIONS
ischarge Instructions for  [facility]  [address] [place], [address] [facility]  Telephone: [phone]    NAME:  Ruby Longo OF BIRTH:  1970  DATE:  9/30/2022    Wound Cleansing:   Do not scrub or use excessive force. Cleanse wound prior to applying a clean dressing with:  [] Normal Saline   [] Keep Wound Dry in Shower        [] May Shower at Discharge   [x] cleanse with [place] and [place] baby shampoo lather leave 2-3 then rinse with water, pat dry and redress wound. Dressings:           Wound Location Right Lateral Ankle     Apply Primary Dressing:  Vera, gauze, kerlix, tape, single tube    Change dressing:   [] Daily      [x] Every Other Day   [] Three times per week  [] Once a week   [] Do Not Change Dressing     [] Other:    Dietary:  [x] Diet as tolerated: [] Diabetic Diet:   [x] Increase Protein: examples ( Meat, cheese, eggs, greek yogurt, premier protein drink, fish, nuts )   [] Other:    Return Appointment:  []     [x] Return Appointment: With Dr. Salvador Silver  2 weeks     Electronically signed on 9/30/2022 at [address] [place] [address] Information: Should you experience any significant changes in your wound(s) or have questions about your wound care, please contact the [facility] at [address] [place] [address] 8:00 am - 4:30. If you need help with your wound outside these hours and cannot wait until we are again available, contact your PCP or go to the hospital emergency room. PLEASE NOTE: IF YOU ARE UNABLE TO OBTAIN WOUND SUPPLIES, CONTINUE TO USE THE SUPPLIES YOU HAVE AVAILABLE UNTIL YOU ARE ABLE TO REACH US. IT IS MOST IMPORTANT TO KEEP THE WOUND COVERED AT ALL TIMES.      Physician Signature:_______________________  Dr. Salvador Silver

## 2022-09-30 NOTE — WOUND CARE
09/30/22 0909   Anesthetic   Anesthetic 4% Lidocaine Liquid Topical   Right Leg Edema Point of Measurement   Leg circumference 33.7 cm   Ankle circumference 20 cm   RLE Peripheral Vascular    Capillary Refill Less than/equal to 3 seconds   Color Appropriate for race   Temperature Warm   Pedal Pulse Palpable   Wound Ankle Lateral;Right #1   Date First Assessed: 06/08/22   Present on Hospital Admission: Yes  Wound Approximate Age at First Assessment (Weeks): 6 weeks  Primary Wound Type: Traumatic  Location: Ankle  Wound Location Orientation: Lateral;Right  Wound Description: #1   Wound Image    Cleansed Cleansed with saline   Wound Length (cm) 0.5 cm   Wound Width (cm) 0.5 cm   Wound Depth (cm) 0.1 cm   Wound Surface Area (cm^2) 0.25 cm^2   Change in Wound Size % 94.32   Wound Volume (cm^3) 0.025 cm^3   Wound Healing % 97   Wound Assessment Slough;Pink/red   Drainage Amount Moderate   Drainage Description Serous   Wound Odor None   Sophy-Wound/Incision Assessment Intact; Blanchable erythema   Edges Flat/open edges   Wound Thickness Description Full thickness   Pain 1   Pain Scale 1 Numeric (0 - 10)   Pain Intensity 1 0   Patient Stated Pain Goal 0   Visit Vitals  BP (!) 136/97 (BP 1 Location: Left arm, BP Patient Position: Sitting)   Pulse 75   Temp 97.7 °F (36.5 °C)   Resp 16

## 2022-09-30 NOTE — WOUND CARE
[address]   Progress Note and Procedure Note     [facility] RECORD NUMBER:  [id number]  AGE: 50 y.o. RACE WHITE/NON-  GENDER: female  : 1970  EPISODE DATE:  2022    Subjective:     Chief Complaint   Patient presents with    Wound Check         HISTORY of PRESENT ILLNESS HPI    Marivel Parr is a 50 y.o. female who presents today for wound/ulcer evaluation.    History of Wound Context: ran over by [employer] during team building outing at work  Joy Cabral  Pain Timing/Severity: moderate  Quality of pain: aching and dull  Severity:  3 / 10   Modifying Factors: Pain worsens with touch, ambulation  Associated Signs/Symptoms: none    Ulcer Identification:  Ulcer Type: traumatic    Contributing Factors: edema, diabetes, poor glucose control and arterial insufficiency    Wound: right lateral ankle         PAST MEDICAL HISTORY    Past Medical History:   Diagnosis Date    Adverse effect of anesthesia     difficult to wake    Chronic kidney disease     Dr Mikie Rico, stage 3 Proteinuria    CKD (chronic kidney disease) stage 3, GFR 30-59 ml/min ([facility])     Coronary artery disease 2011    with silent MI in  and s/p one stent    Diabetes mellitus type 1 ([facility] [address] 2011    Age 7, Insulin Pump    Diabetic retinopathy ([facility] [address]     Dyslipidemia 2011    GERD (gastroesophageal reflux disease)     History of peptic ulcer disease 2011    from plavix    Hypertension     denies states takes BP meds to reduce protein in kidneys due to kidney disease    PUD (peptic ulcer disease)     Stroke ([facility] [address] 2016    hx L basal ganglia bleed        PAST SURGICAL HISTORY    Past Surgical History:   Procedure Laterality Date    HX CORONARY ARTERY BYPASS GRAFT  2020    HX HEENT Bilateral     laser surgery for retinopathy on multiple occasions and vitrectomy bilateral    HX HEENT Bilateral -    cataracts Dr Lizzie Shaffer  2020    Status post open repair of umbilical hernia with mesh 2020    [place] CARDIAC SURG PROCEDURE UNLIST  2007    Dr Orellana Melcher [place] stent with Card Cath       FAMILY HISTORY    Family History   Problem Relation Age of Onset    Heart Disease Father         CABG    Diabetes Father         Type II    Hypertension Father     Stroke Father     Dementia Father     Heart Disease Paternal Grandfather     Hypertension Paternal Grandfather     Stroke Paternal Grandfather     Diabetes Maternal Grandmother         Type 2    Heart Disease Mother     Diabetes Mother         Type II    Stroke Mother     OSTEOARTHRITIS Mother     Hypertension Mother     Stroke Maternal Grandfather     Heart Disease Paternal Grandmother     Hypertension Paternal Grandmother     Stroke Paternal Grandmother     Dementia Paternal Grandmother        SOCIAL HISTORY    Social History     Tobacco Use    Smoking status: Never    Smokeless tobacco: Never   Substance Use Topics    Alcohol use: Yes     Alcohol/week: 1.0 standard drink     Types: 1 Glasses of wine per week     Comment: daily    Drug use: Never     Types: Prescription, OTC       ALLERGIES    Allergies   Allergen Reactions    Erythromycin Hives    Venlafaxine Other (comments)     Caused high blood sugars       MEDICATIONS    Current Outpatient Medications on File Prior to Encounter   Medication Sig Dispense Refill    ferrous sulfate (IRON PO) Take 1 Tablet by mouth daily. OTC Gentle Iron      insulin lispro (HUMALOG) 100 unit/mL injection USE VIA INSULIN PUMP.  UNITS PER DAY. (replaces Novolog, per insurance preference) 110 mL 3    amLODIPine (NORVASC) 5 mg tablet Take 5 mg by mouth daily. Blood-Glucose Sensor (Dexcom G6 Sensor) victor m Change sensor every 10 days. DX:E10.42 9 Each 3    gabapentin (NEURONTIN) 100 mg capsule Take 100 mg by mouth nightly. carvediloL (COREG) 6.25 mg tablet TAKE 1 TABLET TWICE A DAY (Patient taking differently: 12.5 mg two (2) times daily (with meals). ) 180 Tablet 3 rosuvastatin (CRESTOR) 20 mg tablet TAKE 1 TABLET NIGHTLY 90 Tablet 3    Insulin Pump Cartridge (Omnipod Dash 5 Pack Pod) crtg Change pod every 3 days 30 Each 3    Blood-Glucose Transmitter (Dexcom G6 Transmitter) victor m Change sensor every 10 days. [phone] Device 3    Blood-Glucose Meter,Continuous (Dexcom G6 ) misc Change sensor every 10 days. [phone] 1 Each 0    lisinopriL (PRINIVIL, ZESTRIL) 40 mg tablet 40 mg daily. aspirin delayed-release 81 mg tablet Take 81 mg by mouth nightly. ondansetron (ZOFRAN ODT) 4 mg disintegrating tablet Take 1 Tab by mouth every eight (8) hours as needed for Nausea. 10 Tab 0    hydroCHLOROthiazide (HYDRODIURIL) 25 mg tablet Take 1 Tab by mouth daily. (Patient taking differently: Take 25 mg by mouth nightly.) 14 Tab 0    cyanocobalamin, vitamin B-12, 5,000 mcg subl by SubLINGual route daily. spironolactone (ALDACTONE) 25 mg tablet Take 1 Tab by mouth three (3) days a week. (Patient taking differently: Take 25 mg by mouth daily. ) 15 Tab 0    escitalopram oxalate (LEXAPRO) 10 mg tablet Take 10 mg by mouth daily. No current facility-administered medications on file prior to encounter. REVIEW OF SYSTEMS    A comprehensive review of systems was negative except for that written in the HPI. Objective:     Visit Vitals  BP (!) 136/97 (BP 1 Location: Left arm, BP Patient Position: Sitting)   Pulse 75   Temp 97.7 °F (36.5 °C)   Resp 16       Wt Readings from Last 3 Encounters:   08/31/22 73.7 kg (162 lb 6.4 oz)   04/20/22 75.6 kg (166 lb 9.6 oz)   12/15/21 76.8 kg (169 lb 6.4 oz)       PHYSICAL EXAM    Pertinent items are noted in HPI. Objective:    Vascular:  B/L LE  DP 1/4; PT 1/4  capillary fill time brisk, pitting edema is present, skin temperature is cool, varicosities are present.     Dermatological:    Wound: right lateral ankle  Measurements: per RN note  Margins: maceration improved, epibole noted, intact, no erythema  Drainage: serous, mild  Odor: none  Wound base: 90% granular budding  Lymphangitic streaking? No.  Undermining? No.  Sinus tracts? No.  Exposed bone? No.  Subcutaneous crepitation on palpation? No.      Skin is dry and scaly, exhibits hemosiderin deposition. There is no maceration of the interspaces of the feet b/l. No hyperkeratotic lesions noted      Neurological:  DTR are present, protective sensation per 5.07 [place] Dalila monofilament is diminished, patient is AAOx3, mood is normal. Epicritic sensation is intact. Orthopedic:  B/L LE are symmetric, ROM of ankle, STJ, 1st MTPJ is limited, MMT 5 out of 5 for B/L LE. No pedal amputations ntoed    Constitutional: Pt is a well developed, well appearing female. Lymphatics: negative tenderness to palpation of neck/axillary/inguinal nodes. Imaging / Labs / Cx / Px: On file with [facility], pt relates no fx/dislocations  Debridement Wound Care        Problem List Items Addressed This Visit          Integumentary    Ulcer of right ankle, with fat layer exposed ([address] - Primary    Relevant Medications    lidocaine (ALOCANE) 4 % topical gel (Start on 9/30/2022 10:00 AM)    Other Relevant Orders    INITIATE OUTPATIENT WOUND CARE PROTOCOL       Procedure Note  Indications:  Based on my examination of this patient's wound(s)/ulcer(s) today, debridement is required to promote healing and evaluate the wound base.     Performed by: Willy Wagner DPM    Consent obtained: Yes    Time out taken: Yes    Debridement: Excisional    Using scissors and forceps the wound(s)/ulcer(s) was/were sharply debrided down through and including the removal of    subcutaneous tissue    Devitalized Tissue Debrided: slough    Pre Debridement Measurements:  Are located in the Wound/Ulcer Documentation Flow Sheet    Non-Pressure ulcer, fat layer exposed    Wound/Ulcer #: 1    Post Debridement Measurements:  Wound/Ulcer Descriptions are Pre Debridement except measurements:  WOUND POA CONDITIONS    Wound Abdomen Mid (Active)   Number of days: 1017       Wound Ankle Lateral;Right #1 (Active)   Wound Image   09/30/22 0909   Wound Etiology Traumatic 07/15/22 0913   Dressing Status Old drainage noted 06/17/22 0909   Cleansed Cleansed with saline 09/30/22 0909   Dressing/Treatment Collagen with Ag;Silicone border 05/67/10 0921   Wound Length (cm) 0.5 cm 09/30/22 0909   Wound Width (cm) 0.5 cm 09/30/22 0909   Wound Depth (cm) 0.1 cm 09/30/22 0909   Wound Surface Area (cm^2) 0.25 cm^2 09/30/22 0909   Change in Wound Size % 94.32 09/30/22 0909   Wound Volume (cm^3) 0.025 cm^3 09/30/22 0909   Wound Healing % 97 09/30/22 0909   Post-Procedure Length (cm) 0.5 cm 09/30/22 0926   Post-Procedure Width (cm) 0.5 cm 09/30/22 0926   Post-Procedure Depth (cm) 0.2 cm 09/30/22 0926   Post-Procedure Surface Area (cm^2) 0.25 cm^2 09/30/22 0926   Post-Procedure Volume (cm^3) 0.05 cm^3 09/30/22 0926   Wound Assessment Slough;[place]/red 09/30/22 0909   Drainage Amount Moderate 09/30/22 0909   Drainage Description Serous 09/30/22 0909   Wound Odor None 09/30/22 0909   Sophy-Wound/Incision Assessment Intact; Blanchable erythema 09/30/22 0909   Edges Flat/open edges 09/30/22 0909   Wound Thickness Description Full thickness 09/30/22 0909   Number of days: 114       [REMOVED] Wound Sternum (Removed)   Number of days: 14       [REMOVED] Wound Leg Right (Removed)   Number of days: 14           Total Surface Area Debrided:  <20 sq cm     Estimated Blood Loss:  None    Hemostasis Achieved: Pressure    Procedural Pain: 0 / 10     Post Procedural Pain: 3 / 10     Response to treatment: Patient tolerated treatment with mild discomfort but relieved after procedure was completed          Assessment:    s91.011s laceration without FB right ankle, sequela    Ulcer right ankle to fat    Diabetes type 1 with foot ulcer  Problem List Items Addressed This Visit          Integumentary    Ulcer of right ankle, with fat layer exposed ([address] - Primary    Relevant Medications    lidocaine (ALOCANE) 4 % topical gel (Start on 9/30/2022 10:00 AM)    Other Relevant Orders    INITIATE OUTPATIENT WOUND CARE PROTOCOL       Plan:     Treatment Note please see attached Discharge Instructions    Written patient dismissal instructions given to patient or POA.          Wound care with madelyn/gauze/tape every other day    Do not get wound wet or air it out    D/c GV, peroxide to wound    Has finished oral abx course    F/u 2 weeks, wound improving  much in appearance and size    Electronically signed by Ashwini Henry DPM on 9/30/2022 at 2:46 PM

## 2022-09-30 NOTE — WOUND CARE
09/30/22 0933   Wound Ankle Lateral;Right #1   Date First Assessed: 06/08/22   Present on Hospital Admission: Yes  Wound Approximate Age at First Assessment (Weeks): 6 weeks  Primary Wound Type: Traumatic  Location: Ankle  Wound Location Orientation: Lateral;Right  Wound Description: #1   Dressing Status New dressing applied   Dressing/Treatment Collagen with Ag;Silicone border;Gauze dressing/dressing sponge   Discharge Condition: Stable     Pain: 0    Ambulatory Status: Walking    Discharge Destination: Home     Transportation: Car    Accompanied by: Self     Discharge instructions reviewed with Patient and copy or written instructions have been provided. All questions/concerns have been addressed at this time.

## 2022-10-17 ENCOUNTER — HOSPITAL ENCOUNTER (OUTPATIENT)
Dept: WOUND CARE | Age: 52
Discharge: HOME | End: 2022-10-17
Payer: COMMERCIAL

## 2022-10-17 VITALS
RESPIRATION RATE: 16 BRPM | DIASTOLIC BLOOD PRESSURE: 70 MMHG | SYSTOLIC BLOOD PRESSURE: 176 MMHG | TEMPERATURE: 98.2 F | HEART RATE: 67 BPM

## 2022-10-17 DIAGNOSIS — L97.312 ULCER OF RIGHT ANKLE, WITH FAT LAYER EXPOSED (HCC): Primary | ICD-10-CM

## 2022-10-17 PROCEDURE — 74011000250 HC RX REV CODE- 250: Performed by: PODIATRIST

## 2022-10-17 PROCEDURE — 99213 OFFICE O/P EST LOW 20 MIN: CPT

## 2022-10-17 RX ORDER — SILVER SULFADIAZINE 10 G/1000G
CREAM TOPICAL ONCE
Status: CANCELLED | OUTPATIENT
Start: 2022-10-17 | End: 2022-10-17

## 2022-10-17 RX ORDER — LIDOCAINE 50 MG/G
OINTMENT TOPICAL ONCE
Status: CANCELLED | OUTPATIENT
Start: 2022-10-17 | End: 2022-10-17

## 2022-10-17 RX ORDER — TRIAMCINOLONE ACETONIDE 1 MG/G
OINTMENT TOPICAL ONCE
Status: CANCELLED | OUTPATIENT
Start: 2022-10-17 | End: 2022-10-17

## 2022-10-17 RX ORDER — CLOBETASOL PROPIONATE 0.5 MG/G
OINTMENT TOPICAL ONCE
Status: CANCELLED | OUTPATIENT
Start: 2022-10-17 | End: 2022-10-17

## 2022-10-17 RX ORDER — LIDOCAINE 40 MG/G
CREAM TOPICAL ONCE
Status: CANCELLED | OUTPATIENT
Start: 2022-10-17 | End: 2022-10-17

## 2022-10-17 RX ORDER — LIDOCAINE HYDROCHLORIDE 20 MG/ML
JELLY TOPICAL ONCE
Status: CANCELLED | OUTPATIENT
Start: 2022-10-17 | End: 2022-10-17

## 2022-10-17 RX ORDER — BETAMETHASONE DIPROPIONATE 0.5 MG/G
OINTMENT TOPICAL ONCE
Status: CANCELLED | OUTPATIENT
Start: 2022-10-17 | End: 2022-10-17

## 2022-10-17 RX ORDER — MUPIROCIN 20 MG/G
OINTMENT TOPICAL ONCE
Status: CANCELLED | OUTPATIENT
Start: 2022-10-17 | End: 2022-10-17

## 2022-10-17 RX ORDER — BACITRACIN ZINC AND POLYMYXIN B SULFATE 500; 1000 [USP'U]/G; [USP'U]/G
OINTMENT TOPICAL ONCE
Status: CANCELLED | OUTPATIENT
Start: 2022-10-17 | End: 2022-10-17

## 2022-10-17 RX ORDER — BACITRACIN 500 [USP'U]/G
OINTMENT TOPICAL ONCE
Status: CANCELLED | OUTPATIENT
Start: 2022-10-17 | End: 2022-10-17

## 2022-10-17 RX ORDER — GENTAMICIN SULFATE 1 MG/G
OINTMENT TOPICAL ONCE
Status: CANCELLED | OUTPATIENT
Start: 2022-10-17 | End: 2022-10-17

## 2022-10-17 RX ORDER — LIDOCAINE HYDROCHLORIDE 40 MG/ML
SOLUTION TOPICAL ONCE
Status: CANCELLED | OUTPATIENT
Start: 2022-10-17 | End: 2022-10-17

## 2022-10-17 RX ADMIN — Medication: at 09:26

## 2022-10-17 NOTE — WOUND CARE
[address]   Progress Note and Procedure Note   NO Procedure      [facility] RECORD NUMBER:  [id number]  AGE: 50 y.o. RACE WHITE/NON-  GENDER: female  : 1970  EPISODE DATE:  10/17/2022    Subjective:     Chief Complaint   Patient presents with    Wound Check     Right lateral ankle         HISTORY of PRESENT ILLNESS HPI    Guillermo Castro is a 50 y.o. female who presents today for wound/ulcer evaluation.    History of Wound Context: [place] injury in 2022   Wound/Ulcer Pain Timing/Severity: intermittent and mild  Quality of pain: burning and sharp  Severity:  2 / 10   Modifying Factors: Pain worsens with walking and Pain is relieved/improved with rest  Associated Signs/Symptoms: increased drainage    Ulcer Identification:  Ulcer Type: arterial and traumatic    Contributing Factors: diabetes, arterial insufficiency, and decreased tissue oxygenation    Wound: 1         PAST MEDICAL HISTORY    Past Medical History:   Diagnosis Date    Adverse effect of anesthesia     difficult to wake    Chronic kidney disease     Dr Lara Rangel, stage 3 Proteinuria    CKD (chronic kidney disease) stage 3, GFR 30-59 ml/min ([facility])     Coronary artery disease 2011    with silent MI in  and s/p one stent    Diabetes mellitus type 1 ([facility] [address] 2011    Age 7, Insulin Pump    Diabetic retinopathy ([facility] [address]     Dyslipidemia 2011    GERD (gastroesophageal reflux disease)     History of peptic ulcer disease 2011    from plavix    Hypertension     denies states takes BP meds to reduce protein in kidneys due to kidney disease    PUD (peptic ulcer disease)     Stroke ([facility] [address] 2016    hx L basal ganglia bleed        PAST SURGICAL HISTORY    Past Surgical History:   Procedure Laterality Date    HX CORONARY ARTERY BYPASS GRAFT  2020    HX HEENT Bilateral     laser surgery for retinopathy on multiple occasions and vitrectomy bilateral    HX HEENT Bilateral 2013-14    cataracts Dr Beck Beard    [address]  01/2020    Status post open repair of umbilical hernia with mesh 1/2020    [place] CARDIAC SURG PROCEDURE UNLIST  2007    Dr Gerard Correia stent with Card Cath       FAMILY HISTORY    Family History   Problem Relation Age of Onset    Heart Disease Father         CABG    Diabetes Father         Type II    Hypertension Father     Stroke Father     Dementia Father     Heart Disease Paternal Grandfather     Hypertension Paternal Grandfather     Stroke Paternal Grandfather     Diabetes Maternal Grandmother         Type 2    Heart Disease Mother     Diabetes Mother         Type II    Stroke Mother     OSTEOARTHRITIS Mother     Hypertension Mother     Stroke Maternal Grandfather     Heart Disease Paternal Grandmother     Hypertension Paternal Grandmother     Stroke Paternal Grandmother     Dementia Paternal Grandmother        SOCIAL HISTORY    Social History     Tobacco Use    Smoking status: Never    Smokeless tobacco: Never   Substance Use Topics    Alcohol use: Yes     Alcohol/week: 1.0 standard drink     Types: 1 Glasses of wine per week     Comment: daily    Drug use: Never     Types: Prescription, OTC       ALLERGIES    Allergies   Allergen Reactions    Erythromycin Hives    Venlafaxine Other (comments)     Caused high blood sugars       MEDICATIONS    Current Outpatient Medications on File Prior to Encounter   Medication Sig Dispense Refill    ferrous sulfate (IRON PO) Take 1 Tablet by mouth daily. OTC Gentle Iron      amLODIPine (NORVASC) 5 mg tablet Take 5 mg by mouth daily. gabapentin (NEURONTIN) 100 mg capsule Take 100 mg by mouth nightly. carvediloL (COREG) 6.25 mg tablet TAKE 1 TABLET TWICE A DAY (Patient taking differently: 12.5 mg two (2) times daily (with meals). ) 180 Tablet 3    rosuvastatin (CRESTOR) 20 mg tablet TAKE 1 TABLET NIGHTLY 90 Tablet 3    lisinopriL (PRINIVIL, ZESTRIL) 40 mg tablet 40 mg daily.       aspirin delayed-release 81 mg tablet Take 81 mg by mouth nightly. hydroCHLOROthiazide (HYDRODIURIL) 25 mg tablet Take 1 Tab by mouth daily. (Patient taking differently: Take 25 mg by mouth nightly.) 14 Tab 0    cyanocobalamin, vitamin B-12, 5,000 mcg subl by SubLINGual route daily. spironolactone (ALDACTONE) 25 mg tablet Take 1 Tab by mouth three (3) days a week. (Patient taking differently: Take 25 mg by mouth daily. ) 15 Tab 0    escitalopram oxalate (LEXAPRO) 10 mg tablet Take 10 mg by mouth daily. insulin lispro (HUMALOG) 100 unit/mL injection USE VIA INSULIN PUMP.  UNITS PER DAY. (replaces Novolog, per insurance preference) 110 mL 3    Blood-Glucose Sensor (Dexcom G6 Sensor) victor m Change sensor every 10 days. DX:E10.42 9 Each 3    Insulin Pump Cartridge (Omnipod Dash 5 Pack Pod) crtg Change pod every 3 days 30 Each 3    Blood-Glucose Transmitter (Dexcom G6 Transmitter) victor m Change sensor every 10 days. [phone] Device 3    Blood-Glucose Meter,Continuous (Dexcom G6 ) misc Change sensor every 10 days. [phone] 80 1 Each 0    ondansetron (ZOFRAN ODT) 4 mg disintegrating tablet Take 1 Tab by mouth every eight (8) hours as needed for Nausea. 10 Tab 0     No current facility-administered medications on file prior to encounter. REVIEW OF SYSTEMS    A comprehensive review of systems was negative except for that written in the HPI. Objective:     Visit Vitals  BP (!) 176/70 (BP 1 Location: Left upper arm, BP Patient Position: Sitting)   Pulse 67   Temp 98.2 °F (36.8 °C)   Resp 16       Wt Readings from Last 3 Encounters:   08/31/22 73.7 kg (162 lb 6.4 oz)   04/20/22 75.6 kg (166 lb 9.6 oz)   12/15/21 76.8 kg (169 lb 6.4 oz)       PHYSICAL EXAM    Pertinent items are noted in HPI. Dermatological:  Nails trimmed and painted. There are extensive skin cracks at both heels. Skin is dry and scaly, exhibits hemosiderin deposition. There is no maceration of the interspaces of the feet b/l.       See wound description below. Neurological:  DTR are present, protective sensation per 5.07 [place] Dalila monofilament is diminished, patient is AAOx3, mood is normal. Epicritic sensation is intact. Orthopedic:  B/L LE are symmetric, ROM of ankle, STJ, 1st MTPJ is limited, MMT 5 out of 5 for B/L LE. Constitutional: Pt is a well developed, average middle aged  female. Lymphatics: negative tenderness to palpation of neck/axillary/inguinal nodes. Assessment:      Problem List Items Addressed This Visit          Integumentary    Ulcer of right ankle, with fat layer exposed ([address] - Primary    Relevant Medications    lidocaine (ALOCANE) 4 % topical gel (Completed)    Other Relevant Orders    INITIATE OUTPATIENT WOUND CARE PROTOCOL       Procedure Note  Indications:  Based on my examination of this patient's wound(s)/ulcer(s) today, debridement is not required to promote healing and evaluate the wound base. Wound Abdomen Mid (Active)   Number of days: 1034       Wound Ankle Lateral;Right #1 (Active)   Wound Image   10/17/22 0924   Wound Etiology Traumatic 07/15/22 0913   Dressing Status New dressing applied 09/30/22 0933   Cleansed Cleansed with saline 10/17/22 0924   Dressing/Treatment Gauze dressing/dressing sponge;Tape/Soft cloth adhesive tape; Other (Comment) 10/17/22 0949   Wound Length (cm) 0.3 cm 10/17/22 0924   Wound Width (cm) 0.3 cm 10/17/22 0924   Wound Depth (cm) 0 cm 10/17/22 0924   Wound Surface Area (cm^2) 0.09 cm^2 10/17/22 0924   Change in Wound Size % 97.95 10/17/22 0924   Wound Volume (cm^3) 0 cm^3 10/17/22 0924   Wound Healing % 100 10/17/22 0924   Post-Procedure Length (cm) 0.5 cm 09/30/22 0926   Post-Procedure Width (cm) 0.5 cm 09/30/22 0926   Post-Procedure Depth (cm) 0.2 cm 09/30/22 0926   Post-Procedure Surface Area (cm^2) 0.25 cm^2 09/30/22 0926   Post-Procedure Volume (cm^3) 0.05 cm^3 09/30/22 0926   Wound Assessment Other (Comment) 10/17/22 0924   Drainage Amount Scant 10/17/22 0924   Drainage Description Serosanguinous 10/17/22 0924   Wound Odor None 10/17/22 0924   Sophy-Wound/Incision Assessment Blanchable erythema 10/17/22 0924   Edges Flat/open edges 10/17/22 0924   Wound Thickness Description Full thickness 09/30/22 0909   Number of days: 131        Plan:   L97.312 - RT ankle ulcer fat  E11.621 - DM with foot ulcer  I70.203 - PVD with ulcer    - Pt evaluated and tx.  - Wound continues to slowly improve. - Noted could be vascular component given her h/o coronary bypass sx, noted if persists or she develops calf pain or other symptoms would refer her for vascular workup to see if she is a candidate for angiography, discussed what is involved with angiography vs LE bypass. - GV gauze tape. - Follow up 2 wks or sooner prn further issues. Treatment Note please see attached Discharge Instructions    Written patient dismissal instructions given to patient or POA.          Electronically signed by Ammy Ovalle DPM on 10/17/2022 at 11:25 AM

## 2022-10-17 NOTE — WOUND CARE
10/17/22 0924   Anesthetic   Anesthetic 4% Lidocaine Liquid Topical   Wound Ankle Lateral;Right #1   Date First Assessed: 06/08/22   Present on Hospital Admission: Yes  Wound Approximate Age at First Assessment (Weeks): 6 weeks  Primary Wound Type: Traumatic  Location: Ankle  Wound Location Orientation: Lateral;Right  Wound Description: #1   Wound Image    Cleansed Cleansed with saline   Wound Length (cm) 0.3 cm   Wound Width (cm) 0.3 cm   Wound Depth (cm) 0 cm   Wound Surface Area (cm^2) 0.09 cm^2   Change in Wound Size % 97.95   Wound Volume (cm^3) 0 cm^3   Wound Healing % 100   Wound Assessment Other (Comment)  (scab)   Drainage Amount Scant   Drainage Description Serosanguinous   Wound Odor None   Sophy-Wound/Incision Assessment Blanchable erythema   Edges Flat/open edges   Pain 1   Pain Scale 1 Numeric (0 - 10)   Pain Intensity 1 0   Visit Vitals  BP (!) 176/70 (BP 1 Location: Left upper arm, BP Patient Position: Sitting)   Pulse 67   Temp 98.2 °F (36.8 °C)   Resp 16

## 2022-10-17 NOTE — WOUND CARE
10/17/22 0949   Wound Ankle Lateral;Right #1   Date First Assessed: 06/08/22   Present on Hospital Admission: Yes  Wound Approximate Age at First Assessment (Weeks): 6 weeks  Primary Wound Type: Traumatic  Location: Ankle  Wound Location Orientation: Lateral;Right  Wound Description: #1   Dressing/Treatment Gauze dressing/dressing sponge;Tape/Soft cloth adhesive tape; Other (Comment)  (gentian violet)   Discharge Condition: Stable     Pain: 0    Ambulatory Status: Walking    Discharge Destination: Home     Transportation: Car    Accompanied by: Self     Discharge instructions reviewed with Patient and copy or written instructions have been provided. All questions/concerns have been addressed at this time.

## 2022-10-17 NOTE — DISCHARGE INSTRUCTIONS
Discharge Instructions for  [facility]  [address] [place], [address] [facility]  Telephone: [phone]    NAME:  Rossy Deras OF BIRTH:  1970  DATE:  10/17/2022    Wound Cleansing:   Do not scrub or use excessive force. Cleanse wound prior to applying a clean dressing with:  [] Normal Saline   [x] Keep Wound Dry in Shower      [] May Shower at Discharge   [x] cleanse with Rosangela Fish and Rosangela Fish baby shampoo lather leave 2-3 then rinse with water, pat dry and redress wound. Dressings:           Wound Location Right Lateral Ankle     Apply Primary Dressing:  Gentian Violet wet to dry, gauze, tape, single tube    Change dressing:   [] Daily      [x] Every Other Day   [] Three times per week  [] Once a week   [] Other:    Dietary:  [x] Diet as tolerated: [] Diabetic Diet:   [x] Increase Protein: examples ( Meat, cheese, eggs, greek yogurt, premier protein drink, fish, nuts )   [] Other:    Return Appointment:    [x] Return Appointment: With Dr. Ervin Holm  2 weeks     Electronically signed on 10/17/2022 at [facility]: Should you experience any significant changes in your wound(s) or have questions about your wound care, please contact the [address] [place] [address] at [address] [place] [address] 8:00 am - 4:30. If you need help with your wound outside these hours and cannot wait until we are again available, contact your PCP or go to the hospital emergency room. PLEASE NOTE: IF YOU ARE UNABLE TO OBTAIN WOUND SUPPLIES, CONTINUE TO USE THE SUPPLIES YOU HAVE AVAILABLE UNTIL YOU ARE ABLE TO REACH US. IT IS MOST IMPORTANT TO KEEP THE WOUND COVERED AT ALL TIMES.      Physician Signature:_______________________  Dr. Ervin Holm

## 2022-10-18 RX ORDER — INSULIN PMP CART,AUT,G6/7,CNTR
1 EACH SUBCUTANEOUS
Qty: 2 BOX | Refills: 11 | Status: SHIPPED | OUTPATIENT
Start: 2022-10-18

## 2022-10-18 RX ORDER — INSULIN PMP CART,AUT,G6/7,CNTR
1 EACH SUBCUTANEOUS
Qty: 1 KIT | Refills: 0 | Status: SHIPPED | OUTPATIENT
Start: 2022-10-18

## (undated) DEVICE — BANDAGE COMPR M W6INXL10YD WHT BGE VELC E MTRX HK AND LOOP

## (undated) DEVICE — BLADE OPHTH KNF D3MM 15DEG CATRCT BLU MICRO-SHARP

## (undated) DEVICE — GLOVE SURG SZ 7.5 L11.2IN THK9.8MIL STRW LTX POLYMER BEAD

## (undated) DEVICE — ROCKER SWITCH PENCIL HOLSTER: Brand: VALLEYLAB

## (undated) DEVICE — REM POLYHESIVE ADULT PATIENT RETURN ELECTRODE: Brand: VALLEYLAB

## (undated) DEVICE — DERMABOND SKIN ADH 0.7ML -- DERMABOND ADVANCED 12/BX

## (undated) DEVICE — SUTURE SZ 7 L18IN NONABSORBABLE SIL CCS L48MM 1/2 CIR STRNM M655G

## (undated) DEVICE — Device

## (undated) DEVICE — AVID DUAL STAGE VENOUS DRAINAGE CANNULA: Brand: AVID DUAL STAGE VENOUS DRAINAGE CANNULA

## (undated) DEVICE — INSERT SUT HLD F/OCTBS RETRCTR --

## (undated) DEVICE — KIT,1200CC CANISTER,3/16"X6' TUBING: Brand: MEDLINE INDUSTRIES, INC.

## (undated) DEVICE — SUTURE PROL SZ 5-0 L30IN NONABSORBABLE BLU L13MM RB-2 1/2 8710H

## (undated) DEVICE — BANDAGE COMPR ELASTIC 5 YDX6 IN

## (undated) DEVICE — SOLUTION IV 1000ML 0.9% SOD CHL

## (undated) DEVICE — CARD SMRT DISP TRANSIT TIME MEDISTEM VERIQ

## (undated) DEVICE — SUTURE PROL SZ 4-0 L36IN NONABSORBABLE BLU L26MM SH 1/2 CIR 8521H

## (undated) DEVICE — COVER LT HNDL PLAS RIG 1 PER PK

## (undated) DEVICE — PROCEDURE KIT FLUID MGMT CUST MAINFOLD STRL

## (undated) DEVICE — AGENT HEMSTAT W4XL4IN OXIDIZED REGENERATED CELOS ABSRB SFT

## (undated) DEVICE — KIT BLWR MISTER 5P 15L W/ TBNG SET IRRIG MIST TO IMPROVE

## (undated) DEVICE — STRAP,POSITIONING,KNEE/BODY,FOAM,4X60": Brand: MEDLINE

## (undated) DEVICE — SUTURE PROL SZ 6-0 L24IN NONABSORBABLE BLU L13MM C-1 3/8 8726H

## (undated) DEVICE — CLIP INT SM WIDE RED TI TRNSVRS GRV CHEVRON SHP W PRECIS

## (undated) DEVICE — PAD,ABDOMINAL,5"X9",ST,LF,25/BX: Brand: MEDLINE INDUSTRIES, INC.

## (undated) DEVICE — 1000ML,PRESSURE INFUSER W/STOPCOCK: Brand: MEDLINE

## (undated) DEVICE — DRAIN,WOUND,ROUND,24FR,5/16",FULL-FLUTED: Brand: MEDLINE

## (undated) DEVICE — GOWN,SIRUS,NONRNF,SETINSLV,XL,20/CS: Brand: MEDLINE

## (undated) DEVICE — NEEDLE HYPO 18GA L1.5IN PNK S STL HUB POLYPR SHLD REG BVL

## (undated) DEVICE — STERNUM BLADE, OFFSET (31.7 X 0.64 X 6.3MM)

## (undated) DEVICE — SUT PROL 7-0 24IN BV1 DA BLU --

## (undated) DEVICE — SOLUTION IV 500ML 0.9% SOD CHL FLX CONT

## (undated) DEVICE — STERILE POLYISOPRENE POWDER-FREE SURGICAL GLOVES WITH EMOLLIENT COATING: Brand: PROTEXIS

## (undated) DEVICE — HYPODERMIC SAFETY NEEDLE: Brand: MAGELLAN

## (undated) DEVICE — SUTURE PROL SZ 8-0 L24IN NONABSORBABLE BLU L6.5MM BV130-5 8732H

## (undated) DEVICE — TRANSFER PK BLD PROD 300ML --

## (undated) DEVICE — INFECTION CONTROL KIT SYS

## (undated) DEVICE — TTL1LYR 16FR10ML 100%SIL TMPST TR: Brand: MEDLINE

## (undated) DEVICE — TUBING SUCT 12FR MAL ALUM SHFT FN CAP VENT UNIV CONN W/ OBT

## (undated) DEVICE — LEAD PCMKR MYOCARDL BPLR TEMP. --

## (undated) DEVICE — HANDLE LT SNAP ON ULT DURABLE LENS FOR TRUMPF ALC DISPOSABLE

## (undated) DEVICE — SOLUTION IRRIG 1000ML H2O STRL BLT

## (undated) DEVICE — SOLUTION IV 1000ML PH 7.4 INJ NRMSOL R

## (undated) DEVICE — DRESSING SIL W4XL5IN ANTIBACT GELLING FBR CYTOFORM

## (undated) DEVICE — SYS INCISION MANAGEMENT -- PREVENA

## (undated) DEVICE — PINNACLE INTRODUCER SHEATH: Brand: PINNACLE

## (undated) DEVICE — SUTURE PROL SZ 2-0 L36IN NONABSORBABLE BLU SH L26MM 1/2 CIR 8523H

## (undated) DEVICE — STRIP,CLOSURE,WOUND,MEDI-STRIP,1/2X4: Brand: MEDLINE

## (undated) DEVICE — SUTURE VCRL SZ 3-0 L54IN ABSRB UD LIGAPAK REEL POLYGLACTIN J285G

## (undated) DEVICE — KIT,ANTI FOG,W/SPONGE & FLUID,SOFT PACK: Brand: MEDLINE

## (undated) DEVICE — NEEDLE ANGIO 18GA L9CM NRML 1 WALL SMOOTH FINISH CLR HUB FOR

## (undated) DEVICE — INTENDED FOR TISSUE SEPARATION, AND OTHER PROCEDURES THAT REQUIRE A SHARP SURGICAL BLADE TO PUNCTURE OR CUT.: Brand: BARD-PARKER ® CARBON RIB-BACK BLADES

## (undated) DEVICE — CANNULA PERF 15FR L12.5IN RG STPCOCK WIREWOUND BODY

## (undated) DEVICE — SYS VSL HARV HEMOPRO2 VASOVIEW -- HARV SYS MINIMUM ORDER 5/EA

## (undated) DEVICE — SUTURE DEK POLY GRN BR SZ3 0 T 2 2N 6716

## (undated) DEVICE — INTENDED TO STANDARDIZE OR CAMERAS TO ALLOW FOR THE USE OF THE OR CAMERA COVER: Brand: ASPEN® O.R. CAMERA COVER

## (undated) DEVICE — DRAPE SLUSH DISC W44XL66IN ST FOR RND BSIN HUSH SLUSH SYS

## (undated) DEVICE — NEEDLE HYPO 25GA L1.5IN BVL ORIENTED ECLIPSE

## (undated) DEVICE — U-SHAPED STYLE, SILICONE (1 PER STERILE PKG): Brand: SCANLAN® A/C LOCATOR® GRAFT MARKER

## (undated) DEVICE — SPONGE GZ W4XL4IN COT 12 PLY TYP VII WVN C FLD DSGN

## (undated) DEVICE — CATH DIAG-D 6F MULTI PIG 155 5 -- IMPULSE 16599-302

## (undated) DEVICE — SYR 10ML LUER LOK 1/5ML GRAD --

## (undated) DEVICE — 6 FOOT DISPOSABLE EXTENSION CABLE WITH SAFE CONNECT / SCREW-DOWN

## (undated) DEVICE — TIDISHIELD TRANSPORT, CONTAINMENT COVER FOR BACK TABLE 4'6" (1.37M) TO 8' (2.43M) IN LENGTH: Brand: TIDISHIELD

## (undated) DEVICE — CABLE PACE ALGTR CLP SAF 12FT --

## (undated) DEVICE — TBG INSUFFLATION LUER LOCK: Brand: MEDLINE INDUSTRIES, INC.

## (undated) DEVICE — 3M™ IOBAN™ 2 ANTIMICROBIAL INCISE DRAPE 6650EZ: Brand: IOBAN™ 2

## (undated) DEVICE — TEMP PACING WIRE: Brand: MYO/WIRE

## (undated) DEVICE — CATHETER IV 14GA L2IN POLYUR STR ORNG HUB SFTY RADPQ DISP

## (undated) DEVICE — BAG RED 3PLY 2MIL 30X40 IN

## (undated) DEVICE — 40418 TRENDELENBURG ONE-STEP ARM PROTECTORS LARGE (1 PAIR): Brand: 40418 TRENDELENBURG ONE-STEP ARM PROTECTORS LARGE (1 PAIR)

## (undated) DEVICE — STERILE POLYISOPRENE POWDER-FREE SURGICAL GLOVES: Brand: PROTEXIS

## (undated) DEVICE — WRAP SURG W1.31XL1.34M CARD FOR PT 165-172CM THERMOWRP

## (undated) DEVICE — SUTURE MCRYL SZ 3-0 L27IN ABSRB UD L24MM PS-1 3/8 CIR PRIM Y936H

## (undated) DEVICE — SYR 50ML LR LCK 1ML GRAD NSAF --

## (undated) DEVICE — SPONGE GZ W4XL4IN COT RADPQ HIGHLY ABSRB

## (undated) DEVICE — TUBING, SUCTION, 1/4" X 12', STRAIGHT: Brand: MEDLINE

## (undated) DEVICE — DRAPE PRB US TRNSDCR 6X96IN --

## (undated) DEVICE — PRESSURE MONITORING SET: Brand: TRUWAVE

## (undated) DEVICE — SUT CHRMC 3-0 27IN SH BRN --

## (undated) DEVICE — PACK PROCEDURE SURG HRT CATH

## (undated) DEVICE — (D)PREP SKN CHLRAPRP APPL 26ML -- CONVERT TO ITEM 371833

## (undated) DEVICE — BLADE OPHTH 180DEG CUT SURF BLU STR SHRP DBL BVL GRINDLESS

## (undated) DEVICE — PREP SKN PREVAIL 40ML APPL --

## (undated) DEVICE — SUTURE VCRL SZ 4-0 L18IN ABSRB UD L19MM PS-2 3/8 CIR PRIM J496H

## (undated) DEVICE — AORTIC PUNCHES ARE USED TO CREATE A UNIFORM OPENING IN BLOOD VESSELS DURING CARDIOVASCULAR SURGERY. THE VESSEL GRAFT IS INSERTED INTO THE CREATED OPENING AND SUTURED TO THE VESSEL WALL. AORTIC LANCETS ARE USED TO MAKE A SMALL UNIFORM CUT IN A BLOOD VESSEL TO FACILITATE INSERTION OF AN AORTIC PUNCH.  PUNCHES COME IN VARIOUS LENGTHS, DIAMETERS AND TIP CONFIGURATIONS.: Brand: CLEANCUT ROTATING AORTIC PUNCH

## (undated) DEVICE — DRSG BORDR MPLX HEEL 8.7X9.1IN --

## (undated) DEVICE — GARMENT,MEDLINE,DVT,INT,CALF,MED, GEN2: Brand: MEDLINE

## (undated) DEVICE — DRAPE FLD WRM W44XL66IN C6L FOR INTRATEMP SYS THERMABASIN